# Patient Record
Sex: MALE | Race: WHITE | Employment: FULL TIME | URBAN - METROPOLITAN AREA
[De-identification: names, ages, dates, MRNs, and addresses within clinical notes are randomized per-mention and may not be internally consistent; named-entity substitution may affect disease eponyms.]

---

## 2018-07-30 ENCOUNTER — OFFICE VISIT (OUTPATIENT)
Dept: FAMILY MEDICINE CLINIC | Age: 48
End: 2018-07-30

## 2018-07-30 VITALS
WEIGHT: 241 LBS | DIASTOLIC BLOOD PRESSURE: 78 MMHG | RESPIRATION RATE: 16 BRPM | HEART RATE: 78 BPM | SYSTOLIC BLOOD PRESSURE: 130 MMHG | OXYGEN SATURATION: 97 % | BODY MASS INDEX: 33.74 KG/M2 | HEIGHT: 71 IN

## 2018-07-30 DIAGNOSIS — I10 ESSENTIAL HYPERTENSION: ICD-10-CM

## 2018-07-30 DIAGNOSIS — S31.809A WOUND OF BUTTOCK, UNSPECIFIED LATERALITY, INITIAL ENCOUNTER: ICD-10-CM

## 2018-07-30 DIAGNOSIS — I48.91 ATRIAL FIBRILLATION, UNSPECIFIED TYPE (HCC): Primary | ICD-10-CM

## 2018-07-30 DIAGNOSIS — Z94.5 HISTORY OF SKIN GRAFT: ICD-10-CM

## 2018-07-30 DIAGNOSIS — E11.9 TYPE 2 DIABETES MELLITUS WITHOUT COMPLICATION, WITHOUT LONG-TERM CURRENT USE OF INSULIN (HCC): Primary | ICD-10-CM

## 2018-07-30 PROCEDURE — 99203 OFFICE O/P NEW LOW 30 MIN: CPT | Performed by: FAMILY MEDICINE

## 2018-07-30 RX ORDER — METOPROLOL TARTRATE 50 MG/1
50 TABLET, FILM COATED ORAL 2 TIMES DAILY
COMMUNITY
End: 2018-07-30 | Stop reason: SDUPTHER

## 2018-07-30 RX ORDER — LISINOPRIL 20 MG/1
20 TABLET ORAL DAILY
COMMUNITY
End: 2018-07-30 | Stop reason: SDUPTHER

## 2018-07-30 RX ORDER — LISINOPRIL 20 MG/1
20 TABLET ORAL DAILY
Qty: 30 TABLET | Refills: 2 | Status: SHIPPED | OUTPATIENT
Start: 2018-07-30 | End: 2018-10-24 | Stop reason: SDUPTHER

## 2018-07-30 RX ORDER — METOPROLOL TARTRATE 50 MG/1
50 TABLET, FILM COATED ORAL 2 TIMES DAILY
Qty: 60 TABLET | Refills: 2 | Status: SHIPPED | OUTPATIENT
Start: 2018-07-30 | End: 2018-10-24 | Stop reason: SDUPTHER

## 2018-07-30 ASSESSMENT — PATIENT HEALTH QUESTIONNAIRE - PHQ9
2. FEELING DOWN, DEPRESSED OR HOPELESS: 0
SUM OF ALL RESPONSES TO PHQ9 QUESTIONS 1 & 2: 0
1. LITTLE INTEREST OR PLEASURE IN DOING THINGS: 0
SUM OF ALL RESPONSES TO PHQ QUESTIONS 1-9: 0

## 2018-07-30 ASSESSMENT — ENCOUNTER SYMPTOMS: DIARRHEA: 0

## 2018-07-30 NOTE — PROGRESS NOTES
7/30/2018    This is a 53 y.o. male   Chief Complaint   Patient presents with   Dante Samuels Established New Doctor     Had A1C and other labs done on Friday will send copy   . HPI  Pt presents today as a new pt to establish a PCP. Past medical history includes A. Fib, hypertension, and type 2 diabetes. States that he had his hemoglobin A1C and additional labs done 3 days ago. Sees an Endocrinologist.    States that he has had 4 surgeries including a graft for an anal hair follicle that spread to his scrotum and developed into flesh eating bacteria. Most recent skin graft was in early July. Sees a wound specialist and has an appointment in 3 days. Uses Xeroform dressing. Will go back to [facility] for colostomy reversal later this year. Past Medical History:   Diagnosis Date    Allergic rhinitis     Atrial fibrillation (HCC)     Hyperlipidemia     Hypertension     Type 2 diabetes mellitus without complication [facility])        Past Surgical History:   Procedure Laterality Date    COLONOSCOPY  06/05/2018    INFECTED SKIN DEBRIDEMENT  06/05/2018-06/08/2018    Removal of infected tissue and flesh from anus scrotum and surrounding area    KNEE ARTHROSCOPY Left     SKIN GRAFT  07/01/2018       Social History     Social History    Marital status:      Spouse name: N/A    Number of children: N/A    Years of education: N/A     Occupational History    Not on file.      Social History Main Topics    Smoking status: Never Smoker    Smokeless tobacco: Never Used    Alcohol use Yes      Comment: monthly 3 beers    Drug use: No    Sexual activity: Yes     Partners: Female     Other Topics Concern    Not on file     Social History Narrative    No narrative on file       Family History   Problem Relation Age of Onset    Diabetes Father        Current Outpatient Prescriptions   Medication Sig Dispense Refill    sitaGLIPtan-metformin (JANUMET)  MG per tablet Take 1 tablet by mouth 2 times daily (with meals)      lisinopril (PRINIVIL;ZESTRIL) 20 MG tablet Take 20 mg by mouth daily      metoprolol tartrate (LOPRESSOR) 50 MG tablet Take 50 mg by mouth 2 times daily       No current facility-administered medications for this visit. There is no immunization history on file for this patient. Allergies   Allergen Reactions    No Known Allergies        No results found for any previous visit. Review of Systems   Constitutional: Negative for chills and fever. Gastrointestinal: Negative for diarrhea. Genitourinary:        Healing anal/scrotal graft       /78 (Site: Left Arm, Position: Sitting, Cuff Size: Medium Adult)   Pulse 78   Resp 16   Ht 5' 10.5\" (1.791 m)   Wt 241 lb (109.3 kg)   SpO2 97%   BMI 34.09 kg/m²     Physical Exam   Constitutional: He is oriented to person, place, and time. He appears well-developed and well-nourished. HENT:   Head: Normocephalic and atraumatic. Eyes: EOM are normal. Pupils are equal, round, and reactive to light. Neck: Normal range of motion. Cardiovascular: Normal rate, regular rhythm and normal heart sounds. Pulmonary/Chest: Effort normal and breath sounds normal.   Abdominal: Bowel sounds are normal. There is no tenderness. Neurological: He is alert and oriented to person, place, and time. Psychiatric: He has a normal mood and affect. His behavior is normal. Judgment and thought content normal.       Plan   Diagnosis Orders   1. Type 2 diabetes mellitus without complication, without long-term current use of insulin ([facility] [address]  Patient had A1C done 3 days ago, as well as other labs and will have them sent to us. 2. Essential hypertension     3. History of skin graft    4. Wound Buttock    Return in about 2 months (around 9/30/2018) for Physical Exam.    Prior to Visit Medications    Medication Sig Taking?  Authorizing Provider   sitaGLIPtan-metformin (JANUMET)  MG per tablet Take 1 tablet by mouth 2 times daily (with meals)

## 2018-10-24 ENCOUNTER — OFFICE VISIT (OUTPATIENT)
Dept: FAMILY MEDICINE CLINIC | Age: 48
End: 2018-10-24

## 2018-10-24 VITALS
WEIGHT: 254 LBS | HEART RATE: 83 BPM | HEIGHT: 71 IN | BODY MASS INDEX: 35.56 KG/M2 | TEMPERATURE: 98.1 F | SYSTOLIC BLOOD PRESSURE: 153 MMHG | DIASTOLIC BLOOD PRESSURE: 97 MMHG | RESPIRATION RATE: 16 BRPM

## 2018-10-24 DIAGNOSIS — E11.9 TYPE 2 DIABETES MELLITUS WITHOUT COMPLICATION, WITHOUT LONG-TERM CURRENT USE OF INSULIN (HCC): ICD-10-CM

## 2018-10-24 DIAGNOSIS — I48.91 ATRIAL FIBRILLATION, UNSPECIFIED TYPE (HCC): ICD-10-CM

## 2018-10-24 DIAGNOSIS — J30.2 SEASONAL ALLERGIES: ICD-10-CM

## 2018-10-24 DIAGNOSIS — I10 ESSENTIAL HYPERTENSION: ICD-10-CM

## 2018-10-24 DIAGNOSIS — Z00.00 ANNUAL PHYSICAL EXAM: Primary | ICD-10-CM

## 2018-10-24 LAB
CREATININE URINE POCT: ABNORMAL
HBA1C MFR BLD: 7.7 %
MICROALBUMIN/CREAT 24H UR: ABNORMAL MG/G{CREAT}
MICROALBUMIN/CREAT UR-RTO: ABNORMAL

## 2018-10-24 PROCEDURE — 82044 UR ALBUMIN SEMIQUANTITATIVE: CPT | Performed by: FAMILY MEDICINE

## 2018-10-24 PROCEDURE — 99396 PREV VISIT EST AGE 40-64: CPT | Performed by: FAMILY MEDICINE

## 2018-10-24 PROCEDURE — 83036 HEMOGLOBIN GLYCOSYLATED A1C: CPT | Performed by: FAMILY MEDICINE

## 2018-10-24 RX ORDER — FLUTICASONE PROPIONATE 50 MCG
1 SPRAY, SUSPENSION (ML) NASAL DAILY
Qty: 1 BOTTLE | Refills: 0 | Status: SHIPPED | OUTPATIENT
Start: 2018-10-24 | End: 2019-12-05

## 2018-10-24 RX ORDER — LISINOPRIL 20 MG/1
20 TABLET ORAL DAILY
Qty: 30 TABLET | Refills: 2 | Status: SHIPPED | OUTPATIENT
Start: 2018-10-24 | End: 2019-02-08 | Stop reason: SDUPTHER

## 2018-10-24 RX ORDER — METOPROLOL TARTRATE 50 MG/1
50 TABLET, FILM COATED ORAL 2 TIMES DAILY
Qty: 60 TABLET | Refills: 2 | Status: SHIPPED | OUTPATIENT
Start: 2018-10-24 | End: 2019-02-08 | Stop reason: SDUPTHER

## 2018-10-24 ASSESSMENT — PATIENT HEALTH QUESTIONNAIRE - PHQ9
2. FEELING DOWN, DEPRESSED OR HOPELESS: 0
1. LITTLE INTEREST OR PLEASURE IN DOING THINGS: 0
SUM OF ALL RESPONSES TO PHQ QUESTIONS 1-9: 0
SUM OF ALL RESPONSES TO PHQ9 QUESTIONS 1 & 2: 0
SUM OF ALL RESPONSES TO PHQ QUESTIONS 1-9: 0

## 2018-10-24 ASSESSMENT — ENCOUNTER SYMPTOMS
ABDOMINAL PAIN: 0
SHORTNESS OF BREATH: 0
COLOR CHANGE: 0
COUGH: 1
CONSTIPATION: 0
EYE PAIN: 0
DIARRHEA: 0
BACK PAIN: 0
EYE ITCHING: 0

## 2019-02-08 DIAGNOSIS — I10 ESSENTIAL HYPERTENSION: ICD-10-CM

## 2019-02-08 DIAGNOSIS — I48.91 ATRIAL FIBRILLATION, UNSPECIFIED TYPE (HCC): ICD-10-CM

## 2019-02-08 RX ORDER — METOPROLOL TARTRATE 50 MG/1
TABLET, FILM COATED ORAL
Qty: 60 TABLET | Refills: 2 | Status: SHIPPED | OUTPATIENT
Start: 2019-02-08 | End: 2019-05-10 | Stop reason: SDUPTHER

## 2019-02-08 RX ORDER — LISINOPRIL 20 MG/1
TABLET ORAL
Qty: 30 TABLET | Refills: 2 | Status: SHIPPED | OUTPATIENT
Start: 2019-02-08 | End: 2019-05-10 | Stop reason: SDUPTHER

## 2019-02-11 PROBLEM — T81.89XA SURGICAL WOUND, NON HEALING: Status: ACTIVE | Noted: 2018-07-14

## 2019-02-11 PROBLEM — N49.3 FOURNIER'S GANGRENE OF SCROTUM: Status: ACTIVE | Noted: 2018-07-14

## 2019-02-11 PROBLEM — M72.6 NECROTIZING FASCIITIS (HCC): Status: ACTIVE | Noted: 2018-06-05

## 2019-02-11 PROBLEM — Z93.3 COLOSTOMY IN PLACE (HCC): Status: ACTIVE | Noted: 2018-07-14

## 2019-02-11 PROBLEM — E66.811 OBESITY, CLASS I, BMI 30-34.9: Status: ACTIVE | Noted: 2018-07-24

## 2019-02-11 PROBLEM — Z94.5 S/P SPLIT THICKNESS SKIN GRAFT: Status: ACTIVE | Noted: 2018-07-14

## 2019-02-11 PROBLEM — E66.9 OBESITY, CLASS I, BMI 30-34.9: Status: ACTIVE | Noted: 2018-07-24

## 2019-05-10 ENCOUNTER — OFFICE VISIT (OUTPATIENT)
Dept: FAMILY MEDICINE CLINIC | Age: 49
End: 2019-05-10

## 2019-05-10 VITALS
RESPIRATION RATE: 16 BRPM | WEIGHT: 264 LBS | SYSTOLIC BLOOD PRESSURE: 138 MMHG | BODY MASS INDEX: 36.96 KG/M2 | HEART RATE: 99 BPM | HEIGHT: 71 IN | OXYGEN SATURATION: 96 % | TEMPERATURE: 97.6 F | DIASTOLIC BLOOD PRESSURE: 86 MMHG

## 2019-05-10 DIAGNOSIS — E11.9 TYPE 2 DIABETES MELLITUS WITHOUT COMPLICATION, WITHOUT LONG-TERM CURRENT USE OF INSULIN (HCC): Primary | ICD-10-CM

## 2019-05-10 DIAGNOSIS — I10 ESSENTIAL HYPERTENSION: ICD-10-CM

## 2019-05-10 DIAGNOSIS — I48.91 ATRIAL FIBRILLATION, UNSPECIFIED TYPE (HCC): ICD-10-CM

## 2019-05-10 LAB — HBA1C MFR BLD: 9.8 %

## 2019-05-10 PROCEDURE — 83036 HEMOGLOBIN GLYCOSYLATED A1C: CPT | Performed by: FAMILY MEDICINE

## 2019-05-10 PROCEDURE — 99214 OFFICE O/P EST MOD 30 MIN: CPT | Performed by: FAMILY MEDICINE

## 2019-05-10 RX ORDER — METOPROLOL TARTRATE 50 MG/1
TABLET, FILM COATED ORAL
Qty: 60 TABLET | Refills: 2 | Status: SHIPPED | OUTPATIENT
Start: 2019-05-10 | End: 2019-08-16 | Stop reason: SDUPTHER

## 2019-05-10 RX ORDER — LISINOPRIL 20 MG/1
TABLET ORAL
Qty: 30 TABLET | Refills: 2 | Status: SHIPPED | OUTPATIENT
Start: 2019-05-10 | End: 2019-08-16 | Stop reason: SDUPTHER

## 2019-05-10 ASSESSMENT — PATIENT HEALTH QUESTIONNAIRE - PHQ9
SUM OF ALL RESPONSES TO PHQ9 QUESTIONS 1 & 2: 0
SUM OF ALL RESPONSES TO PHQ QUESTIONS 1-9: 0
1. LITTLE INTEREST OR PLEASURE IN DOING THINGS: 0
2. FEELING DOWN, DEPRESSED OR HOPELESS: 0
SUM OF ALL RESPONSES TO PHQ QUESTIONS 1-9: 0

## 2019-07-01 DIAGNOSIS — E11.9 DM (DIABETES MELLITUS) (HCC): ICD-10-CM

## 2019-08-16 DIAGNOSIS — I10 ESSENTIAL HYPERTENSION: ICD-10-CM

## 2019-08-16 DIAGNOSIS — I48.91 ATRIAL FIBRILLATION, UNSPECIFIED TYPE (HCC): ICD-10-CM

## 2019-08-16 RX ORDER — METOPROLOL TARTRATE 50 MG/1
TABLET, FILM COATED ORAL
Qty: 180 TABLET | Refills: 0 | Status: SHIPPED | OUTPATIENT
Start: 2019-08-16 | End: 2019-12-05 | Stop reason: SDUPTHER

## 2019-08-16 RX ORDER — LISINOPRIL 20 MG/1
TABLET ORAL
Qty: 90 TABLET | Refills: 0 | Status: SHIPPED | OUTPATIENT
Start: 2019-08-16 | End: 2019-11-19 | Stop reason: SDUPTHER

## 2019-11-19 DIAGNOSIS — I10 ESSENTIAL HYPERTENSION: ICD-10-CM

## 2019-11-19 RX ORDER — LISINOPRIL 20 MG/1
TABLET ORAL
Qty: 90 TABLET | Refills: 0 | Status: SHIPPED | OUTPATIENT
Start: 2019-11-19 | End: 2019-12-05 | Stop reason: SDUPTHER

## 2019-12-05 ENCOUNTER — OFFICE VISIT (OUTPATIENT)
Dept: FAMILY MEDICINE CLINIC | Age: 49
End: 2019-12-05

## 2019-12-05 VITALS
SYSTOLIC BLOOD PRESSURE: 143 MMHG | TEMPERATURE: 97.1 F | HEART RATE: 78 BPM | HEIGHT: 71 IN | DIASTOLIC BLOOD PRESSURE: 86 MMHG | BODY MASS INDEX: 36.12 KG/M2 | WEIGHT: 258 LBS | RESPIRATION RATE: 16 BRPM | OXYGEN SATURATION: 97 %

## 2019-12-05 DIAGNOSIS — E11.9 TYPE 2 DIABETES MELLITUS WITHOUT COMPLICATION, WITHOUT LONG-TERM CURRENT USE OF INSULIN (HCC): ICD-10-CM

## 2019-12-05 DIAGNOSIS — I10 ESSENTIAL HYPERTENSION: ICD-10-CM

## 2019-12-05 DIAGNOSIS — I10 ESSENTIAL HYPERTENSION: Primary | ICD-10-CM

## 2019-12-05 DIAGNOSIS — I48.91 ATRIAL FIBRILLATION, UNSPECIFIED TYPE (HCC): ICD-10-CM

## 2019-12-05 LAB
CREATININE URINE POCT: 300
HBA1C MFR BLD: 10 %
MICROALBUMIN/CREAT 24H UR: 150 MG/G{CREAT}
MICROALBUMIN/CREAT UR-RTO: NORMAL

## 2019-12-05 PROCEDURE — 83036 HEMOGLOBIN GLYCOSYLATED A1C: CPT | Performed by: FAMILY MEDICINE

## 2019-12-05 PROCEDURE — 99214 OFFICE O/P EST MOD 30 MIN: CPT | Performed by: FAMILY MEDICINE

## 2019-12-05 PROCEDURE — 82044 UR ALBUMIN SEMIQUANTITATIVE: CPT | Performed by: FAMILY MEDICINE

## 2019-12-05 RX ORDER — METOPROLOL TARTRATE 50 MG/1
TABLET, FILM COATED ORAL
Qty: 180 TABLET | Refills: 0 | Status: SHIPPED | OUTPATIENT
Start: 2019-12-05 | End: 2020-03-16

## 2019-12-05 RX ORDER — LISINOPRIL 20 MG/1
TABLET ORAL
Qty: 90 TABLET | Refills: 0 | Status: SHIPPED | OUTPATIENT
Start: 2019-12-05 | End: 2020-03-16

## 2019-12-05 RX ORDER — BLOOD PRESSURE TEST KIT
1 KIT MISCELLANEOUS 2 TIMES DAILY
Qty: 1 KIT | Refills: 0 | Status: CANCELLED | OUTPATIENT
Start: 2019-12-05

## 2019-12-05 ASSESSMENT — PATIENT HEALTH QUESTIONNAIRE - PHQ9
2. FEELING DOWN, DEPRESSED OR HOPELESS: 0
1. LITTLE INTEREST OR PLEASURE IN DOING THINGS: 0
SUM OF ALL RESPONSES TO PHQ9 QUESTIONS 1 & 2: 0
SUM OF ALL RESPONSES TO PHQ QUESTIONS 1-9: 0
SUM OF ALL RESPONSES TO PHQ QUESTIONS 1-9: 0

## 2020-03-16 RX ORDER — LISINOPRIL 20 MG/1
TABLET ORAL
Qty: 30 TABLET | Refills: 0 | Status: SHIPPED | OUTPATIENT
Start: 2020-03-16 | End: 2020-06-12 | Stop reason: SDUPTHER

## 2020-03-16 RX ORDER — METOPROLOL TARTRATE 50 MG/1
TABLET, FILM COATED ORAL
Qty: 60 TABLET | Refills: 0 | Status: SHIPPED | OUTPATIENT
Start: 2020-03-16 | End: 2020-06-12 | Stop reason: SDUPTHER

## 2020-03-16 NOTE — TELEPHONE ENCOUNTER
Please let pt know that I hae refilled his medication, but that he is 2 months overdue for his diabetic follow-up. His last A1C was elevated, and it is crucial that he follow-up to better control his diabetes. Thank you.

## 2020-06-12 ENCOUNTER — VIRTUAL VISIT (OUTPATIENT)
Dept: FAMILY MEDICINE CLINIC | Age: 50
End: 2020-06-12
Payer: COMMERCIAL

## 2020-06-12 PROCEDURE — 2022F DILAT RTA XM EVC RTNOPTHY: CPT | Performed by: FAMILY MEDICINE

## 2020-06-12 PROCEDURE — 99214 OFFICE O/P EST MOD 30 MIN: CPT | Performed by: FAMILY MEDICINE

## 2020-06-12 PROCEDURE — 3046F HEMOGLOBIN A1C LEVEL >9.0%: CPT | Performed by: FAMILY MEDICINE

## 2020-06-12 PROCEDURE — G8427 DOCREV CUR MEDS BY ELIG CLIN: HCPCS | Performed by: FAMILY MEDICINE

## 2020-06-12 RX ORDER — METOPROLOL TARTRATE 50 MG/1
TABLET, FILM COATED ORAL
Qty: 180 TABLET | Refills: 0 | Status: SHIPPED | OUTPATIENT
Start: 2020-06-12 | End: 2020-09-14

## 2020-06-12 RX ORDER — EMPAGLIFLOZIN 10 MG/1
10 TABLET, FILM COATED ORAL DAILY
Qty: 90 TABLET | Refills: 0 | Status: SHIPPED | OUTPATIENT
Start: 2020-06-12 | End: 2020-12-29 | Stop reason: ALTCHOICE

## 2020-06-12 RX ORDER — LISINOPRIL 20 MG/1
TABLET ORAL
Qty: 90 TABLET | Refills: 0 | Status: SHIPPED | OUTPATIENT
Start: 2020-06-12 | End: 2020-09-14

## 2020-06-12 ASSESSMENT — ENCOUNTER SYMPTOMS: BACK PAIN: 1

## 2020-06-22 ENCOUNTER — TELEPHONE (OUTPATIENT)
Dept: FAMILY MEDICINE CLINIC | Age: 50
End: 2020-06-22

## 2020-06-29 ENCOUNTER — TELEPHONE (OUTPATIENT)
Dept: FAMILY MEDICINE CLINIC | Age: 50
End: 2020-06-29

## 2020-08-12 ENCOUNTER — TELEPHONE (OUTPATIENT)
Dept: SURGERY | Age: 50
End: 2020-08-12

## 2020-08-12 ENCOUNTER — TELEPHONE (OUTPATIENT)
Dept: FAMILY MEDICINE CLINIC | Age: 50
End: 2020-08-12

## 2020-08-12 NOTE — TELEPHONE ENCOUNTER
Pt was referred by his PCP for a Colostomy Reversal. Pt had original surgery done in 2018 in [facility]. But now [place] and [place] ins. will not cover the procedure if he were to go back to original doctors. He is wanting to schedule an appt. If you would like me to schedule him with Dr. Emerson Rios for a consult please let me know.

## 2020-08-13 NOTE — TELEPHONE ENCOUNTER
Called & spoke to pt  He called [address] started it    Pt needs a letter written from Dr. Bang Fitting with the orders.     Pt will cb with fax #

## 2020-08-15 NOTE — TELEPHONE ENCOUNTER
I signed a form late this week, was it sent and is this the needed information from me that the pt is referring to? Thank you.

## 2020-09-14 RX ORDER — LISINOPRIL 20 MG/1
TABLET ORAL
Qty: 90 TABLET | Refills: 0 | Status: SHIPPED | OUTPATIENT
Start: 2020-09-14 | End: 2020-10-16 | Stop reason: SDUPTHER

## 2020-09-14 RX ORDER — METOPROLOL TARTRATE 50 MG/1
TABLET, FILM COATED ORAL
Qty: 180 TABLET | Refills: 0 | Status: SHIPPED | OUTPATIENT
Start: 2020-09-14 | End: 2020-10-16 | Stop reason: SDUPTHER

## 2020-09-16 ENCOUNTER — APPOINTMENT (OUTPATIENT)
Dept: GENERAL RADIOLOGY | Age: 50
End: 2020-09-16
Payer: MEDICAID

## 2020-09-16 ENCOUNTER — HOSPITAL ENCOUNTER (EMERGENCY)
Age: 50
Discharge: HOME | End: 2020-09-16
Payer: MEDICAID

## 2020-09-16 VITALS
HEART RATE: 88 BPM | SYSTOLIC BLOOD PRESSURE: 147 MMHG | DIASTOLIC BLOOD PRESSURE: 87 MMHG | OXYGEN SATURATION: 99 % | TEMPERATURE: 98.8 F | BODY MASS INDEX: 35.7 KG/M2 | HEIGHT: 71 IN | WEIGHT: 255 LBS | RESPIRATION RATE: 16 BRPM

## 2020-09-16 LAB
A/G RATIO: 1.4 (ref 1.1–2.2)
ALBUMIN SERPL-MCNC: 4.3 G/DL (ref 3.4–5)
ALP BLD-CCNC: 102 U/L (ref 40–129)
ALT SERPL-CCNC: 25 U/L (ref 10–40)
ANION GAP SERPL CALCULATED.3IONS-SCNC: 13 MMOL/L (ref 3–16)
AST SERPL-CCNC: 9 U/L (ref 15–37)
BASOPHILS ABSOLUTE: 0.1 K/UL (ref 0–0.2)
BASOPHILS RELATIVE PERCENT: 0.6 %
BILIRUB SERPL-MCNC: 1.5 MG/DL (ref 0–1)
BUN BLDV-MCNC: 14 MG/DL (ref 7–20)
CALCIUM SERPL-MCNC: 9.9 MG/DL (ref 8.3–10.6)
CHLORIDE BLD-SCNC: 98 MMOL/L (ref 99–110)
CO2: 23 MMOL/L (ref 21–32)
CREAT SERPL-MCNC: 1 MG/DL (ref 0.9–1.3)
EOSINOPHILS ABSOLUTE: 0.1 K/UL (ref 0–0.6)
EOSINOPHILS RELATIVE PERCENT: 0.9 %
GFR AFRICAN AMERICAN: >60
GFR NON-AFRICAN AMERICAN: >60
GLOBULIN: 3 G/DL
GLUCOSE BLD-MCNC: 296 MG/DL (ref 70–99)
HCT VFR BLD CALC: 38.6 % (ref 40.5–52.5)
HEMOGLOBIN: 13.6 G/DL (ref 13.5–17.5)
LYMPHOCYTES ABSOLUTE: 1.5 K/UL (ref 1–5.1)
LYMPHOCYTES RELATIVE PERCENT: 16.7 %
MCH RBC QN AUTO: 29.7 PG (ref 26–34)
MCHC RBC AUTO-ENTMCNC: 35.2 G/DL (ref 31–36)
MCV RBC AUTO: 84.6 FL (ref 80–100)
MONOCYTES ABSOLUTE: 0.8 K/UL (ref 0–1.3)
MONOCYTES RELATIVE PERCENT: 8.5 %
NEUTROPHILS ABSOLUTE: 6.5 K/UL (ref 1.7–7.7)
NEUTROPHILS RELATIVE PERCENT: 73.3 %
PDW BLD-RTO: 12.6 % (ref 12.4–15.4)
PLATELET # BLD: 160 K/UL (ref 135–450)
PMV BLD AUTO: 7.7 FL (ref 5–10.5)
POTASSIUM REFLEX MAGNESIUM: 4.5 MMOL/L (ref 3.5–5.1)
RBC # BLD: 4.57 M/UL (ref 4.2–5.9)
SODIUM BLD-SCNC: 134 MMOL/L (ref 136–145)
TOTAL PROTEIN: 7.3 G/DL (ref 6.4–8.2)
WBC # BLD: 8.9 K/UL (ref 4–11)

## 2020-09-16 PROCEDURE — 85025 COMPLETE CBC W/AUTO DIFF WBC: CPT

## 2020-09-16 PROCEDURE — 99283 EMERGENCY DEPT VISIT LOW MDM: CPT

## 2020-09-16 PROCEDURE — 6360000002 HC RX W HCPCS: Performed by: PHYSICIAN ASSISTANT

## 2020-09-16 PROCEDURE — 87040 BLOOD CULTURE FOR BACTERIA: CPT

## 2020-09-16 PROCEDURE — 96365 THER/PROPH/DIAG IV INF INIT: CPT

## 2020-09-16 PROCEDURE — 80053 COMPREHEN METABOLIC PANEL: CPT

## 2020-09-16 PROCEDURE — 83036 HEMOGLOBIN GLYCOSYLATED A1C: CPT

## 2020-09-16 PROCEDURE — 73630 X-RAY EXAM OF FOOT: CPT

## 2020-09-16 PROCEDURE — 2580000003 HC RX 258: Performed by: PHYSICIAN ASSISTANT

## 2020-09-16 RX ORDER — 0.9 % SODIUM CHLORIDE 0.9 %
1000 INTRAVENOUS SOLUTION INTRAVENOUS ONCE
Status: COMPLETED | OUTPATIENT
Start: 2020-09-16 | End: 2020-09-16

## 2020-09-16 RX ORDER — AMOXICILLIN AND CLAVULANATE POTASSIUM 875; 125 MG/1; MG/1
1 TABLET, FILM COATED ORAL 2 TIMES DAILY
Qty: 20 TABLET | Refills: 0 | Status: SHIPPED | OUTPATIENT
Start: 2020-09-16 | End: 2020-09-26

## 2020-09-16 RX ADMIN — SODIUM CHLORIDE 1000 ML: 9 INJECTION, SOLUTION INTRAVENOUS at 15:22

## 2020-09-16 RX ADMIN — AMPICILLIN SODIUM AND SULBACTAM SODIUM 3 G: 2; 1 INJECTION, POWDER, FOR SOLUTION INTRAMUSCULAR; INTRAVENOUS at 14:39

## 2020-09-16 NOTE — ED NOTES
Bed: 29  Expected date:   Expected time:   Means of arrival:   Comments:     Jimy Holland RN  09/16/20 2883

## 2020-09-16 NOTE — ED PROVIDER NOTES
HPI.    REVIEW OF SYSTEMS    (2-9 systems for level 4, 10 or more for level 5)     Review of Systems    Positives and Pertinent negatives as per HPI. Except as noted above in the ROS, all other systems were reviewed and negative.        PAST MEDICAL HISTORY     Past Medical History:   Diagnosis Date    Allergic rhinitis     [facility] ([facility] [address]     Hyperlipidemia     Hypertension     Type 2 diabetes mellitus without complication ([facility] [address]          SURGICAL HISTORY     Past Surgical History:   Procedure Laterality Date    COLONOSCOPY  06/05/2018    INFECTED SKIN DEBRIDEMENT  06/05/2018-06/08/2018    Removal of infected tissue and flesh from anus scrotum and surrounding area    KNEE ARTHROSCOPY Left     SKIN GRAFT  07/01/2018         CURRENTMEDICATIONS       Discharge Medication List as of 9/16/2020  3:32 PM      CONTINUE these medications which have NOT CHANGED    Details   metFORMIN (GLUCOPHAGE) 1000 MG tablet TAKE 1 TABLET BY MOUTH TWICE DAILY WITH MEALS, Disp-180 tablet,R-0Normal      metoprolol tartrate (LOPRESSOR) 50 MG tablet Take 1 tablet by mouth twice daily, Disp-180 tablet,R-0Normal      lisinopril (PRINIVIL;ZESTRIL) 20 MG tablet Take 1 tablet by mouth once daily, Disp-90 tablet,R-0Normal      empagliflozin (JARDIANCE) 10 MG tablet Take 1 tablet by mouth daily, Disp-90 tablet,R-0Normal               ALLERGIES     No known allergies    FAMILYHISTORY       Family History   Problem Relation Age of Onset    Diabetes Father           SOCIAL HISTORY       Social History     Tobacco Use    Smoking status: Never Smoker    Smokeless tobacco: Never Used   Substance Use Topics    Alcohol use: Yes     Comment: monthly 3 beers    Drug use: No       SCREENINGS             PHYSICAL EXAM    (up to 7 for level 4, 8 or more for level 5)     ED Triage Vitals [09/16/20 1341]   BP Temp Temp Source Pulse Resp SpO2 Height Weight   (!) 181/110 98.8 °F (37.1 °C) Oral 102 18 98 % 5' 11\" (1.803 m) 255 lb (115.7 kg) Physical Exam  Vitals signs and nursing note reviewed. Constitutional:       Appearance: Normal appearance. He is well-developed. He is obese. HENT:      Head: Normocephalic and atraumatic. Right Ear: External ear normal.      Left Ear: External ear normal.   Eyes:      General: No scleral icterus. Right eye: No discharge. Left eye: No discharge. Conjunctiva/sclera: Conjunctivae normal.   Neck:      Musculoskeletal: Normal range of motion and neck supple. Cardiovascular:      Rate and Rhythm: Normal rate and regular rhythm. Heart sounds: Normal heart sounds. Pulmonary:      Effort: Pulmonary effort is normal.      Breath sounds: Normal breath sounds. Musculoskeletal:         General: Signs of injury present. Comments: Patient has loss of the second right nail plate. He has epidermal tissue loss tip of the great toe right foot. He has some erythema involving the dorsum of the foot to mid metatarsal area. Area is tender. He has what appears to be a blood blister on the base and lateral aspect of the great toe. See picture. Skin:     General: Skin is warm and dry. Neurological:      General: No focal deficit present. Mental Status: He is alert and oriented to person, place, and time. Mental status is at baseline. Psychiatric:         Mood and Affect: Mood normal.         Behavior: Behavior normal.         Thought Content:  Thought content normal.         Judgment: Judgment normal.               DIAGNOSTIC RESULTS   LABS:    Labs Reviewed   CBC WITH AUTO DIFFERENTIAL - Abnormal; Notable for the following components:       Result Value    Hematocrit 38.6 (*)     All other components within normal limits    Narrative:     Performed at:  [address], [address] [place] [address]   Phone [phone]   COMPREHENSIVE METABOLIC PANEL W/ REFLEX TO MG FOR LOW K - Abnormal; Notable for the following components:    Sodium 134 (*)     Chloride 98 (*)     Glucose 296 (*)     Total Bilirubin 1.5 (*)     AST 9 (*)     All other components within normal limits    Narrative:     Performed at:  [facility]) [address],  [place], [place]   Phone [phone]   CULTURE, BLOOD 2   CULTURE, BLOOD 1   HEMOGLOBIN A1C       All other labs were within normal range or not returned as of this dictation. EKG: All EKG's are interpreted by the Emergency Department Physician in the absence of a cardiologist.  Please see their note for interpretation of EKG. RADIOLOGY:   Non-plain film images such as CT, Ultrasound and MRI are read by the radiologist. Plain radiographic images are visualized and preliminarily interpreted by the ED Provider with the below findings:    X-ray of the patient's right great toe shows comminuted minimally displaced fracture distal phalanx. No gas in tissue or tissue swelling as noted by radiology. Interpretation per the Radiologist below, if available at the time of this note:    XR FOOT RIGHT (MIN 3 VIEWS)   Final Result   Comminuted minimally displaced fracture 1st distal phalanx           No results found. PROCEDURES     The patient's right foot was soaked in a chlorhexidine and saline solution. Epidermal tissue was debrided. The area was gently cleansed. Antibiotic ointment applied along with dry sterile dressing. Postop shoe fitted on the patient.      Procedures    CRITICAL CARE TIME   N/A    CONSULTS:  None      EMERGENCY DEPARTMENT COURSE and DIFFERENTIAL DIAGNOSIS/MDM:   Vitals:    Vitals:    09/16/20 1341 09/16/20 1447 09/16/20 1521 09/16/20 1638   BP: (!) 181/110 (!) 143/96 (!) 148/92 (!) 147/87   Pulse: 102 92 87 88   Resp: 18 16 16 16   Temp: 98.8 °F (37.1 °C)      TempSrc: Oral      SpO2: 98% 98% 97% 99%   Weight: 255 lb (115.7 kg)      Height: 5' 11\" (1.803 m)          Patient was given the following medications:  Medications   ampicillin-sulbactam (UNASYN) 3 g in sodium chloride 0.9 % 100 mL IVPB (0 g Intravenous Stopped 9/16/20 1543)   0.9 % sodium chloride bolus (0 mLs Intravenous Stopped 9/16/20 1633)           Patient resenting with history of traumatic injury to his right great toe and second toe occurring yesterday. He was trying to knock dirt out of a pipe when he struck the top of his foot more specifically the first and second digit right foot. He noted some tissue injury and loss of the second nail plate. He comes in for evaluation. He is diabetic peripheral neuropathy. X-ray shows a mildly comminuted slightly displaced fracture distal phalanx right great toe. The foot was soaked in a chlorhexidine saline solution, cleansed with antibiotic ointment and dressing applied. He is fitted in a postop shoe. With the pink or early erythema on the dorsum of the foot I would consider this to be a potential early emerging cellulitis. IV was started labs were drawn and he was given Unasyn 3 g IV. Laboratory studies obtained showing normal WBC as well as CMP. Patient be discharged with close follow-up by PCP or podiatry wound care specialist Dr. Chapin Varela on Friday or in 48 hours. The patient aware to return to this facility if symptoms worsen. He will be discharged with Augmentin 875 mg twice daily. He is aware to maintain strict elevation and observation of his foot. The patient does express understanding of his diagnosis and the treatment plan. FINAL IMPRESSION      1. Closed displaced fracture of distal phalanx of right great toe, initial encounter    2. Abrasion, right great toe, initial encounter    3. Traumatic avulsion of nail plate of toe, initial encounter    4.  Cellulitis of right foot          DISPOSITION/PLAN   DISPOSITION Decision To Discharge 09/16/2020 04:33:52 PM      PATIENT REFERREDTO:  [address]  [address], [address] [place] [address] Robert Rudd     Schedule an appointment as soon as possible for a visit in 2 days  Requesting urgent appointment    DO Sid Kelley    Schedule an appointment as soon as possible for a visit in 2 days      [facility]  ED  [address] [facility] [address]  Go to   If symptoms worsen      DISCHARGE MEDICATIONS:  Discharge Medication List as of 9/16/2020  3:32 PM      START taking these medications    Details   amoxicillin-clavulanate (AUGMENTIN) 875-125 MG per tablet Take 1 tablet by mouth 2 times daily for 10 days, Disp-20 tablet,R-0Print             DISCONTINUED MEDICATIONS:  Discharge Medication List as of 9/16/2020  3:32 PM                 (Please note that portions of this note were completed with a voice recognition program.  Efforts were made to edit the dictations but occasionally words are mis-transcribed. )    Bella Adams PA-C (electronically signed)           Bella Adams PA-C  09/16/20 1921

## 2020-09-16 NOTE — ED NOTES
Pt right great toe and foot applied with PSO/Adaptic/4x4 gauze/kerlex wrap.       Carolina Ear, LPN  95/50/84 1122

## 2020-09-17 ENCOUNTER — TELEPHONE (OUTPATIENT)
Dept: FAMILY MEDICINE CLINIC | Age: 50
End: 2020-09-17

## 2020-09-17 LAB
ESTIMATED AVERAGE GLUCOSE: 220.2 MG/DL
HBA1C MFR BLD: 9.3 %

## 2020-09-20 LAB
BLOOD CULTURE, ROUTINE: NORMAL
CULTURE, BLOOD 2: NORMAL

## 2020-09-23 ENCOUNTER — VIRTUAL VISIT (OUTPATIENT)
Dept: FAMILY MEDICINE CLINIC | Age: 50
End: 2020-09-23
Payer: MEDICAID

## 2020-09-23 ENCOUNTER — TELEPHONE (OUTPATIENT)
Dept: FAMILY MEDICINE CLINIC | Age: 50
End: 2020-09-23

## 2020-09-23 PROCEDURE — 2022F DILAT RTA XM EVC RTNOPTHY: CPT | Performed by: FAMILY MEDICINE

## 2020-09-23 PROCEDURE — G8427 DOCREV CUR MEDS BY ELIG CLIN: HCPCS | Performed by: FAMILY MEDICINE

## 2020-09-23 PROCEDURE — 99214 OFFICE O/P EST MOD 30 MIN: CPT | Performed by: FAMILY MEDICINE

## 2020-09-23 PROCEDURE — 3046F HEMOGLOBIN A1C LEVEL >9.0%: CPT | Performed by: FAMILY MEDICINE

## 2020-09-23 ASSESSMENT — PATIENT HEALTH QUESTIONNAIRE - PHQ9
SUM OF ALL RESPONSES TO PHQ QUESTIONS 1-9: 0
SUM OF ALL RESPONSES TO PHQ9 QUESTIONS 1 & 2: 0
SUM OF ALL RESPONSES TO PHQ QUESTIONS 1-9: 0
1. LITTLE INTEREST OR PLEASURE IN DOING THINGS: 0
2. FEELING DOWN, DEPRESSED OR HOPELESS: 0

## 2020-09-23 NOTE — PROGRESS NOTES
discoloration noted on facial skin         [] Abnormal-            Psychiatric:       [x] Normal Affect [] No Hallucinations        [] Abnormal-     Other pertinent observable physical exam findings-     ASSESSMENT/PLAN:  1. Type 2 diabetes mellitus without complication, without long-term current use of insulin (HCC)  - will check with [facility] regarding Jardiance PA  - continue metformin 1000 mg BID    2. Colostomy in place [facility])  - instructed pt to call [facility] to have his records sent to us so that Dr. Osiel Morris can review for future colostomy reversal surgery      Return in about 3 months (around 12/23/2020) for Diabetis F/U. Mikaela Rao is a 51 y.o. male being evaluated by a Virtual Visit (video visit) encounter to address concerns as mentioned above. A caregiver was present when appropriate. Due to this being a TeleHealth encounter (During [facility] public health emergency), evaluation of the following organ systems was limited: Vitals/Constitutional/EENT/Resp/CV/GI//MS/Neuro/Skin/Heme-Lymph-Imm. Pursuant to the emergency declaration under the [address] [place] [address], [address] [place] [address] authority and the [employer] Act, this Virtual Visit was conducted with patient's (and/or legal guardian's) consent, to reduce the patient's risk of exposure to COVID-19 and provide necessary medical care. The patient (and/or legal guardian) has also been advised to contact this office for worsening conditions or problems, and seek emergency medical treatment and/or call 911 if deemed necessary. Patient identification was verified at the start of the visit: Yes    Total time spent on this encounter: Not billed by time    Services were provided through a video synchronous discussion virtually to substitute for in-person clinic visit. Patient and provider were located at their individual homes.     --Connie Cook DO on 9/23/2020 at 2:46 PM    An electronic signature was used to authenticate this note.

## 2020-09-23 NOTE — TELEPHONE ENCOUNTER
Pt was prescribed Jardiance 10 mg on 06/12/2020 but states that [facility] wanted a PA first. Please call [address] [facility] and inquire about this. If paperwork needs to be signed, please have them fax it and place it on my keyboard to sign. Thank you.

## 2020-10-06 ENCOUNTER — TELEPHONE (OUTPATIENT)
Dept: FAMILY MEDICINE CLINIC | Age: 50
End: 2020-10-06

## 2020-10-06 NOTE — TELEPHONE ENCOUNTER
Called & spoke to pt. Mara worth has been denied  [facility] has also been denied. Pt will call his insurance co & find out which medication they will pay for. He will call us back with the medication.

## 2020-10-16 ENCOUNTER — TELEPHONE (OUTPATIENT)
Dept: FAMILY MEDICINE CLINIC | Age: 50
End: 2020-10-16

## 2020-10-16 RX ORDER — LISINOPRIL 20 MG/1
TABLET ORAL
Qty: 7 TABLET | Refills: 0 | Status: SHIPPED | OUTPATIENT
Start: 2020-10-16 | End: 2020-12-21 | Stop reason: SDUPTHER

## 2020-10-16 RX ORDER — METOPROLOL TARTRATE 50 MG/1
TABLET, FILM COATED ORAL
Qty: 14 TABLET | Refills: 0 | Status: SHIPPED | OUTPATIENT
Start: 2020-10-16 | End: 2020-12-21 | Stop reason: SDUPTHER
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
## 2020-10-16 NOTE — TELEPHONE ENCOUNTER
Pharm called saying the patient is out town and needing a 7 day supply of the Lopressor and Lisinopril. Pharm on file. Please advise.

## 2020-10-16 NOTE — TELEPHONE ENCOUNTER
I have resubmitted PA for Jardiance as pt has not reached an A1C of less than 9 while on Metformin for over 90 days. This was a requirement by [facility] to have Jardiance approved.

## 2020-10-19 NOTE — TELEPHONE ENCOUNTER
Angélica Kyle was again denied. Stating pt has not tried and failed a 90 day trial of metformin. When PA was submitted, I answered YES to pt failing a metformin due to uncontrolled A1C. Pt has been on metformin since 10/24/2018. A1C has been above 9.0 since 5/10/2019. Calling [facility] @ [phone] for Appeal.       Fax Number [phone]  Expedited request - [phone]    Any medical record notes as to why medication is needed. Spoke with Wilfrid Pepper. Call Reference number -- [id number]    Sent last 3 OV's and A1C's.

## 2020-12-21 NOTE — TELEPHONE ENCOUNTER
Future Appointments   Date Time Provider Myron Call   1/6/2021  3:00 PM DO NATHAN Granda  [address]     LOV 9/23/2020

## 2020-12-21 NOTE — TELEPHONE ENCOUNTER
----- Message from [employer] sent at 12/21/2020 11:06 AM EST -----  Subject: Refill Request    QUESTIONS  Name of Medication? metoprolol tartrate (LOPRESSOR) 50 MG tablet  Patient-reported dosage and instructions? one 50 mg twice a day  How many days do you have left? 5  Preferred Pharmacy? [facility]  Pharmacy phone number (if available)? [phone]  Additional Information for Provider? Med check scheduled for 1/6  ---------------------------------------------------------------------------  --------------    Name of Medication? lisinopril (PRINIVIL;ZESTRIL) 20 MG tablet  Patient-reported dosage and instructions? 20 mg once a day  How many days do you have left? 0  Preferred Pharmacy? [facility]  Pharmacy phone number (if available)? [phone]  Additional Information for Provider? med check set for 1/6  ---------------------------------------------------------------------------  --------------    Name of Medication? metFORMIN (GLUCOPHAGE) 1000 MG tablet  Patient-reported dosage and instructions? 1000 mg twice a day  How many days do you have left? 5  Preferred Pharmacy? [facility]  Pharmacy phone number (if available)? [phone]  Additional Information for Provider? med check 1/6  ---------------------------------------------------------------------------  --------------  CALL BACK INFO  What is the best way for the office to contact you? OK to leave message on   voicemail  Preferred Call Back Phone Number?  [phone]

## 2020-12-22 RX ORDER — LISINOPRIL 20 MG/1
TABLET ORAL
Qty: 30 TABLET | Refills: 2 | Status: SHIPPED | OUTPATIENT
Start: 2020-12-22 | End: 2021-03-18 | Stop reason: SDUPTHER

## 2020-12-22 RX ORDER — METOPROLOL TARTRATE 50 MG/1
TABLET, FILM COATED ORAL
Qty: 60 TABLET | Refills: 2 | Status: SHIPPED | OUTPATIENT
Start: 2020-12-22 | Stop reason: SDUPTHER

## 2020-12-22 NOTE — TELEPHONE ENCOUNTER
Pt called again about refills states he is almost out of lisinopril (PRINIVIL;ZESTRIL) 20 MG tablet [    Needs 30 day supply sent to [facility] on file

## 2020-12-28 ENCOUNTER — OFFICE VISIT (OUTPATIENT)
Dept: PRIMARY CARE CLINIC | Age: 50
End: 2020-12-28
Payer: MEDICAID

## 2020-12-28 PROCEDURE — 99211 OFF/OP EST MAY X REQ PHY/QHP: CPT | Performed by: NURSE PRACTITIONER

## 2020-12-28 PROCEDURE — G8417 CALC BMI ABV UP PARAM F/U: HCPCS | Performed by: NURSE PRACTITIONER

## 2020-12-28 PROCEDURE — G8428 CUR MEDS NOT DOCUMENT: HCPCS | Performed by: NURSE PRACTITIONER

## 2020-12-28 NOTE — PROGRESS NOTES
removed prior to surgery. You may want to bring your eye glasses to wear immediately before and after surgery. 14. Dentures will need to be removed before your procedure. 13. Bring cases for your glasses, contacts, dentures, or hearing aids to protect them while you are in surgery. 16. If you use a CPAP, please bring it with you on the day of your procedure. 17. Do not shave or wax for 72 hours prior to procedure near your operative site  18. FOR WOMAN OF CHILDBEARING AGE ONLY- please bring a urine sample with you on day of surgery or make sure we can collect on arrival.     If you have further questions, you may contact your surgeon's office or us at [phone]     Left instructions on patient's voicemail. Ashleigh Murray. 12/28/2020 .10:48 AM

## 2020-12-28 NOTE — PROGRESS NOTES
[facility] [employer] / [facility] ([facility]) [address] [facility], [address]    Acknowledgment of Informed Consent for Surgical or Medical Procedure and Sedation  I agree to allow doctor(s) LOLY ESCOBEDO and his/her associates or assistants, including residents and/or other qualified medical practitioner to perform the following medical treatment or procedure and to administer or direct the administration of sedation as necessary:  Procedure(s): RIGHT FOOT, INCISION AND DRAINAGE BELOW FASCIA WITH OR WITHOUT TENDON SHEATH FOOT SINGLE BURSAL SPACE, AMPUTATION OF SECOND TOE  My doctor has explained the following regarding the proposed procedure:   the explanation of the procedure   the benefits of the procedure   the potential problems that might occur during recuperation   the risks and side effects of the procedure which could include but are not limited to severe blood loss, infection, stroke or death   the benefits, risks and side effect of alternative procedures including the consequences of declining this procedure or any alternative procedures   the likelihood of achieving satisfactory results. I acknowledge no guarantee or assurance has been made to me regarding the results. I understand that during the course of this treatment/procedure, unforeseen conditions can occur which require an additional or different procedure. I agree to allow my physician or assistants to perform such extension of the original procedure as they may find necessary. I understand that sedation will often result in temporary impairment of memory and fine motor skills and that sedation can occasionally progress to a state of deep sedation or general anesthesia. I understand the risks of anesthesia for surgery include, but are not limited to, sore throat, hoarseness, injury to face, mouth, or teeth; nausea; headache; injury to blood vessels or nerves; death, brain damage, or paralysis.     I understand that if I have a Limitation of Treatment order in effect during my hospitalization, the order may or may not be in effect during this procedure. I give my doctor permission to give me blood or blood products. I understand that there are risks with receiving blood such as hepatitis, AIDS, fever, or allergic reaction. I acknowledge that the risks, benefits, and alternatives of this treatment have been explained to me and that no express or implied warranty has been given by the hospital, any blood bank, or any person or entity as to the blood or blood components transfused. At the discretion of my doctor, I agree to allow observers, equipment/product representatives and allow photographing, and/or televising of the procedure, provided my name or identity is maintained confidentially. I agree the hospital may dispose of or use for scientific or educational purposes any tissue, fluid, or body parts which may be removed.     ________________________________Date________Time______ am/pm  ([place] [facility])  Patient or Signature of Closest Relative or Legal Guardian    ________________________________Date________Time______am/pm      Page 1 of  1  Witness

## 2020-12-29 ENCOUNTER — OFFICE VISIT (OUTPATIENT)
Dept: FAMILY MEDICINE CLINIC | Age: 50
End: 2020-12-29
Payer: MEDICAID

## 2020-12-29 ENCOUNTER — ANESTHESIA EVENT (OUTPATIENT)
Dept: OPERATING ROOM | Age: 50
End: 2020-12-29
Payer: MEDICAID

## 2020-12-29 VITALS
TEMPERATURE: 97.7 F | OXYGEN SATURATION: 98 % | SYSTOLIC BLOOD PRESSURE: 136 MMHG | BODY MASS INDEX: 35.42 KG/M2 | WEIGHT: 253 LBS | DIASTOLIC BLOOD PRESSURE: 102 MMHG | HEIGHT: 71 IN | HEART RATE: 91 BPM

## 2020-12-29 LAB
A/G RATIO: 1.4 (ref 1.1–2.2)
ALBUMIN SERPL-MCNC: 4.2 G/DL (ref 3.4–5)
ALP BLD-CCNC: 118 U/L (ref 40–129)
ALT SERPL-CCNC: 26 U/L (ref 10–40)
ANION GAP SERPL CALCULATED.3IONS-SCNC: 13 MMOL/L (ref 3–16)
AST SERPL-CCNC: 10 U/L (ref 15–37)
BILIRUB SERPL-MCNC: 0.7 MG/DL (ref 0–1)
BUN BLDV-MCNC: 16 MG/DL (ref 7–20)
CALCIUM SERPL-MCNC: 10.2 MG/DL (ref 8.3–10.6)
CHLORIDE BLD-SCNC: 100 MMOL/L (ref 99–110)
CHOLESTEROL, TOTAL: 176 MG/DL (ref 0–199)
CO2: 23 MMOL/L (ref 21–32)
CREAT SERPL-MCNC: 1.2 MG/DL (ref 0.9–1.3)
ESTIMATED AVERAGE GLUCOSE: 231.7 MG/DL
GFR AFRICAN AMERICAN: >60
GFR NON-AFRICAN AMERICAN: >60
GLOBULIN: 3.1 G/DL
GLUCOSE BLD-MCNC: 294 MG/DL (ref 70–99)
HBA1C MFR BLD: 10 %
HBA1C MFR BLD: 9.7 %
HCT VFR BLD CALC: 42.5 % (ref 40.5–52.5)
HDLC SERPL-MCNC: 27 MG/DL (ref 40–60)
HEMOGLOBIN: 14.3 G/DL (ref 13.5–17.5)
LDL CHOLESTEROL CALCULATED: ABNORMAL MG/DL
LDL CHOLESTEROL DIRECT: 89 MG/DL
MCH RBC QN AUTO: 28.5 PG (ref 26–34)
MCHC RBC AUTO-ENTMCNC: 33.7 G/DL (ref 31–36)
MCV RBC AUTO: 84.6 FL (ref 80–100)
PDW BLD-RTO: 13.3 % (ref 12.4–15.4)
PLATELET # BLD: 251 K/UL (ref 135–450)
PMV BLD AUTO: 8.3 FL (ref 5–10.5)
POTASSIUM SERPL-SCNC: 5.5 MMOL/L (ref 3.5–5.1)
RBC # BLD: 5.02 M/UL (ref 4.2–5.9)
SARS-COV-2: NOT DETECTED
SODIUM BLD-SCNC: 136 MMOL/L (ref 136–145)
TOTAL PROTEIN: 7.3 G/DL (ref 6.4–8.2)
TRIGL SERPL-MCNC: 523 MG/DL (ref 0–150)
VLDLC SERPL CALC-MCNC: ABNORMAL MG/DL
WBC # BLD: 10 K/UL (ref 4–11)

## 2020-12-29 PROCEDURE — G8484 FLU IMMUNIZE NO ADMIN: HCPCS | Performed by: NURSE PRACTITIONER

## 2020-12-29 PROCEDURE — G8417 CALC BMI ABV UP PARAM F/U: HCPCS | Performed by: NURSE PRACTITIONER

## 2020-12-29 PROCEDURE — 83036 HEMOGLOBIN GLYCOSYLATED A1C: CPT | Performed by: NURSE PRACTITIONER

## 2020-12-29 PROCEDURE — 2022F DILAT RTA XM EVC RTNOPTHY: CPT | Performed by: NURSE PRACTITIONER

## 2020-12-29 PROCEDURE — 3046F HEMOGLOBIN A1C LEVEL >9.0%: CPT | Performed by: NURSE PRACTITIONER

## 2020-12-29 PROCEDURE — G8427 DOCREV CUR MEDS BY ELIG CLIN: HCPCS | Performed by: NURSE PRACTITIONER

## 2020-12-29 PROCEDURE — 1036F TOBACCO NON-USER: CPT | Performed by: NURSE PRACTITIONER

## 2020-12-29 PROCEDURE — 99213 OFFICE O/P EST LOW 20 MIN: CPT | Performed by: NURSE PRACTITIONER

## 2020-12-29 RX ORDER — ROSUVASTATIN CALCIUM 5 MG/1
5 TABLET, COATED ORAL DAILY
Qty: 30 TABLET | Refills: 0 | Status: SHIPPED | OUTPATIENT
Start: 2020-12-29 | End: 2021-03-23 | Stop reason: SINTOL

## 2020-12-29 NOTE — PROGRESS NOTES
[facility]   Telephone: [phone]  Fax: [phone]  Preoperative History & Physical        DIAGNOSIS:  Toe infection with ulceration     PROCEDURE:  Second Toe amputation per Dr. Lalo Gamboa on 12/30/2020      History Obtained From:  Patient    HISTORY OF PRESENT ILLNESS:    The patient is a 53 y.o. male with significant past medical history of atrial fib, hyperlipidemia, hypertension, Type II diabetes who presents for pre operative evaluation. Atrial fib he explained he has had for multiple years. He has worn a monitor a few times with no episode of the a-fib. He did go to the emergency room at one time and was found in a-fib in the 180's. He states it is brought on by drinking something super cold, a jolt of anxiety, ect. He states it is controlled by lisinopril and metoprolol. 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 Had surgery from right side of buttock onto the front of scrotum. Has colostomy bag LLQ. He is in the middle of scheduling surgery to take down the colostomy but Covid restrictions has prolonged this surgery. Past Medical History:   Diagnosis Date    Allergic rhinitis     Atrial fibrillation (HCC)     Hyperlipidemia     Hypertension     Type 2 diabetes mellitus without complication [facility])      Past Surgical History:   Procedure Laterality Date    COLONOSCOPY  06/05/2018    INFECTED SKIN DEBRIDEMENT  06/05/2018-06/08/2018    Removal of infected tissue and flesh from anus scrotum and surrounding area    KNEE ARTHROSCOPY Left     SKIN GRAFT  07/01/2018     Current Outpatient Medications   Medication Sig Dispense Refill    metoprolol tartrate (LOPRESSOR) 50 MG tablet Take 1 tablet by mouth twice daily 60 tablet 2    lisinopril (PRINIVIL;ZESTRIL) 20 MG tablet Take 1 tablet by mouth once daily 30 tablet 2    metFORMIN (GLUCOPHAGE) 1000 MG tablet TAKE 1 TABLET BY MOUTH TWICE DAILY WITH MEALS 180 tablet 1     No current facility-administered medications for this visit. Allergies:  No known allergies  History of allergic reaction to anesthesia:  No  Planned anesthesia: General   Known anesthesia problems: None   Bleeding risk: No recent or remote history of abnormal bleeding  Personal or FH of DVT/PE: No    Patient objection to receiving blood products: No    Social History     Tobacco Use   Smoking Status Never Smoker   Smokeless Tobacco Never Used     The patient states he drinks 0-1 per week. Family History   Problem Relation Age of Onset    Diabetes Father        REVIEW OF SYSTEMS:    CONSTITUTIONAL:  negative for  chills, sweats, fatigue and malaise  EYES:  negative for  double vision, blurred vision, dry eyes and blind spots  HEENT:  negative for  tinnitus, ear drainage, earaches, nasal congestion and snoring  RESPIRATORY:  negative for  dry cough, dyspnea, wheezing and chest pain  CARDIOVASCULAR:  positive for  Intermittent a-fib   GASTROINTESTINAL:  negative for diarrhea, constipation, abdominal pain, abdominal mass and jaundice: Colostomy intact/bag in place.     GENITOURINARY:  negative for urinary incontinence and hesitancy  INTEGUMENT/BREAST:  negative for skin lesion(s), skin color change, changes in hair and changes in nails  HEMATOLOGIC/LYMPHATIC:  negative for easy bruising, bleeding and petechiae  ALLERGIC/IMMUNOLOGIC:  negative for hay fever, angioedema and anaphylaxis  ENDOCRINE:  negative for cold intolerance, tremor, weight changes and change in bowel habits  MUSCULOSKELETAL:  negative for  arthralgias, pain, joint swelling and stiff joints  NEUROLOGICAL:  negative for dizziness, seizures, memory problems, speech problems, visual disturbance and gait problems    PHYSICAL EXAM:      BP (!) 136/102 (Site: Right Upper Arm, Position: Sitting, Cuff Size: Medium Adult)   Pulse 91   Temp 97.7 °F (36.5 °C)   Ht 5' 11\" (1.803 m)   Wt 253 lb (114.8 kg)   SpO2 98%   BMI 35.29 kg/m²     CONSTITUTIONAL:  awake, alert, cooperative, no apparent distress, and appears stated age    Eyes:  Lids and lashes normal, pupils equal, round and reactive to light, extra ocular muscles intact, sclera clear, conjunctiva normal    Head/ENT:  Normocephalic, without obvious abnormality, atramatic, sinuses nontender on palpation, external ears without lesions, oral pharynx with moist mucus membranes, tonsils without erythema or exudates, gums normal and good dentition. Neck:  Supple, symmetrical, trachea midline, no adenopathy, thyroid symmetric, not enlarged and no tenderness, skin normal, No carotid bruit    Heart:  Normal apical impulse, regular rate and rhythm, normal S1 and S2, no S3 or S4, and no murmur noted    Lungs:  No increased work of breathing, good air exchange, clear to auscultation bilaterally, no crackles or wheezing    Abdomen:  No scars, normal bowel sounds, soft, non-distended, non-tender, no masses palpated, no hepatosplenomegally    Extremities:  No clubbing, cyanosis, or edema    NEUROLOGIC:  Awake, alert, oriented to name, place and time. Cranial nerves II-XII are grossly intact. Motor is 5 out of 5 bilaterally. ASSESSMENT AND PLAN:    1. Preoperative workup as follows: none  2. Change in medication regimen before surgery: None  3. Prophylaxis for cardiac events with perioperative beta-blockers: Currently taking  metoprolol  ACC/AHA indications for pre-operative beta-blocker use:    · Vascular surgery with history of postitive stress test  · Intermediate or high risk surgery with history of CAD   · Intermediate or high risk surgery with multiple clinical predictors of CAD- 2 of the following: history of compensated or prior heart failure, history of cerebrovascular disease, DM, or renal insufficiency    Routine administration of higher-dose, long-acting metoprolol in beta-blocker-naïve patients on the day of surgery, and in the absence of dose titration is associated with an overall increase in mortality.   Beta-blockers should be started days to weeks

## 2020-12-29 NOTE — PROGRESS NOTES
Spoke with Katya Balbuena at [facility] AT [address] [place] [address], please sign H&P so we can use it foe surgery tomorrow.

## 2020-12-30 ENCOUNTER — APPOINTMENT (OUTPATIENT)
Dept: GENERAL RADIOLOGY | Age: 50
End: 2020-12-30
Attending: PODIATRIST
Payer: MEDICAID

## 2020-12-30 ENCOUNTER — HOSPITAL ENCOUNTER (OUTPATIENT)
Age: 50
Setting detail: OUTPATIENT SURGERY
Discharge: HOME | End: 2020-12-30
Attending: PODIATRIST | Admitting: PODIATRIST
Payer: MEDICAID

## 2020-12-30 ENCOUNTER — ANESTHESIA (OUTPATIENT)
Dept: OPERATING ROOM | Age: 50
End: 2020-12-30
Payer: MEDICAID

## 2020-12-30 VITALS
HEART RATE: 80 BPM | TEMPERATURE: 98 F | OXYGEN SATURATION: 99 % | HEIGHT: 71 IN | WEIGHT: 255 LBS | SYSTOLIC BLOOD PRESSURE: 128 MMHG | BODY MASS INDEX: 35.7 KG/M2 | RESPIRATION RATE: 16 BRPM | DIASTOLIC BLOOD PRESSURE: 82 MMHG

## 2020-12-30 VITALS
SYSTOLIC BLOOD PRESSURE: 144 MMHG | RESPIRATION RATE: 21 BRPM | DIASTOLIC BLOOD PRESSURE: 82 MMHG | OXYGEN SATURATION: 100 %

## 2020-12-30 DIAGNOSIS — M86.9 OSTEOMYELITIS OF SECOND TOE OF RIGHT FOOT (HCC): ICD-10-CM

## 2020-12-30 DIAGNOSIS — G89.18 POST-OP PAIN: Primary | ICD-10-CM

## 2020-12-30 LAB
GLUCOSE BLD-MCNC: 235 MG/DL (ref 70–99)
GLUCOSE BLD-MCNC: 276 MG/DL (ref 70–99)
PERFORMED ON: ABNORMAL
PERFORMED ON: ABNORMAL

## 2020-12-30 PROCEDURE — 87116 MYCOBACTERIA CULTURE: CPT

## 2020-12-30 PROCEDURE — 87205 SMEAR GRAM STAIN: CPT

## 2020-12-30 PROCEDURE — 87186 SC STD MICRODIL/AGAR DIL: CPT

## 2020-12-30 PROCEDURE — 2580000003 HC RX 258: Performed by: PODIATRIST

## 2020-12-30 PROCEDURE — 2709999900 HC NON-CHARGEABLE SUPPLY: Performed by: PODIATRIST

## 2020-12-30 PROCEDURE — 87070 CULTURE OTHR SPECIMN AEROBIC: CPT

## 2020-12-30 PROCEDURE — 87015 SPECIMEN INFECT AGNT CONCNTJ: CPT

## 2020-12-30 PROCEDURE — 87075 CULTR BACTERIA EXCEPT BLOOD: CPT

## 2020-12-30 PROCEDURE — 6360000002 HC RX W HCPCS: Performed by: ANESTHESIOLOGY

## 2020-12-30 PROCEDURE — 87206 SMEAR FLUORESCENT/ACID STAI: CPT

## 2020-12-30 PROCEDURE — 6370000000 HC RX 637 (ALT 250 FOR IP): Performed by: ANESTHESIOLOGY

## 2020-12-30 PROCEDURE — 3600000004 HC SURGERY LEVEL 4 BASE: Performed by: PODIATRIST

## 2020-12-30 PROCEDURE — 87102 FUNGUS ISOLATION CULTURE: CPT

## 2020-12-30 PROCEDURE — 3700000000 HC ANESTHESIA ATTENDED CARE: Performed by: PODIATRIST

## 2020-12-30 PROCEDURE — 2580000003 HC RX 258: Performed by: ANESTHESIOLOGY

## 2020-12-30 PROCEDURE — 6360000002 HC RX W HCPCS: Performed by: PODIATRIST

## 2020-12-30 PROCEDURE — 7100000010 HC PHASE II RECOVERY - FIRST 15 MIN: Performed by: PODIATRIST

## 2020-12-30 PROCEDURE — 3600000014 HC SURGERY LEVEL 4 ADDTL 15MIN: Performed by: PODIATRIST

## 2020-12-30 PROCEDURE — 6360000002 HC RX W HCPCS: Performed by: NURSE ANESTHETIST, CERTIFIED REGISTERED

## 2020-12-30 PROCEDURE — 73630 X-RAY EXAM OF FOOT: CPT

## 2020-12-30 PROCEDURE — 7100000001 HC PACU RECOVERY - ADDTL 15 MIN: Performed by: PODIATRIST

## 2020-12-30 PROCEDURE — 87077 CULTURE AEROBIC IDENTIFY: CPT

## 2020-12-30 PROCEDURE — 3700000001 HC ADD 15 MINUTES (ANESTHESIA): Performed by: PODIATRIST

## 2020-12-30 PROCEDURE — 7100000000 HC PACU RECOVERY - FIRST 15 MIN: Performed by: PODIATRIST

## 2020-12-30 PROCEDURE — 7100000011 HC PHASE II RECOVERY - ADDTL 15 MIN: Performed by: PODIATRIST

## 2020-12-30 PROCEDURE — 88305 TISSUE EXAM BY PATHOLOGIST: CPT

## 2020-12-30 PROCEDURE — 88311 DECALCIFY TISSUE: CPT

## 2020-12-30 RX ORDER — OXYCODONE HYDROCHLORIDE AND ACETAMINOPHEN 5; 325 MG/1; MG/1
1 TABLET ORAL PRN
Status: DISCONTINUED | OUTPATIENT
Start: 2020-12-30 | End: 2020-12-30 | Stop reason: HOSPADM

## 2020-12-30 RX ORDER — MEPERIDINE HYDROCHLORIDE 25 MG/ML
12.5 INJECTION INTRAMUSCULAR; INTRAVENOUS; SUBCUTANEOUS EVERY 5 MIN PRN
Status: DISCONTINUED | OUTPATIENT
Start: 2020-12-30 | End: 2020-12-30 | Stop reason: HOSPADM

## 2020-12-30 RX ORDER — DEXTROSE MONOHYDRATE 50 MG/ML
100 INJECTION, SOLUTION INTRAVENOUS PRN
Status: DISCONTINUED | OUTPATIENT
Start: 2020-12-30 | End: 2020-12-30 | Stop reason: HOSPADM

## 2020-12-30 RX ORDER — SODIUM CHLORIDE, SODIUM LACTATE, POTASSIUM CHLORIDE, CALCIUM CHLORIDE 600; 310; 30; 20 MG/100ML; MG/100ML; MG/100ML; MG/100ML
INJECTION, SOLUTION INTRAVENOUS CONTINUOUS
Status: DISCONTINUED | OUTPATIENT
Start: 2020-12-30 | End: 2020-12-30 | Stop reason: HOSPADM

## 2020-12-30 RX ORDER — MAGNESIUM HYDROXIDE 1200 MG/15ML
LIQUID ORAL CONTINUOUS PRN
Status: COMPLETED | OUTPATIENT
Start: 2020-12-30 | End: 2020-12-30

## 2020-12-30 RX ORDER — HYDROCODONE BITARTRATE AND ACETAMINOPHEN 5; 325 MG/1; MG/1
1 TABLET ORAL EVERY 8 HOURS PRN
Qty: 9 TABLET | Refills: 0 | Status: SHIPPED | OUTPATIENT
Start: 2020-12-30 | End: 2021-01-02

## 2020-12-30 RX ORDER — LABETALOL 20 MG/4 ML (5 MG/ML) INTRAVENOUS SYRINGE
5 EVERY 10 MIN PRN
Status: DISCONTINUED | OUTPATIENT
Start: 2020-12-30 | End: 2020-12-30 | Stop reason: HOSPADM

## 2020-12-30 RX ORDER — FENTANYL CITRATE 50 UG/ML
50 INJECTION, SOLUTION INTRAMUSCULAR; INTRAVENOUS EVERY 5 MIN PRN
Status: DISCONTINUED | OUTPATIENT
Start: 2020-12-30 | End: 2020-12-30 | Stop reason: HOSPADM

## 2020-12-30 RX ORDER — LIDOCAINE HYDROCHLORIDE 20 MG/ML
INJECTION, SOLUTION INTRAVENOUS PRN
Status: DISCONTINUED | OUTPATIENT
Start: 2020-12-30 | End: 2020-12-30 | Stop reason: SDUPTHER

## 2020-12-30 RX ORDER — APREPITANT 40 MG/1
40 CAPSULE ORAL ONCE
Status: COMPLETED | OUTPATIENT
Start: 2020-12-30 | End: 2020-12-30

## 2020-12-30 RX ORDER — DIPHENHYDRAMINE HYDROCHLORIDE 50 MG/ML
12.5 INJECTION INTRAMUSCULAR; INTRAVENOUS
Status: DISCONTINUED | OUTPATIENT
Start: 2020-12-30 | End: 2020-12-30 | Stop reason: HOSPADM

## 2020-12-30 RX ORDER — ONDANSETRON 2 MG/ML
INJECTION INTRAMUSCULAR; INTRAVENOUS PRN
Status: DISCONTINUED | OUTPATIENT
Start: 2020-12-30 | End: 2020-12-30 | Stop reason: SDUPTHER

## 2020-12-30 RX ORDER — HYDRALAZINE HYDROCHLORIDE 20 MG/ML
5 INJECTION INTRAMUSCULAR; INTRAVENOUS EVERY 10 MIN PRN
Status: DISCONTINUED | OUTPATIENT
Start: 2020-12-30 | End: 2020-12-30 | Stop reason: HOSPADM

## 2020-12-30 RX ORDER — DOXYCYCLINE HYCLATE 100 MG
100 TABLET ORAL 2 TIMES DAILY
Qty: 14 TABLET | Refills: 0 | Status: SHIPPED | OUTPATIENT
Start: 2020-12-30 | End: 2021-01-06

## 2020-12-30 RX ORDER — PROPOFOL 10 MG/ML
INJECTION, EMULSION INTRAVENOUS CONTINUOUS PRN
Status: DISCONTINUED | OUTPATIENT
Start: 2020-12-30 | End: 2020-12-30 | Stop reason: SDUPTHER

## 2020-12-30 RX ORDER — FENTANYL CITRATE 50 UG/ML
INJECTION, SOLUTION INTRAMUSCULAR; INTRAVENOUS PRN
Status: DISCONTINUED | OUTPATIENT
Start: 2020-12-30 | End: 2020-12-30 | Stop reason: SDUPTHER

## 2020-12-30 RX ORDER — OXYCODONE HYDROCHLORIDE AND ACETAMINOPHEN 5; 325 MG/1; MG/1
2 TABLET ORAL PRN
Status: DISCONTINUED | OUTPATIENT
Start: 2020-12-30 | End: 2020-12-30 | Stop reason: HOSPADM

## 2020-12-30 RX ORDER — ONDANSETRON 2 MG/ML
4 INJECTION INTRAMUSCULAR; INTRAVENOUS
Status: DISCONTINUED | OUTPATIENT
Start: 2020-12-30 | End: 2020-12-30 | Stop reason: HOSPADM

## 2020-12-30 RX ORDER — PROPOFOL 10 MG/ML
INJECTION, EMULSION INTRAVENOUS PRN
Status: DISCONTINUED | OUTPATIENT
Start: 2020-12-30 | End: 2020-12-30 | Stop reason: SDUPTHER

## 2020-12-30 RX ORDER — FENTANYL CITRATE 50 UG/ML
25 INJECTION, SOLUTION INTRAMUSCULAR; INTRAVENOUS EVERY 5 MIN PRN
Status: DISCONTINUED | OUTPATIENT
Start: 2020-12-30 | End: 2020-12-30 | Stop reason: HOSPADM

## 2020-12-30 RX ORDER — PROCHLORPERAZINE EDISYLATE 5 MG/ML
5 INJECTION INTRAMUSCULAR; INTRAVENOUS
Status: DISCONTINUED | OUTPATIENT
Start: 2020-12-30 | End: 2020-12-30 | Stop reason: HOSPADM

## 2020-12-30 RX ORDER — DEXTROSE MONOHYDRATE 25 G/50ML
12.5 INJECTION, SOLUTION INTRAVENOUS PRN
Status: DISCONTINUED | OUTPATIENT
Start: 2020-12-30 | End: 2020-12-30 | Stop reason: HOSPADM

## 2020-12-30 RX ORDER — NICOTINE POLACRILEX 4 MG
15 LOZENGE BUCCAL PRN
Status: DISCONTINUED | OUTPATIENT
Start: 2020-12-30 | End: 2020-12-30 | Stop reason: HOSPADM

## 2020-12-30 RX ORDER — MIDAZOLAM HYDROCHLORIDE 1 MG/ML
INJECTION INTRAMUSCULAR; INTRAVENOUS PRN
Status: DISCONTINUED | OUTPATIENT
Start: 2020-12-30 | End: 2020-12-30 | Stop reason: SDUPTHER

## 2020-12-30 RX ADMIN — MIDAZOLAM HYDROCHLORIDE 2 MG: 2 INJECTION, SOLUTION INTRAMUSCULAR; INTRAVENOUS at 07:26

## 2020-12-30 RX ADMIN — PROPOFOL 25 MCG/KG/MIN: 10 INJECTION, EMULSION INTRAVENOUS at 07:34

## 2020-12-30 RX ADMIN — SODIUM CHLORIDE, POTASSIUM CHLORIDE, SODIUM LACTATE AND CALCIUM CHLORIDE: 600; 310; 30; 20 INJECTION, SOLUTION INTRAVENOUS at 06:36

## 2020-12-30 RX ADMIN — VANCOMYCIN HYDROCHLORIDE 1 G: 10 INJECTION, POWDER, LYOPHILIZED, FOR SOLUTION INTRAVENOUS at 07:26

## 2020-12-30 RX ADMIN — ONDANSETRON 4 MG: 2 INJECTION INTRAMUSCULAR; INTRAVENOUS at 07:53

## 2020-12-30 RX ADMIN — FENTANYL CITRATE 25 MCG: 50 INJECTION INTRAMUSCULAR; INTRAVENOUS at 07:42

## 2020-12-30 RX ADMIN — LIDOCAINE HYDROCHLORIDE 50 MG: 20 INJECTION, SOLUTION INTRAVENOUS at 07:28

## 2020-12-30 RX ADMIN — PROPOFOL 50 MG: 10 INJECTION, EMULSION INTRAVENOUS at 07:30

## 2020-12-30 RX ADMIN — INSULIN HUMAN 4 UNITS: 100 INJECTION, SOLUTION PARENTERAL at 07:15

## 2020-12-30 RX ADMIN — APREPITANT 40 MG: 40 CAPSULE ORAL at 07:21

## 2020-12-30 ASSESSMENT — PULMONARY FUNCTION TESTS
PIF_VALUE: 0
PIF_VALUE: 1
PIF_VALUE: 0

## 2020-12-30 ASSESSMENT — PAIN - FUNCTIONAL ASSESSMENT: PAIN_FUNCTIONAL_ASSESSMENT: 0-10

## 2020-12-30 ASSESSMENT — ENCOUNTER SYMPTOMS: SHORTNESS OF BREATH: 0

## 2020-12-30 ASSESSMENT — LIFESTYLE VARIABLES: SMOKING_STATUS: 0

## 2020-12-30 NOTE — PROGRESS NOTES
Ambulatory Surgery/Procedure Discharge Note  Pt alert and oriented  Foot drsg clean dry and intact  Walking shoe on place  Ice to OR  Site with instructions  Foot elevated  IV dcd , fluids and crackers taken well  Verbal and written discharge instructions given to pt and wife 'denies needing to void prior to discharge  2 Rxs given to pt upon discharge   vibratab and norco  No nausea   No pain   dcd per wheelchair to car with wife present   Stable at time of discharge    Vitals:    12/30/20 0955   BP: 128/82   Pulse: 80   Resp: 16   Temp: 98 °F (36.7 °C)   SpO2: 99%       In: 210 [P.O.:60; I.V.:150]  Out: -     Restroom use offered before discharge. Yes    Pain assessment:  level of pain (1-10, 10 severe),   Pain Level: 0        Patient discharged to home/self care.  Patient discharged via wheel chair by transporter to waiting family/S.O.       12/30/2020 9:56 AM

## 2020-12-30 NOTE — OP NOTE
Operative Note      Patient: Marita Styles  YOB: 1970  MRN: [id number]    Date of Procedure: 12/30/2020     Pre-Op Diagnosis: RIGHT FOOT OSTEOMYELITIS OF RIGHT SECOND TOE M86.171     Post-Op Diagnosis: Same       Procedure(s):  RIGHT 2ND TOE INCISION AND DRAINAGE BELOW FASCIA  PARTIAL AMPUTATION OF RIGHT 2ND TOE  BIOPSY OF MIDDLE PHALANX, RIGHT 2nd     Surgeon(s):  Nay Simmons DPM     Assistant:  Resident: Rosendo Agosto DPM     Anesthesia: Monitor Anesthesia Care     Injectables: pre-op 10 cc Polocaine plain      Hemostasis: anatomic dissection     Materials: 2-0 Nylon and 3-0 Nylon       Estimated Blood Loss: less than 10 cc     Complications: None     Specimens:   ID Type Source Tests Collected by Time Destination   1 : CULTURE SWAB RIGHT 2ND TOE Body Fluid Cul de sac CULTURE, ANAEROBIC, CULTURE, FUNGUS, CULTURE WITH SMEAR, ACID FAST Cherylle Patches, [facility] 12/30/2020 2035     A : RIGHT DISTAL 2ND PHALANX Tissue Tissue SURGICAL PATHOLOGY [place] [facility] 12/30/2020 0753     B : RIGHT 2ND PHALANX CLEARANCE FRAGMENT Tissue Tissue SURGICAL PATHOLOGY [place] CORINNE Simmons 12/30/2020 0755           Implants:  * No implants in log *      Drains: * No LDAs found *     Findings: Full thickness ulceration noted to the distal plantar aspect of the right 2nd digit, no purulent drainage      INDICATIONS FOR PROCEDURE: This patient has signs and symptoms clinically  consistent with the above mentioned preoperative diagnosis. Having failed conservative treatment, it was determined that the patient would benefit from surgical intervention. All potential risks, benefits, and complications were discussed with the patient prior to the scheduling of surgery. All the patient's questions were answered and no guarantees were given. The patient wished to proceed with surgery, and informed written consent was obtained.      DETAILS OF PROCEDURE: The patient was brought from the pre-operative area and placed on as a clearance fragment. The surgical site was then further investigated on multiple tissue planes and no purulent drainage was noted. Next, a culture swab of the surgical site was obtained for culture and sensitivities. At this time, the incision site was flushed using copious amounts of normal saline. The incision site was closed using 2-0 Nylon and 3-0 Nylon in a simple interrupted suture fashion. A soft sterile dressing was applied consisting of xeroform, gauze, webril, ace, and coban. A prompt hyperemic response was noted on all aspects of the patient's right lower extremity. END OF PROCEDURE: The patient tolerated the procedure and anesthesia well and was transported from the operating room to the PACU with vital signs stable and vascular status intact to all aspects of the patient's right lower extremity and digital capillary refill time immediate to the digits of the right foot. Following a period of post-operative monitoring, the patient will be discharged home with written and oral wound care and follow-up instructions. The patient was provided with prescriptions for Norco and Doxycycline. The patient is to follow-up with Dr. Baumann Back in his private office within 5-7 days. The patient is to keep dressing clean, dry and intact at all times. The patient is to call if any complications occur.     This operative report was dictated on behalf of Dr. Imogene Leyden, DPM.    Sheree Messer DPM  Podiatric Resident PGY2  Pager [phone] or Perfect[employer]  12/30/2020, 8:38 AM      Electronically signed by Sheree Messer DPM on 12/30/2020 at 8:15 AM

## 2020-12-30 NOTE — BRIEF OP NOTE
Brief Postoperative Note      Patient: Marlon [place]  YOB: 1970  MRN: [id number]    Date of Procedure: 12/30/2020    Pre-Op Diagnosis: RIGHT FOOT OSTEOMYELITIS OF RIGHT SECOND TOE M86.171    Post-Op Diagnosis: Same       Procedure(s):  RIGHT 2ND TOE INCISION AND DRAINAGE BELOW FASCIA  PARTIAL AMPUTATION OF RIGHT 2ND TOE   BIOPSY OF PHALANX, MIDDLE RIGHT 2nd    Surgeon(s):  Rosalva Flores DPM    Assistant:  Resident: Sonal Berman DPM    Anesthesia: Monitor Anesthesia Care    Injectables: pre-op 10 cc Polocaine plain     Hemostasis: anatomic dissection    Materials: 2-0 Nylon and 3-0 Nylon      Estimated Blood Loss: less than 10 cc    Complications: None    Specimens:   ID Type Source Tests Collected by Time Destination   1 : CULTURE SWAB RIGHT 2ND TOE Body Fluid Cul de sac CULTURE, ANAEROBIC, CULTURE, FUNGUS, CULTURE WITH SMEAR, ACID FAST Freida Pichardo DPM 12/30/2020 3392    A : RIGHT DISTAL 2ND PHALANX Tissue Tissue SURGICAL PATHOLOGY Rosalva Flores DPM 12/30/2020 0753    B : RIGHT 2ND PHALANX CLEARANCE FRAGMENT Tissue Tissue SURGICAL PATHOLOGY Rosalva Flores DPM 12/30/2020 9772        Implants:  * No implants in log *      Drains: * No LDAs found *    Findings: Full thickness ulceration noted to the distal plantar aspect of the right 2nd digit, no purulent drainage    Sonal Berman DPM  Podiatric Resident PGY2  Pager [phone] or [employer]  12/30/2020, 8:14 AM      Electronically signed by Sonal Berman DPM on 12/30/2020 at 8:13 AM

## 2020-12-30 NOTE — ANESTHESIA PRE PROCEDURE
Department of Anesthesiology  Preprocedure Note       Name:  Rubio Gunter   Age:  53 y.o.  :  1970                                          MRN:  [id number]         Date:  2020      Surgeon: Eloisa Deluca):  Andrés Temple DPM    Procedure: Procedure(s):  RIGHT FOOT INCISION AND DRAINAGE BELOW FASCIA WITH OR WITHOUT TENDON SHEATH FOOT SINGLE BURSAL SPACE/ AMPUTATION OF SECOND TOE  . Medications prior to admission:   Prior to Admission medications    Medication Sig Start Date End Date Taking? Authorizing Provider   metoprolol tartrate (LOPRESSOR) 50 MG tablet Take 1 tablet by mouth twice daily 20  Yes Priyanka Russell DO   lisinopril (PRINIVIL;ZESTRIL) 20 MG tablet Take 1 tablet by mouth once daily 20  Yes Priyanka Russell DO   metFORMIN (GLUCOPHAGE) 1000 MG tablet TAKE 1 TABLET BY MOUTH TWICE DAILY WITH MEALS 20  Yes Priyanka Russell DO   rosuvastatin (CRESTOR) 5 MG tablet Take 1 tablet by mouth daily 20   DEEP [place] - CNP       Current medications:    Current Facility-Administered Medications   Medication Dose Route Frequency Provider Last Rate Last Admin    vancomycin (VANCOCIN) 1,000 mg in dextrose 5 % 250 mL IVPB  1,000 mg Intravenous Once Andrés Temple DPM        lactated ringers infusion   Intravenous Continuous Mcgee Book,  mL/hr at 20 0636 New Bag at 20 0636    insulin regular (HUMULIN R;NOVOLIN R) injection 4 Units  4 Units Subcutaneous Once Dia Escalante MD        glucose (GLUTOSE) 40 % oral gel 15 g  15 g Oral PRN Dia Escalante MD        dextrose 50 % IV solution  12.5 g Intravenous PRN Dia Escalante MD        glucagon (rDNA) injection 1 mg  1 mg Intramuscular PRN Dia Escalante MD        dextrose 5 % solution  100 mL/hr Intravenous PRN Dia Escalante MD           Allergies:     Allergies   Allergen Reactions    No Known Allergies        Problem List:    Patient Active Problem List   Diagnosis Code  Atrial fibrillation (HCC) I48.91    Essential hypertension I10    Type 2 diabetes mellitus without complication, without long-term current use of insulin (HCC) E11.9    History of skin graft Z94.5    Wound of buttock S31.809A    Obesity, Class I, BMI 30-34.9 E66.9    S/P split thickness skin graft Z94.5    Surgical wound, non healing T81.89XA    Necrotizing fasciitis ([facility] [address] M72.6    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    Colostomy in place [facility]) Z93.3       Past Medical History:        Diagnosis Date    Allergic rhinitis     Atrial fibrillation (HCC)     Hyperlipidemia     Hypertension     Type 2 diabetes mellitus without complication ([facility] [address]        Past Surgical History:        Procedure Laterality Date    COLONOSCOPY  06/05/2018    INFECTED SKIN DEBRIDEMENT  06/05/2018-06/08/2018    Removal of infected tissue and flesh from anus scrotum and surrounding area    KNEE ARTHROSCOPY Left     SKIN GRAFT  07/01/2018       Social History:    Social History     Tobacco Use    Smoking status: Never Smoker    Smokeless tobacco: Never Used   Substance Use Topics    Alcohol use: Yes     Comment: monthly 3 beers                                Counseling given: Not Answered      Vital Signs (Current):   Vitals:    12/28/20 1443 12/30/20 0617   BP:  136/88   Pulse:  87   Resp:  16   Temp:  97.8 °F (36.6 °C)   TempSrc:  Temporal   SpO2:  100%   Weight: 255 lb (115.7 kg) 255 lb (115.7 kg)   Height: 5' 11\" (1.803 m) 5' 11\" (1.803 m)                                              BP Readings from Last 3 Encounters:   12/30/20 136/88   12/29/20 (!) 136/102   09/16/20 (!) 147/87       NPO Status: Time of last liquid consumption: 2100                        Time of last solid consumption: 2100                        Date of last liquid consumption: 12/29/20                        Date of last solid food consumption: 12/29/20    BMI:   Wt Readings from Last 3 Encounters:   12/30/20 255 lb (115.7 kg)   12/29/20 253 lb (114.8 kg)   09/16/20 255 lb (115.7 kg)     Body mass index is 35.57 kg/m².     CBC:   Lab Results   Component Value Date    WBC 10.0 12/29/2020    RBC 5.02 12/29/2020    HGB 14.3 12/29/2020    HCT 42.5 12/29/2020    MCV 84.6 12/29/2020    RDW 13.3 12/29/2020     12/29/2020       CMP:   Lab Results   Component Value Date     12/29/2020    K 5.5 12/29/2020    K 4.5 09/16/2020     12/29/2020    CO2 23 12/29/2020    BUN 16 12/29/2020    CREATININE 1.2 12/29/2020    GFRAA >60 12/29/2020    AGRATIO 1.4 12/29/2020    LABGLOM >60 12/29/2020    GLUCOSE 294 12/29/2020    PROT 7.3 12/29/2020    CALCIUM 10.2 12/29/2020    BILITOT 0.7 12/29/2020    ALKPHOS 118 12/29/2020    AST 10 12/29/2020    ALT 26 12/29/2020       POC Tests:   Recent Labs     12/30/20  0610   POCGLU 276*       Coags: No results found for: PROTIME, INR, APTT    HCG (If Applicable): No results found for: PREGTESTUR, PREGSERUM, HCG, HCGQUANT     ABGs: No results found for: PHART, PO2ART, VEA4ZOY, RTN2YQD, BEART, A3XQMDWP     Type & Screen (If Applicable):  No results found for: LABABO, LABRH    Drug/Infectious Status (If Applicable):  No results found for: HIV, HEPCAB    COVID-19 Screening (If Applicable):   Lab Results   Component Value Date    COVID19 Not Detected 12/28/2020         Anesthesia Evaluation    Airway: Mallampati: II  TM distance: >3 FB     Mouth opening: > = 3 FB Dental:          Pulmonary:       (-) shortness of breath and not a current smoker                           Cardiovascular:  Exercise tolerance: good (>4 METS),   (+) hypertension:, dysrhythmias: atrial fibrillation,     (-) past MI and  angina                Neuro/Psych:      (-) seizures           GI/Hepatic/Renal:   (+) GERD:, renal disease:, morbid obesity          Endo/Other:    (+) DiabetesType II DM, poorly controlled, , .                 Abdominal:           Vascular:                                      Anesthesia Plan      MAC     ASA 3     (-npo MN  -very poorly controlled DM. Not using insulin but agrees to a dose today to improve healing. -h/o nec fasc of bottom in [place] now with ostomy  -afib for over 10 years since admitted with the [place] fasc. Does not follow cardiologist)  Induction: intravenous. MIPS: Postoperative opioids intended. Anesthetic plan and risks discussed with patient. Plan discussed with MESSI.                 Sarah Amaral MD   12/30/2020

## 2020-12-30 NOTE — H&P
H&P on chart, completed yesterday. Pt reports no changes since visit with PCP.      Pollo Parker, SANGEETHA, APRN  12/30/2020, 6:37 AM

## 2021-01-03 LAB
GRAM STAIN RESULT: ABNORMAL
ORGANISM: ABNORMAL
WOUND/ABSCESS: ABNORMAL

## 2021-01-04 LAB — ANAEROBIC CULTURE: NORMAL

## 2021-02-01 LAB
FUNGUS (MYCOLOGY) CULTURE: NORMAL
FUNGUS STAIN: NORMAL

## 2021-02-16 LAB
AFB CULTURE (MYCOBACTERIA): NORMAL
AFB SMEAR: NORMAL

## 2021-03-18 DIAGNOSIS — I10 ESSENTIAL HYPERTENSION: ICD-10-CM

## 2021-03-18 RX ORDER — LISINOPRIL 20 MG/1
TABLET ORAL
Qty: 30 TABLET | Refills: 2 | Status: SHIPPED | OUTPATIENT
Start: 2021-03-18 | End: 2021-06-18 | Stop reason: DRUGHIGH

## 2021-03-18 NOTE — TELEPHONE ENCOUNTER
Future Appointments   Date Time Provider Myron Call   3/23/2021  8:15 AM DO NATHAN Sage  [address] [facility]     LOV 9/23/2020

## 2021-03-18 NOTE — TELEPHONE ENCOUNTER
----- Message from Cedric English sent at 3/18/2021  2:22 PM EDT -----  Subject: Refill Request    QUESTIONS  Name of Medication? lisinopril (PRINIVIL;ZESTRIL) 20 MG tablet  Patient-reported dosage and instructions? 1 20 mg tablet a day   How many days do you have left? 2  Preferred Pharmacy? [facility]  Pharmacy phone number (if available)? [phone]  ---------------------------------------------------------------------------  --------------    Name of Medication? metoprolol tartrate (LOPRESSOR) 50 MG tablet  Patient-reported dosage and instructions? 1 50 mg 2 x a day   How many days do you have left? 12  [facility]  Pharmacy phone number (if available)? [phone]  ---------------------------------------------------------------------------  --------------    Name of Medication? metFORMIN (GLUCOPHAGE) 1000 MG tablet  Patient-reported dosage and instructions? 1 1000mg 2 x a day   How many days do you have left? 16  Preferred Pharmacy? [facility]  Pharmacy phone number (if available)? [phone]  ---------------------------------------------------------------------------  --------------  Aurea HICKS  What is the best way for the office to contact you? OK to leave message on   voicemail  Preferred Call Back Phone Number?  [phone]

## 2021-03-22 DIAGNOSIS — I48.91 ATRIAL FIBRILLATION, UNSPECIFIED TYPE (HCC): ICD-10-CM

## 2021-03-22 RX ORDER — METOPROLOL TARTRATE 50 MG/1
TABLET, FILM COATED ORAL
Qty: 180 TABLET | Refills: 1 | Status: SHIPPED | OUTPATIENT
Start: 2021-03-22 | End: 2021-10-19 | Stop reason: SDUPTHER

## 2021-03-22 NOTE — TELEPHONE ENCOUNTER
Future Appointments   Date Time Provider Myron Call   3/23/2021  8:15 AM DO NATHAN Powers  [address] [facility] 9/23/2020

## 2021-03-23 ENCOUNTER — OFFICE VISIT (OUTPATIENT)
Dept: FAMILY MEDICINE CLINIC | Age: 51
End: 2021-03-23
Payer: MEDICAID

## 2021-03-23 VITALS
HEART RATE: 79 BPM | OXYGEN SATURATION: 98 % | SYSTOLIC BLOOD PRESSURE: 174 MMHG | DIASTOLIC BLOOD PRESSURE: 96 MMHG | RESPIRATION RATE: 16 BRPM | TEMPERATURE: 97.4 F | WEIGHT: 254 LBS | BODY MASS INDEX: 35.43 KG/M2

## 2021-03-23 DIAGNOSIS — E11.9 TYPE 2 DIABETES MELLITUS WITHOUT COMPLICATION, WITHOUT LONG-TERM CURRENT USE OF INSULIN (HCC): Primary | ICD-10-CM

## 2021-03-23 DIAGNOSIS — I10 ESSENTIAL HYPERTENSION: ICD-10-CM

## 2021-03-23 LAB
CREATININE URINE POCT: NORMAL
HBA1C MFR BLD: 8.8 %
MICROALBUMIN/CREAT 24H UR: NORMAL MG/G{CREAT}
MICROALBUMIN/CREAT UR-RTO: NORMAL

## 2021-03-23 PROCEDURE — G8427 DOCREV CUR MEDS BY ELIG CLIN: HCPCS | Performed by: FAMILY MEDICINE

## 2021-03-23 PROCEDURE — 83036 HEMOGLOBIN GLYCOSYLATED A1C: CPT | Performed by: FAMILY MEDICINE

## 2021-03-23 PROCEDURE — 3052F HG A1C>EQUAL 8.0%<EQUAL 9.0%: CPT | Performed by: FAMILY MEDICINE

## 2021-03-23 PROCEDURE — 99214 OFFICE O/P EST MOD 30 MIN: CPT | Performed by: FAMILY MEDICINE

## 2021-03-23 PROCEDURE — G8484 FLU IMMUNIZE NO ADMIN: HCPCS | Performed by: FAMILY MEDICINE

## 2021-03-23 PROCEDURE — 82044 UR ALBUMIN SEMIQUANTITATIVE: CPT | Performed by: FAMILY MEDICINE

## 2021-03-23 PROCEDURE — 2022F DILAT RTA XM EVC RTNOPTHY: CPT | Performed by: FAMILY MEDICINE

## 2021-03-23 PROCEDURE — 1036F TOBACCO NON-USER: CPT | Performed by: FAMILY MEDICINE

## 2021-03-23 PROCEDURE — G8417 CALC BMI ABV UP PARAM F/U: HCPCS | Performed by: FAMILY MEDICINE

## 2021-03-23 ASSESSMENT — PATIENT HEALTH QUESTIONNAIRE - PHQ9
1. LITTLE INTEREST OR PLEASURE IN DOING THINGS: 0
SUM OF ALL RESPONSES TO PHQ QUESTIONS 1-9: 0

## 2021-03-23 NOTE — PROGRESS NOTES
Never Used   Substance and Sexual Activity    Alcohol use: Yes     Comment: monthly 3 beers    Drug use: No    Sexual activity: Yes     Partners: Female   Lifestyle    Physical activity     Days per week: Not on file     Minutes per session: Not on file    Stress: Not on file   Relationships    Social connections     Talks on phone: Not on file     Gets together: Not on file     Attends Hoahaoism service: Not on file     Active member of club or organization: Not on file     Attends meetings of clubs or organizations: Not on file     Relationship status: Not on file    Intimate partner violence     Fear of current or ex partner: Not on file     Emotionally abused: Not on file     Physically abused: Not on file     Forced sexual activity: Not on file   Other Topics Concern    Not on file   Social History Narrative    Not on file       Family History   Problem Relation Age of Onset    Diabetes Father        Current Outpatient Medications   Medication Sig Dispense Refill    SITagliptin (JANUVIA) 100 MG tablet Take 1 tablet by mouth daily 90 tablet 1    metoprolol tartrate (LOPRESSOR) 50 MG tablet Take 1 tablet by mouth twice daily 180 tablet 1    lisinopril (PRINIVIL;ZESTRIL) 20 MG tablet Take 1 tablet by mouth once daily 30 tablet 2    metFORMIN (GLUCOPHAGE) 1000 MG tablet TAKE 1 TABLET BY MOUTH TWICE DAILY WITH MEALS 180 tablet 1     No current facility-administered medications for this visit.         Immunization History   Administered Date(s) Administered    DTP 06/15/2007    Influenza Vaccine, unspecified formulation 01/01/1900    Pneumococcal Polysaccharide ([id number]) 08/23/2012       Allergies   Allergen Reactions    No Known Allergies        Admission on 12/30/2020, Discharged on 12/30/2020   Component Date Value Ref Range Status    POC Glucose 12/30/2020 276* 70 - 99 mg/dl Final    Performed on 12/30/2020 ACCU-CHEK   Final    Anaerobic Culture 12/30/2020 No anaerobes isolated   Final    Effort: Pulmonary effort is normal.      Breath sounds: Normal breath sounds. No wheezing. Abdominal:      General: Bowel sounds are normal.      Tenderness: There is no abdominal tenderness. Musculoskeletal:      Comments: Amputated distal right 2nd toe   Neurological:      Mental Status: He is alert and oriented to person, place, and time. Psychiatric:         Behavior: Behavior normal.         Thought Content: Thought content normal.         Judgment: Judgment normal.     Foot Exam: Some decreased sensation bilaterally, tested with monofilament, amputated right distal 2nd toe, no cuts or punctures    Plan   Diagnosis Orders   1. Type 2 diabetes mellitus without complication, without long-term current use of insulin (HCC)  POCT glycosylated hemoglobin (Hb A1C)    POCT microalbumin    Diabetic Foot Exam    TSH without Reflex    Comprehensive Metabolic Panel    Lipid Panel    SITagliptin (JANUVIA) 100 MG tablet   2. Essential hypertension  CBC Auto Differential     Blood pressure twice daily x 2 weeks and drop off results at the office. Return in about 3 months (around 6/23/2021) for Diabetis F/U. Prior to Visit Medications    Medication Sig Taking?  Authorizing Provider   SITagliptin (JANUVIA) 100 MG tablet Take 1 tablet by mouth daily Yes Spencer Martinez DO   metoprolol tartrate (LOPRESSOR) 50 MG tablet Take 1 tablet by mouth twice daily Yes Spencer Martinez DO   lisinopril (PRINIVIL;ZESTRIL) 20 MG tablet Take 1 tablet by mouth once daily Yes Spencer Martinez DO   metFORMIN (GLUCOPHAGE) 1000 MG tablet TAKE 1 TABLET BY MOUTH TWICE DAILY WITH MEALS Yes Spencer Martinez DO   metoprolol tartrate (LOPRESSOR) 50 MG tablet Take 1 tablet by mouth twice daily  Spencer Martinez DO

## 2021-06-02 ENCOUNTER — CLINICAL DOCUMENTATION (OUTPATIENT)
Dept: OTHER | Age: 51
End: 2021-06-02

## 2021-06-18 ENCOUNTER — VIRTUAL VISIT (OUTPATIENT)
Dept: FAMILY MEDICINE CLINIC | Age: 51
End: 2021-06-18
Payer: MEDICAID

## 2021-06-18 DIAGNOSIS — I48.91 ATRIAL FIBRILLATION, UNSPECIFIED TYPE (HCC): ICD-10-CM

## 2021-06-18 DIAGNOSIS — I10 ESSENTIAL HYPERTENSION: ICD-10-CM

## 2021-06-18 DIAGNOSIS — E11.9 TYPE 2 DIABETES MELLITUS WITHOUT COMPLICATION, WITHOUT LONG-TERM CURRENT USE OF INSULIN (HCC): Primary | ICD-10-CM

## 2021-06-18 DIAGNOSIS — Z93.3 COLOSTOMY IN PLACE (HCC): ICD-10-CM

## 2021-06-18 PROCEDURE — 99214 OFFICE O/P EST MOD 30 MIN: CPT | Performed by: FAMILY MEDICINE

## 2021-06-18 PROCEDURE — 3052F HG A1C>EQUAL 8.0%<EQUAL 9.0%: CPT | Performed by: FAMILY MEDICINE

## 2021-06-18 PROCEDURE — 2022F DILAT RTA XM EVC RTNOPTHY: CPT | Performed by: FAMILY MEDICINE

## 2021-06-18 PROCEDURE — G8427 DOCREV CUR MEDS BY ELIG CLIN: HCPCS | Performed by: FAMILY MEDICINE

## 2021-06-18 RX ORDER — LISINOPRIL 30 MG/1
30 TABLET ORAL DAILY
Qty: 30 TABLET | Refills: 0 | Status: SHIPPED | OUTPATIENT
Start: 2021-06-18 | End: 2021-07-21

## 2021-06-18 NOTE — PROGRESS NOTES
2021    TELEHEALTH EVALUATION -- Audio/Visual (During IKBIW-34 public health emergency)    HPI:    Venus Krishnamurthy (:  1970) has requested an audio/video evaluation for the following concern(s):    Pt presents today via doxy. me Video visit for a diabetes and HTN follow-up. Diabetes:  Currently taking Metformin 1000 mg BID, was taking Januvia 100 mg daily but had SE of fatigue. Morning BS: avg 120's    Admits to a possible herniai his LUQ near his colostomy site. Had referral placed for general surgery in 2020, would like for     Denies fatigue, vision changes, polydipsia, or foot numbness    Eating better and walking. Losing 2-3 pounds a week. Last eye exam: referral placed today  Last foot exam: 21  Last microalbumin: 21 A1C = 8.8    Labs were ordered on 21 which have not been done    HTN: Currently taking Lopressor 50 mg BID and Lisinopril 20 mg    Home BP readings: 150/80 last night    Admits to increased stress level and mild HA's  Denies dizziness or palpitations    Review of Systems   Constitutional: Negative for fatigue. Eyes: Negative for visual disturbance. Cardiovascular: Negative for palpitations. Endocrine: Negative for polydipsia. Neurological: Positive for headaches. Negative for dizziness and numbness. Prior to Visit Medications    Medication Sig Taking?  Authorizing Provider   lisinopril (PRINIVIL;ZESTRIL) 30 MG tablet Take 1 tablet by mouth daily Yes Michael Dennison DO   SITagliptin (JANUVIA) 100 MG tablet Take 1 tablet by mouth daily Yes Michael Dennison DO   metoprolol tartrate (LOPRESSOR) 50 MG tablet Take 1 tablet by mouth twice daily Yes Michael Dennison DO   metFORMIN (GLUCOPHAGE) 1000 MG tablet TAKE 1 TABLET BY MOUTH TWICE DAILY WITH MEALS Yes Michael Dennison DO   metoprolol tartrate (LOPRESSOR) 50 MG tablet Take 1 tablet by mouth twice daily  Michael Dennison DO       Social History     Tobacco Use    Smoking status: Never Smoker    Smokeless tobacco: Never Used   Vaping Use    Vaping Use: Never used   Substance Use Topics    Alcohol use: Yes     Comment: monthly 3 beers    Drug use: No        Allergies   Allergen Reactions    No Known Allergies    ,   Past Medical History:   Diagnosis Date    Allergic rhinitis     Atrial fibrillation (HCC)     Hyperlipidemia     Hypertension     Type 2 diabetes mellitus without complication ([facility] [address]    ,   Past Surgical History:   Procedure Laterality Date    COLONOSCOPY  06/05/2018    FOOT DEBRIDEMENT Right 12/30/2020    RIGHT 2ND TOE INCISION AND DRAINAGE BELOW FASCIA performed by Zain Martinez DPM at [address] [place] [address]  06/05/2018-06/08/2018    Removal of infected tissue and flesh from anus scrotum and surrounding area    KNEE ARTHROSCOPY Left     SKIN GRAFT  07/01/2018    TOE AMPUTATION Right 12/30/2020    PARTIAL AMPUTATION OF RIGHT 2ND TOE performed by Zain Martinez DPM at [address]:  [ INSTRUCTIONS:  \"[x]\" Indicates a positive item  \"[]\" Indicates a negative item  -- DELETE ALL ITEMS NOT EXAMINED]  Vital Signs: (As obtained by patient/caregiver or practitioner observation)    Weight - 245 lb    Constitutional: [x] Appears well-developed and well-nourished [x] No apparent distress      [] Abnormal-   Mental status  [x] Alert and awake  [x] Oriented to person/place/time [x]Able to follow commands      Eyes:  EOM    [x]  Normal  [] Abnormal-  Sclera  []  Normal  [] Abnormal -         Discharge []  None visible  [] Abnormal -    HENT:   [x] Normocephalic, atraumatic.   [] Abnormal   [] Mouth/Throat: Mucous membranes are moist.     External Ears [x] Normal  [] Abnormal-     Neck: [x] No visualized mass     Pulmonary/Chest: [x] Respiratory effort normal.  [x] No visualized signs of difficulty breathing or respiratory distress        [] Abnormal-      Musculoskeletal:   [] Normal gait with no signs of ataxia         [x] Normal range of motion of neck        [] Abnormal-       Neurological:        [x] No Facial Asymmetry (Cranial nerve 7 motor function) (limited exam to video visit)          [x] No gaze palsy        [] Abnormal-         Skin:        [x] No significant exanthematous lesions or discoloration noted on facial skin         [] Abnormal-            Psychiatric:       [x] Normal Affect [] No Hallucinations        [] Abnormal-     Other pertinent observable physical exam findings-     ASSESSMENT/PLAN:  1. Type 2 diabetes mellitus without complication, without long-term current use of insulin (HCC)  - pr will do fasting labs early next week at [facility]  - no medication changes until lab results reviewed    2. Atrial fibrillation, unspecified type ([facility] [address]  - stable  -continue Lopressor 50 mg BID    3. Essential hypertension  - increased Lisinopril to 30 mg  - NAZARIO BID x 2 weeks and send reading via [employer]      Return in about 3 months (around 9/18/2021) for Physical Exam.    Jered Rios, was evaluated through a synchronous (real-time) audio-video encounter. The patient (or guardian if applicable) is aware that this is a billable service. Verbal consent to proceed has been obtained within the past 12 months. The visit was conducted pursuant to the emergency declaration under the [facility] [address] authority and the [employer] Act. Patient identification was verified, and a caregiver was present when appropriate. The patient was located in a state where the provider was credentialed to provide care. Total time spent on this encounter: Not billed by time    --Kasie Hilliard DO on 6/18/2021 at 11:00 AM    An electronic signature was used to authenticate this note.

## 2021-06-21 ENCOUNTER — TELEPHONE (OUTPATIENT)
Dept: SURGERY | Age: 51
End: 2021-06-21

## 2021-06-21 ENCOUNTER — TELEPHONE (OUTPATIENT)
Dept: FAMILY MEDICINE CLINIC | Age: 51
End: 2021-06-21

## 2021-06-21 DIAGNOSIS — Z93.3 COLOSTOMY IN PLACE (HCC): Primary | ICD-10-CM

## 2021-06-21 NOTE — TELEPHONE ENCOUNTER
Patient needs a CT ordered BEFORE he can be seen by the MD you referred him to at [facility]. Please place an order for a CT Scan and fax to [facility] at [phone].

## 2021-06-21 NOTE — TELEPHONE ENCOUNTER
Pt had Colostomy in 06/2018 by Dr. Raissa Solorzano from [facility] in [facility]. Pt previously called in 08/2020 requesting a reversal with Dr. Wanda Alcala. Pt has had a hard time requesting surgery records from first procedure. Pt was finally able to obtain those records (in chart Care everywhere) . Pt sees Dr. Kurt Tovar as PCP, called Dr. Sidney Neal office requesting they send a CT order for pt prior to his office visit with Dr. Wanda Alcala to discuss reversal options. Pt also stated that he has a hernia located around stoma area, he was curious if both surgeries could be done at the same time. Told him that is something to discuss with Dr. Wanda Alcala once appointment is scheduled. Pt understood, will call back to schedule once CT is done.

## 2021-06-25 NOTE — TELEPHONE ENCOUNTER
Pt called this morning to see if the CT had been ordered. I advised Dr Malia Ocampo was out of the office until next week, pt understood. Please advise once order is placed.

## 2021-07-15 ENCOUNTER — HOSPITAL ENCOUNTER (OUTPATIENT)
Dept: CT IMAGING | Age: 51
Discharge: HOME | End: 2021-07-15
Payer: MEDICAID

## 2021-07-15 DIAGNOSIS — Z93.3 COLOSTOMY IN PLACE (HCC): ICD-10-CM

## 2021-07-15 LAB
GFR AFRICAN AMERICAN: >60
GFR NON-AFRICAN AMERICAN: 58
PERFORMED ON: ABNORMAL
POC CREATININE: 1.3 MG/DL (ref 0.9–1.3)
POC SAMPLE TYPE: ABNORMAL

## 2021-07-15 PROCEDURE — 6360000004 HC RX CONTRAST MEDICATION: Performed by: FAMILY MEDICINE

## 2021-07-15 PROCEDURE — 74177 CT ABD & PELVIS W/CONTRAST: CPT

## 2021-07-15 PROCEDURE — 82565 ASSAY OF CREATININE: CPT

## 2021-07-15 RX ADMIN — IOHEXOL 50 ML: 240 INJECTION, SOLUTION INTRATHECAL; INTRAVASCULAR; INTRAVENOUS; ORAL at 15:08

## 2021-07-15 RX ADMIN — IOPAMIDOL 80 ML: 755 INJECTION, SOLUTION INTRAVENOUS at 15:08

## 2021-07-20 DIAGNOSIS — I10 ESSENTIAL HYPERTENSION: ICD-10-CM

## 2021-07-21 RX ORDER — LISINOPRIL 30 MG/1
TABLET ORAL
Qty: 90 TABLET | Refills: 0 | Status: SHIPPED | OUTPATIENT
Start: 2021-07-21 | End: 2021-10-19 | Stop reason: SDUPTHER

## 2021-09-02 ENCOUNTER — OFFICE VISIT (OUTPATIENT)
Dept: SURGERY | Age: 51
End: 2021-09-02
Payer: MEDICAID

## 2021-09-02 VITALS
HEIGHT: 71 IN | DIASTOLIC BLOOD PRESSURE: 86 MMHG | SYSTOLIC BLOOD PRESSURE: 140 MMHG | WEIGHT: 250 LBS | BODY MASS INDEX: 35 KG/M2

## 2021-09-02 DIAGNOSIS — K43.5 PARASTOMAL HERNIA WITHOUT OBSTRUCTION OR GANGRENE: ICD-10-CM

## 2021-09-02 DIAGNOSIS — K62.4 ANAL STENOSIS: ICD-10-CM

## 2021-09-02 DIAGNOSIS — Z93.3 COLOSTOMY IN PLACE (HCC): Primary | ICD-10-CM

## 2021-09-02 PROCEDURE — 99243 OFF/OP CNSLTJ NEW/EST LOW 30: CPT | Performed by: SURGERY

## 2021-09-02 PROCEDURE — G8427 DOCREV CUR MEDS BY ELIG CLIN: HCPCS | Performed by: SURGERY

## 2021-09-02 PROCEDURE — G8417 CALC BMI ABV UP PARAM F/U: HCPCS | Performed by: SURGERY

## 2021-09-03 ENCOUNTER — OFFICE VISIT (OUTPATIENT)
Dept: SURGERY | Age: 51
End: 2021-09-03
Payer: MEDICAID

## 2021-09-03 VITALS
OXYGEN SATURATION: 98 % | SYSTOLIC BLOOD PRESSURE: 160 MMHG | DIASTOLIC BLOOD PRESSURE: 95 MMHG | RESPIRATION RATE: 16 BRPM | HEART RATE: 82 BPM | BODY MASS INDEX: 35.14 KG/M2 | WEIGHT: 251 LBS | HEIGHT: 71 IN

## 2021-09-03 DIAGNOSIS — K62.4 ANAL STENOSIS: Primary | ICD-10-CM

## 2021-09-03 PROCEDURE — G8417 CALC BMI ABV UP PARAM F/U: HCPCS | Performed by: SURGERY

## 2021-09-03 PROCEDURE — G8427 DOCREV CUR MEDS BY ELIG CLIN: HCPCS | Performed by: SURGERY

## 2021-09-03 PROCEDURE — 99244 OFF/OP CNSLTJ NEW/EST MOD 40: CPT | Performed by: SURGERY

## 2021-09-03 NOTE — LETTER
P - [facility] [address] [place] [address] [phone]  f [phone]    Hair Michaels MD                        SURGERY ORDER   -- Time of order -- 9/3/21    11:09 AM    [facility]:   [facility] # _________________                               Scheduled by: ____________    Surgery Date & Time:                    Pt arrival:                                                                                       Patient Name:  Rubi Worthy     :  1970 PCP:  Sahra Linares DO       Home Ph:    [phone] (home)                                                     PROCEDURE:  Exam under anesthesia, colonoscopy via stoma and colonoscopy via rectum; Anal dilation, Possible excision of scar and anal advancement flap    DIAGNOSIS:      ICD-10-CM    1. Anal stenosis  K62.4        Anesthesia: _General  Time Needed:  3 hours   Pt Position:  Lithotomy for scope and dilation.  Then will go prone for prep drape and advancement flap         Outpatient __x_  Admit ___                Pre-Op to be done by: _PCP__  Cardiac Clearance Done by: ___  Labs Needed: CBC __x_ CMP _x__ EKG _x__        Patient to meet with Anesthesiology prior to surgery ___no__     Medications to be stopped 5 days before surgery: _aspirin if on aspirin__  Additional / Special Orders:     Cefoxitin 2 gm IV on call to OR  Bowel prep for colonoscopy: Golytely, Suprep, Prepopik, or Plenvue  Exparel in room                                                                                                                Hair Michaels MD

## 2021-09-03 NOTE — PROGRESS NOTES
Subjective:     Patient is a 53 y.o. man with colostomy in place, anal stenosis    The patient is referred by Dr. Jez Delong MD, Ree Orellana DO. Results of consultation will be shared via electronic medical record    HPI: Mr. Scooter Nava has a complicated history related to necrotizing fasciitis in summer of 2018. He subsequently developed some anal stenosis. He is able to pass mucus. Colostomy is working with known parastomal hernia. He has a history of diabetes and had a recent foot debridement. He does not smoke. He has never had a colonoscopy. He reports he did not have any damage to his sphincter complex at the time of his debridement and his surgeons in [facility] had planned to eventually close his colostomy.        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    Colostomy in place [facility]) 07/14/2018    Necrotizing fasciitis ([facility] [address] 06/05/2018     Past Medical History:   Diagnosis Date    Allergic rhinitis     Atrial fibrillation (HCC)     Hyperlipidemia     Hypertension     Type 2 diabetes mellitus without complication [facility])       Past Surgical History:   Procedure Laterality Date    COLONOSCOPY  06/05/2018    FOOT DEBRIDEMENT Right 12/30/2020    RIGHT 2ND TOE INCISION AND DRAINAGE BELOW FASCIA performed by Carol Ann Hudson DPM at [address]  06/05/2018-06/08/2018    Removal of infected tissue and flesh from anus scrotum and surrounding area    KNEE ARTHROSCOPY Left     SKIN GRAFT  07/01/2018    TOE AMPUTATION Right 12/30/2020    PARTIAL AMPUTATION OF RIGHT 2ND TOE performed by Barbara Sunshine DPM [address]      Not in a hospital admission. Allergies   Allergen Reactions    No Known Allergies       Social History     Tobacco Use    Smoking status: Never Smoker    Smokeless tobacco: Never Used   Substance Use Topics    Alcohol use: Yes     Comment: monthly 3 beers      Family History   Problem Relation Age of Onset    Diabetes Father       Review of Systems    GEN: reviewed and negative except as noted in HPI. GI: reviewed and negative except as noted in HPI. Objective:     GEN: appears well, no distress, appears stated age  PSYCH: normal mood, normal affect  NECK: no neck masses, trachea midline  ENT: moist oral mucosa; anicteric  SKIN: no rash or jaundice  CV: regular heart rate and rhythm  PULM: normal respiratory effort, no wheezing  GI: soft non tender abdomen. Normal bowel sounds  RECTAL: Stenotic anus. Digital not attempted due to pain on prior exam. Circumferential scarring    EXT/NEURO: normal gait, strength/sensation grossly intact in all extremities    No recent labs    Imaging :    CT report:     \"  Impression:        1. Left lower quadrant end colostomy with associated moderate-sized parastomal hernia containing multiple nonobstructed loops of small bowel.       2. Approximately 3 small fat-containing ventral hernias.       3. Borderline hepatic steatosis. \"      Assessment:     Anal stenosis  Colostomy in place  Diabetes mellitus  Ventral and parastomal hernias   Obesity  History of necrotizing fasciitis     Plan:     Discussed first step is to treat his anal stenosis. Discussed recommendation for colonoscopy given age 53 and never been scoped. Discussed ACS and USPSTF recommendations for initial colon screening at age 40. I would perform this at same time as anal dilation / advancement flap to combine in single anesthesia. Discussed RBA of anal dilation versus advancement flap.  Discussed higher complication rate in patient's with diabetes. Discussed bleeding and infection rates. Discussed leak rate with colostomy closure. Discussed I would wait a few months to let flap heal prior to performing colostomy closure. Discussed potentially fatal risks such as MI (heart attack) and stroke after major surgery. Discussed given prior necrotizing fasciitis and scar burden bowel function may not be completely normal even after colostomy closure and he may have issues with leakage or accidents. Discussed given his significant hernia burden I am likely to enlist Fletcher Sarmiento and Shobha Reddy from the hernia center to assist closing his abdominal wall and parastomal hernia after colostomy closure.      Will schedule for colonoscopy, dilation of anal stenosis and/or anal advancement flap    Francisco Carvalho M.D.  9/3/21   10:07 AM

## 2021-09-10 ENCOUNTER — TELEPHONE (OUTPATIENT)
Dept: SURGERY | Age: 51
End: 2021-09-10

## 2021-09-14 RX ORDER — SODIUM, POTASSIUM,MAG SULFATES 17.5-3.13G
SOLUTION, RECONSTITUTED, ORAL ORAL
Qty: 1 EACH | Refills: 0 | Status: ON HOLD | OUTPATIENT
Start: 2021-09-14 | End: 2022-09-16 | Stop reason: HOSPADM

## 2021-09-14 NOTE — TELEPHONE ENCOUNTER
Patient has been scheduled for:    Procedure: EUA, c-scope via stoma and rectum, anal dilation  Date: 10/12/21  Time: 11:00AM  Arrival: 9:00AM  Hospital: [facility]: Test on 10/6  ASA?: N/A  Prep? Suprep, reviewed by phone and emailed     Pre-op? PCP    Post-op Appt? Pt. Will call when he knows his work schedule. Patient advised they will need a . Orders faxed to surgery scheduling. Instructions have been emailed to:  [email or web address]    Suprep sent to pharmacy

## 2021-09-15 ENCOUNTER — TELEPHONE (OUTPATIENT)
Dept: SURGERY | Age: 51
End: 2021-09-15

## 2021-09-15 NOTE — TELEPHONE ENCOUNTER
Spoke with patient, surgery moved to 10/26/21, arrival time is 5:30AM      New order sent to [facility] scheduling

## 2021-09-15 NOTE — TELEPHONE ENCOUNTER
Left a message for patient to call me back. I need to reschedule his surgery from 10/12 to possibly 10/5 with .

## 2021-09-15 NOTE — TELEPHONE ENCOUNTER
Patient was originally prescribed suprep, per his pharmacy it would cost him $122.05. We have switched to Nulytley (generic of Golytely)  It will cost patient $38.81    Instructions are the same for these products.

## 2021-10-19 DIAGNOSIS — I48.91 ATRIAL FIBRILLATION, UNSPECIFIED TYPE (HCC): ICD-10-CM

## 2021-10-19 DIAGNOSIS — I10 ESSENTIAL HYPERTENSION: ICD-10-CM

## 2021-10-19 RX ORDER — METOPROLOL TARTRATE 50 MG/1
TABLET, FILM COATED ORAL
Qty: 180 TABLET | Refills: 1 | Status: SHIPPED | OUTPATIENT
Start: 2021-10-19 | End: 2021-10-21

## 2021-10-19 RX ORDER — LISINOPRIL 30 MG/1
TABLET ORAL
Qty: 90 TABLET | Refills: 0 | Status: SHIPPED | OUTPATIENT
Start: 2021-10-19 | End: 2021-10-22

## 2021-10-19 NOTE — TELEPHONE ENCOUNTER
----- Message from Karuna Jiménez sent at 10/19/2021  9:35 AM EDT -----  Subject: Refill Request    QUESTIONS  Name of Medication? metFORMIN (GLUCOPHAGE) 1000 MG tablet  Patient-reported dosage and instructions? 1000 Mg tablet   How many days do you have left? 0  [facility]  Pharmacy phone number (if available)? [phone]  Additional Information for Provider? pt would like a 90 day supply   ---------------------------------------------------------------------------  --------------,  Name of Medication? lisinopril (PRINIVIL;ZESTRIL) 30 MG tablet  Patient-reported dosage and instructions? 30 Mg tablet   How many days do you have left? 2  Preferred Pharmacy? [facility]  Pharmacy phone number (if available)? [phone]  Additional Information for Provider? pt would like a 90 day supply   ---------------------------------------------------------------------------  --------------,  Name of Medication? metoprolol tartrate (LOPRESSOR) 50 MG tablet  Patient-reported dosage and instructions? 50 Mg   How many days do you have left? 1  Preferred Pharmacy? [facility]  Pharmacy phone number (if available)? [phone]  Additional Information for Provider? pt would like a 90 day supply   ---------------------------------------------------------------------------  --------------  CALL BACK INFO  What is the best way for the office to contact you? OK to leave message on   voicemail  Preferred Call Back Phone Number?  [phone]

## 2021-10-20 NOTE — TELEPHONE ENCOUNTER
Please let pt know that I refilled their medication, but he needs to schedule a diabetic visit(VV ok)  in the next month and have his previously ordered fasting labs done BEFORE his visit. Thank you.

## 2021-10-21 DIAGNOSIS — I10 ESSENTIAL HYPERTENSION: ICD-10-CM

## 2021-10-21 DIAGNOSIS — I48.91 ATRIAL FIBRILLATION, UNSPECIFIED TYPE (HCC): ICD-10-CM

## 2021-10-21 RX ORDER — METOPROLOL TARTRATE 50 MG/1
TABLET, FILM COATED ORAL
Qty: 180 TABLET | Refills: 0 | Status: SHIPPED | OUTPATIENT
Start: 2021-10-21 | End: 2022-05-20

## 2021-10-21 NOTE — TELEPHONE ENCOUNTER
Refilled but pt needs to make diabetic follow-up and have fasting labs done BEFORE the appt. Thank you.

## 2021-10-22 RX ORDER — LISINOPRIL 30 MG/1
TABLET ORAL
Qty: 90 TABLET | Refills: 0 | Status: SHIPPED | OUTPATIENT
Start: 2021-10-22 | End: 2022-05-20

## 2021-10-25 ENCOUNTER — TELEPHONE (OUTPATIENT)
Dept: SURGERY | Age: 51
End: 2021-10-25

## 2021-10-25 NOTE — TELEPHONE ENCOUNTER
I have placed a reminder call to patient for upcoming procedure. Did you speak directly to patient or leave a voicemail? Left a voicemail    Prep? NPO 12AM  Prep #2    Must have a  to and from procedure.     Arrive at the main entrance [facility] at 5:30AM

## 2021-12-30 NOTE — PROGRESS NOTES
Left     SKIN GRAFT  07/01/2018    TOE AMPUTATION Right 12/30/2020    PARTIAL AMPUTATION OF RIGHT 2ND TOE [facility] Iona Taylor D[facility]     Family History   Problem Relation Age of Onset    Diabetes Father      Social History     Socioeconomic History    Marital status: Single     Spouse name: Not on file    Number of children: Not on file    Years of education: Not on file    Highest education level: Not on file   Occupational History    Not on file   Tobacco Use    Smoking status: Never Smoker    Smokeless tobacco: Never Used   Vaping Use    Vaping Use: Never used   Substance and Sexual Activity    Alcohol use: Yes     Comment: monthly 3 beers    Drug use: No    Sexual activity: Yes     Partners: Female   Other Topics Concern    Not on file   Social History Narrative    Not on file     Social Determinants of Health     Financial Resource Strain:     Difficulty of Paying Living Expenses:    Food Insecurity:     Worried About Running Out of Food in the Last Year:     Ran Out of Food in the Last Year:    Transportation Needs:     Lack of Transportation (Medical):  Lack of Transportation (Non-Medical):    Physical Activity:     Days of Exercise per Week:     Minutes of Exercise per Session:    Stress:     Feeling of Stress :    Social Connections:     Frequency of Communication with Friends and Family:     Frequency of Social Gatherings with Friends and Family:     Attends Tenriism Services:     Active Member of Clubs or Organizations:     Attends Club or Organization Meetings:     Marital Status:    Intimate Partner Violence:     Fear of Current or Ex-Partner:     Emotionally Abused:     Physically Abused:     Sexually Abused:           Vitals:    09/02/21 1003   BP: (!) 140/86   Site: Left Upper Arm   Position: Sitting   Cuff Size: Large Adult   Weight: 250 lb (113.4 kg)   Height: 5' 11\" (1.803 m)     Body mass index is 34.87 kg/m².      Wt Readings from Last 3 PROVIDER:[TOKEN:[[id number]]]

## 2022-02-23 NOTE — TELEPHONE ENCOUNTER
One month of medication filled. Pt needs to make a VV or in office appointment for his diabetes and have previously ordered labs done BEFORE his visit. He is overdue and his diabetes is not at goal. Thank you.

## 2022-02-28 NOTE — TELEPHONE ENCOUNTER
Future Appointments   Date Time Provider Myron Call   3/9/2022  1:00 PM DO VAHID Anderson [address]

## 2022-03-08 ASSESSMENT — PATIENT HEALTH QUESTIONNAIRE - PHQ9
SUM OF ALL RESPONSES TO PHQ QUESTIONS 1-9: 0
SUM OF ALL RESPONSES TO PHQ9 QUESTIONS 1 & 2: 0
1. LITTLE INTEREST OR PLEASURE IN DOING THINGS: 0
2. FEELING DOWN, DEPRESSED OR HOPELESS: 0
SUM OF ALL RESPONSES TO PHQ QUESTIONS 1-9: 0

## 2022-03-09 ENCOUNTER — TELEMEDICINE (OUTPATIENT)
Dept: FAMILY MEDICINE CLINIC | Age: 52
End: 2022-03-09
Payer: MEDICAID

## 2022-03-09 DIAGNOSIS — E11.9 TYPE 2 DIABETES MELLITUS WITHOUT COMPLICATION, WITHOUT LONG-TERM CURRENT USE OF INSULIN (HCC): ICD-10-CM

## 2022-03-09 DIAGNOSIS — I48.91 ATRIAL FIBRILLATION, UNSPECIFIED TYPE (HCC): ICD-10-CM

## 2022-03-09 DIAGNOSIS — R45.89 DEPRESSED MOOD: ICD-10-CM

## 2022-03-09 DIAGNOSIS — F41.9 ANXIETY: Primary | ICD-10-CM

## 2022-03-09 PROCEDURE — 3046F HEMOGLOBIN A1C LEVEL >9.0%: CPT | Performed by: FAMILY MEDICINE

## 2022-03-09 PROCEDURE — 3017F COLORECTAL CA SCREEN DOC REV: CPT | Performed by: FAMILY MEDICINE

## 2022-03-09 PROCEDURE — G8427 DOCREV CUR MEDS BY ELIG CLIN: HCPCS | Performed by: FAMILY MEDICINE

## 2022-03-09 PROCEDURE — 99214 OFFICE O/P EST MOD 30 MIN: CPT | Performed by: FAMILY MEDICINE

## 2022-03-09 PROCEDURE — 2022F DILAT RTA XM EVC RTNOPTHY: CPT | Performed by: FAMILY MEDICINE
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
## 2022-05-18 DIAGNOSIS — E11.9 TYPE 2 DIABETES MELLITUS WITHOUT COMPLICATION, WITHOUT LONG-TERM CURRENT USE OF INSULIN (HCC): ICD-10-CM

## 2022-05-19 DIAGNOSIS — I48.91 ATRIAL FIBRILLATION, UNSPECIFIED TYPE (HCC): ICD-10-CM

## 2022-05-19 DIAGNOSIS — I10 ESSENTIAL HYPERTENSION: ICD-10-CM

## 2022-05-19 LAB
ESTIMATED AVERAGE GLUCOSE: 194.4 MG/DL
HBA1C MFR BLD: 8.4 %

## 2022-05-20 RX ORDER — METOPROLOL TARTRATE 50 MG/1
TABLET, FILM COATED ORAL
Qty: 180 TABLET | Refills: 0 | Status: SHIPPED | OUTPATIENT
Start: 2022-05-20 | End: 2022-08-10 | Stop reason: SDUPTHER

## 2022-05-20 RX ORDER — LISINOPRIL 30 MG/1
TABLET ORAL
Qty: 90 TABLET | Refills: 0 | Status: SHIPPED | OUTPATIENT
Start: 2022-05-20 | End: 2022-08-10 | Stop reason: SDUPTHER

## 2022-05-31 RX ORDER — NYSTATIN 100000 [USP'U]/G
POWDER TOPICAL
Qty: 1 EACH | Refills: 1 | Status: ON HOLD | OUTPATIENT
Start: 2022-05-31 | End: 2022-09-16 | Stop reason: HOSPADM

## 2022-06-10 DIAGNOSIS — E11.9 TYPE 2 DIABETES MELLITUS WITHOUT COMPLICATION, WITHOUT LONG-TERM CURRENT USE OF INSULIN (HCC): ICD-10-CM

## 2022-06-10 DIAGNOSIS — Z12.5 PROSTATE CANCER SCREENING: Primary | ICD-10-CM

## 2022-06-10 DIAGNOSIS — I10 ESSENTIAL HYPERTENSION: ICD-10-CM

## 2022-08-10 DIAGNOSIS — I48.91 ATRIAL FIBRILLATION, UNSPECIFIED TYPE (HCC): ICD-10-CM

## 2022-08-10 DIAGNOSIS — E11.9 TYPE 2 DIABETES MELLITUS WITHOUT COMPLICATION, WITHOUT LONG-TERM CURRENT USE OF INSULIN (HCC): ICD-10-CM

## 2022-08-10 DIAGNOSIS — I10 ESSENTIAL HYPERTENSION: ICD-10-CM

## 2022-08-10 RX ORDER — LISINOPRIL 30 MG/1
TABLET ORAL
Qty: 90 TABLET | Refills: 1 | Status: SHIPPED | OUTPATIENT
Start: 2022-08-10

## 2022-08-10 RX ORDER — METOPROLOL TARTRATE 50 MG/1
TABLET, FILM COATED ORAL
Qty: 180 TABLET | Refills: 1 | Status: SHIPPED | OUTPATIENT
Start: 2022-08-10

## 2022-08-10 NOTE — TELEPHONE ENCOUNTER
----- Message from Chiqiutasusie Messina sent at 8/10/2022  9:10 AM EDT -----  Subject: Refill Request    QUESTIONS  Name of Medication? lisinopril (PRINIVIL;ZESTRIL) 30 MG tablet  Patient-reported dosage and instructions? 30 mg  How many days do you have left? 6  Preferred Pharmacy? [facility]  Pharmacy phone number (if available)? [phone]  ---------------------------------------------------------------------------  --------------,  Name of Medication? metFORMIN (GLUCOPHAGE) 1000 MG tablet  Patient-reported dosage and instructions? 1000 MG TABLET  How many days do you have left? 5  [facility]  Pharmacy phone number (if available)? [phone]  ---------------------------------------------------------------------------  --------------,  Name of Medication? empagliflozin (JARDIANCE) 10 MG tablet  Patient-reported dosage and instructions? 10 MG   How many days do you have left? 4  Preferred Pharmacy? [facility]  Pharmacy phone number (if available)? [phone]  ---------------------------------------------------------------------------  --------------,  Name of Medication? metoprolol tartrate (LOPRESSOR) 50 MG tablet  Patient-reported dosage and instructions? 50 MG  How many days do you have left? 5  Preferred Pharmacy? [facility]  Pharmacy phone number (if available)? [phone]  ---------------------------------------------------------------------------  --------------  Aretta Kill INFO  What is the best way for the office to contact you? OK to leave message on   voicemail  Preferred Call Back Phone Number? [phone]  ---------------------------------------------------------------------------  --------------  SCRIPT ANSWERS  Relationship to Patient?  Self

## 2022-08-23 ENCOUNTER — TELEPHONE (OUTPATIENT)
Dept: SURGERY | Age: 52
End: 2022-08-23

## 2022-08-23 NOTE — TELEPHONE ENCOUNTER
Patient called to re-connect with Dr. Osvaldo Worrell from his last visit in September of 2021. Everything was set to go for surgery and then some legal matters arose that precluded him from moving forward. The patient would like to know what his next steps would be to get back in to see Dr. Osvaldo Worrell and re-scheduled for surgery again.     Please call back at [phone]

## 2022-08-24 NOTE — TELEPHONE ENCOUNTER
Given its been close to a year I will need to see him in office to go over everything again    Thanks    Chilo Etienne M.D.  8/24/22   10:41 AM

## 2022-09-07 RX ORDER — GENTAMICIN SULFATE 1 MG/G
1 OINTMENT TOPICAL DAILY
Qty: 30 G | Refills: 3 | Status: ON HOLD | OUTPATIENT
Start: 2022-09-07 | End: 2022-09-16 | Stop reason: HOSPADM

## 2022-09-07 RX ORDER — SULFAMETHOXAZOLE AND TRIMETHOPRIM 400; 80 MG/1; MG/1
1 TABLET ORAL DAILY
Qty: 10 TABLET | Refills: 0 | Status: ON HOLD | OUTPATIENT
Start: 2022-09-07 | End: 2022-09-16 | Stop reason: HOSPADM

## 2022-09-07 RX ORDER — CEPHALEXIN 500 MG/1
500 CAPSULE ORAL 4 TIMES DAILY
Qty: 40 CAPSULE | Refills: 0 | Status: ON HOLD | OUTPATIENT
Start: 2022-09-07 | End: 2022-09-16 | Stop reason: HOSPADM

## 2022-09-07 RX ORDER — SODIUM HYPOCHLORITE 5 MG/ML
1 SOLUTION TOPICAL DAILY
Qty: 473 ML | Refills: 1 | Status: ON HOLD | OUTPATIENT
Start: 2022-09-07 | End: 2022-09-16 | Stop reason: HOSPADM

## 2022-09-07 NOTE — TELEPHONE ENCOUNTER
Rx sent for left foot infection  Changed to Bactrim and Keflex for better coverage.    Gentamicin ointment   Dakins

## 2022-09-09 ENCOUNTER — OFFICE VISIT (OUTPATIENT)
Dept: SURGERY | Age: 52
End: 2022-09-09
Payer: MEDICAID

## 2022-09-09 VITALS
TEMPERATURE: 97.7 F | HEART RATE: 85 BPM | RESPIRATION RATE: 18 BRPM | SYSTOLIC BLOOD PRESSURE: 144 MMHG | WEIGHT: 241 LBS | BODY MASS INDEX: 33.74 KG/M2 | HEIGHT: 71 IN | DIASTOLIC BLOOD PRESSURE: 92 MMHG | OXYGEN SATURATION: 98 %

## 2022-09-09 DIAGNOSIS — K62.4 ANAL STENOSIS: Primary | ICD-10-CM

## 2022-09-09 PROCEDURE — 3017F COLORECTAL CA SCREEN DOC REV: CPT | Performed by: SURGERY

## 2022-09-09 PROCEDURE — G8427 DOCREV CUR MEDS BY ELIG CLIN: HCPCS | Performed by: SURGERY

## 2022-09-09 PROCEDURE — G8417 CALC BMI ABV UP PARAM F/U: HCPCS | Performed by: SURGERY

## 2022-09-09 PROCEDURE — 99214 OFFICE O/P EST MOD 30 MIN: CPT | Performed by: SURGERY

## 2022-09-09 PROCEDURE — 1036F TOBACCO NON-USER: CPT | Performed by: SURGERY

## 2022-09-09 NOTE — PROGRESS NOTES
use: Yes     Comment: monthly 3 beers      Family History   Problem Relation Age of Onset    Diabetes Father       Review of Systems    GEN: reviewed and negative except as noted in HPI. GI: reviewed and negative except as noted in HPI. Objective:     GEN: appears well, no distress, appears stated age  PSYCH: normal mood, normal affect  NECK: no neck masses, trachea midline  ENT: moist oral mucosa; anicteric  SKIN: no rash or jaundice  CV: regular heart rate and rhythm  PULM: normal respiratory effort, no wheezing  GI: soft non tender abdomen. Normal bowel sounds  RECTAL: severe stenosis and scarring   EXT/NEURO: normal gait, strength/sensation grossly intact in all extremities      Assessment:     Anal stenosis  Colostomy in place  Diabetes mellitus  Ventral and parastomal hernias   Obesity  History of necrotizing fasciitis     Plan:     Discussed first step is to treat his anal stenosis. Discussed recommendation for colonoscopy given age 53 and never been scoped. Discussed ACS and USPSTF recommendations for initial colon screening at age 40. I would perform this at same time as anal dilation / advancement flap to combine in single anesthesia. Discussed RBA of anal dilation versus advancement flap. Discussed higher complication rate in patient's with diabetes. Discussed bleeding and infection rates. Discussed leak rate with colostomy closure. Discussed I would wait a few months to let flap heal prior to performing colostomy closure. Discussed potentially fatal risks such as MI (heart attack) and stroke after major surgery. Discussed given prior necrotizing fasciitis and scar burden bowel function may not be completely normal even after colostomy closure and he may have issues with leakage or accidents. Discussed given his significant hernia burden I am likely to enlist Fletcher Bermudez and Víctor Rocha from the hernia center to assist closing his abdominal wall and parastomal hernia after colostomy closure. Will schedule for colonoscopy, dilation of anal stenosis and/or anal advancement flap    Caitie Jhaveri M.D.  9/9/22   10:23 AM

## 2022-09-11 PROCEDURE — 99285 EMERGENCY DEPT VISIT HI MDM: CPT

## 2022-09-11 PROCEDURE — 96365 THER/PROPH/DIAG IV INF INIT: CPT

## 2022-09-11 PROCEDURE — 96375 TX/PRO/DX INJ NEW DRUG ADDON: CPT

## 2022-09-12 ENCOUNTER — APPOINTMENT (OUTPATIENT)
Dept: GENERAL RADIOLOGY | Age: 52
DRG: 710 | End: 2022-09-12
Payer: MEDICAID

## 2022-09-12 ENCOUNTER — ANESTHESIA (OUTPATIENT)
Dept: OPERATING ROOM | Age: 52
DRG: 710 | End: 2022-09-12
Payer: MEDICAID

## 2022-09-12 ENCOUNTER — HOSPITAL ENCOUNTER (INPATIENT)
Age: 52
LOS: 4 days | Discharge: HOME | DRG: 710 | End: 2022-09-16
Attending: EMERGENCY MEDICINE | Admitting: INTERNAL MEDICINE
Payer: MEDICAID

## 2022-09-12 ENCOUNTER — APPOINTMENT (OUTPATIENT)
Dept: MRI IMAGING | Age: 52
DRG: 710 | End: 2022-09-12
Payer: MEDICAID

## 2022-09-12 ENCOUNTER — APPOINTMENT (OUTPATIENT)
Dept: VASCULAR LAB | Age: 52
DRG: 710 | End: 2022-09-12
Payer: MEDICAID

## 2022-09-12 ENCOUNTER — ANESTHESIA EVENT (OUTPATIENT)
Dept: OPERATING ROOM | Age: 52
DRG: 710 | End: 2022-09-12
Payer: MEDICAID

## 2022-09-12 DIAGNOSIS — L08.9 DIABETIC FOOT INFECTION (HCC): Primary | ICD-10-CM

## 2022-09-12 DIAGNOSIS — L08.9 DIABETIC INFECTION OF LEFT FOOT (HCC): ICD-10-CM

## 2022-09-12 DIAGNOSIS — A48.0 GAS GANGRENE OF FOOT (HCC): ICD-10-CM

## 2022-09-12 DIAGNOSIS — E11.628 DIABETIC INFECTION OF LEFT FOOT (HCC): ICD-10-CM

## 2022-09-12 DIAGNOSIS — E11.628 DIABETIC FOOT INFECTION (HCC): Primary | ICD-10-CM

## 2022-09-12 PROBLEM — R79.89 ELEVATED SERUM CREATININE: Status: ACTIVE | Noted: 2022-09-12

## 2022-09-12 PROBLEM — E78.5 DYSLIPIDEMIA: Status: ACTIVE | Noted: 2022-09-12

## 2022-09-12 PROBLEM — M86.472 CHRONIC OSTEOMYELITIS OF LEFT FOOT WITH DRAINING SINUS (HCC): Status: ACTIVE | Noted: 2022-09-12

## 2022-09-12 LAB
ANION GAP SERPL CALCULATED.3IONS-SCNC: 12 MMOL/L (ref 3–16)
BASOPHILS ABSOLUTE: 0.1 K/UL (ref 0–0.2)
BASOPHILS RELATIVE PERCENT: 0.5 %
BUN BLDV-MCNC: 26 MG/DL (ref 7–20)
C-REACTIVE PROTEIN: 308.6 MG/L (ref 0–5.1)
CALCIUM SERPL-MCNC: 10 MG/DL (ref 8.3–10.6)
CHLORIDE BLD-SCNC: 100 MMOL/L (ref 99–110)
CO2: 21 MMOL/L (ref 21–32)
CREAT SERPL-MCNC: 1.5 MG/DL (ref 0.9–1.3)
EKG ATRIAL RATE: 95 BPM
EKG DIAGNOSIS: NORMAL
EKG P AXIS: 54 DEGREES
EKG P-R INTERVAL: 138 MS
EKG Q-T INTERVAL: 322 MS
EKG QRS DURATION: 86 MS
EKG QTC CALCULATION (BAZETT): 404 MS
EKG R AXIS: -6 DEGREES
EKG T AXIS: 15 DEGREES
EKG VENTRICULAR RATE: 95 BPM
EOSINOPHILS ABSOLUTE: 0 K/UL (ref 0–0.6)
EOSINOPHILS RELATIVE PERCENT: 0.1 %
GFR AFRICAN AMERICAN: 60
GFR NON-AFRICAN AMERICAN: 49
GLUCOSE BLD-MCNC: 146 MG/DL (ref 70–99)
GLUCOSE BLD-MCNC: 146 MG/DL (ref 70–99)
GLUCOSE BLD-MCNC: 176 MG/DL (ref 70–99)
GLUCOSE BLD-MCNC: 203 MG/DL (ref 70–99)
GLUCOSE BLD-MCNC: 232 MG/DL (ref 70–99)
HCT VFR BLD CALC: 35.3 % (ref 40.5–52.5)
HEMOGLOBIN: 11.9 G/DL (ref 13.5–17.5)
INR BLD: 1.19 (ref 0.87–1.14)
LYMPHOCYTES ABSOLUTE: 1.1 K/UL (ref 1–5.1)
LYMPHOCYTES RELATIVE PERCENT: 6.9 %
MCH RBC QN AUTO: 28.2 PG (ref 26–34)
MCHC RBC AUTO-ENTMCNC: 33.6 G/DL (ref 31–36)
MCV RBC AUTO: 84 FL (ref 80–100)
MONOCYTES ABSOLUTE: 1.1 K/UL (ref 0–1.3)
MONOCYTES RELATIVE PERCENT: 6.9 %
NEUTROPHILS ABSOLUTE: 13.4 K/UL (ref 1.7–7.7)
NEUTROPHILS RELATIVE PERCENT: 85.6 %
PDW BLD-RTO: 12.7 % (ref 12.4–15.4)
PERFORMED ON: ABNORMAL
PLATELET # BLD: 193 K/UL (ref 135–450)
PMV BLD AUTO: 8.1 FL (ref 5–10.5)
POTASSIUM REFLEX MAGNESIUM: 4.6 MMOL/L (ref 3.5–5.1)
PREALBUMIN: 9.7 MG/DL (ref 20–40)
PROTHROMBIN TIME: 15.1 SEC (ref 11.7–14.5)
RBC # BLD: 4.21 M/UL (ref 4.2–5.9)
SEDIMENTATION RATE, ERYTHROCYTE: 57 MM/HR (ref 0–20)
SODIUM BLD-SCNC: 133 MMOL/L (ref 136–145)
WBC # BLD: 15.7 K/UL (ref 4–11)

## 2022-09-12 PROCEDURE — 85025 COMPLETE CBC W/AUTO DIFF WBC: CPT

## 2022-09-12 PROCEDURE — 6370000000 HC RX 637 (ALT 250 FOR IP): Performed by: PODIATRIST

## 2022-09-12 PROCEDURE — 3600000014 HC SURGERY LEVEL 4 ADDTL 15MIN: Performed by: PODIATRIST

## 2022-09-12 PROCEDURE — 87076 CULTURE ANAEROBE IDENT EACH: CPT

## 2022-09-12 PROCEDURE — 6360000004 HC RX CONTRAST MEDICATION: Performed by: PODIATRIST

## 2022-09-12 PROCEDURE — 6360000002 HC RX W HCPCS: Performed by: PODIATRIST

## 2022-09-12 PROCEDURE — 2580000003 HC RX 258: Performed by: EMERGENCY MEDICINE

## 2022-09-12 PROCEDURE — 73630 X-RAY EXAM OF FOOT: CPT

## 2022-09-12 PROCEDURE — 85610 PROTHROMBIN TIME: CPT

## 2022-09-12 PROCEDURE — 88304 TISSUE EXAM BY PATHOLOGIST: CPT

## 2022-09-12 PROCEDURE — 6360000002 HC RX W HCPCS: Performed by: STUDENT IN AN ORGANIZED HEALTH CARE EDUCATION/TRAINING PROGRAM

## 2022-09-12 PROCEDURE — 73720 MRI LWR EXTREMITY W/O&W/DYE: CPT

## 2022-09-12 PROCEDURE — 87186 SC STD MICRODIL/AGAR DIL: CPT

## 2022-09-12 PROCEDURE — 2580000003 HC RX 258: Performed by: ANESTHESIOLOGY

## 2022-09-12 PROCEDURE — 0KBW0ZZ EXCISION OF LEFT FOOT MUSCLE, OPEN APPROACH: ICD-10-PCS | Performed by: PODIATRIST

## 2022-09-12 PROCEDURE — 7100000000 HC PACU RECOVERY - FIRST 15 MIN: Performed by: PODIATRIST

## 2022-09-12 PROCEDURE — 87205 SMEAR GRAM STAIN: CPT

## 2022-09-12 PROCEDURE — 88311 DECALCIFY TISSUE: CPT

## 2022-09-12 PROCEDURE — 6360000002 HC RX W HCPCS: Performed by: EMERGENCY MEDICINE

## 2022-09-12 PROCEDURE — 87040 BLOOD CULTURE FOR BACTERIA: CPT

## 2022-09-12 PROCEDURE — 2580000003 HC RX 258: Performed by: STUDENT IN AN ORGANIZED HEALTH CARE EDUCATION/TRAINING PROGRAM

## 2022-09-12 PROCEDURE — 1200000000 HC SEMI PRIVATE

## 2022-09-12 PROCEDURE — 36415 COLL VENOUS BLD VENIPUNCTURE: CPT

## 2022-09-12 PROCEDURE — A4217 STERILE WATER/SALINE, 500 ML: HCPCS | Performed by: PODIATRIST

## 2022-09-12 PROCEDURE — 87181 SC STD AGAR DILUTION PER AGT: CPT

## 2022-09-12 PROCEDURE — 2500000003 HC RX 250 WO HCPCS: Performed by: ANESTHESIOLOGY

## 2022-09-12 PROCEDURE — 93005 ELECTROCARDIOGRAM TRACING: CPT | Performed by: PODIATRIST

## 2022-09-12 PROCEDURE — 3700000001 HC ADD 15 MINUTES (ANESTHESIA): Performed by: PODIATRIST

## 2022-09-12 PROCEDURE — 87077 CULTURE AEROBIC IDENTIFY: CPT

## 2022-09-12 PROCEDURE — 7100000001 HC PACU RECOVERY - ADDTL 15 MIN: Performed by: PODIATRIST

## 2022-09-12 PROCEDURE — A9576 INJ PROHANCE MULTIPACK: HCPCS | Performed by: PODIATRIST

## 2022-09-12 PROCEDURE — 85652 RBC SED RATE AUTOMATED: CPT

## 2022-09-12 PROCEDURE — 83036 HEMOGLOBIN GLYCOSYLATED A1C: CPT

## 2022-09-12 PROCEDURE — 80048 BASIC METABOLIC PNL TOTAL CA: CPT

## 2022-09-12 PROCEDURE — 0Y6N0ZF DETACHMENT AT LEFT FOOT, PARTIAL 5TH RAY, OPEN APPROACH: ICD-10-PCS | Performed by: PODIATRIST

## 2022-09-12 PROCEDURE — 99223 1ST HOSP IP/OBS HIGH 75: CPT | Performed by: INTERNAL MEDICINE

## 2022-09-12 PROCEDURE — 87075 CULTR BACTERIA EXCEPT BLOOD: CPT

## 2022-09-12 PROCEDURE — 71046 X-RAY EXAM CHEST 2 VIEWS: CPT

## 2022-09-12 PROCEDURE — 2709999900 HC NON-CHARGEABLE SUPPLY: Performed by: PODIATRIST

## 2022-09-12 PROCEDURE — 93925 LOWER EXTREMITY STUDY: CPT

## 2022-09-12 PROCEDURE — 2580000003 HC RX 258: Performed by: PODIATRIST

## 2022-09-12 PROCEDURE — 0J9R0ZZ DRAINAGE OF LEFT FOOT SUBCUTANEOUS TISSUE AND FASCIA, OPEN APPROACH: ICD-10-PCS | Performed by: PODIATRIST

## 2022-09-12 PROCEDURE — 87070 CULTURE OTHR SPECIMN AEROBIC: CPT

## 2022-09-12 PROCEDURE — 6360000002 HC RX W HCPCS: Performed by: ANESTHESIOLOGY

## 2022-09-12 PROCEDURE — 0Y6N0ZD DETACHMENT AT LEFT FOOT, PARTIAL 4TH RAY, OPEN APPROACH: ICD-10-PCS | Performed by: PODIATRIST

## 2022-09-12 PROCEDURE — 3700000000 HC ANESTHESIA ATTENDED CARE: Performed by: PODIATRIST

## 2022-09-12 PROCEDURE — 6370000000 HC RX 637 (ALT 250 FOR IP): Performed by: STUDENT IN AN ORGANIZED HEALTH CARE EDUCATION/TRAINING PROGRAM

## 2022-09-12 PROCEDURE — 84134 ASSAY OF PREALBUMIN: CPT

## 2022-09-12 PROCEDURE — 86140 C-REACTIVE PROTEIN: CPT

## 2022-09-12 PROCEDURE — 3600000004 HC SURGERY LEVEL 4 BASE: Performed by: PODIATRIST

## 2022-09-12 RX ORDER — ONDANSETRON 2 MG/ML
4 INJECTION INTRAMUSCULAR; INTRAVENOUS
Status: DISCONTINUED | OUTPATIENT
Start: 2022-09-12 | End: 2022-09-12 | Stop reason: HOSPADM

## 2022-09-12 RX ORDER — SODIUM CHLORIDE, SODIUM LACTATE, POTASSIUM CHLORIDE, CALCIUM CHLORIDE 600; 310; 30; 20 MG/100ML; MG/100ML; MG/100ML; MG/100ML
INJECTION, SOLUTION INTRAVENOUS CONTINUOUS PRN
Status: DISCONTINUED | OUTPATIENT
Start: 2022-09-12 | End: 2022-09-12 | Stop reason: SDUPTHER

## 2022-09-12 RX ORDER — ONDANSETRON 2 MG/ML
INJECTION INTRAMUSCULAR; INTRAVENOUS PRN
Status: DISCONTINUED | OUTPATIENT
Start: 2022-09-12 | End: 2022-09-12 | Stop reason: SDUPTHER

## 2022-09-12 RX ORDER — ENOXAPARIN SODIUM 100 MG/ML
30 INJECTION SUBCUTANEOUS 2 TIMES DAILY
Status: DISCONTINUED | OUTPATIENT
Start: 2022-09-12 | End: 2022-09-12

## 2022-09-12 RX ORDER — OXYCODONE HYDROCHLORIDE 5 MG/1
5 TABLET ORAL
Status: DISCONTINUED | OUTPATIENT
Start: 2022-09-12 | End: 2022-09-12 | Stop reason: HOSPADM

## 2022-09-12 RX ORDER — POLYETHYLENE GLYCOL 3350 17 G/17G
17 POWDER, FOR SOLUTION ORAL DAILY PRN
Status: DISCONTINUED | OUTPATIENT
Start: 2022-09-12 | End: 2022-09-16 | Stop reason: HOSPADM

## 2022-09-12 RX ORDER — LINEZOLID 2 MG/ML
600 INJECTION, SOLUTION INTRAVENOUS EVERY 12 HOURS
Status: DISCONTINUED | OUTPATIENT
Start: 2022-09-12 | End: 2022-09-14

## 2022-09-12 RX ORDER — SODIUM CHLORIDE 0.9 % (FLUSH) 0.9 %
5-40 SYRINGE (ML) INJECTION EVERY 12 HOURS SCHEDULED
Status: DISCONTINUED | OUTPATIENT
Start: 2022-09-12 | End: 2022-09-16 | Stop reason: HOSPADM

## 2022-09-12 RX ORDER — SODIUM CHLORIDE, SODIUM LACTATE, POTASSIUM CHLORIDE, AND CALCIUM CHLORIDE .6; .31; .03; .02 G/100ML; G/100ML; G/100ML; G/100ML
1000 INJECTION, SOLUTION INTRAVENOUS ONCE
Status: COMPLETED | OUTPATIENT
Start: 2022-09-12 | End: 2022-09-12

## 2022-09-12 RX ORDER — ACETAMINOPHEN 325 MG/1
650 TABLET ORAL EVERY 6 HOURS PRN
Status: DISCONTINUED | OUTPATIENT
Start: 2022-09-12 | End: 2022-09-16 | Stop reason: HOSPADM

## 2022-09-12 RX ORDER — INSULIN LISPRO 100 [IU]/ML
0-4 INJECTION, SOLUTION INTRAVENOUS; SUBCUTANEOUS
Status: DISCONTINUED | OUTPATIENT
Start: 2022-09-12 | End: 2022-09-13

## 2022-09-12 RX ORDER — SODIUM CHLORIDE 9 MG/ML
INJECTION, SOLUTION INTRAVENOUS PRN
Status: DISCONTINUED | OUTPATIENT
Start: 2022-09-12 | End: 2022-09-16 | Stop reason: HOSPADM

## 2022-09-12 RX ORDER — METOCLOPRAMIDE HYDROCHLORIDE 5 MG/ML
INJECTION INTRAMUSCULAR; INTRAVENOUS PRN
Status: DISCONTINUED | OUTPATIENT
Start: 2022-09-12 | End: 2022-09-12 | Stop reason: SDUPTHER

## 2022-09-12 RX ORDER — HEPARIN SODIUM 5000 [USP'U]/ML
5000 INJECTION, SOLUTION INTRAVENOUS; SUBCUTANEOUS EVERY 8 HOURS SCHEDULED
Status: DISCONTINUED | OUTPATIENT
Start: 2022-09-12 | End: 2022-09-12

## 2022-09-12 RX ORDER — ONDANSETRON 2 MG/ML
4 INJECTION INTRAMUSCULAR; INTRAVENOUS EVERY 6 HOURS PRN
Status: DISCONTINUED | OUTPATIENT
Start: 2022-09-12 | End: 2022-09-16 | Stop reason: HOSPADM

## 2022-09-12 RX ORDER — ONDANSETRON 2 MG/ML
4 INJECTION INTRAMUSCULAR; INTRAVENOUS ONCE
Status: COMPLETED | OUTPATIENT
Start: 2022-09-12 | End: 2022-09-12

## 2022-09-12 RX ORDER — SODIUM CHLORIDE 0.9 % (FLUSH) 0.9 %
5-40 SYRINGE (ML) INJECTION EVERY 12 HOURS SCHEDULED
Status: DISCONTINUED | OUTPATIENT
Start: 2022-09-12 | End: 2022-09-12 | Stop reason: HOSPADM

## 2022-09-12 RX ORDER — MEPERIDINE HYDROCHLORIDE 25 MG/ML
12.5 INJECTION INTRAMUSCULAR; INTRAVENOUS; SUBCUTANEOUS EVERY 5 MIN PRN
Status: DISCONTINUED | OUTPATIENT
Start: 2022-09-12 | End: 2022-09-12 | Stop reason: HOSPADM

## 2022-09-12 RX ORDER — METOPROLOL TARTRATE 50 MG/1
50 TABLET, FILM COATED ORAL 2 TIMES DAILY
Status: DISCONTINUED | OUTPATIENT
Start: 2022-09-12 | End: 2022-09-16 | Stop reason: HOSPADM

## 2022-09-12 RX ORDER — FAMOTIDINE 20 MG/1
20 TABLET, FILM COATED ORAL 2 TIMES DAILY
Status: DISCONTINUED | OUTPATIENT
Start: 2022-09-12 | End: 2022-09-16 | Stop reason: HOSPADM

## 2022-09-12 RX ORDER — SODIUM CHLORIDE 9 MG/ML
INJECTION, SOLUTION INTRAVENOUS PRN
Status: DISCONTINUED | OUTPATIENT
Start: 2022-09-12 | End: 2022-09-12 | Stop reason: HOSPADM

## 2022-09-12 RX ORDER — SODIUM CHLORIDE 0.9 % (FLUSH) 0.9 %
5-40 SYRINGE (ML) INJECTION PRN
Status: DISCONTINUED | OUTPATIENT
Start: 2022-09-12 | End: 2022-09-16 | Stop reason: HOSPADM

## 2022-09-12 RX ORDER — IBUPROFEN 400 MG/1
800 TABLET ORAL ONCE
Status: DISCONTINUED | OUTPATIENT
Start: 2022-09-12 | End: 2022-09-12

## 2022-09-12 RX ORDER — HEPARIN SODIUM 5000 [USP'U]/ML
5000 INJECTION, SOLUTION INTRAVENOUS; SUBCUTANEOUS EVERY 8 HOURS SCHEDULED
Status: COMPLETED | OUTPATIENT
Start: 2022-09-13 | End: 2022-09-14

## 2022-09-12 RX ORDER — PROPOFOL 10 MG/ML
INJECTION, EMULSION INTRAVENOUS PRN
Status: DISCONTINUED | OUTPATIENT
Start: 2022-09-12 | End: 2022-09-12 | Stop reason: SDUPTHER

## 2022-09-12 RX ORDER — PROCHLORPERAZINE EDISYLATE 5 MG/ML
5 INJECTION INTRAMUSCULAR; INTRAVENOUS
Status: DISCONTINUED | OUTPATIENT
Start: 2022-09-12 | End: 2022-09-12 | Stop reason: HOSPADM

## 2022-09-12 RX ORDER — SODIUM CHLORIDE 0.9 % (FLUSH) 0.9 %
5-40 SYRINGE (ML) INJECTION PRN
Status: DISCONTINUED | OUTPATIENT
Start: 2022-09-12 | End: 2022-09-12 | Stop reason: HOSPADM

## 2022-09-12 RX ORDER — ONDANSETRON 4 MG/1
4 TABLET, ORALLY DISINTEGRATING ORAL EVERY 8 HOURS PRN
Status: DISCONTINUED | OUTPATIENT
Start: 2022-09-12 | End: 2022-09-16 | Stop reason: HOSPADM

## 2022-09-12 RX ORDER — FAMOTIDINE 10 MG/ML
INJECTION, SOLUTION INTRAVENOUS PRN
Status: DISCONTINUED | OUTPATIENT
Start: 2022-09-12 | End: 2022-09-12 | Stop reason: SDUPTHER

## 2022-09-12 RX ORDER — FENTANYL CITRATE 50 UG/ML
INJECTION, SOLUTION INTRAMUSCULAR; INTRAVENOUS PRN
Status: DISCONTINUED | OUTPATIENT
Start: 2022-09-12 | End: 2022-09-12 | Stop reason: SDUPTHER

## 2022-09-12 RX ORDER — MAGNESIUM HYDROXIDE 1200 MG/15ML
LIQUID ORAL CONTINUOUS PRN
Status: COMPLETED | OUTPATIENT
Start: 2022-09-12 | End: 2022-09-12

## 2022-09-12 RX ORDER — ACETAMINOPHEN 650 MG/1
650 SUPPOSITORY RECTAL EVERY 6 HOURS PRN
Status: DISCONTINUED | OUTPATIENT
Start: 2022-09-12 | End: 2022-09-16 | Stop reason: HOSPADM

## 2022-09-12 RX ORDER — HYDRALAZINE HYDROCHLORIDE 20 MG/ML
10 INJECTION INTRAMUSCULAR; INTRAVENOUS
Status: DISCONTINUED | OUTPATIENT
Start: 2022-09-12 | End: 2022-09-12 | Stop reason: HOSPADM

## 2022-09-12 RX ORDER — LABETALOL HYDROCHLORIDE 5 MG/ML
10 INJECTION, SOLUTION INTRAVENOUS
Status: DISCONTINUED | OUTPATIENT
Start: 2022-09-12 | End: 2022-09-12 | Stop reason: HOSPADM

## 2022-09-12 RX ORDER — LIDOCAINE HYDROCHLORIDE 20 MG/ML
INJECTION, SOLUTION INTRAVENOUS PRN
Status: DISCONTINUED | OUTPATIENT
Start: 2022-09-12 | End: 2022-09-12 | Stop reason: SDUPTHER

## 2022-09-12 RX ORDER — INSULIN LISPRO 100 [IU]/ML
0-4 INJECTION, SOLUTION INTRAVENOUS; SUBCUTANEOUS NIGHTLY
Status: DISCONTINUED | OUTPATIENT
Start: 2022-09-12 | End: 2022-09-13

## 2022-09-12 RX ORDER — DEXTROSE MONOHYDRATE 100 MG/ML
INJECTION, SOLUTION INTRAVENOUS CONTINUOUS PRN
Status: DISCONTINUED | OUTPATIENT
Start: 2022-09-12 | End: 2022-09-16 | Stop reason: HOSPADM

## 2022-09-12 RX ADMIN — LIDOCAINE HYDROCHLORIDE 100 MG: 20 INJECTION, SOLUTION INTRAVENOUS at 20:03

## 2022-09-12 RX ADMIN — SODIUM CHLORIDE, PRESERVATIVE FREE 10 ML: 5 INJECTION INTRAVENOUS at 22:01

## 2022-09-12 RX ADMIN — PIPERACILLIN AND TAZOBACTAM 4500 MG: 4; .5 INJECTION, POWDER, FOR SOLUTION INTRAVENOUS at 13:29

## 2022-09-12 RX ADMIN — FAMOTIDINE 20 MG: 20 TABLET ORAL at 13:21

## 2022-09-12 RX ADMIN — GADOTERIDOL 20 ML: 279.3 INJECTION, SOLUTION INTRAVENOUS at 09:31

## 2022-09-12 RX ADMIN — ONDANSETRON 4 MG: 2 INJECTION INTRAMUSCULAR; INTRAVENOUS at 20:45

## 2022-09-12 RX ADMIN — SODIUM CHLORIDE, POTASSIUM CHLORIDE, SODIUM LACTATE AND CALCIUM CHLORIDE 1000 ML: 600; 310; 30; 20 INJECTION, SOLUTION INTRAVENOUS at 06:58

## 2022-09-12 RX ADMIN — METOCLOPRAMIDE 10 MG: 5 INJECTION, SOLUTION INTRAMUSCULAR; INTRAVENOUS at 20:21

## 2022-09-12 RX ADMIN — SODIUM CHLORIDE, PRESERVATIVE FREE 10 ML: 5 INJECTION INTRAVENOUS at 13:22

## 2022-09-12 RX ADMIN — PIPERACILLIN AND TAZOBACTAM 4500 MG: 4; .5 INJECTION, POWDER, FOR SOLUTION INTRAVENOUS at 21:53

## 2022-09-12 RX ADMIN — METOPROLOL TARTRATE 50 MG: 50 TABLET, FILM COATED ORAL at 13:21

## 2022-09-12 RX ADMIN — SODIUM CHLORIDE, SODIUM LACTATE, POTASSIUM CHLORIDE, AND CALCIUM CHLORIDE: .6; .31; .03; .02 INJECTION, SOLUTION INTRAVENOUS at 19:58

## 2022-09-12 RX ADMIN — METOPROLOL TARTRATE 50 MG: 50 TABLET, FILM COATED ORAL at 22:01

## 2022-09-12 RX ADMIN — ONDANSETRON 4 MG: 2 INJECTION INTRAMUSCULAR; INTRAVENOUS at 03:23

## 2022-09-12 RX ADMIN — SODIUM CHLORIDE: 9 INJECTION, SOLUTION INTRAVENOUS at 11:25

## 2022-09-12 RX ADMIN — SODIUM CHLORIDE, POTASSIUM CHLORIDE, SODIUM LACTATE AND CALCIUM CHLORIDE 1000 ML: 600; 310; 30; 20 INJECTION, SOLUTION INTRAVENOUS at 05:49

## 2022-09-12 RX ADMIN — FENTANYL CITRATE 100 MCG: 50 INJECTION, SOLUTION INTRAMUSCULAR; INTRAVENOUS at 20:03

## 2022-09-12 RX ADMIN — FAMOTIDINE 20 MG: 10 INJECTION, SOLUTION INTRAVENOUS at 20:21

## 2022-09-12 RX ADMIN — PROPOFOL 200 MG: 10 INJECTION, EMULSION INTRAVENOUS at 20:03

## 2022-09-12 RX ADMIN — LINEZOLID 600 MG: 600 INJECTION, SOLUTION INTRAVENOUS at 11:25

## 2022-09-12 RX ADMIN — FAMOTIDINE 20 MG: 20 TABLET ORAL at 22:01

## 2022-09-12 RX ADMIN — VANCOMYCIN HYDROCHLORIDE 2500 MG: 10 INJECTION, POWDER, LYOPHILIZED, FOR SOLUTION INTRAVENOUS at 05:58

## 2022-09-12 RX ADMIN — LINEZOLID 600 MG: 600 INJECTION, SOLUTION INTRAVENOUS at 22:04

## 2022-09-12 RX ADMIN — PIPERACILLIN AND TAZOBACTAM 4500 MG: 4; .5 INJECTION, POWDER, FOR SOLUTION INTRAVENOUS at 05:19

## 2022-09-12 ASSESSMENT — ENCOUNTER SYMPTOMS
NAUSEA: 1
COLOR CHANGE: 0
ABDOMINAL DISTENTION: 0
EYE DISCHARGE: 0
CHEST TIGHTNESS: 0
SHORTNESS OF BREATH: 0
DIARRHEA: 0
WHEEZING: 0
VOMITING: 0
SORE THROAT: 0
CONSTIPATION: 0
BACK PAIN: 0
ABDOMINAL PAIN: 0
COUGH: 0
EYE PAIN: 0

## 2022-09-12 ASSESSMENT — PAIN SCALES - GENERAL
PAINLEVEL_OUTOF10: 0
PAINLEVEL_OUTOF10: 7

## 2022-09-12 ASSESSMENT — PAIN DESCRIPTION - ORIENTATION: ORIENTATION: LEFT

## 2022-09-12 ASSESSMENT — PAIN DESCRIPTION - PAIN TYPE: TYPE: ACUTE PAIN

## 2022-09-12 ASSESSMENT — PAIN DESCRIPTION - DESCRIPTORS: DESCRIPTORS: THROBBING

## 2022-09-12 ASSESSMENT — PAIN - FUNCTIONAL ASSESSMENT: PAIN_FUNCTIONAL_ASSESSMENT: 0-10

## 2022-09-12 ASSESSMENT — PAIN DESCRIPTION - FREQUENCY: FREQUENCY: INTERMITTENT

## 2022-09-12 ASSESSMENT — PAIN DESCRIPTION - LOCATION: LOCATION: TOE (COMMENT WHICH ONE);FOOT

## 2022-09-12 NOTE — H&P
Internal Medicine  PGY 1  History & Physical      CC   L foot infection    History Obtained From:  patient, electronic medical record    HISTORY OF PRESENT ILLNESS:    This is a 49year old male with a history of T2DM (last A1c 8.4 5/22), afib, HTN, HLD, necrotizing fasciotomy s/p colostomy, ricardo's gangrene of scrotum, who presents with 24 hours of redness, swelling and dark red/purple blistering to top of L foot. Was seen by podiatry on Wednesday and has been taking bactrim and keflex, however he as been having increasing fevers, chills, nausea over last 2 days. When he changed his dressing about 1 day before presentation he noticed new discoloration, erythema, and warmth that was not there before. Also has plantar foot wound with purulent discharge for last 3 months. On arrival pt afebrile, tachycardic to 106, 's-140's, hemodynamically stable on RA. BMP notable for Na 133, Cr 1.5, glc 232. .6, sed rate 57. WBC 15.7, Hgb 11.9, INR 1.19. CXR with mild cardiomegaly. L Foot XR with soft tissue gas on lateral aspect for forefoot. Of note, the patient works in manual labor.      Past Medical History:        Diagnosis Date    Allergic rhinitis     Atrial fibrillation ([address]     Hyperlipidemia     Hypertension     Necrotizing fasciitis ([facility] [address]     2018 resulted in colostomy    Type 2 diabetes mellitus without complication [facility])        Past Surgical History:        Procedure Laterality Date    COLONOSCOPY  06/05/2018    COLOSTOMY      result of necrotizing fasciotomy    FOOT DEBRIDEMENT Right 12/30/2020    RIGHT 2ND TOE INCISION AND DRAINAGE BELOW FASCIA performed by Lorraine Andrade DPM at [facility]  06/05/2018-06/08/2018    Removal of infected tissue and flesh from anus scrotum and surrounding area    KNEE ARTHROSCOPY Left     SKIN GRAFT  07/01/2018    TOE AMPUTATION Right 12/30/2020    PARTIAL AMPUTATION OF RIGHT 2ND TOE performed by Lorraine Andrade DPM at [address] Negative for chest pain, palpitations and leg swelling. Gastrointestinal:  Positive for nausea. Negative for abdominal distention, abdominal pain, constipation, diarrhea and vomiting. Genitourinary:  Negative for dysuria, frequency and urgency. Musculoskeletal:  Negative for back pain and neck pain. Skin:  Positive for wound. Negative for color change. Neurological:  Negative for dizziness, tremors, seizures, weakness, numbness and headaches. Psychiatric/Behavioral:  Negative for agitation, behavioral problems and confusion. The patient is not nervous/anxious. ROS: A 10 point review of systems was conducted, significant findings as noted in HPI. Physical exam:    Physical Exam  Constitutional:       General: He is not in acute distress. Appearance: Normal appearance. He is obese. He is not ill-appearing, toxic-appearing or diaphoretic. HENT:      Head: Normocephalic and atraumatic. Nose: Nose normal.      Mouth/Throat:      Mouth: Mucous membranes are moist.      Pharynx: Oropharynx is clear. Eyes:      Extraocular Movements: Extraocular movements intact. Conjunctiva/sclera: Conjunctivae normal.      Pupils: Pupils are equal, round, and reactive to light. Cardiovascular:      Rate and Rhythm: Normal rate and regular rhythm. Pulses: Normal pulses. Heart sounds: Normal heart sounds. Pulmonary:      Effort: Pulmonary effort is normal. No respiratory distress. Breath sounds: Normal breath sounds. No wheezing or rhonchi. Abdominal:      General: There is distension. Palpations: Abdomen is soft. Tenderness: There is no abdominal tenderness. There is no guarding or rebound. Comments: Ostomy bag present   Musculoskeletal:         General: No swelling or tenderness. Normal range of motion. Cervical back: Normal range of motion. Right lower leg: No edema. Left lower leg: No edema. Comments: LLE in wraps.  R toe amputation   Skin: or skin ulceration. No definite signs of    osteomyelitis. MRI FOOT LEFT W WO CONTRAST    (Results Pending)       ASSESSMENT AND PLAN:    Diabetic foot infection  Concern for gas gangrene   1-2 days of worsening L foot dorsum erythema, dark purple/red blistering. Soft tissue gas on lateral aspect of forefoot on XR. Initial .6, sed rate 57. Received vancomycin in ED. - podiatry consulted   - wound culture pending   - possible bone biopsy   - ID consulted  - blood culture pending   - BLE duplex pending   - continue zyvox and zosyn   - 9/12 MRI L Foot W WO with large ulceration and sinus tract from ball of foot to 4th intermetatarsal space with adjacent osteomyelitis and septic arthritis of the 4th MTP joint and adjacent 4th metarsal and 4th proximal phalanx. Suspected early osteomyelitis of distal 5th metatarsal and base of 5th proximal phalanx. Likely devitalized soft tissues surrounding ulceration. LEROY  Apparent baseline cr ~1 in 2020. Likely 2/2 bactrim, pt states he did drink wine while on the bactrim. - avoid nephrotoxic medications  - strict I/Os    T2DM  Peripheral neuropathy  5/22 A1c 8.4  - home empagliflozin and metformin held   - LDSSI    Atrial fibrillation  Currently rate controlled. Not on anticoagulation at home.  [id number]   - continue home lopressor     HTN  - continue home lopressor     HLD  Not on home statin       Will discuss with attending physician Dr. Lele Persaud Status: Full code  FEN: NPO  PPX: SCDs pending possible procedure, pepcid   DISPO: Phillip Lanza MD PGY-1  9/12/2022,  9:42 AM

## 2022-09-12 NOTE — PROGRESS NOTES
4 Eyes Admission Assessment     I agree as the admission nurse that 2 RN's have performed a thorough Head to Toe Skin Assessment on the patient. ALL assessment sites listed below have been assessed on admission. Areas assessed by both nurses: Domenic RANDLE [x]   Head, Face, and Ears   [x]   Shoulders, Back, and Chest  [x]   Arms, Elbows, and Hands   [x]   Coccyx, Sacrum, and Ischium  [x]   Legs, Feet, and Heels        Does the Patient have Skin Breakdown? Yes a wound was noted on the Admission Assessment and an LDA was Initiated documentation include the Brigitte-wound, Wound Assessment, Measurements, Dressing Treatment, Drainage, and Color\",         Jorge Prevention initiated:  No   Wound Care Orders initiated:  No      WOC nurse consulted for Pressure Injury (Stage 3,4, Unstageable, DTI, NWPT, and Complex wounds) or Jorge score 18 or lower:  No      Patient has wound on left foot, podiatry seeing patient for this wound.     Nurse 1 eSignature: Electronically signed by Janny Lundberg RN on 9/12/22 at 1:34 PM EDT    **SHARE this note so that the co-signing nurse is able to place an eSignature**    Nurse 2 eSignature: Electronically signed by Brooklyn Hawkins RN on 9/12/22 at 7:38 PM EDT

## 2022-09-12 NOTE — CONSULTS
Department of Podiatry Consult Note  Resident       Reason for Consult:  diabetic foot infection, possible nec fasc  Requesting Physician:  Radha Pollard MD    CHIEF COMPLAINT:  left foot infection    HISTORY OF PRESENT ILLNESS:                The patient is a 49 y.o. male with significant past medical history as listed below. Podiatry was consulted for \"diabetic foot, possible nec fasc\". Patient reports increased redness, swelling, as well as blistering to the top of his left foot over the last 24 hours. He had a history of a plantar foot wound over the last 3 months. He last saw his podiatrist on Wednesday, who prescribed him antibiotics, antibiotic ointment, and Dakin's sodium hypochlorite solution to cleanse the wound with. Patient reports increased fever, chills, nausea over the last 2 days. Since he is arrived to the emergency department his nausea and fever has improved. He denies any pain to his bilateral lower extremities, however endorses neuropathy. He admits to being diabetic, and reports his last A1c was 8%.     Past Medical History:        Diagnosis Date    Allergic rhinitis     Atrial fibrillation ([facility] [address]     Hyperlipidemia     Hypertension     Necrotizing fasciitis ([facility] [address]     2018 resulted in colostomy    Type 2 diabetes mellitus without complication [facility])      Past Surgical History:        Procedure Laterality Date    COLONOSCOPY  06/05/2018    COLOSTOMY      result of necrotizing fasciotomy    FOOT DEBRIDEMENT Right 12/30/2020    RIGHT 2ND TOE INCISION AND DRAINAGE BELOW FASCIA performed by Erica Ren DPM at [facility]  06/05/2018-06/08/2018    Removal of infected tissue and flesh from anus scrotum and surrounding area    KNEE ARTHROSCOPY Left     SKIN GRAFT  07/01/2018    TOE AMPUTATION Right 12/30/2020    PARTIAL AMPUTATION OF RIGHT 2ND TOE performed by Erica Ren DPM at [facility] OR     Current Medications:    Current Facility-Administered Medications: vancomycin (VANCOCIN) 2,500 mg in dextrose 5 % 500 mL IVPB, 25 mg/kg, IntraVENous, Once  lactated ringers bolus, 1,000 mL, IntraVENous, Once  lactated ringers bolus, 1,000 mL, IntraVENous, Once  ibuprofen (ADVIL;MOTRIN) tablet 800 mg, 800 mg, Oral, Once  Allergies:   No known allergies  Social History:    TOBACCO:   reports that he has never smoked. He has never used smokeless tobacco.  ETOH:   reports current alcohol use. DRUGS:   reports no history of drug use. Family History:       Problem Relation Age of Onset    Diabetes Father      REVIEW OF SYSTEMS:    Review of Systems: A 12 point review of systems is unremarkable with the exception of the chief complaint. Patient specifically deniesvomiting, shortness of breath, chest pain, abdominal pain, constipation, or difficulty urinating. PHYSICAL EXAM:      Vitals:    /78   Pulse (!) 106   Temp 98.5 °F (36.9 °C) (Oral)   Resp 18   Ht 6' (1.829 m)   Wt 242 lb (109.8 kg)   SpO2 99%   BMI 32.82 kg/m²     LABS:   Recent Labs     09/12/22  0315   WBC 15.7*   HGB 11.9*   HCT 35.3*        Recent Labs     09/12/22  0315   *   K 4.6      CO2 21   BUN 26*   CREATININE 1.5*     No results for input(s): PROT, INR, APTT in the last 72 hours. VASCULAR: Right DP pulse palpable 2/4, PT pulse b/l palpable 2/4. Left DP pulse nonpalpable 0/4. Upon hand-held Doppler examination, DP and PT pulses were noted to have triphasic signals b/l. CFT is brisk to the digits of the foot b/l. Skin temperature is warm to warm from proximal to distal with focal calor noted to the dorsal left forefoot. Nonpitting edema noted to left foot. No pain with calf compression b/l. NEUROLOGIC: Gross and epicritic sensation is diminished b/l. Protective sensation is diminished at all pedal sites b/l.     DERMATOLOGIC:   Left lower extremity:  Full-thickness ulceration noted to the plantar aspect of the left foot underlying the fourth metatarsal.  Wound measures approximately 2.5 cm x 1.0 cm x 0.6 cm. Wound base is fibrotic in nature. Wound probes to periosteum, does not directly probe to bone. Wound tracks to the dorsal compartment of the foot between the fourth and fifth metatarsal.  Sanguinopurulent drainage noted. Malodor noted. Ecchymosis noted to the dorsal aspect of the left forefoot overlying the fourth and fifth metatarsals. Fluctuance and crepitus noted. Images from 9/12. Right lower extremity:  Soft tissue envelope is closed and without acute signs of infection. Hallucal nail is thickened and discolored. MUSCULOSKELETAL: Muscle strength is 5/5 for all pedal groups tested. Tenderness with palpation of the left dorsal forefoot. History of partial right second digit amputation. IMAGING:  XR Left Foot 9/12  Narrative       Patient: Agata Edwards  Time Out: 04:07   Exam(s): FILM LEFT FOOT        EXAM:     XR Left Foot Complete, 3 or More Views       CLINICAL HISTORY:     diabetic ulcer lateral distal midfoot/ 3-4-5 MT area. TECHNIQUE:     Frontal, lateral and oblique views of the left foot. COMPARISON:     No relevant prior studies available. FINDINGS:     Bones/joints:  Plantar calcaneal spur measuring 7 mm. No acute    fracture. No dislocation. Soft tissues:  Dorsal foot soft tissue swelling with numerous    bubbles of air in the dorsal aspect of the foot at the level of    the distal fourth and fifth. No radiopaque foreign body. Electronically signed by Radha De Leon M.D. on 09-12-22 at [employer] [facility] of air in the soft tissues near the fourth and fifth    digits as described. The bubbles of air may be secondary to a gas    forming infection or skin ulceration. No definite signs of    osteomyelitis. XR Left Foot post-debridement 9/12.   Narrative   XR FOOT LEFT (MIN 3 VIEWS)       Indication: s/p bedside debridement 2/2 gas 4th and 5th metatarsal        COMPARISON: Earlier the same day       Findings: 3 views of the left foot were obtained. The bones are well-mineralized. Soft tissue gas is again noted of the lateral aspect of the forefoot. There is no bony destruction or abnormal periosteal reaction. No acute osseous abnormality. Impression   Impression: Soft tissue gas again noted of the lateral aspect of the forefoot. PROCEDURE: Incision and drainage; left extremity:  After obtaining verbal consent, the attention was directed toward the left foot. Using a hemostat, blunt dissection was carried via the left foot plantar wound to the dorsal compartment of the foot. Approximately 15 cc of sanguinopurulent drainage was expressed. A wound culture was obtained. Next, attention was directed to the dorsal aspect of left foot at the site of the ecchymosis and fluctuance/crepitus. Using #10 blade, a full-thickness incision was made over the site of fluctuance/crepitus. Hemostat was then used to bluntly dissect and explore various compartments. Approximately 5 cc of sanguinopurulent drainage. The foot was manually compressed until no further purulent drainage was expressed. The plantar foot wound and dorsal incision was then irrigated with copious amounts of a betadine/saline solution. The surgical site was then re-evaluated. No further purulent drainage expressed. No bleeding noted. A dressing was then applied consisting of iodoform packing, Betadine soaked gauze, Kerlix, Ace bandage. Patient tolerated the procedure well. IMPRESSION/RECOMMENDATIONS:    - Sepsis 2/2 gas gangrene; left LE  - Diabetic foot ulceration; left foot;  Jackson 3  - Type 2 diabetes mellitus with peripheral neuropathy  - History of partial 2nd digit amputation      -Patient examined and evaluated at bedside   -Tachycardic, otherwise VSS, leukocytosis noted (WBC 15.7)  -.6, GIO94  -Prealbumin, HbA1c pending  -Wound culture obtained; will f/u results  -Blood culture x2 pending  -Imaging reviewed, impression noted above  -MRI left foot ordered  -Arterial duplex b/ LE ordered  -Bedside incision and drainage performed as described above in procedure documentation  -Dressing applied to left LE consisting of iodoform packing, betadine soaked gauze, DSD, ACE  -Continue broad spectrum antibiotics  -Nonweight bearing to the left LE  -Post op shoe ordered  -NPO  -Chest XR, EKG ordered  -PT/INR ordered  -Consent to be obtained  -Plan for surgical intervention today consisting of incision and drainage, possible bone biopsies of left lower extremity  -Surgical clearance per primary team      Dispo: Sepsis 2/2 gas gangrene; left LE. Labs and imaging reviewed. MRI, arterial duplex ordered. Plan for surgical intervention today consisting of incision and drainage, possible bone biopsies of left lower extremity. NPO. Surgical clearance per primary team.    Patient examined and evaluated at bedside with NESSA Mccurdy DPM  Podiatric Resident, PGY-3  Pager #: [phone] or Perfect Serve      Patient was seen and evaluated at bedside. Agree with residents assessment and treatment plan.   Ron Chaudhary DPM

## 2022-09-12 NOTE — ANESTHESIA PRE PROCEDURE
Department of Anesthesiology  Preprocedure Note       Name:  Isiah Kidney   Age:  49 y.o.  :  1970                                          MRN:  [id number]         Date:  2022      Surgeon: Anuj Lugo):  Erica Ren DPM    Procedure: Procedure(s):  LEFT FOOT DEBRIDEMENT INCISION AND DRAINAGE    Medications prior to admission:   Prior to Admission medications    Medication Sig Start Date End Date Taking? Authorizing Provider   sulfamethoxazole-trimethoprim (BACTRIM) 400-80 MG per tablet Take 1 tablet by mouth daily for 10 days 22  Leora Sullivan DPM   cephALEXin (KEFLEX) 500 MG capsule Take 1 capsule by mouth 4 times daily for 10 days  Patient not taking: Reported on 2022  Leora Sullivan DPM   gentamicin (GARAMYCIN) 0.1 % ointment Apply 1 g topically daily for 7 days Apply topically daily with dressing changes. 22  Leora Sullivan DPM   sodium hypochlorite (DAKINS) 0.5 % SOLN external solution Irrigate with 473 mLs as directed daily Do not use whole bottle each time. Small amount to wash the wound each day with dressing changes. 22   Leora Sullivan DPM   empagliflozin (JARDIANCE) 10 MG tablet Take 1 tablet by mouth in the morning. 8/10/22   Greeley Distance, DO   lisinopril (PRINIVIL;ZESTRIL) 30 MG tablet Take 1 tablet by mouth once daily 8/10/22   Greeley Distance, DO   metFORMIN (GLUCOPHAGE) 1000 MG tablet TAKE 1 TABLET BY MOUTH TWICE DAILY WITH MEALS 8/10/22   Greeley Distance, DO   metoprolol tartrate (LOPRESSOR) 50 MG tablet Take 1 tablet by mouth twice daily 8/10/22   Rasheed Distance, DO   nystatin (MYCOSTATIN) 140579 UNIT/GM powder Apply 3 times daily.   Patient not taking: No sig reported 22   Greeley Distance, DO   sertraline (ZOLOFT) 50 MG tablet Take 1 tablet by mouth daily  Patient not taking: No sig reported 3/9/22   Rasheed Distance, DO   Na Sulfate-K Sulfate-Mg Sulf (SUPREP BOWEL PREP KIT) 17.5-3.13-1.6 GM/177ML SOLN Follow Suprep instructions given to you by our office.   Patient not taking: No sig reported 9/14/21   Ayse Bentley MD   metoprolol tartrate (LOPRESSOR) 50 MG tablet Take 1 tablet by mouth twice daily 12/22/20   Deana Riojas DO       Current medications:    Current Facility-Administered Medications   Medication Dose Route Frequency Provider Last Rate Last Admin    sodium chloride flush 0.9 % injection 5-40 mL  5-40 mL IntraVENous 2 times per day Zulma Rio Arriba, DO   10 mL at 09/12/22 1322    sodium chloride flush 0.9 % injection 5-40 mL  5-40 mL IntraVENous PRN Zulma Rio Arriba, DO        0.9 % sodium chloride infusion   IntraVENous PRN Zumla Rio Arriba, DO 25 mL/hr at 09/12/22 1125 New Bag at 09/12/22 1125    ondansetron (ZOFRAN-ODT) disintegrating tablet 4 mg  4 mg Oral Q8H PRN Zulma Rio Arriba, DO        Or    [facility] injection 4 mg  4 mg IntraVENous Q6H PRN Zulma Rio Arriba, DO        polyethylene glycol (GLYCOLAX) packet 17 g  17 g Oral Daily PRN Zulma Rio Arriba, DO        acetaminophen (TYLENOL) tablet 650 mg  650 mg Oral Q6H PRN Zulma Rio Arriba, DO        Or    acetaminophen (TYLENOL) suppository 650 mg  650 mg Rectal Q6H PRN Zulma Rio Arriba, DO        glucose chewable tablet 16 g  4 tablet Oral PRN Zulma Rio Arriba, DO        dextrose bolus 10% 125 mL  125 mL IntraVENous PRN Zulma Rio Arriba, DO        Or    dextrose bolus 10% 250 mL  250 mL IntraVENous PRN Zulma Rio Arriba, DO        glucagon (rDNA) injection 1 mg  1 mg SubCUTAneous PRN Zulma Rio Arriba, DO        dextrose 10 % infusion   IntraVENous Continuous PRN Zulma Rio Arriba, DO        famotidine (PEPCID) tablet 20 mg  20 mg Oral BID Zulma Rio Arriba, DO   20 mg at 09/12/22 1321    insulin lispro (1 Unit Dial) 0-4 Units  0-4 Units SubCUTAneous TID WC Amrit Whitley DO        insulin lispro (1 Unit Dial) 0-4 Units  0-4 Units SubCUTAneous Nightly Amrit Whitley DO        piperacillin-tazobactam (ZOSYN) 4,500 mg in dextrose 5 % 100 mL IVPB extended infusion (mini-bag)  4,500 by Erica Ren DPM at [address] History:    Social History     Tobacco Use    Smoking status: Never    Smokeless tobacco: Never   Substance Use Topics    Alcohol use: Yes     Comment: monthly 3 beers                                Counseling given: Not Answered      Vital Signs (Current):   Vitals:    09/12/22 0937 09/12/22 0950 09/12/22 1335 09/12/22 1643   BP:  122/72 (!) 146/72 124/74   Pulse:  93 97 90   Resp:  16 16 18   Temp:  98.1 °F (36.7 °C) 98.1 °F (36.7 °C) 97.3 °F (36.3 °C)   TempSrc:  Oral Oral Oral   SpO2:  96% 97% 94%   Weight: 252 lb 3.3 oz (114.4 kg)      Height: 6' (1.829 m)                                                 BP Readings from Last 3 Encounters:   09/12/22 124/74   09/09/22 (!) 144/92   09/03/21 (!) 160/95       NPO Status:                                                                                 BMI:   Wt Readings from Last 3 Encounters:   09/12/22 252 lb 3.3 oz (114.4 kg)   09/09/22 241 lb (109.3 kg)   09/03/21 251 lb (113.9 kg)     Body mass index is 34.21 kg/m².     CBC:   Lab Results   Component Value Date/Time    WBC 15.7 09/12/2022 03:15 AM    RBC 4.21 09/12/2022 03:15 AM    HGB 11.9 09/12/2022 03:15 AM    HCT 35.3 09/12/2022 03:15 AM    MCV 84.0 09/12/2022 03:15 AM    RDW 12.7 09/12/2022 03:15 AM     09/12/2022 03:15 AM       CMP:   Lab Results   Component Value Date/Time     09/12/2022 03:15 AM    K 4.6 09/12/2022 03:15 AM     09/12/2022 03:15 AM    CO2 21 09/12/2022 03:15 AM    BUN 26 09/12/2022 03:15 AM    CREATININE 1.5 09/12/2022 03:15 AM    GFRAA 60 09/12/2022 03:15 AM    AGRATIO 1.4 12/29/2020 12:47 PM    LABGLOM 49 09/12/2022 03:15 AM    GLUCOSE 232 09/12/2022 03:15 AM    PROT 7.3 12/29/2020 12:47 PM    CALCIUM 10.0 09/12/2022 03:15 AM    BILITOT 0.7 12/29/2020 12:47 PM    ALKPHOS 118 12/29/2020 12:47 PM    AST 10 12/29/2020 12:47 PM    ALT 26 12/29/2020 12:47 PM       POC Tests:   Recent Labs     09/12/22  1631   POCGLU 146* Coags:   Lab Results   Component Value Date/Time    PROTIME 15.1 09/12/2022 08:08 AM    INR 1.19 09/12/2022 08:08 AM       HCG (If Applicable): No results found for: PREGTESTUR, PREGSERUM, HCG, HCGQUANT     ABGs: No results found for: PHART, PO2ART, XSL8QPT, RKF3HHR, BEART, E7PQHLKE     Type & Screen (If Applicable):  No results found for: LABABO, LABRH    Drug/Infectious Status (If Applicable):  No results found for: HIV, HEPCAB    COVID-19 Screening (If Applicable):   Lab Results   Component Value Date/Time    COVID19 Not Detected 12/28/2020 10:05 PM           Anesthesia Evaluation  Patient summary reviewed and Nursing notes reviewed no history of anesthetic complications:   Airway: Mallampati: II  TM distance: >3 FB   Neck ROM: full  Mouth opening: > = 3 FB   Dental:          Pulmonary:Negative Pulmonary ROS and normal exam                               Cardiovascular:    (+) hypertension:,                   Neuro/Psych:   Negative Neuro/Psych ROS              GI/Hepatic/Renal: Neg GI/Hepatic/Renal ROS            Endo/Other:    (+) DiabetesType II DM, , .                 Abdominal:             Vascular: Other Findings:           Anesthesia Plan      general     ASA 3       Induction: intravenous. MIPS: Postoperative opioids intended and Prophylactic antiemetics administered. Anesthetic plan and risks discussed with patient. Plan discussed with CRNA.     Attending anesthesiologist reviewed and agrees with Preprocedure content                Barbara Ken MD   9/12/2022

## 2022-09-12 NOTE — ED PROVIDER NOTES
[facility]          ATTENDING PHYSICIAN NOTE       Date of evaluation: 9/11/2022    Chief Complaint     Wound Check (On going foot wounds left foot x 3 months, see's Podiatry- appt on Wed, ? Wound getting worse, on po antibiotics per Podiatry)      History of Present Illness     Filiberto Lynch is a 49 y.o. male who presents with wound to the left foot that they are concerned for infection. Patient reports that over the course last 24 hours the wound on his left foot on the lateral aspect appears more erythematous, developing a blister and an area of dark red to purple on the dorsal aspect, as well as expressing a lot of purulence from the wound on the plantar surface of his foot. Reports he also had development of erythema over this time over the dorsum of the foot and extending up towards the ankle. He has poor sensation in the foot, has some baseline neuropathy but does not have new pain in the area. Denies any systemic symptoms otherwise other than that he feels quite anxious as he is had necrotizing fasciitis before and is concerned that it may be developing into the same. Reports blood sugars been okay, and no other complaints other than the appearance of the left foot. He has recently been on Bactrim and Keflex in an effort to stave off this infection due to his foot, however he taking consistently. Review of Systems     Review of Systems  Pertinent positives and negatives are listed in the HPI; otherwise all systems are reviewed and were negative. Past Medical, Surgical, Family, and Social History     He has a past medical history of Allergic rhinitis, Atrial fibrillation ([address] Hyperlipidemia, Hypertension, Necrotizing fasciitis ([address] and Type 2 diabetes mellitus without complication ([address] He has a past surgical history that includes Colonoscopy (06/05/2018); Skin graft (07/01/2018); Infected skin debridement (06/05/2018-06/08/2018);  Knee arthroscopy (Left); Foot Debridement (Right, 12/30/2020); Toe amputation (Right, 12/30/2020); and colostomy. His family history includes Diabetes in his father. He reports that he has never smoked. He has never used smokeless tobacco. He reports current alcohol use. He reports that he does not use drugs. Medications     Previous Medications    CEPHALEXIN (KEFLEX) 500 MG CAPSULE    Take 1 capsule by mouth 4 times daily for 10 days    EMPAGLIFLOZIN (JARDIANCE) 10 MG TABLET    Take 1 tablet by mouth in the morning. GENTAMICIN (GARAMYCIN) 0.1 % OINTMENT    Apply 1 g topically daily for 7 days Apply topically daily with dressing changes. LISINOPRIL (PRINIVIL;ZESTRIL) 30 MG TABLET    Take 1 tablet by mouth once daily    METFORMIN (GLUCOPHAGE) 1000 MG TABLET    TAKE 1 TABLET BY MOUTH TWICE DAILY WITH MEALS    METOPROLOL TARTRATE (LOPRESSOR) 50 MG TABLET    Take 1 tablet by mouth twice daily    NA SULFATE-K SULFATE-MG SULF (SUPREP BOWEL PREP KIT) 17.5-3.13-1.6 GM/177ML SOLN    Follow Suprep instructions given to you by our office. NYSTATIN (MYCOSTATIN) 084887 UNIT/GM POWDER    Apply 3 times daily. SERTRALINE (ZOLOFT) 50 MG TABLET    Take 1 tablet by mouth daily    SODIUM HYPOCHLORITE (DAKINS) 0.5 % SOLN EXTERNAL SOLUTION    Irrigate with 473 mLs as directed daily Do not use whole bottle each time. Small amount to wash the wound each day with dressing changes. SULFAMETHOXAZOLE-TRIMETHOPRIM (BACTRIM) 400-80 MG PER TABLET    Take 1 tablet by mouth daily for 10 days       Allergies     He is allergic to no known allergies. Physical Exam     INITIAL VITALS: BP: (!) 142/66, Temp: 98.5 °F (36.9 °C), Heart Rate: (!) 106, Resp: 18, SpO2: 99 %   Physical Exam  Constitutional:       Appearance: He is not toxic-appearing. HENT:      Head: Normocephalic and atraumatic. Eyes:      Conjunctiva/sclera: Conjunctivae normal.   Cardiovascular:      Rate and Rhythm: Tachycardia present.       Comments: Mild tachycardia noted  Musculoskeletal:         General: Swelling (left foot) present. Cervical back: Normal range of motion. Comments: Left foot with some diffuse swelling, erythema extending over the dorsum of the foot from the area of the wound on the forefoot back to about the ankle. There is a ulcer with some serosanguineous discharge on the plantar surface of the foot. Fluctuance appreciated over the lateral aspect of the dorsum of the foot. Neurological:      General: No focal deficit present. Mental Status: He is alert and oriented to person, place, and time. Diagnostic Results     EKG       RADIOLOGY:  XR FOOT LEFT (MIN 3 VIEWS)   Final Result     Bubbles of air in the soft tissues near the fourth and fifth    digits as described. The bubbles of air may be secondary to a gas    forming infection or skin ulceration. No definite signs of    osteomyelitis.               LABS:   Results for orders placed or performed during the hospital encounter of 09/12/22   CBC with Auto Differential   Result Value Ref Range    WBC 15.7 (H) 4.0 - 11.0 K/uL    RBC 4.21 4.20 - 5.90 M/uL    Hemoglobin 11.9 (L) 13.5 - 17.5 g/dL    Hematocrit 35.3 (L) 40.5 - 52.5 %    MCV 84.0 80.0 - 100.0 fL    MCH 28.2 26.0 - 34.0 pg    MCHC 33.6 31.0 - 36.0 g/dL    RDW 12.7 12.4 - 15.4 %    Platelets 732 293 - 337 K/uL    MPV 8.1 5.0 - 10.5 fL    Neutrophils % 85.6 %    Lymphocytes % 6.9 %    Monocytes % 6.9 %    Eosinophils % 0.1 %    Basophils % 0.5 %    Neutrophils Absolute 13.4 (H) 1.7 - 7.7 K/uL    Lymphocytes Absolute 1.1 1.0 - 5.1 K/uL    Monocytes Absolute 1.1 0.0 - 1.3 K/uL    Eosinophils Absolute 0.0 0.0 - 0.6 K/uL    Basophils Absolute 0.1 0.0 - 0.2 K/uL   BMP w/ Reflex to MG   Result Value Ref Range    Sodium 133 (L) 136 - 145 mmol/L    Potassium reflex Magnesium 4.6 3.5 - 5.1 mmol/L    Chloride 100 99 - 110 mmol/L    CO2 21 21 - 32 mmol/L    Anion Gap 12 3 - 16    Glucose 232 (H) 70 - 99 mg/dL    BUN 26 (H) 7 - 20 mg/dL    Creatinine 1.5 (H) 0.9 - 1.3 mg/dL    GFR Non- 49 (A) >60    GFR  60 (A) >60    Calcium 10.0 8.3 - 10.6 mg/dL   Sedimentation Rate   Result Value Ref Range    Sed Rate 57 (H) 0 - 20 mm/Hr   C-Reactive Protein   Result Value Ref Range    .6 (H) 0.0 - 5.1 mg/L       ED BEDSIDE ULTRASOUND:  No results found. RECENT VITALS:  BP: (!) 110/54,Temp: 98.5 °F (36.9 °C), Heart Rate: 96, Resp: 18, SpO2: 97 %     Procedures         ED Course     Nursing Notes, Past Medical Hx, Past Surgical Hx, Social Hx,Allergies, and Family Hx were reviewed. patient was given the following medications:  Orders Placed This Encounter   Medications    ondansetron (ZOFRAN) injection 4 mg    piperacillin-tazobactam (ZOSYN) 4,500 mg in dextrose 5 % 100 mL IVPB (mini-bag)     Order Specific Question:   Antimicrobial Indications     Answer:   Sepsis of Unknown Etiology    vancomycin (VANCOCIN) 2,500 mg in dextrose 5 % 500 mL IVPB     Order Specific Question:   Antimicrobial Indications     Answer:   Sepsis of Unknown Etiology    lactated ringers bolus       CONSULTS:  IP CONSULT TO PODIATRY  IP CONSULT TO HOSPITALIST    MEDICAL DECISIONMAKING / ASSESSMENT / Jeaneth Zac is a 49 y.o. male who presents with diabetic foot infection. Labs remarkable for leukocytosis, elevation in ESR and CRP, and x-ray reveals potentially some gas bubbles in the area of the wound, though no crepitus is appreciated on my exam.  Other than some mild tachycardia which is nearly resolved at the time of my evaluation, the patient does not appear systemically ill. Blood cultures were obtained, patient was started on some IV fluid, and will give some broad-spectrum antibiotics in form of vancomycin and Zosyn. Podiatry consulted and will come and see the patient for potential operative intervention. Spoke to hospitalist for admission. Clinical Impression     1.  Diabetic foot infection ([address] Disposition     PATIENT REFERRED TO:  No follow-up provider specified.     DISCHARGE MEDICATIONS:  New Prescriptions    No medications on file       DISPOSITION Decision To Admit 09/12/2022 04:36:55 AM         Dioni Helms MD  09/12/22 0486

## 2022-09-12 NOTE — PROGRESS NOTES
Pre-Operative Note  Resident Note     Patient: Alondra Baez     Procedure: Incision and drainage with possible partial 4th and 5th ray amputation; left lower extremity    Consent: to be obtained     Labs:        Recent Labs     09/12/22  0315   WBC 15.7*   HGB 11.9*   HCT 35.3*   MCV 84.0           Recent Labs     09/12/22 0315   *   K 4.6      CO2 21   BUN 26*   CREATININE 1.5*      No results for input(s): AST, ALT, ALB, BILIDIR, BILITOT, ALKPHOS in the last 72 hours. No results for input(s): LIPASE, AMYLASE in the last 72 hours. Recent Labs     09/12/22  0808   INR 1.19*      No results for input(s): CKTOTAL, CKMB, CKMBINDEX, TROPONINI in the last 72 hours. Saline lock/IV access: yes    Clearance for surgery: yes per medicine team    Diet: NPO     CXR:  mild cardiomegaly       EKG:  Please see cardiology section of EMR      Echocardiogram: not done    PT/INR: 15.1/1.19    IVF: as needed     Abx: IV linezolid, zosyn    Type and Screen: not indicated    Pregnancy test: not applicable    Known risk factors for perioperative complications: Diabetes mellitus  Atrial fibrillation       Anesthesia to see patient    I have examined the patient and reviewed the original history and physical completed and find no relevant changes.     Adilia Cazares DPM  Podiatric Resident, PGY-3  Pager #: [phone] or Perfect Serve

## 2022-09-12 NOTE — PROGRESS NOTES
Perfect Serve secure message sent to Christiane Claude, MD, as follows:  Patient had an order for Lovenox that I noticed was discontinued that was awaiting pharmacy approval due to his new admission status. Should I chart against/not given due its discontinuation? Please advise. Thank you.

## 2022-09-12 NOTE — PROGRESS NOTES
Perfect Serve secure message sent to Haven Rock MD, as follows:  Patient had an order for Lovenox that I noticed was discontinued that was awaiting pharmacy approval due to his new admission status. Should I chart against/not given due its discontinuation? Please advise. Thank you.

## 2022-09-12 NOTE — CONSULTS
Infectious Diseases Inpatient Consult Note    RESIDENT NOTE - reviewed / edited, attending note at bottom    Reason for Consult:   Left foot diabetic ulcer with osteomyelitis, concern for necrotizing fasciitis  Requesting Physician:   Dr. Cindi Elmore  Primary Care Physician:  Charlotte Velez DO  History Obtained From:   Pt, EPIC    Admit Date: 9/12/2022  Hospital Day: 1    CHIEF COMPLAINT:       Chief Complaint   Patient presents with    Wound Check     On going foot wounds left foot x 3 months, see's Podiatry- appt on Wed, ? 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 Patient states he was walking without his work boots, with blank shoes on in his work area, and stepped on some nail filings, which punctured his shoe and caused an injury to his left lateral plantar foot beneath his fourth or fifth MTP. He states that due to his diabetic neuropathy he did not notice immediately. He states this wound has worsened. He has been seen at podiatry for this, who prescribed him Keflex and Bactrim, along with gentamicin ointment and Dakin's cleansing solution on Wednesday due to worsening wound. The patient then noticed some blistering and redness over the dorsal side of the foot opposite of the wound, which prompted him to present here to [facility]. The patient endorses some fevers and chills over the past couple days, but did not take his temperature at home. He denies any other wounds currently.     Patient denies any history of smoking, states he drinks infrequently, denies any drug use. Denies history of HIV or other immunocompromised status. As far as his Mike's gangrene, the patient is well-healed from this from 2018. His diverting colostomy colostomy is planned to be reversed after anal stenosis dilation with Dr. Bry Marx. In the ED, the patient was afebrile and tachycardic in the low 100s, but hemodynamically stable. Leukocytosis was present at 15.7 with elevated CRP of 308, and low sodium of 133. Creatinine elevated at 1.5, glucose 232    Past Medical History:    Past Medical History:   Diagnosis Date    Allergic rhinitis     Atrial fibrillation [address] [address]     Hyperlipidemia     Hypertension     Necrotizing fasciitis ([facility] [address]     2018 resulted in colostomy    Type 2 diabetes mellitus without complication [facility])        Past Surgical History:    Past Surgical History:   Procedure Laterality Date    COLONOSCOPY  06/05/2018    COLOSTOMY      result of necrotizing fasciotomy    FOOT DEBRIDEMENT Right 12/30/2020    RIGHT 2ND TOE INCISION AND DRAINAGE BELOW FASCIA performed by Lashae Marte DPM at [facility]  06/05/2018-06/08/2018    Removal of infected tissue and flesh from anus scrotum and surrounding area    KNEE ARTHROSCOPY Left     SKIN GRAFT  07/01/2018    TOE AMPUTATION Right 12/30/2020    PARTIAL AMPUTATION OF RIGHT 2ND TOE performed by Lashae Marte DPM at [facility] OR       Current Medications:     sodium chloride flush  5-40 mL IntraVENous 2 times per day    famotidine  20 mg Oral BID    insulin lispro  0-4 Units SubCUTAneous TID WC    insulin lispro  0-4 Units SubCUTAneous Nightly    piperacillin-tazobactam  4,500 mg IntraVENous Q8H    linezolid  600 mg IntraVENous Q12H    metoprolol tartrate  50 mg Oral BID    [Held by provider] heparin (porcine)  5,000 Units SubCUTAneous 3 times per day       Allergies:  Patient has no known allergies.     Social History:    TOBACCO:    Denies  ETOH:    Socially, not daily  DRUGS:   Denies  MARITAL STATUS:   In a Micro: Blood cultures (9/12) in process  Wound culture in process (9/12)    Imaging:   MRI left foot (9/12)   Large ulceration and sinus tract extending from the ball the foot along the fourth intermetatarsal space. Adjacent osteomyelitis and septic arthritis of the fourth MTP joint and adjacent fourth metatarsal and fourth proximal phalanx. Suspected early    osteomyelitis of the distal fifth metatarsal and base of the fifth proximal phalanx.        IMPRESSION:      Patient Active Problem List   Diagnosis    Atrial fibrillation ([address]    Essential hypertension    Type 2 diabetes mellitus without complication, without long-term current use of insulin ([address]    History of skin graft    Wound of buttock    Obesity, Class I, BMI 30-34.9    S/P split thickness skin graft    Surgical wound, non healing    Necrotizing fasciitis ([address]    Mike's gangrene of scrotum    Colostomy in place [facility])    Diabetic infection of left foot (HCC)    Elevated serum creatinine    Dyslipidemia    Diabetic foot infection (HCC)       #Sepsis secondary to 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  -Received 4 days of Keflex and Bactrim, started 9/7  -Leukocytosis, elevated CRP  -MRI findings of osteomyelitis with septic arthritis of the fourth MTP, fourth metatarsal, fourth proximal phalanx, and early osteo of the fifth metatarsal and base of the fifth proximal phalanx  -I&D by podiatry today with approximately 15 mL sanguinous purulent drainage from the dorsum and 5 cm similar fluid from deep blunt exploration  -Patient to go to the OR [facility] for further I&D, possible partial amputation  -Patient started on Zosyn and Zyvox      RECOMMENDATIONS:    -Continue empiric antibiotics  -Follow-up wound cultures, follow-up blood cultures  -Follow-up intraoperative findings from tonight    Discussed with Dr. Connor Clifford DO  PGY1, Internal Medicine  09/12/22  6:48 PM    Addendum to Resident Consult note:  Pt seen, examined and

## 2022-09-13 LAB
ANION GAP SERPL CALCULATED.3IONS-SCNC: 12 MMOL/L (ref 3–16)
BASOPHILS ABSOLUTE: 0 K/UL (ref 0–0.2)
BASOPHILS RELATIVE PERCENT: 0.3 %
BUN BLDV-MCNC: 20 MG/DL (ref 7–20)
CALCIUM SERPL-MCNC: 9.3 MG/DL (ref 8.3–10.6)
CHLORIDE BLD-SCNC: 100 MMOL/L (ref 99–110)
CO2: 21 MMOL/L (ref 21–32)
CREAT SERPL-MCNC: 1.5 MG/DL (ref 0.9–1.3)
EOSINOPHILS ABSOLUTE: 0 K/UL (ref 0–0.6)
EOSINOPHILS RELATIVE PERCENT: 0.3 %
ESTIMATED AVERAGE GLUCOSE: 151.3 MG/DL
ESTIMATED AVERAGE GLUCOSE: 151.3 MG/DL
GFR AFRICAN AMERICAN: 60
GFR NON-AFRICAN AMERICAN: 49
GLUCOSE BLD-MCNC: 154 MG/DL (ref 70–99)
GLUCOSE BLD-MCNC: 196 MG/DL (ref 70–99)
GLUCOSE BLD-MCNC: 215 MG/DL (ref 70–99)
GLUCOSE BLD-MCNC: 234 MG/DL (ref 70–99)
GLUCOSE BLD-MCNC: 247 MG/DL (ref 70–99)
HBA1C MFR BLD: 6.9 %
HBA1C MFR BLD: 6.9 %
HCT VFR BLD CALC: 32.4 % (ref 40.5–52.5)
HEMOGLOBIN: 11.1 G/DL (ref 13.5–17.5)
LYMPHOCYTES ABSOLUTE: 1.1 K/UL (ref 1–5.1)
LYMPHOCYTES RELATIVE PERCENT: 8.8 %
MAGNESIUM: 1.4 MG/DL (ref 1.8–2.4)
MCH RBC QN AUTO: 28 PG (ref 26–34)
MCHC RBC AUTO-ENTMCNC: 34.4 G/DL (ref 31–36)
MCV RBC AUTO: 81.6 FL (ref 80–100)
MONOCYTES ABSOLUTE: 0.9 K/UL (ref 0–1.3)
MONOCYTES RELATIVE PERCENT: 7.3 %
NEUTROPHILS ABSOLUTE: 10 K/UL (ref 1.7–7.7)
NEUTROPHILS RELATIVE PERCENT: 83.3 %
PDW BLD-RTO: 12.4 % (ref 12.4–15.4)
PERFORMED ON: ABNORMAL
PLATELET # BLD: 198 K/UL (ref 135–450)
PMV BLD AUTO: 7.4 FL (ref 5–10.5)
POTASSIUM SERPL-SCNC: 4.5 MMOL/L (ref 3.5–5.1)
RBC # BLD: 3.97 M/UL (ref 4.2–5.9)
SODIUM BLD-SCNC: 133 MMOL/L (ref 136–145)
WBC # BLD: 12 K/UL (ref 4–11)

## 2022-09-13 PROCEDURE — 99232 SBSQ HOSP IP/OBS MODERATE 35: CPT | Performed by: INTERNAL MEDICINE

## 2022-09-13 PROCEDURE — 6370000000 HC RX 637 (ALT 250 FOR IP): Performed by: PODIATRIST

## 2022-09-13 PROCEDURE — 83735 ASSAY OF MAGNESIUM: CPT

## 2022-09-13 PROCEDURE — 97165 OT EVAL LOW COMPLEX 30 MIN: CPT

## 2022-09-13 PROCEDURE — 6370000000 HC RX 637 (ALT 250 FOR IP)

## 2022-09-13 PROCEDURE — 6360000002 HC RX W HCPCS: Performed by: STUDENT IN AN ORGANIZED HEALTH CARE EDUCATION/TRAINING PROGRAM

## 2022-09-13 PROCEDURE — 6360000002 HC RX W HCPCS: Performed by: PODIATRIST

## 2022-09-13 PROCEDURE — 97116 GAIT TRAINING THERAPY: CPT

## 2022-09-13 PROCEDURE — 36415 COLL VENOUS BLD VENIPUNCTURE: CPT

## 2022-09-13 PROCEDURE — 1200000000 HC SEMI PRIVATE

## 2022-09-13 PROCEDURE — 97162 PT EVAL MOD COMPLEX 30 MIN: CPT

## 2022-09-13 PROCEDURE — 80048 BASIC METABOLIC PNL TOTAL CA: CPT

## 2022-09-13 PROCEDURE — 2580000003 HC RX 258: Performed by: PODIATRIST

## 2022-09-13 PROCEDURE — 97530 THERAPEUTIC ACTIVITIES: CPT

## 2022-09-13 PROCEDURE — 85025 COMPLETE CBC W/AUTO DIFF WBC: CPT

## 2022-09-13 PROCEDURE — 97535 SELF CARE MNGMENT TRAINING: CPT

## 2022-09-13 RX ORDER — INSULIN LISPRO 100 [IU]/ML
0-8 INJECTION, SOLUTION INTRAVENOUS; SUBCUTANEOUS
Status: DISCONTINUED | OUTPATIENT
Start: 2022-09-13 | End: 2022-09-16 | Stop reason: HOSPADM

## 2022-09-13 RX ORDER — MAGNESIUM SULFATE IN WATER 40 MG/ML
2000 INJECTION, SOLUTION INTRAVENOUS ONCE
Status: COMPLETED | OUTPATIENT
Start: 2022-09-13 | End: 2022-09-13

## 2022-09-13 RX ORDER — SODIUM HYPOCHLORITE 1.25 MG/ML
SOLUTION TOPICAL DAILY
Status: DISCONTINUED | OUTPATIENT
Start: 2022-09-13 | End: 2022-09-16 | Stop reason: HOSPADM

## 2022-09-13 RX ORDER — INSULIN LISPRO 100 [IU]/ML
0-4 INJECTION, SOLUTION INTRAVENOUS; SUBCUTANEOUS NIGHTLY
Status: DISCONTINUED | OUTPATIENT
Start: 2022-09-13 | End: 2022-09-16 | Stop reason: HOSPADM

## 2022-09-13 RX ADMIN — MAGNESIUM SULFATE HEPTAHYDRATE 2000 MG: 40 INJECTION, SOLUTION INTRAVENOUS at 08:30

## 2022-09-13 RX ADMIN — PIPERACILLIN AND TAZOBACTAM 4500 MG: 4; .5 INJECTION, POWDER, FOR SOLUTION INTRAVENOUS at 06:15

## 2022-09-13 RX ADMIN — INSULIN LISPRO 2 UNITS: 100 INJECTION, SOLUTION INTRAVENOUS; SUBCUTANEOUS at 17:18

## 2022-09-13 RX ADMIN — FAMOTIDINE 20 MG: 20 TABLET ORAL at 21:28

## 2022-09-13 RX ADMIN — PIPERACILLIN AND TAZOBACTAM 4500 MG: 4; .5 INJECTION, POWDER, FOR SOLUTION INTRAVENOUS at 14:50

## 2022-09-13 RX ADMIN — INSULIN LISPRO 1 UNITS: 100 INJECTION, SOLUTION INTRAVENOUS; SUBCUTANEOUS at 12:16

## 2022-09-13 RX ADMIN — ACETAMINOPHEN 650 MG: 325 TABLET, FILM COATED ORAL at 04:37

## 2022-09-13 RX ADMIN — HEPARIN SODIUM 5000 UNITS: 5000 INJECTION INTRAVENOUS; SUBCUTANEOUS at 21:46

## 2022-09-13 RX ADMIN — METOPROLOL TARTRATE 50 MG: 50 TABLET, FILM COATED ORAL at 21:28

## 2022-09-13 RX ADMIN — SODIUM CHLORIDE, PRESERVATIVE FREE 10 ML: 5 INJECTION INTRAVENOUS at 21:45

## 2022-09-13 RX ADMIN — FAMOTIDINE 20 MG: 20 TABLET ORAL at 08:30

## 2022-09-13 RX ADMIN — INSULIN LISPRO 1 UNITS: 100 INJECTION, SOLUTION INTRAVENOUS; SUBCUTANEOUS at 08:32

## 2022-09-13 RX ADMIN — HEPARIN SODIUM 5000 UNITS: 5000 INJECTION INTRAVENOUS; SUBCUTANEOUS at 14:54

## 2022-09-13 RX ADMIN — LINEZOLID 600 MG: 600 INJECTION, SOLUTION INTRAVENOUS at 21:45

## 2022-09-13 RX ADMIN — PIPERACILLIN AND TAZOBACTAM 4500 MG: 4; .5 INJECTION, POWDER, FOR SOLUTION INTRAVENOUS at 21:37

## 2022-09-13 RX ADMIN — METOPROLOL TARTRATE 50 MG: 50 TABLET, FILM COATED ORAL at 08:30

## 2022-09-13 RX ADMIN — DAKIN'S SOLUTION 0.125% (QUARTER STRENGTH): 0.12 SOLUTION at 09:02

## 2022-09-13 RX ADMIN — LINEZOLID 600 MG: 600 INJECTION, SOLUTION INTRAVENOUS at 11:07

## 2022-09-13 RX ADMIN — ACETAMINOPHEN 650 MG: 325 TABLET, FILM COATED ORAL at 21:28

## 2022-09-13 RX ADMIN — HEPARIN SODIUM 5000 UNITS: 5000 INJECTION INTRAVENOUS; SUBCUTANEOUS at 06:13

## 2022-09-13 NOTE — ANESTHESIA POSTPROCEDURE EVALUATION
Department of Anesthesiology  Postprocedure Note    Patient: Joel Arias  MRN: [id number]  YOB: 1970  Date of evaluation: 9/12/2022      Procedure Summary     Date: 09/12/22 Room / Location: [facility] [address]  / [facility]    Anesthesia Start: 1958 Anesthesia Stop: 2054    Procedure: LEFT FOOT DEBRIDEMENT INCISION AND DRAINAGE, AMPUTATION OF FOURTH AND FIFTH RAYS (Left: Foot) Diagnosis:       Gas gangrene of foot ([address]      (GAS GANGRENE LEFT FOOT)    Surgeons: Ash Tolliver DPM Responsible Provider: Faby Gerber MD    Anesthesia Type: general ASA Status: 3          Anesthesia Type: No value filed.     Manda Phase I:      Manda Phase II:        Anesthesia Post Evaluation    Patient location during evaluation: PACU  Patient participation: complete - patient participated  Level of consciousness: awake and alert  Airway patency: patent  Nausea & Vomiting: no nausea and no vomiting  Complications: no  Cardiovascular status: hemodynamically stable  Respiratory status: acceptable  Hydration status: euvolemic  Multimodal analgesia pain management approach

## 2022-09-13 NOTE — PROGRESS NOTES
Physician Progress Note      PATIENT:               Isidra Esparza  CSN #:                  [id number]  :                       1970  ADMIT DATE:       2022 12:55 AM  DISCH DATE:  RESPONDING  PROVIDER #:        Tez Larson DPM          QUERY TEXT:    Patient admitted with \"GAS GANGRENE LEFT FOOT\". Per Op note dated 22   documentation of \"debridement\". To accurately reflect the procedure performed   please document if debridement was excisional or nonexcisional and the deepest   depth of tissue removed as down to and including: The medical record reflects the following:  Risk Factors: Gas gangrene of left foot  Clinical Indicators: per OP note on 22 \"Incision and drainage with   extensive debridement muscle and fascia of 25 cm?;  Using a #15 blade, full-thickness incision was made over the dorsal aspect of   foot extending distally around the fourth and fifth  digit. The incision excised the necrotic and nonviable tissue to the dorsal   aspect of the foot\"  Treatment: CBC, CMP, Blood cultures, MRI of the left foot; Podiatry consult; Infectious disease consult; s/p left foot surgery;  Meds: Zyvox IV; Zosyn IV; Vancomycin IV  Options provided:  -- Excisional debridement of fascia of the left foot  -- Excisional debridement of muscle of the left foot  -- Nonexcisional debridement of fascia of the left foot  -- Nonexcisional debridement of muscle of the left foot  -- Other - I will add my own diagnosis  -- Disagree - Not applicable / Not valid  -- Disagree - Clinically unable to determine / Unknown  -- Refer to Clinical Documentation Reviewer    PROVIDER RESPONSE TEXT:    Excisional debridement of muscle of the left foot was performed during   procedure on 22.     Query created by: Mary Christian on 2022 11:40 AM      Electronically signed by:  Tez Larson DPM 2022 12:16 PM

## 2022-09-13 NOTE — PROGRESS NOTES
Progress Note    Admit Date: 9/12/2022  Diet: ADULT DIET; Regular; 4 carb choices (60 gm/meal)  ADULT ORAL NUTRITION SUPPLEMENT; Breakfast, Lunch, Dinner; Wound Healing Oral Supplement    CC: L foot infection    Interval history:   Awake, appropriately interactive, pleasant this AM. Doing well post operatively. Did have episode of HTN to 's with  last night that resolved without intervention. No new or worsening complaints. Medications:     Scheduled Meds:   sodium hypochlorite   Irrigation Daily    magnesium sulfate  2,000 mg IntraVENous Once    sodium chloride flush  5-40 mL IntraVENous 2 times per day    famotidine  20 mg Oral BID    insulin lispro  0-4 Units SubCUTAneous TID WC    insulin lispro  0-4 Units SubCUTAneous Nightly    piperacillin-tazobactam  4,500 mg IntraVENous Q8H    linezolid  600 mg IntraVENous Q12H    metoprolol tartrate  50 mg Oral BID    heparin (porcine)  5,000 Units SubCUTAneous 3 times per day     Continuous Infusions:   sodium chloride 25 mL/hr at 09/12/22 1125    dextrose       PRN Meds:sodium chloride flush, sodium chloride, ondansetron **OR** ondansetron, polyethylene glycol, acetaminophen **OR** acetaminophen, glucose, dextrose bolus **OR** dextrose bolus, glucagon (rDNA), dextrose    Objective:   Vitals:   T-max:  Patient Vitals for the past 8 hrs:   BP Temp Temp src Pulse Resp SpO2 Weight   09/13/22 0746 138/84 98.4 °F (36.9 °C) Oral 100 18 96 % --   09/13/22 0600 -- -- -- -- -- -- 246 lb 7.6 oz (111.8 kg)   09/13/22 0423 (!) 145/72 -- -- 97 -- -- --   09/13/22 0345 (!) 178/97 100 °F (37.8 °C) Oral (!) 110 16 97 % --       Intake/Output Summary (Last 24 hours) at 9/13/2022 0802  Last data filed at 9/13/2022 2868  Gross per 24 hour   Intake 557 ml   Output 2375 ml   Net -1818 ml       Review of Systems  Per HPI    Physical Exam  Constitutional:       General: He is not in acute distress. Appearance: Normal appearance. He is obese.  He is not ill-appearing, toxic-appearing or diaphoretic. HENT:      Head: Normocephalic and atraumatic. Nose: Nose normal.      Mouth/Throat:      Mouth: Mucous membranes are moist.      Pharynx: Oropharynx is clear. Eyes:      Extraocular Movements: Extraocular movements intact. Conjunctiva/sclera: Conjunctivae normal.      Pupils: Pupils are equal, round, and reactive to light. Cardiovascular:      Rate and Rhythm: Normal rate and regular rhythm. Pulses: Normal pulses. Heart sounds: Normal heart sounds. Pulmonary:      Effort: Pulmonary effort is normal. No respiratory distress. Breath sounds: Normal breath sounds. No wheezing or rhonchi. Abdominal:      General: There is distension. Palpations: Abdomen is soft. Tenderness: There is no abdominal tenderness. There is no guarding or rebound. Comments: Ostomy bag present   Musculoskeletal:         General: No swelling or tenderness. Normal range of motion. Cervical back: Normal range of motion. Right lower leg: No edema. Left lower leg: No edema. Comments: LLE in wraps. R toe amputation   Skin:     General: Skin is warm and dry. Capillary Refill: Capillary refill takes less than 2 seconds. Coloration: Skin is not jaundiced or pale. Findings: Lesion present. No bruising. Neurological:      General: No focal deficit present. Mental Status: He is alert and oriented to person, place, and time. Mental status is at baseline. Cranial Nerves: No cranial nerve deficit. Sensory: Sensory deficit present. Motor: No weakness. Psychiatric:         Mood and Affect: Mood normal.         Behavior: Behavior normal.         Thought Content:  Thought content normal.         Judgment: Judgment normal.     LABS:    CBC:   Recent Labs     09/12/22  0315 09/13/22  0607   WBC 15.7* 12.0*   HGB 11.9* 11.1*   HCT 35.3* 32.4*    198   MCV 84.0 81.6     Renal:    Recent Labs     09/12/22 0315 09/13/22  0607   * 133*   K 4.6 4.5    100   CO2 21 21   BUN 26* 20   CREATININE 1.5* 1.5*   GLUCOSE 232* 196*   CALCIUM 10.0 9.3   MG  --  1.40*   ANIONGAP 12 12     Hepatic: No results for input(s): AST, ALT, BILITOT, BILIDIR, PROT, LABALBU, ALKPHOS in the last 72 hours. Troponin: No results for input(s): TROPONINI in the last 72 hours. BNP: No results for input(s): BNP in the last 72 hours. Lipids: No results for input(s): CHOL, HDL in the last 72 hours. Invalid input(s): LDLCALCU, TRIGLYCERIDE  ABGs:  No results for input(s): PHART, WQV1GDV, PO2ART, GST9GHD, BEART, THGBART, A0TXZZZN, LLB9UFR in the last 72 hours. INR:   Recent Labs     09/12/22  0808   INR 1.19*     Lactate: No results for input(s): LACTATE in the last 72 hours. Cultures:  -----------------------------------------------------------------  RAD:   XR FOOT LEFT (MIN 3 VIEWS)   Final Result      Partial amputation of the right fourth and fifth rays      VL DUP LOWER EXTREMITY ARTERIES BILATERAL         MRI FOOT LEFT W WO CONTRAST   Final Result   1. Large ulceration and sinus tract extending from the ball the foot along the fourth intermetatarsal space. Adjacent osteomyelitis and septic arthritis of the fourth MTP joint and adjacent fourth metatarsal and fourth proximal phalanx. Suspected early    osteomyelitis of the distal fifth metatarsal and base of the fifth proximal phalanx. 2. Diminished enhancement in the soft tissues adjacent to the ulceration suggesting devitalized soft tissues. Clinical correlation recommended. XR CHEST (2 VW)   Final Result   Impression: Mild cardiomegaly. XR FOOT LEFT (MIN 3 VIEWS)   Final Result   Impression: Soft tissue gas again noted of the lateral aspect of the forefoot. XR FOOT LEFT (MIN 3 VIEWS)   Final Result     Bubbles of air in the soft tissues near the fourth and fifth    digits as described.   The bubbles of air may be secondary to a gas    forming infection or skin ulceration. No definite signs of    osteomyelitis. Assessment/Plan:   Diabetic foot infection  Concern for gas gangrene   1-2 days of worsening L foot dorsum erythema, dark purple/red blistering. Soft tissue gas on lateral aspect of forefoot on XR. Initial .6, sed rate 57. Received vancomycin in ED. 9/12 MRI L Foot W WO with large ulceration and sinus tract from ball of foot to 4th intermetatarsal space with adjacent osteomyelitis and septic arthritis of the 4th MTP joint and adjacent 4th metarsal and 4th proximal phalanx. Suspected early osteomyelitis of distal 5th metatarsal and base of 5th proximal phalanx. Likely devitalized soft tissues surrounding ulceration. BLE arterial duplex unremarkable. - Podiatry following              - wound culture pending              - S/p ray amputation of L 4th and 5th digit 9/12  - ID consulted   - continue zyvox and zosyn   - 9/12 blood culture NGTD     LEROY  Apparent baseline cr ~1 in 2020. Likely 2/2 bactrim, pt states he did drink wine while on the bactrim. - avoid nephrotoxic medications  - strict I/Os     T2DM  Peripheral neuropathy  5/22 A1c 8.4  - home empagliflozin and metformin held   - LDSSI     Atrial fibrillation  Currently rate controlled. Not on anticoagulation at home.  [id number]   - continue home lopressor      HTN  - continue home lopressor      HLD  Not on home statin         Will discuss with attending physician Dr. Jonathan Hunt      Code Status: Full code  FEN: Regular diet  PPX: SubQ heparin, pepcid   DISPO: Roberto Carpenter MD, PGY-1  09/13/22  8:02 AM    This patient has been staffed and discussed with Abraham Astorga MD.

## 2022-09-13 NOTE — PROGRESS NOTES
09/13/22 1507   Encounter Summary   Encounter Overview/Reason  Initial Encounter   Service Provided For: Patient   Referral/Consult From: Rounding   Support System Family members;Friends/neighbors   Complexity of Encounter Moderate   Begin Time 1034   End Time  1044   Total Time Calculated 10 min   Encounter    Type Initial Screen/Assessment   Assessment/Intervention/Outcome   Assessment Calm;Coping; Hopeful   Intervention Active listening;Nurtured Hope   Outcome Engaged in conversation;Encouraged;Expressed Gratitude;Receptive   Plan and Referrals   Plan/Referrals Continue to visit, (comment)

## 2022-09-13 NOTE — PROGRESS NOTES
2053 admitted to PACU from [place]. Connected to monitor. Report at bedside. Denies pain and nausea. Dr Blood Pap here to see patient. Dr Louann Snowden aware of HR. Accucheck 146.  2105 xrays complete.

## 2022-09-13 NOTE — PLAN OF CARE
Problem: Pain  Goal: Verbalizes/displays adequate comfort level or baseline comfort level  9/13/2022 0219 by Opal Jo RN  Outcome: Progressing  Flowsheets (Taken 9/13/2022 0219)  Verbalizes/displays adequate comfort level or baseline comfort level:   Encourage patient to monitor pain and request assistance   Assess pain using appropriate pain scale   Administer analgesics based on type and severity of pain and evaluate response  Note: Pt denies pain poster operatively.  Will continue to monitor  9/12/2022 1855 by Shanique Ortega RN  Outcome: Progressing     Problem: Safety - Adult  Goal: Free from fall injury  9/13/2022 0219 by Opal Jo RN  Outcome: Progressing  9/12/2022 1855 by Shanique Ortega RN  Outcome: Progressing     Problem: Chronic Conditions and Co-morbidities  Goal: Patient's chronic conditions and co-morbidity symptoms are monitored and maintained or improved  Outcome: Progressing

## 2022-09-13 NOTE — PROGRESS NOTES
assist, NWB L LE in post-op shoe, colostomy    Subjective   General  Chart Reviewed: Yes  Additional Pertinent Hx: 49 y.o. M admitted 9/12 s/p L foot I&D, 4th and 5th ray amp. PMHx includes DMII, necrotizing fasciitis leading to colostomy, previous podiatry surgeries  Family / Caregiver Present: Yes (parents arrived at end of session)  Referring Practitioner: RABAI  Subjective  Subjective: Pt in bed on entry. Pleasant and cooperative with therapy. Appreciative of suggestions/recommendations. General Comment  Comments: No c/o pain     Social/Functional History  Social/Functional History  Lives With: Alone  Type of Home: House  Home Layout: One level  Home Access: Level entry  Bathroom Shower/Tub: Walk-in shower  Bathroom Toilet: Handicap height  Bathroom Equipment: Built-in shower seat, Grab bars in shower  Home Equipment: Walker, standard, Walker, rolling, Rollator  ADL Assistance: Independent  Homemaking Assistance: Independent  Ambulation Assistance: Independent  Active : Yes  Occupation: Full time employment  Type of Occupation: - plans to take time off work  Additional Comments: Pt can have assistance at home or move into girlfriend's home temporarily as she works from home. She lives in 2 story house, bedroom and bathroom on 2nd floor, 1/2 bath on 1st floor, level entry. Objective          Observation/Palpation  Observation: Pt with L distal LE bandaged with wound vac, pt with colostomy  Safety Devices  Type of Devices: Left in bed;Call light within reach; Bed alarm in place (left in bed as found)  Gait  Overall Level of Assistance:  (Transfers and gait assessed by PT. Pt safe with use of walker, compliant with NWB.  Plans to obtain both rollabout and iwalk crutch.)        ADL  Feeding: Independent  Grooming: Modified independent ;Setup  LE Bathing Skilled Clinical Factors: Discussed plan for bathing, education provided: will likely use walk-in with built in seat and prop LLE out of the water to ensure is does not get wet. LE Dressing Skilled Clinical Factors: Educated on sitting for dressing and any other dynamic standing activities - verb understanding  Toileting Skilled Clinical Factors: Discussed/educated on methods for emptying colostomy - pt verb understanding and has a few methods to try     Activity Tolerance  Activity Tolerance: Patient tolerated treatment well        Vision  Vision: Impaired  Vision Exceptions: Wears glasses at all times  Hearing  Hearing: Within functional limits  Cognition  Overall Cognitive Status: WNL  Orientation  Overall Orientation Status: Within Normal Limits          Education Given To: Patient  Education Provided: Role of Therapy;Plan of Care;Precautions; ADL Adaptive Strategies;Transfer Training;Energy Conservation; Fall Prevention Strategies  Education Method: Verbal  Barriers to Learning: None  Education Outcome: Verbalized understanding         AM-PAC Score    AM-[facility] Inpatient Daily Activity Raw Score: 21 (09/13/22 1404)  AM-PAC Inpatient ADL T-Scale Score : 44.27 (09/13/22 1404)  ADL Inpatient CMS 0-100% Score: 32.79 (09/13/22 1404)  ADL Inpatient CMS G-Code Modifier : CJ (09/13/22 1404)      Goals  Short Term Goals  Time Frame for Short term goals: by D/C  Short Term Goal 1: Demonstrate safe/effective toileting routine (including colostomy management) - Not met  Short Term Goal 2: Perform all functional transfers with spvn - Not met       Therapy Time   Individual Concurrent Group Co-treatment   Time In 1339         Time Out 1404         Minutes 25          Timed Code Tx Min: 10  Total Tx Time: 25       Rebecca Mccarthy, OT

## 2022-09-13 NOTE — PROGRESS NOTES
Physical Therapy  Facility/Department: [address]  Physical Therapy Initial Assessment and Treatment    Name: Charlie Barton  : 1970  MRN: [id number]  Date of Service: 2022    Discharge Recommendations:   Charlie Barton scored a  18/24 on the AM-PAC short mobility form. Current research shows that an AM-PAC score of 18 or greater is typically associated with a discharge to the patient's home setting. Based on the patient's AM-PAC score and their current functional mobility deficits, it is recommended that the patient have 2-3 sessions per week of Physical Therapy at d/c to increase the patient's independence. At this time, this patient demonstrates the endurance and safety to discharge home with home services) and a follow up treatment frequency of 2-3x/wk. Please see assessment section for further patient specific details. If patient discharges prior to next session this note will serve as a discharge summary. Please see below for the latest assessment towards goals. PT Equipment Recommendations  Equipment Needed: Yes  Other: Possibly rollabout- will assess      Patient Diagnosis(es): The primary encounter diagnosis was Diabetic foot infection ([address] A diagnosis of Gas gangrene of foot ([address] was also pertinent to this visit. Past Medical History:  has a past medical history of Allergic rhinitis, Atrial fibrillation ([address] Hyperlipidemia, Hypertension, Necrotizing fasciitis ([address] and Type 2 diabetes mellitus without complication ([address] Past Surgical History:  has a past surgical history that includes Colonoscopy (2018); Skin graft (2018); Infected skin debridement (2018-2018); Knee arthroscopy (Left); Foot Debridement (Right, 2020); Toe amputation (Right, 2020); colostomy; and Foot Debridement (Left, 2022). Assessment   Assessment: Pt is 50 yo M admitted with diabetic infection of L foot and underwent surgery.   Normally, pt is very independent, living alone, driving and working full time. Presently, pt is moving with assist of 1 and wheeled walker to assist in maintaining L LE NWB. Pt maintains his WB status well. Pt plans to d/c home to his [place] house with assist or to his girlfriend's 2 story home. Pt would like to trial rollabout prior to d/c later in the week. Pt may need rollabout for home use, will assess. Rec 24 hour assist initially and home PT. Will follow. Treatment Diagnosis: Decreased functional mobility  Therapy Prognosis: Good  Decision Making: Medium Complexity  Requires PT Follow-Up: Yes  Activity Tolerance  Activity Tolerance: Patient tolerated treatment well     Plan   Plan  Plan:  (2-5)  Current Treatment Recommendations: Balance training, Functional mobility training, Transfer training, Gait training, Stair training, Safety education & training, Therapeutic activities  Safety Devices  Type of Devices: Call light within reach, Left in chair, Chair alarm in place, Nurse notified     Restrictions  Position Activity Restriction  Other position/activity restrictions: Up with assist, NWB L LE, colostomy     Subjective   Pain: Denies  General  Chart Reviewed: Yes  Additional Pertinent Hx: Pt is 48 yo M admitted with diabetic infection of L foot. History of T2DM (last A1c 8.4 5/22), afib, HTN, HLD, necrotizing fasciotomy s/p colostomy, ricardo's gangrene of scrotum. Pt underwent on 9/12 incision and drainage with extensive debridement of muscle and fascia of 25cm2 left foot and Partial 4th and 5th ray amputation left foot. Follow up surgery planned for later this week per pt. Family / Caregiver Present: No  Referring Practitioner: Dr. Danny Seth  Diagnosis: Diabetic infection of L foot  Subjective  Subjective: Pt supine in bed and agreeable to PT. Pt instructed on L LE NWB.     Social/Functional History  Social/Functional History  Lives With: Alone  Type of Home: House  Home Layout: One level  Home Access: Level entry  Bathroom Shower/Tub: Walk-in shower  Bathroom Toilet: Handicap height  Bathroom Equipment: Built-in shower seat, Grab bars in shower  Home Equipment: Walker, standard, Walker, rolling, Rollator  ADL Assistance: Independent  Homemaking Assistance: Independent  Ambulation Assistance: Independent  Active : Yes  Occupation: Full time employment  Type of Occupation: - plans to take time off work  Additional Comments: Pt can have assistance at home or move into girlfriend's home temporarily as she works from home. She lives in 2 story house, bedroom and bathroom on 2nd floor, 1/2 bath on 1st floor, level entry.     Vision/Hearing  Vision  Vision: Impaired  Vision Exceptions: Wears glasses at all times  Hearing  Hearing: Within functional limits      Cognition   Orientation  Overall Orientation Status: Within Normal Limits     Objective   Observation/Palpation  Observation: Pt with L distal LE bandaged with wound vac, pt with colostomy    Sensation: N and T B feet    AROM RLE (degrees)  RLE AROM: WFL  AROM LLE (degrees)  LLE AROM : WFL    Strength RLE  Strength RLE: WFL  Strength LLE  Strength LLE: WFL  Comment: At hip and knee    Bed mobility  Supine to Sit: Supervision  Scooting: Supervision    Transfers  Sit to Stand: Contact guard assistance  Stand to sit: Contact guard assistance  Comment: Cues for correct hand placement    Ambulation  Device: Rolling Walker  Assistance: Contact guard assistance  Quality of Gait: Good maintenance of L LE NWB  Gait Deviations: Slow Justina  Distance: 4 feet and 20 feet  Comments: Pt would like to try rollabout prior to d/c    Balance  Sitting - Static: Good  Sitting - Dynamic: Good  Standing - Static: Fair (With walker)  Standing - Dynamic: Fair;- (With walker)    Goals  Short Term Goals  Time Frame for Short term goals: By discharge  Short term goal 1: Pt will perform sit to and from transfer with SBA, NWB L LE  Short term goal 2: Pt will ambulate 60 feet with appropriate AD and ROSAURA ANDERSON  Short term goal 3: (If d/c to girlfriend's home): Pt will ambulate up and down 1 flight stairs with rail and crutch with ROSAURA ANDERSON    Education  Patient Education  Education Given To: Patient  Education Provided: Role of Therapy;Precautions  Education Outcome: Verbalized understanding    Therapy Time   Individual Concurrent Group Co-treatment   Time In 0955         Time Out 1049         Minutes 54             Timed Code Treatment Minutes:   39    Total Treatment Minutes:   [address], PT

## 2022-09-13 NOTE — PROGRESS NOTES
ID Follow-up NOTE  RESIDENT NOTE - reviewed / edited, attending note at bottom    CC:   Left foot diabetic ulcer with osteomyelitis and gas gangrene  Antibiotics: Linezolid, Zosyn    Admit Date: 9/12/2022  Hospital Day: 2    Subjective:     Patient underwent bedside I&D yesterday with podiatry, with subsequent I&D and partial fourth and fifth ray amputation of the left foot. T-max of 100.0 overnight, with intermittent tachycardia in the 90s and low 100s, but stable pressures. Patient states he feels a lot better after surgery yesterday. He denies any pain, never has to do his diabetic neuropathy. Denies any nausea or vomiting and is tolerating a diet. Objective:     Patient Vitals for the past 8 hrs:   BP Temp Temp src Pulse Resp SpO2 Weight   09/13/22 0746 138/84 98.4 °F (36.9 °C) Oral 100 18 96 % --   09/13/22 0600 -- -- -- -- -- -- 246 lb 7.6 oz (111.8 kg)   09/13/22 0423 (!) 145/72 -- -- 97 -- -- --   09/13/22 0345 (!) 178/97 100 °F (37.8 °C) Oral (!) 110 16 97 % --     I/O last 3 completed shifts: In: 557 [I.V.:457; IV Piggyback:100]  Out: 0346 [Urine:2375]  I/O this shift:  In: 240 [P.O.:240]  Out: -     EXAM:  GENERAL: No apparent distress.     HEENT: Membranes moist, no oral lesion  NECK:  Supple, no lymphadenopathy  LUNGS: Clear b/l, no rales, no dullness  CARDIAC: RRR, no murmur appreciated  ABD:  + BS, soft / NT  EXT:  Left lower extremity wrapped in a bandage, instillation wound VAC applied to the surgical wound  NEURO: No focal neurologic findings  PSYCH: Orientation, sensorium, mood normal  LINES:  Peripheral iv       Data Review:  Lab Results   Component Value Date    WBC 12.0 (H) 09/13/2022    HGB 11.1 (L) 09/13/2022    HCT 32.4 (L) 09/13/2022    MCV 81.6 09/13/2022     09/13/2022     Lab Results   Component Value Date    CREATININE 1.5 (H) 09/13/2022    BUN 20 09/13/2022     (L) 09/13/2022    K 4.5 09/13/2022     09/13/2022    CO2 21 09/13/2022       Hepatic Function Panel:   Lab Results   Component Value Date/Time    [facility] SOUTH 118 12/29/2020 12:47 PM    ALT 26 12/29/2020 12:47 PM    AST 10 12/29/2020 12:47 PM    PROT 7.3 12/29/2020 12:47 PM    BILITOT 0.7 12/29/2020 12:47 PM    LABALBU 4.2 12/29/2020 12:47 PM       MICRO:  -Blood cultures (9/12) no growth to date  -Wound gram stain with 4+ Gram positive cocci and 2+ gram-positive rods, culture still in process    IMAGING:  MRI left foot (9/12)   Large ulceration and sinus tract extending from the ball the foot along the fourth intermetatarsal space. Adjacent osteomyelitis and septic arthritis of the fourth MTP joint and adjacent fourth metatarsal and fourth proximal phalanx. Suspected early    osteomyelitis of the distal fifth metatarsal and base of the fifth proximal phalanx.        Scheduled Meds:   sodium hypochlorite   Irrigation Daily    sodium chloride flush  5-40 mL IntraVENous 2 times per day    famotidine  20 mg Oral BID    insulin lispro  0-4 Units SubCUTAneous TID WC    insulin lispro  0-4 Units SubCUTAneous Nightly    piperacillin-tazobactam  4,500 mg IntraVENous Q8H    linezolid  600 mg IntraVENous Q12H    metoprolol tartrate  50 mg Oral BID    heparin (porcine)  5,000 Units SubCUTAneous 3 times per day       Continuous Infusions:   sodium chloride 25 mL/hr at 09/12/22 1125    dextrose         PRN Meds:  sodium chloride flush, sodium chloride, ondansetron **OR** ondansetron, polyethylene glycol, acetaminophen **OR** acetaminophen, glucose, dextrose bolus **OR** dextrose bolus, glucagon (rDNA), dextrose      Assessment:     #Sepsis secondary to 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  -Initial wound from stepping on nail 2-3 mo ago  -Received 4 days of Keflex and Bactrim, started 9/7  -Leukocytosis of 15.7, CRP elevated at 308.6  -A1c 6.9  -MRI findings of osteomyelitis with septic arthritis of the fourth MTP, fourth metatarsal, fourth proximal phalanx, and early osteo of the fifth metatarsal and base of the fifth proximal phalanx  -Bedside I&D with podiatry for wound cultures, went to the OR last night for further I&D and partial ourth and fifth ray amputation of left foot   -Wound culture gram stain with gram-positive cocci and rods, culture still pending  -Patient started on Zosyn and Zyvox on 9/12   -Blood cultures no growth to date      Plan:     -Follow-up wound cultures  -Continue broad-spectrum empiric antibiotics linezolid and Zosyn  -Follow-up surgical pathology     Discussed with Dr. Fajardo Mt, DO    Addendum to Resident Consult note:  Pt seen, examined and evaluated. I have reviewed the current history, physical findings, labs and assessment and plan and agree with note as documented by resident (Dr. Anderson Waldrop). 50 yo man with hx DM, neuropathy, obesity (BMI 34), AF, HTN, HL  Hx Fornier's gangrene, had anal stenosis, has diverting colostomy     Stepped on nail 2-3 mo ago, developed L plantar foot ulcer  Wound worsening over time, assoc with drainage. Outpatient rx - Keflex and bactrim  He developed worsening L foot swellng, erythema, 'feverish'     In ED 9/11, afeb, WBC 15.7  Admit, started on Linezolid, Zosyn    L foot x-ray with gas  Arterial studies with no significant problem  Cult - light Ps aeruginosa, heavy E faecalis      9/12   MRI with 4th MT + proximal phalanx OM with 4th MTP septic arthritis, 5th MT / prox phalanx edema   OR 4/5th partial ray resection, purulence     9/13 No pain. IMP/  Gas gangrene   L plantar foot wound infection secondary to nail puncture injury     REC/  Cont Linezolid + Zosyn  Will f/u on wound and blood cult   - BC no growth to date; Await sensitivities on Pseudomonas, Enterococcus  Wound care (now instil VAC with 1/4 strength Dakin's) per Podiatry  Return to OR per Podiatry - tentatively 9/15     Medical Decision Making:   The following items were considered in medical decision making:  Discussion of patient care with other providers  Reviewed clinical

## 2022-09-13 NOTE — PROGRESS NOTES
Podiatric Surgery Daily Progress Note  Charity Bettencourt      Subjective :   Patient seen and examined this am at the bedside. Patient denies any acute overnight events. Patient denies N/V/F/C/SOB. Patient denies calf pain, thigh pain, chest pain. He denies any pain overnight. States he is feeling well this morning. Review of Systems: A 12 point review of symptoms is unremarkable with the exception of the chief complaint. Patient specifically denies nausea, fever, vomiting, chills, shortness of breath, chest pain, abdominal pain, constipation or difficulty urinating. Objective     BP (!) 145/72   Pulse 97   Temp 100 °F (37.8 °C) (Oral)   Resp 16   Ht 6' (1.829 m)   Wt 246 lb 7.6 oz (111.8 kg)   SpO2 97%   BMI 33.43 kg/m²     I/O:  Intake/Output Summary (Last 24 hours) at 9/13/2022 0628  Last data filed at 9/13/2022 5040  Gross per 24 hour   Intake 557 ml   Output 2375 ml   Net -1818 ml              Wt Readings from Last 3 Encounters:   09/13/22 246 lb 7.6 oz (111.8 kg)   09/09/22 241 lb (109.3 kg)   09/03/21 251 lb (113.9 kg)       LABS:    Recent Labs     09/12/22  0315   WBC 15.7*   HGB 11.9*   HCT 35.3*           Recent Labs     09/12/22  0315   *   K 4.6      CO2 21   BUN 26*   CREATININE 1.5*        Recent Labs     09/12/22  0808   INR 1.19*           LOWER EXTREMITY EXAMINATION    Dressing to left LE intact. No strikethrough noted to the external dressing. Sanguinous drainage noted to the internal layers of the dressing. VASCULAR: Right DP pulse palpable 2/4, PT pulse b/l palpable 2/4. Left DP pulse nonpalpable 0/4. Upon hand-held Doppler examination, DP and PT pulses were noted to have triphasic signals b/l. CFT is brisk to the remaining digits of the foot b/l. CFT is less than 2 seconds to the skin flap of left foot surgical site. Skin temperature is warm to warm from proximal to distal with no focal calor noted. Nonpitting edema noted to left foot.  No pain with calf compression b/l. NEUROLOGIC: Gross and epicritic sensation is diminished b/l. Protective sensation is diminished at all pedal sites b/l. DERMATOLOGIC:   Left Lower Extremity:  Full-thickness surgical wound noted to the dorsal lateral aspect of the foot measuring approximately 7.0 cm x 3.0 cm x 3.0 cm. Wound base is granular nature with fourth and fifth metatarsals exposed. Approximately 1 cc of purulent drainage expressed from the dorsal medial aspect of the wound. No fluctuance or crepitus noted. No malodor noted. Mild periwound erythema noted, improving since admission. Full-thickness ulceration noted plantar lateral aspect of the foot. Wound measures approximately 1.2 cm x 0.6 cm. Wound communicates with the above-mentioned surgical wound. No fluctuance or crepitus noted. No purulent drainage expressed. No malodor noted. Images from 9/13. Right lower extremity:  Soft tissue envelope is closed and without acute signs of infection. Hallucal nail is thickened and discolored. MUSCULOSKELETAL: Muscle strength is 5/5 for all pedal groups tested. No pain with palpation of the left dorsal forefoot. History of partial right second digit amputation. Partial 4th and 5th ray amputation of the left foot. IMAGING:  XR Left foot post-op 9/12  Narrative   EXAM: Left foot 3 views 9/12/2022 at 21:02       INDICATION: s/p I&D, partial 4th and 5th ray amputation, postoperative       COMPARISON: 9/12/2022 at 06:37       FINDINGS:       Postoperative changes of transmetatarsal amputations involving the fourth and fifth rays. Impression   Partial amputation of the right fourth and fifth rays     XR Left foot 9/12 (pre-op)  Narrative   EXAM:     XR Left Foot Complete, 3 or More Views       CLINICAL HISTORY:     diabetic ulcer lateral distal midfoot/ 3-4-5 MT area. TECHNIQUE:     Frontal, lateral and oblique views of the left foot.        COMPARISON:     No relevant prior studies available. FINDINGS:     Bones/joints:  Plantar calcaneal spur measuring 7 mm. No acute    fracture. No dislocation. Soft tissues:  Dorsal foot soft tissue swelling with numerous    bubbles of air in the dorsal aspect of the foot at the level of    the distal fourth and fifth. No radiopaque foreign body. Electronically signed by Ga Meredith M.D. on 09-12-22 at [address] of air in the soft tissues near the fourth and fifth    digits as described. The bubbles of air may be secondary to a gas    forming infection or skin ulceration. No definite signs of    osteomyelitis. MRI Left foot 9/12  Narrative   MRI of the left foot with and without contrast       TECHNIQUE: Multiplanar T1 and T2-weighted images were obtained of the left foot without use of contrast.       FINDINGS:       There is a ulceration along the plantar aspect of the ball the foot at the level the fourth MTP joint. There is a enhancing sinus tract extending through the intermetatarsal region and along the lateral aspect of the fourth MTP joint. There is extensive abnormal signal and enhancement in the distal fourth metatarsal and proximal phalanx of the fourth digit compatible with osteomyelitis and septic arthritis of the fourth MTP joint. Patchy areas of edema involving the fifth metatarsal    head and base of the fifth proximal phalanx suspicious for early osteomyelitis. There is a large area of diminished enhancement along the ball the foot laterally suggesting a large area of devitalized soft tissue. First MTP osteoarthritis with osteophyte formation. No suspicious osseous edema elsewhere identified. Impression   1. Large ulceration and sinus tract extending from the ball the foot along the fourth intermetatarsal space. Adjacent osteomyelitis and septic arthritis of the fourth MTP joint and adjacent fourth metatarsal and fourth proximal phalanx.  Suspected early    osteomyelitis of the distal fifth metatarsal and base of the fifth proximal phalanx. 2. Diminished enhancement in the soft tissues adjacent to the ulceration suggesting devitalized soft tissues. Clinical correlation recommended. Arterial Duplex Lower Extremity b/l 9/12  Type of Study:        Extremities Arteries:Lower Extremities Arterial Duplex, VL LOWER EXTREMITY    ARTERIES DUPLEX BILATERAL. Tech Comments   Right   The right ankle/brachial index is 1.12 ( mmHg;  mmHg). There is normal, multiphasic flow identified in the common femoral artery,   suggesting no evidence of significant aorto-iliac inflow disease. There is minimal atherosclerotic plaque involving the superficial femoral and   popliteal arteries with velocities consistent with <50% stenosis. Left   The left ankle/brachial index is 1.05 ( mmHg;  mmHg). There is normal, multiphasic flow identified in the common femoral artery,   suggesting no evidence of significant aorto-iliac inflow disease. There is minimal atherosclerotic plaque involving the superficial femoral and   popliteal arteries with velocities consistent with <50% stenosis. There is no previous exam for comparison. ASSESSMENT/PLAN  - s/p I&D, partial 4th and 5th ray amputation; left foot  - Sepsis 2/2 gas gangrene; left LE  - Diabetic foot ulceration; left foot; Jackson 3  - Type 2 diabetes mellitus with peripheral neuropathy  - History of partial 2nd digit amputation     -Patient examined and evaluated at bedside   -VSS, leukocytosis noted (WBC 12.0)  -.6, [id number]  -Prealbumin 9.7  -HbA1c 6.9%  -Wound culture 9/12- 4+ GPC, 2+ GPR  -Blood culture x2- NGTD  -Surgical path pending  -All imaging reviewed, impressions noted above  -ID following; continue antibiotics per their recommendations  -Left foot wound irrigated with copious amounts of normal sterile saline  -Instill Wound VAC applied to the left foot wound.  Instill VAC set to 20mL for 10 minutes every 2 hours using Dakin's solution.   -Will change wound VAC tomorrow  -Dressing applied to left foot consisting of gauze, kerlix, ACE  -Nonweight bearing to left foot with surgical shoe  -Once wound is converted from dirty to clean, patient will return to OR for further debridement and delayed primary closure. Tentatively planned for Thursday, 9/15. DISPO: s/p I&D with partial 4th and 5th ray amputations; left foot. Surgical path pending. Continue IV antibiotics per ID recs. Instill wound VAC applied. Once wound is converted from dirty to clean, patient will return to OR for further debridement and delayed primary closure; tentatively planned for Thursday, 9/15. Discussed assessment and plan with Dr. Jay Raymond DPM.    Placido Reinoso DPM  Podiatric Resident, PGY-3  Pager #: [phone] or Perfect Serve      Patient was seen and evaluated at bedside. Agree with residents assessment and treatment plan.   Jay Raymond DPM

## 2022-09-13 NOTE — OP NOTE
Operative Note      Patient: Thiago Rodriguez  YOB: 1970  MRN: [id number]    Date of Procedure: 9/12/2022    Pre-Op Diagnosis: GAS GANGRENE LEFT FOOT    Post-Op Diagnosis: Same       Procedure(s):  -Incision and drainage with extensive debridement of muscle and fascia of 25cm2  ; left foot  -Partial 4th and 5th ray amputation; left foot     Surgeon(s):  Swapnil Lopez DPM     Assistant:  Resident: Dandre Oliva DPM     Anesthesia: General     Hemostasis: Pneumatic calf tourniquet at 250 mmHg for 25 minutes     Injectables: 20 cc of 2% Polocaine plain preoperatively     Materials: 1/4 inch iodoform packing     Implants: none      Estimated Blood Loss (mL): less than 50     Complications: None    Specimens:   ID Type Source Tests Collected by Time Destination   A : fifth metatarsal Tissue Tissue SURGICAL PATHOLOGY Swapnil Lopez DPM 9/12/2022 2029    B : fourth metatarsal Tissue Tissue SURGICAL PATHOLOGY Swapnil Lopez DPM 9/12/2022 2030    C : fourth and fifth toes Tissue Tissue SURGICAL PATHOLOGY Swapnil Lopez DPM 9/12/2022 2035        Implants:  * No implants in log *      Drains: * No LDAs found *    Findings: Approximately 15cc of sanguinopurulent drainage encountered. 4th and 5th metatarsal head and 4th and 5th proximal phalanx noted to be discolored and soft. INDICATIONS FOR PROCEDURE: This patient has signs and symptoms clinically  consistent with the above mentioned preoperative diagnosis. Given the clinical picture, along with the labs and imaging, it was determined patient would benefit from urgent surgical intervention. All potential risks, benefits, and complications were discussed with the patient prior to the scheduling of surgery. All the patient's questions were answered and no guarantees were given. The patient wished to proceed with surgery, and informed written consent was obtained.       DETAILS OF PROCEDURE: The patient was brought from the pre-operative area and placed on the operating table in the supine position. A pneumatic calf tourniquet was placed around the patient's well-padded left lower extremity. Following IV sedation, a local anesthetic block was then injected proximal to the incision site consisting of 20 cc of 2% Polocaine plain. The left lower extremity was then scrubbed, prepped, and draped in the usual sterile fashion. A time-out was performed. The patient, procedure, and operative site were confirmed. The foot was then elevated above the level of the heart for approximately 3 minutes and the tourniquet was then inflated 250 mmHg and the following procedures were performed. PROCEDURES: Incision and drainage with extensive debridement muscle and fascia of 25 cm², with partial fourth and fifth ray amputation; of the left lower extremity:  At this time, attention was directed the patient's left foot where over the dorsal aspect of the fourth and fifth metatarsophalangeal joint, crepitance and fluctuance was noted. This correlated with the preoperative radiographs that were consistent with soft tissue emphysema. Using a #15 blade, full-thickness incision was made over the dorsal aspect of foot extending distally around the fourth and fifth digit. The incision excised the necrotic and nonviable tissue to the dorsal aspect of the foot. At this time, approximately 10 cc of sanguinopurulent drainage was expressed over the dorsal aspect of the fourth and fifth metatarsophalangeal joints. Attention was then directed toward the fourth and fifth digit of the left foot. A towel clamp was clamped on the distal aspect of the fourth and fifth digits and used to stabilize the digits. A #15 blade was used to release the medial and lateral collateral ligaments present at the fourth and fifth metatarsophalangeal joints. The fourth and fifth digits were then freed from all remaining soft tissue attachments, and were disarticulated.   The fourth and fifth digits were then passed in the operative field to the back table to be sent as specimen to pathology. Attention was then directed toward the fourth and fifth metatarsals. The fourth and fifth metatarsal heads were noted to be discolored gray, and soft in nature. A McGlamry elevator was used to release the overlying periosteum of the fourth and fifth metatarsals. At this time approximately 5 cc of sanguinopurulent drainage was encountered in the fourth and fifth intermetatarsal space. Next, using a sagittal saw, the distal 1/2 of the fifth metatarsal was resected and passed from the operative field and placed on the back table to be sent as specimen to pathology. Next, using a sagittal saw, the distal 1/2 of the fourth metatarsal was resected and passed from the operative field and placed on the back table to be sent as specimen to pathology. Next, a rongeur was used to excise any remaining necrotic and nonviable soft tissue until healthy bleeding tissue was noted. Attention was then directed toward irrigation. The site was irrigated with copious amounts of normal sterile saline via pulse lavage. Upon completion of this, hemostasis was achieved. The surgical site was then gently packed with 1/2 inch iodoform packing material.    A soft sterile dressing was applied consisting of gauze, cast padding, Coban, Ace bandage. The pneumatic calf tourniquet was rapidly deflated after a total time of 25 minutes and a prompt hyperemic response was noted on all remaining aspects of the patient's left lower extremity. END OF PROCEDURE: The patient tolerated the procedure and anesthesia well. The patient left the OR and was transferred to the PACU with vital signs stable and vascular status intact to all aspects of the left lower extremity.  Following a period of postoperative monitoring the patient will be sent back to his in-house room where he will continue to receive IV antibiotic therapy per the recommendations of  Franchesca Odor. Once the wound is converted to clean he will be brought back to the operating room for further debridement and delayed primary wound closure.      Dictated on behalf of NESSA Don DPM  Podiatric Resident, PGY-3  Pager #: [phone] or Perfect Serve     Electronically signed by Patricio Yanes DPM on 9/12/2022 at 9:12 PM

## 2022-09-13 NOTE — PROGRESS NOTES
PACU Transfer Note    Vitals:    09/12/22 2130   BP: 138/80   Pulse: (!) 101   Resp: 23   Temp: 99.2 °F (37.3 °C)   SpO2: 98%       In: 457 [I.V.:457]  Out: 0     Pain assessment:  none  Pain Level: 0    Report given to Receiving unit RN.    9/12/2022 9:40 PM

## 2022-09-13 NOTE — CARE COORDINATION
Case Management Assessment           Initial Evaluation                Date / Time of Evaluation: 9/13/2022 3:17 PM                 Assessment Completed by: Farrah Koehler RN    Patient Name: Camron Hastings     YOB: 1970  Diagnosis: Diabetic foot infection ([facility] [E11.628, L08.9]     Date / Time: 9/12/2022 12:55 AM    Patient Admission Status: Inpatient    If patient is discharged prior to next notation, then this note serves as note for discharge by case management. Current PCP: Gabi Muro DO  Clinic Patient: No    Chart Reviewed: Yes  Patient/ Family Interviewed: Yes    Initial assessment completed at bedside with: yes     Hospitalization in the last 30 days: No    Emergency Contacts:  Extended Emergency Contact Information  Primary Emergency Contact: [address] [place] [address] Phone: [phone]  Relation: Brother/Sister  Secondary Emergency Contact: [address] Phone: [phone]  Relation: Parent    Advance Directives:   Code Status: Full Code    Financial  Payor: Aj Marroquin / Plan: Aj Marroquin / Product Type: *No Product type* /     Pre-cert required for SNF: Yes    Pharmacy    [address] [facility] [address] [facility] [address] [facility]  Phone: [phone] Fax: [phone]    [address] [place] [address] [phone] [facility] [phone]  [place] [address]  Phone: [phone] Fax: [phone]      Potential assistance Purchasing Medications:    Does Patient want to participate in local refill/ meds to beds program?:      Meds To Beds General Rules:  1. Can ONLY be done Monday- Friday between 8:30am-5pm  2. Prescription(s) must be in pharmacy by 3pm to be filled same day  3. Copy of patient's insurance/ prescription drug card and patient face sheet must be sent along with the prescription(s)  4. Cost of Rx cannot be added to hospital bill. If financial assistance is needed, please contact unit  or ;  or  CANNOT provide pharmacy voucher for patients co-pays  5.  Patients can then  the prescription on their way out of the hospital at discharge, or pharmacy can deliver to the bedside if staff is available. (payment due at time of pick-up or delivery - cash, check, or card accepted)     Able to afford home medications/ co-pay costs: Yes    ADLS  Support Systems:      PT AM-PAC:   OT AM-PAC:     HOUSING  Home Environment:     Lives With: Alone  Type of Home: [address]: One level  Home Access: Level entry  Bathroom Shower/Tub: Walk-in shower  Bathroom Toilet: Handicap height  [address]: Built-in shower seat, Grab bars in [address] [place]: Dulcie Sicks, standard, Walker, rolling, Rollator  Steps: none    Plans to RETURN to current housing: Yes  Barriers to RETURNING to current housin [employer]  Currently ACTIVE with  [facility] [address]: No  Home Care Agency: Not Applicable    Currently ACTIVE with [place] on Aging: No  Passport/ Waiver: No  Passport/ Waiver Services: Not Applicable    Durable Medical Equipment  DME Provider: NA  Equipment: Walker, standard, Walker, rolling, Rollator    Home Oxygen and Respiratory Equipment  Has HOME OXYGEN prior to admission: No  [address]: Not Applicable  Other Respiratory Equipment: NA    Informed of need to bring portable home O2 tank on day of DISCHARGE for nursing to connect prior to leaving: No  Verbalized agreement/Understanding: No  Person to bring portable tank at discharge: NA    Dialysis  Active with HD/PD prior to admission: No  Nephrologist: Dany Chow 61:  Not Applicable    DISCHARGE PLAN:  Disposition: Home with  [facility] [address]: SN PT  if  needed       Transportation PLAN for discharge: family Factors facilitating achievement of predicted outcomes: Cooperative and Pleasant    Barriers to discharge: Pain, medical clearance      Additional Case Management Notes:     CM  following for  d/c planning :      Patient  from Home alone  Indep  , No current  [place] [address]  services  has  DME as  noted  above     Patient  admitted  w/  Diabetic  Foot Infection:  L Left:    Podiatry and  ID following : Per  MD  Documentation :     s/p I&D with partial 4th and 5th ray amputations; left foot. Surgical path pending. Continue IV antibiotics per ID recs. Instill wound VAC applied. Once wound is converted from dirty to clean, patient will return to OR for further debridement and delayed primary closure; tentatively planned for Thursday, 9/15. The Plan for Transition of Care is related to the following treatment goals of Di[facility] foot [facility] [F86.964, L08.9]    The Patient and/or patient representative Dinesh lopez and his family were provided with a choice of provider and agrees with the discharge plan Yes    Freedom of choice list was provided with basic dialogue that supports the patient's individualized plan of care/goals and shares the quality data associated with the providers.  Yes    Care Transition patient: No    Citlali Monroe RN  [facility] [employer]  Case Management Department  Ph: [phone]

## 2022-09-13 NOTE — BRIEF OP NOTE
Brief Postoperative Note      Patient: Brandy Jain  YOB: 1970  MRN: [id number]    Date of Procedure: 9/12/2022    Pre-Op Diagnosis: GAS GANGRENE LEFT FOOT    Post-Op Diagnosis: Same       Procedure(s):  -Incision and drainage with extensive debridement of muscle and fascia of 25cm2  ; left foot  -Partial 4th and 5th ray amputation; left foot    Surgeon(s):  Kenna Phillips DPM    Assistant:  Resident: Gwyn Henson DPM    Anesthesia: General    Hemostasis: Pneumatic calf tourniquet at 250 mmHg for 25 minutes    Injectables: 20 cc of 2% Polocaine plain preoperatively    Materials: 1/4 inch iodoform packing    Implants: none     Estimated Blood Loss (mL): less than 50     Complications: None    Specimens:   ID Type Source Tests Collected by Time Destination   A : fifth metatarsal Tissue Tissue SURGICAL PATHOLOGY Kenna Phillips DPM 9/12/2022 2029    B : fourth metatarsal Tissue Tissue SURGICAL PATHOLOGY Kenna Phillips DPM 9/12/2022 2030    C : fourth and fifth toes Tissue Tissue SURGICAL PATHOLOGY Kenna Phillips DPM 9/12/2022 2035        Implants:  * No implants in log *      Drains: * No LDAs found *    Findings: Approximately 15cc of sanguinopurulent drainage encountered. 4th and 5th metatarsal head and 4th and 5th proximal phalanx noted to be discolored and soft. DISPO: s/p I&D with partial 4th and 5th ray amputations; left foot. Surgical path pending. Continue IV antibiotics per ID recs. Plan for Instill wound VAC application tomorrow AM. Once wound is converted from dirty to clean, patient will return to OR for further debridement and delayed primary closure; tentatively planned for Thursday, 9/15.     Electronically signed by Gwyn Henson DPM on 9/12/2022 at 8:48 PM

## 2022-09-14 LAB
ANION GAP SERPL CALCULATED.3IONS-SCNC: 10 MMOL/L (ref 3–16)
BASOPHILS ABSOLUTE: 0 K/UL (ref 0–0.2)
BASOPHILS RELATIVE PERCENT: 0.7 %
BUN BLDV-MCNC: 19 MG/DL (ref 7–20)
CALCIUM SERPL-MCNC: 9.3 MG/DL (ref 8.3–10.6)
CHLORIDE BLD-SCNC: 103 MMOL/L (ref 99–110)
CO2: 27 MMOL/L (ref 21–32)
CREAT SERPL-MCNC: 1.3 MG/DL (ref 0.9–1.3)
EOSINOPHILS ABSOLUTE: 0.2 K/UL (ref 0–0.6)
EOSINOPHILS RELATIVE PERCENT: 2.9 %
GFR AFRICAN AMERICAN: >60
GFR NON-AFRICAN AMERICAN: 58
GLUCOSE BLD-MCNC: 188 MG/DL (ref 70–99)
GLUCOSE BLD-MCNC: 188 MG/DL (ref 70–99)
GLUCOSE BLD-MCNC: 196 MG/DL (ref 70–99)
GLUCOSE BLD-MCNC: 206 MG/DL (ref 70–99)
GLUCOSE BLD-MCNC: 244 MG/DL (ref 70–99)
HCT VFR BLD CALC: 34.9 % (ref 40.5–52.5)
HEMOGLOBIN: 12 G/DL (ref 13.5–17.5)
LYMPHOCYTES ABSOLUTE: 1.3 K/UL (ref 1–5.1)
LYMPHOCYTES RELATIVE PERCENT: 19.5 %
MCH RBC QN AUTO: 28.9 PG (ref 26–34)
MCHC RBC AUTO-ENTMCNC: 34.4 G/DL (ref 31–36)
MCV RBC AUTO: 84 FL (ref 80–100)
MONOCYTES ABSOLUTE: 0.7 K/UL (ref 0–1.3)
MONOCYTES RELATIVE PERCENT: 11.2 %
NEUTROPHILS ABSOLUTE: 4.3 K/UL (ref 1.7–7.7)
NEUTROPHILS RELATIVE PERCENT: 65.7 %
PDW BLD-RTO: 12.3 % (ref 12.4–15.4)
PERFORMED ON: ABNORMAL
PLATELET # BLD: 198 K/UL (ref 135–450)
PMV BLD AUTO: 7.6 FL (ref 5–10.5)
POTASSIUM SERPL-SCNC: 5.2 MMOL/L (ref 3.5–5.1)
RBC # BLD: 4.15 M/UL (ref 4.2–5.9)
SODIUM BLD-SCNC: 140 MMOL/L (ref 136–145)
WBC # BLD: 6.5 K/UL (ref 4–11)

## 2022-09-14 PROCEDURE — 6370000000 HC RX 637 (ALT 250 FOR IP): Performed by: PODIATRIST

## 2022-09-14 PROCEDURE — 2580000003 HC RX 258: Performed by: PODIATRIST

## 2022-09-14 PROCEDURE — 1200000000 HC SEMI PRIVATE

## 2022-09-14 PROCEDURE — 6360000002 HC RX W HCPCS

## 2022-09-14 PROCEDURE — 80048 BASIC METABOLIC PNL TOTAL CA: CPT

## 2022-09-14 PROCEDURE — 6360000002 HC RX W HCPCS: Performed by: PODIATRIST

## 2022-09-14 PROCEDURE — 36415 COLL VENOUS BLD VENIPUNCTURE: CPT

## 2022-09-14 PROCEDURE — 85025 COMPLETE CBC W/AUTO DIFF WBC: CPT

## 2022-09-14 PROCEDURE — 6360000002 HC RX W HCPCS: Performed by: STUDENT IN AN ORGANIZED HEALTH CARE EDUCATION/TRAINING PROGRAM

## 2022-09-14 PROCEDURE — 99232 SBSQ HOSP IP/OBS MODERATE 35: CPT | Performed by: INTERNAL MEDICINE

## 2022-09-14 RX ORDER — SODIUM HYPOCHLORITE 1.25 MG/ML
SOLUTION TOPICAL DAILY
Status: DISCONTINUED | OUTPATIENT
Start: 2022-09-14 | End: 2022-09-16 | Stop reason: HOSPADM

## 2022-09-14 RX ORDER — HYDRALAZINE HYDROCHLORIDE 20 MG/ML
5 INJECTION INTRAMUSCULAR; INTRAVENOUS ONCE
Status: COMPLETED | OUTPATIENT
Start: 2022-09-14 | End: 2022-09-14

## 2022-09-14 RX ADMIN — PIPERACILLIN AND TAZOBACTAM 4500 MG: 4; .5 INJECTION, POWDER, FOR SOLUTION INTRAVENOUS at 14:41

## 2022-09-14 RX ADMIN — METOPROLOL TARTRATE 50 MG: 50 TABLET, FILM COATED ORAL at 20:19

## 2022-09-14 RX ADMIN — HEPARIN SODIUM 5000 UNITS: 5000 INJECTION INTRAVENOUS; SUBCUTANEOUS at 06:14

## 2022-09-14 RX ADMIN — INSULIN LISPRO 2 UNITS: 100 INJECTION, SOLUTION INTRAVENOUS; SUBCUTANEOUS at 12:06

## 2022-09-14 RX ADMIN — LINEZOLID 600 MG: 600 INJECTION, SOLUTION INTRAVENOUS at 10:20

## 2022-09-14 RX ADMIN — HEPARIN SODIUM 5000 UNITS: 5000 INJECTION INTRAVENOUS; SUBCUTANEOUS at 14:42

## 2022-09-14 RX ADMIN — SODIUM CHLORIDE, PRESERVATIVE FREE 10 ML: 5 INJECTION INTRAVENOUS at 20:19

## 2022-09-14 RX ADMIN — HYDRALAZINE HYDROCHLORIDE 5 MG: 20 INJECTION INTRAMUSCULAR; INTRAVENOUS at 22:13

## 2022-09-14 RX ADMIN — DAKIN'S SOLUTION 0.125% (QUARTER STRENGTH): 0.12 SOLUTION at 09:04

## 2022-09-14 RX ADMIN — PIPERACILLIN AND TAZOBACTAM 4500 MG: 4; .5 INJECTION, POWDER, FOR SOLUTION INTRAVENOUS at 06:14

## 2022-09-14 RX ADMIN — HEPARIN SODIUM 5000 UNITS: 5000 INJECTION INTRAVENOUS; SUBCUTANEOUS at 21:33

## 2022-09-14 RX ADMIN — ACETAMINOPHEN 650 MG: 325 TABLET, FILM COATED ORAL at 22:12

## 2022-09-14 RX ADMIN — FAMOTIDINE 20 MG: 20 TABLET ORAL at 20:19

## 2022-09-14 RX ADMIN — PIPERACILLIN AND TAZOBACTAM 4500 MG: 4; .5 INJECTION, POWDER, FOR SOLUTION INTRAVENOUS at 21:33

## 2022-09-14 RX ADMIN — FAMOTIDINE 20 MG: 20 TABLET ORAL at 09:05

## 2022-09-14 RX ADMIN — SODIUM CHLORIDE, PRESERVATIVE FREE 10 ML: 5 INJECTION INTRAVENOUS at 09:05

## 2022-09-14 RX ADMIN — METOPROLOL TARTRATE 50 MG: 50 TABLET, FILM COATED ORAL at 09:05

## 2022-09-14 ASSESSMENT — PAIN DESCRIPTION - LOCATION: LOCATION: HEAD

## 2022-09-14 NOTE — CARE COORDINATION
CM cont to follow for  d/c planning:    Patient  being followed  by Podiatry and Infectious Disease:     Per  MD Documentation:                 - S/p ray amputation of L 4th and 5th digit 9/12, wound vac in place with dakins, plan for primary closure tomorrow              - NPO midnight     - ID consulted              - continue zyvox and zosyn               - 9/12 wound culture with Enterococcus faecalis and Pseudomonas, sensitivities pending   - 9/12 blood culture NGTD      Plan for surgical intervention tomorrow afternoon    CM cont to follow for  d/c planning  / Needs and  Dispo:    Patient  worked  w/  PTOT  and will follow up post op:  pt  has  some  DME and may try  [facility] today :     Potential Need for   PICC and  LT IV atbx patient  aware . SW /  CM to make  approp referral  arrange  [place] [address]  and  Infusion  company referral to run benefits. Electronically signed by Joe Guevara RN on 9/14/2022 at 10:40 AM         Joe Guevara RN Case Manager  [facility]  [address] [place] [address].   [place] [id number] [phone]  Fax [phone]

## 2022-09-14 NOTE — PROGRESS NOTES
Occupational Therapy    Chart reviewed, attempted OT. Pt politely declined, stating he is connected to IV and wound vac, and is doing well with L LE NWB. OT offered to manage these items, but he declined. He reports that at home he plans to sit on commode to manage colostomy bag. In the hospital, he is managing it in bed. Will follow up per plan of care.   Roxy Munoz, OTR/L 2786

## 2022-09-14 NOTE — PROGRESS NOTES
Progress Note    Admit Date: 9/12/2022  Diet: ADULT DIET; Regular; 4 carb choices (60 gm/meal)  ADULT ORAL NUTRITION SUPPLEMENT; Breakfast, Lunch, Dinner; Standard High Calorie/High Protein Oral Supplement  Diet NPO    CC: L foot infection    Interval history:   Awake, appropriately interactive. Pt notes episode of diaphoresis overnight however this is unchanged compared to past 3-4 nights. Otherwise no new or worsening complaints, states that he feels overall improved. Wound vac replaced with output     Medications:     Scheduled Meds:   sodium hypochlorite   Irrigation Daily    sodium hypochlorite   Irrigation Daily    insulin lispro  0-8 Units SubCUTAneous TID WC    insulin lispro  0-4 Units SubCUTAneous Nightly    sodium chloride flush  5-40 mL IntraVENous 2 times per day    famotidine  20 mg Oral BID    piperacillin-tazobactam  4,500 mg IntraVENous Q8H    linezolid  600 mg IntraVENous Q12H    metoprolol tartrate  50 mg Oral BID    heparin (porcine)  5,000 Units SubCUTAneous 3 times per day     Continuous Infusions:   sodium chloride 25 mL/hr at 09/12/22 1125    dextrose       PRN Meds:sodium chloride flush, sodium chloride, ondansetron **OR** ondansetron, polyethylene glycol, acetaminophen **OR** acetaminophen, glucose, dextrose bolus **OR** dextrose bolus, glucagon (rDNA), dextrose    Objective:   Vitals:   T-max:  Patient Vitals for the past 8 hrs:   BP Temp Temp src Pulse Resp SpO2 Weight   09/14/22 0803 (!) 149/86 97.9 °F (36.6 °C) Oral 76 16 97 % --   09/14/22 0600 -- -- -- -- -- -- 252 lb 6.8 oz (114.5 kg)   09/14/22 0358 139/77 97.7 °F (36.5 °C) Oral 78 16 99 % --         Intake/Output Summary (Last 24 hours) at 9/14/2022 0959  Last data filed at 9/14/2022 9869  Gross per 24 hour   Intake 720 ml   Output 2250 ml   Net -1530 ml         Review of Systems  Per HPI    Physical Exam  Constitutional:       General: He is not in acute distress. Appearance: Normal appearance. He is obese.  He is not ill-appearing, toxic-appearing or diaphoretic. HENT:      Head: Normocephalic and atraumatic. Nose: Nose normal.      Mouth/Throat:      Mouth: Mucous membranes are moist.      Pharynx: Oropharynx is clear. Eyes:      Extraocular Movements: Extraocular movements intact. Conjunctiva/sclera: Conjunctivae normal.      Pupils: Pupils are equal, round, and reactive to light. Cardiovascular:      Rate and Rhythm: Normal rate and regular rhythm. Pulses: Normal pulses. Heart sounds: Normal heart sounds. Pulmonary:      Effort: Pulmonary effort is normal. No respiratory distress. Breath sounds: Normal breath sounds. No wheezing or rhonchi. Abdominal:      General: There is distension. Palpations: Abdomen is soft. Tenderness: There is no abdominal tenderness. There is no guarding or rebound. Comments: Ostomy bag present   Musculoskeletal:         General: No swelling or tenderness. Normal range of motion. Cervical back: Normal range of motion. Right lower leg: No edema. Left lower leg: No edema. Comments: LLE in wraps. R toe amputation   Skin:     General: Skin is warm and dry. Capillary Refill: Capillary refill takes less than 2 seconds. Coloration: Skin is not jaundiced or pale. Findings: Lesion present. No bruising. Neurological:      General: No focal deficit present. Mental Status: He is alert and oriented to person, place, and time. Mental status is at baseline. Cranial Nerves: No cranial nerve deficit. Sensory: Sensory deficit present. Motor: No weakness. Psychiatric:         Mood and Affect: Mood normal.         Behavior: Behavior normal.         Thought Content:  Thought content normal.         Judgment: Judgment normal.     LABS:    CBC:   Recent Labs     09/12/22  0315 09/13/22  0607 09/14/22  0730   WBC 15.7* 12.0* 6.5   HGB 11.9* 11.1* 12.0*   HCT 35.3* 32.4* 34.9*    198 198   MCV 84.0 81.6 84.0 Renal:    Recent Labs     09/12/22  0315 09/13/22  0607 09/14/22  0730   * 133* 140   K 4.6 4.5 5.2*    100 103   CO2 21 21 27   BUN 26* 20 19   CREATININE 1.5* 1.5* 1.3   GLUCOSE 232* 196* 188*   CALCIUM 10.0 9.3 9.3   MG  --  1.40*  --    ANIONGAP 12 12 10       Hepatic: No results for input(s): AST, ALT, BILITOT, BILIDIR, PROT, LABALBU, ALKPHOS in the last 72 hours. Troponin: No results for input(s): TROPONINI in the last 72 hours. BNP: No results for input(s): BNP in the last 72 hours. Lipids: No results for input(s): CHOL, HDL in the last 72 hours. Invalid input(s): LDLCALCU, TRIGLYCERIDE  ABGs:  No results for input(s): PHART, FWP6NQH, PO2ART, YRS2GAW, BEART, THGBART, G8UPGMJF, PGN0OGE in the last 72 hours. INR:   Recent Labs     09/12/22  0808   INR 1.19*       Lactate: No results for input(s): LACTATE in the last 72 hours. Cultures:  -----------------------------------------------------------------  RAD:   XR FOOT LEFT (MIN 3 VIEWS)   Final Result      Partial amputation of the right fourth and fifth rays      VL DUP LOWER EXTREMITY ARTERIES BILATERAL         MRI FOOT LEFT W WO CONTRAST   Final Result   1. Large ulceration and sinus tract extending from the ball the foot along the fourth intermetatarsal space. Adjacent osteomyelitis and septic arthritis of the fourth MTP joint and adjacent fourth metatarsal and fourth proximal phalanx. Suspected early    osteomyelitis of the distal fifth metatarsal and base of the fifth proximal phalanx. 2. Diminished enhancement in the soft tissues adjacent to the ulceration suggesting devitalized soft tissues. Clinical correlation recommended. XR CHEST (2 VW)   Final Result   Impression: Mild cardiomegaly. XR FOOT LEFT (MIN 3 VIEWS)   Final Result   Impression: Soft tissue gas again noted of the lateral aspect of the forefoot.       XR FOOT LEFT (MIN 3 VIEWS)   Final Result     Bubbles of air in the soft tissues near the fourth and fifth    digits as described. The bubbles of air may be secondary to a gas    forming infection or skin ulceration. No definite signs of    osteomyelitis. Assessment/Plan:   Diabetic foot infection  Concern for gas gangrene   1-2 days of worsening L foot dorsum erythema, dark purple/red blistering. Soft tissue gas on lateral aspect of forefoot on XR. Initial .6, sed rate 57. Received vancomycin in ED. 9/12 MRI L Foot W WO with large ulceration and sinus tract from ball of foot to 4th intermetatarsal space with adjacent osteomyelitis and septic arthritis of the 4th MTP joint and adjacent 4th metarsal and 4th proximal phalanx. Suspected early osteomyelitis of distal 5th metatarsal and base of 5th proximal phalanx. Likely devitalized soft tissues surrounding ulceration. BLE arterial duplex unremarkable. - Podiatry following              - S/p ray amputation of L 4th and 5th digit 9/12, wound vac in place with dakins, plan for primary closure tomorrow   - NPO midnight    - RCRI 0, patient is medically cleared for surgery  - ID consulted   - continue zyvox and zosyn    - 9/12 wound culture with Enterococcus faecalis and Pseudomonas, sensitivities pending   - 9/12 blood culture NGTD     LEROY - improving   Apparent baseline cr ~1 in 2020. Likely 2/2 bactrim, pt states he did drink wine while on the bactrim. - avoid nephrotoxic medications  - strict I/Os     T2DM  Peripheral neuropathy  5/22 A1c 8.4  - home empagliflozin and metformin held   - MDSSI     Atrial fibrillation  Currently rate controlled. Not on anticoagulation at home.  [id number] 2   - continue home lopressor      HTN  - continue home lopressor      HLD  Not on home statin         Will discuss with attending physician Dr. Gurwinder De Anda Status: Full code  FEN: Regular diet  PPX: SubQ heparin, pepcid   DISPO: Vance Sethi MD, PGY-1  09/14/22  9:59 AM    This patient has been staffed and discussed with Rissa Calix MD.

## 2022-09-14 NOTE — PROGRESS NOTES
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 Had some diaphoresis overnight, but not really febrile or rigorous like when he had Mike's a couple years back. Vitals afebrile and HDS. Objective:     Patient Vitals for the past 8 hrs:   BP Temp Temp src Pulse Resp SpO2   09/14/22 1205 135/80 97.9 °F (36.6 °C) Oral 80 18 97 %   09/14/22 0803 (!) 149/86 97.9 °F (36.6 °C) Oral 76 16 97 %       I/O last 3 completed shifts: In: 7333 [P.O.:720; I.V.:457]  Out: 3150 [Urine:3150]  I/O this shift: In: 720 [P.O.:720]  Out: 900 [Urine:900]    EXAM:  GENERAL: No apparent distress. HEENT: Membranes moist, no oral lesion  NECK:  Supple, no lymphadenopathy  LUNGS: Clear b/l, no rales, no dullness  CARDIAC: RRR, no murmur appreciated  ABD:  + BS, soft / NT  EXT:  Left lower extremity wrapped in a bandage, instillation wound VAC applied to the surgical wound  NEURO: No focal neurologic findings  PSYCH: Orientation, sensorium, mood normal  LINES:  Peripheral iv       Data Review:  Lab Results   Component Value Date    WBC 6.5 09/14/2022    HGB 12.0 (L) 09/14/2022    HCT 34.9 (L) 09/14/2022    MCV 84.0 09/14/2022     09/14/2022     Lab Results   Component Value Date    CREATININE 1.3 09/14/2022    BUN 19 09/14/2022     09/14/2022    K 5.2 (H) 09/14/2022     09/14/2022    CO2 27 09/14/2022       Hepatic Function Panel:   Lab Results   Component Value Date/Time    ALKPHOS 118 12/29/2020 12:47 PM    ALT 26 12/29/2020 12:47 PM    AST 10 12/29/2020 12:47 PM    PROT 7.3 12/29/2020 12:47 PM    BILITOT 0.7 12/29/2020 12:47 PM    LABALBU 4.2 12/29/2020 12:47 PM       MICRO:  -Blood cultures (9/12) no growth to date  -Wound culture growing E.  Faecalis (pansensitive) and Pseudomonas (also pansensitive)    IMAGING:  MRI left foot (9/12)   Large ulceration and sinus tract extending from the ball the foot along the fourth intermetatarsal space. Adjacent osteomyelitis and septic arthritis of the fourth MTP joint and adjacent fourth metatarsal and fourth proximal phalanx. Suspected early    osteomyelitis of the distal fifth metatarsal and base of the fifth proximal phalanx.        Scheduled Meds:   sodium hypochlorite   Irrigation Daily    sodium hypochlorite   Irrigation Daily    insulin lispro  0-8 Units SubCUTAneous TID WC    insulin lispro  0-4 Units SubCUTAneous Nightly    sodium chloride flush  5-40 mL IntraVENous 2 times per day    famotidine  20 mg Oral BID    piperacillin-tazobactam  4,500 mg IntraVENous Q8H    linezolid  600 mg IntraVENous Q12H    metoprolol tartrate  50 mg Oral BID    heparin (porcine)  5,000 Units SubCUTAneous 3 times per day       Continuous Infusions:   sodium chloride 25 mL/hr at 09/12/22 1125    dextrose         PRN Meds:  sodium chloride flush, sodium chloride, ondansetron **OR** ondansetron, polyethylene glycol, acetaminophen **OR** acetaminophen, glucose, dextrose bolus **OR** dextrose bolus, glucagon (rDNA), dextrose      Assessment:     #Sepsis secondary to 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  -Initial wound from stepping on nail 2-3 mo ago  -Received 4 days of Keflex and Bactrim, started 9/7  -Leukocytosis of 15.7, CRP elevated at 308.6  -A1c 6.9  -MRI findings of osteomyelitis with septic arthritis of the fourth MTP, fourth metatarsal, fourth proximal phalanx, and early osteo of the fifth metatarsal and base of the fifth proximal phalanx  -Bedside I&D with podiatry for wound cultures, went to the OR (9/12) for further I&D and partial ourth and fifth ray amputation of left foot   -Wound culture growing E faecalis (pansensitive), and pseudomonas (pansensitive)  -Patient started on Zosyn and Zyvox on 9/12   -Blood cultures no growth to date      Plan: -Given culture results of pansensitive E faecalis and Pseudomonas, continue Zosyn, but will discontinue llinezolid.  -Follow-up surgical pathology   -Patient to return to OR tomorrow with Podiatry for further debridement, and possible closure    Discussed with Dr. Magdy Avery, DO    Addendum to Resident Progress note:  Pt seen, examined and evaluated. I have reviewed the current history, physical findings, labs and assessment and plan and agree with note as documented by resident (Dr. Graciela Jacobsen). 48 yo man with hx DM, neuropathy, obesity (BMI 34), AF, HTN, HL  Hx Fornier's gangrene, had anal stenosis, has diverting colostomy     Stepped on nail 2-3 mo ago, developed L plantar foot ulcer  Wound worsening over time, assoc with drainage. Outpatient rx - Keflex and bactrim  He developed worsening L foot swellng, erythema, 'feverish'     In ED 9/11, afeb, WBC 15.7  Admit, started on Linezolid, Zosyn    L foot x-ray with gas  Arterial studies with no significant problem  Cult - light Ps aeruginosa, heavy E faecalis      9/12   MRI with 4th MT + proximal phalanx OM with 4th MTP septic arthritis, 5th MT / prox phalanx edema   OR 4/5th partial ray resection, purulence      9/13 No pain. 9/14 no pain. Saw pictures in Podiatry note. IMP/  Gas gangrene   L plantar foot wound infection secondary to nail puncture injury     REC/  Cont Zosyn  Discontinue Linezolid  Wound care (now instil VAC with 1/4 strength Dakin's) per Podiatry  Return to OR per Podiatry - 9/15    Medical Decision Making:   The following items were considered in medical decision making:  Discussion of patient care with other providers  Reviewed clinical lab tests  Reviewed radiology tests  Reviewed other diagnostic tests/interventions  Microbiology cultures and other micro tests reviewed       Discussed with pt, visitors / family  Melida Meza MD

## 2022-09-14 NOTE — PROGRESS NOTES
Podiatric Surgery Daily Progress Note  Moises Contreras      Subjective :   Patient seen and examined this am at the bedside. Patient denies any acute overnight events. Patient denies N/V/F/C/SOB. Patient denies calf pain, thigh pain, chest pain. Review of Systems: A 12 point review of symptoms is unremarkable with the exception of the chief complaint. Patient specifically denies nausea, fever, vomiting, chills, shortness of breath, chest pain, abdominal pain, constipation or difficulty urinating. Objective     /77   Pulse 78   Temp 97.7 °F (36.5 °C) (Oral)   Resp 16   Ht 6' (1.829 m)   Wt 246 lb 7.6 oz (111.8 kg)   SpO2 99%   BMI 33.43 kg/m²     I/O:  Intake/Output Summary (Last 24 hours) at 9/14/2022 0545  Last data filed at 9/14/2022 0210  Gross per 24 hour   Intake 720 ml   Output 2075 ml   Net -1355 ml              Wt Readings from Last 3 Encounters:   09/13/22 246 lb 7.6 oz (111.8 kg)   09/09/22 241 lb (109.3 kg)   09/03/21 251 lb (113.9 kg)       LABS:    Recent Labs     09/12/22  0315 09/13/22  0607   WBC 15.7* 12.0*   HGB 11.9* 11.1*   HCT 35.3* 32.4*    198        Recent Labs     09/13/22  0607   *   K 4.5      CO2 21   BUN 20   CREATININE 1.5*        Recent Labs     09/12/22  0808   INR 1.19*           LOWER EXTREMITY EXAMINATION    Dressing to left LE intact. No strikethrough noted to the external dressing. Approximately 250mL of serosanguinous drainage in Wound VAC cannister. VASCULAR: Right DP pulse palpable 2/4, PT pulse b/l palpable 2/4. Left DP pulse nonpalpable 0/4. Upon hand-held Doppler examination, DP and PT pulses were noted to have triphasic signals b/l. CFT is brisk to the remaining digits of the foot b/l. CFT is less than 2 seconds to the skin flap of left foot surgical site. Skin temperature is warm to warm from proximal to distal with no focal calor noted. Nonpitting edema noted to left foot. No pain with calf compression b/l.      NEUROLOGIC: Gross and epicritic sensation is diminished b/l. Protective sensation is diminished at all pedal sites b/l. DERMATOLOGIC:   Left Lower Extremity:  Full-thickness surgical wound noted to the dorsal lateral aspect of the foot measuring approximately 7.0 cm x 3.0 cm x 3.0 cm. Wound base is granular nature with fourth and fifth metatarsals exposed. No purulent drainage expressed. No fluctuance or crepitus noted. No malodor noted. Mild periwound erythema noted, improving since admission. Full-thickness ulceration noted plantar lateral aspect of the foot. Wound measures approximately 1.2 cm x 0.6 cm. Wound communicates with the above-mentioned surgical wound. No fluctuance or crepitus noted. No purulent drainage expressed. No malodor noted. Images from 9/14. Right lower extremity:  Soft tissue envelope is closed and without acute signs of infection. Hallucal nail is thickened and discolored. MUSCULOSKELETAL: Muscle strength is 5/5 for all pedal groups tested. No pain with palpation of the left dorsal forefoot. History of partial right second digit amputation. Partial 4th and 5th ray amputation of the left foot. IMAGING:  XR Left foot post-op 9/12  Narrative   EXAM: Left foot 3 views 9/12/2022 at 21:02       INDICATION: s/p I&D, partial 4th and 5th ray amputation, postoperative       COMPARISON: 9/12/2022 at 06:37       FINDINGS:       Postoperative changes of transmetatarsal amputations involving the fourth and fifth rays. Impression   Partial amputation of the right fourth and fifth rays     XR Left foot 9/12 (pre-op)  Narrative   EXAM:     XR Left Foot Complete, 3 or More Views       CLINICAL HISTORY:     diabetic ulcer lateral distal midfoot/ 3-4-5 MT area. TECHNIQUE:     Frontal, lateral and oblique views of the left foot. COMPARISON:     No relevant prior studies available. FINDINGS:     Bones/joints:  Plantar calcaneal spur measuring 7 mm.   No acute fracture. No dislocation. Soft tissues:  Dorsal foot soft tissue swelling with numerous    bubbles of air in the dorsal aspect of the foot at the level of    the distal fourth and fifth. No radiopaque foreign body. Electronically signed by Mala Barboza M.D. on 09-12-22 at [address] of air in the soft tissues near the fourth and fifth    digits as described. The bubbles of air may be secondary to a gas    forming infection or skin ulceration. No definite signs of    osteomyelitis. MRI Left foot 9/12  Narrative   MRI of the left foot with and without contrast       TECHNIQUE: Multiplanar T1 and T2-weighted images were obtained of the left foot without use of contrast.       FINDINGS:       There is a ulceration along the plantar aspect of the ball the foot at the level the fourth MTP joint. There is a enhancing sinus tract extending through the intermetatarsal region and along the lateral aspect of the fourth MTP joint. There is extensive abnormal signal and enhancement in the distal fourth metatarsal and proximal phalanx of the fourth digit compatible with osteomyelitis and septic arthritis of the fourth MTP joint. Patchy areas of edema involving the fifth metatarsal    head and base of the fifth proximal phalanx suspicious for early osteomyelitis. There is a large area of diminished enhancement along the ball the foot laterally suggesting a large area of devitalized soft tissue. First MTP osteoarthritis with osteophyte formation. No suspicious osseous edema elsewhere identified. Impression   1. Large ulceration and sinus tract extending from the ball the foot along the fourth intermetatarsal space. Adjacent osteomyelitis and septic arthritis of the fourth MTP joint and adjacent fourth metatarsal and fourth proximal phalanx. Suspected early    osteomyelitis of the distal fifth metatarsal and base of the fifth proximal phalanx.    2. Diminished enhancement in the soft tissues adjacent to the ulceration suggesting devitalized soft tissues. Clinical correlation recommended. Arterial Duplex Lower Extremity b/l 9/12  Type of Study:        Extremities Arteries:Lower Extremities Arterial Duplex, VL LOWER EXTREMITY    ARTERIES DUPLEX BILATERAL. Tech Comments   Right   The right ankle/brachial index is 1.12 ( mmHg;  mmHg). There is normal, multiphasic flow identified in the common femoral artery,   suggesting no evidence of significant aorto-iliac inflow disease. There is minimal atherosclerotic plaque involving the superficial femoral and   popliteal arteries with velocities consistent with <50% stenosis. Left   The left ankle/brachial index is 1.05 ( mmHg;  mmHg). There is normal, multiphasic flow identified in the common femoral artery,   suggesting no evidence of significant aorto-iliac inflow disease. There is minimal atherosclerotic plaque involving the superficial femoral and   popliteal arteries with velocities consistent with <50% stenosis. There is no previous exam for comparison. ASSESSMENT/PLAN  - s/p I&D, partial 4th and 5th ray amputation; left foot  - Sepsis 2/2 gas gangrene; left LE  - Diabetic foot ulceration; left foot; Jackson 3  - Type 2 diabetes mellitus with peripheral neuropathy  - History of partial 2nd digit amputation     -Patient examined and evaluated at bedside   -VSS, no new AM CBC  -.6, [id number]  -Prealbumin 9.7  -HbA1c 6.9%  -Wound culture 9/12- Enterococcus faecalis, pseudomonas aeruginosa  -Blood culture x2- NGTD  -Surgical path pending  -All imaging reviewed, impressions noted above  -ID following; continue antibiotics per their recommendations  -Instill Wound VAC applied to the left foot wound.  Instill VAC set to 20mL for 10 minutes every 2 hours using Dakin's solution.   -Will change wound VAC tomorrow  -Dressing applied to left foot consisting of gauze, kerlix, ACE  -Nonweight bearing to left foot with surgical shoe  -Plan for surgical intervention tomorrow consisting of incision and drainage, delayed primary closure, application of skin graft substitute the left lower extremity  -NPO at midnight  -Clearance per primary team  -Consent to be obtained      DISPO: s/p I&D with partial 4th and 5th ray amputations; left foot. Surgical path pending. Continue IV antibiotics per ID recs. Instill wound VAC reapplied. Plan for surgical intervention tomorrow afternoon. Consent to be obtained    Patient examined and evaluated at bedside with Dr. Jaye Fishman DPM.    Antoni Mayfield DPM  Podiatric Resident, PGY-3  Pager #: [phone] or Perfect [employer]      Patient was seen and evaluated at bedside. Agree with residents assessment and treatment plan.   Jaye Fishman DPM

## 2022-09-14 NOTE — PLAN OF CARE
Podiatric Surgery Plan of Care Note  Moises Contreras      -Discussed surgical intervention with patient at bedside. All potential risks, benefits, and complications were discussed with the patient prior to the scheduling of surgery. No guarantees or promises were made to what the outcomes will be. The patient wished to proceed with surgery, and informed written consent was obtained, signed, and placed on the patient's chart.    -NPO at midnight  -PT/INR to be drawn tomorrow AM  -Heparin to be held tomorrow after morning dose    Discussed with Dr. Josee Elam DPM.    Patricio Yanes DPM  Podiatric Resident, PGY-3  Pager #: [phone] or [employer]

## 2022-09-14 NOTE — PLAN OF CARE
Problem: Discharge Planning  Goal: Discharge to home or other facility with appropriate resources  Outcome: Progressing  Flowsheets (Taken 9/14/2022 0400)  Discharge to home or other facility with appropriate resources:   Identify barriers to discharge with patient and caregiver   Arrange for needed discharge resources and transportation as appropriate   Identify discharge learning needs (meds, wound care, etc)     Problem: Pain  Goal: Verbalizes/displays adequate comfort level or baseline comfort level  Outcome: Progressing  Flowsheets (Taken 9/14/2022 0401)  Verbalizes/displays adequate comfort level or baseline comfort level:   Encourage patient to monitor pain and request assistance   Assess pain using appropriate pain scale   Administer analgesics based on type and severity of pain and evaluate response  Note: Pt denies pain during shift. Requests tylenol to help sleep     Problem: Chronic Conditions and Co-morbidities  Goal: Patient's chronic conditions and co-morbidity symptoms are monitored and maintained or improved  Outcome: Progressing  Flowsheets (Taken 9/14/2022 0401)  Care Plan - Patient's Chronic Conditions and Co-Morbidity Symptoms are Monitored and Maintained or Improved:   Monitor and assess patient's chronic conditions and comorbid symptoms for stability, deterioration, or improvement   Collaborate with multidisciplinary team to address chronic and comorbid conditions and prevent exacerbation or deterioration  Note: Pt has R and L toe amputations. Diabetic foot infection/gas gangrene on L foot. Podiatry and infectious disease following. Wound vac to left foot. ACHS blood glucose checks. No insulin coverage required during my shift. Pt on IV antibitoics. Pt has ostomy that he manages himself. Problem: Safety - Adult  Goal: Free from fall injury  Outcome: Progressing  Note: Pt calls appropriately for needs. NWB on L foot.

## 2022-09-14 NOTE — PLAN OF CARE
Problem: Discharge Planning  Goal: Discharge to home or other facility with appropriate resources  9/14/2022 1403 by Juan Gagnon RN  Outcome: Progressing  Discharge to home or other facility with appropriate resources:   Identify barriers to discharge with patient and caregiver   Arrange for needed discharge resources and transportation as appropriate   Identify discharge learning needs (meds, wound care, etc)  Note: Pt plans to discharge to parent's house when stable. Pt to have surgery tomorrow. May need wound vac and IV antibiotics at discharge. Problem: Pain  Goal: Verbalizes/displays adequate comfort level or baseline comfort level  9/14/2022 1403 by Juan Gagnon RN  Outcome: Progressing  Verbalizes/displays adequate comfort level or baseline comfort level:   Encourage patient to monitor pain and request assistance   Assess pain using appropriate pain scale   Administer analgesics based on type and severity of pain and evaluate response  Note: Pt denies c/o pain. Pt has no feeling in feet. Problem: Safety - Adult  Goal: Free from fall injury  9/14/2022 1403 by Juan Gagnon RN  Outcome: Progressing  Flowsheets (Taken 9/14/2022 1403)  Free From Fall Injury: Instruct family/caregiver on patient safety  Note: Patient at risk for falls. Patient resting quietly in bed. Side rails up x 2/4. Bed locked in lowest position. Bed alarm on. Bedside table and call light within reach. Patient instructed to call for assistance. Patient verbalized understanding. Will continue to monitor.

## 2022-09-15 ENCOUNTER — ANESTHESIA EVENT (OUTPATIENT)
Dept: OPERATING ROOM | Age: 52
DRG: 710 | End: 2022-09-15
Payer: MEDICAID

## 2022-09-15 ENCOUNTER — ANESTHESIA (OUTPATIENT)
Dept: OPERATING ROOM | Age: 52
DRG: 710 | End: 2022-09-15
Payer: MEDICAID

## 2022-09-15 LAB
ANION GAP SERPL CALCULATED.3IONS-SCNC: 10 MMOL/L (ref 3–16)
BASOPHILS ABSOLUTE: 0.1 K/UL (ref 0–0.2)
BASOPHILS RELATIVE PERCENT: 1 %
BUN BLDV-MCNC: 18 MG/DL (ref 7–20)
CALCIUM SERPL-MCNC: 9.7 MG/DL (ref 8.3–10.6)
CHLORIDE BLD-SCNC: 102 MMOL/L (ref 99–110)
CO2: 28 MMOL/L (ref 21–32)
CREAT SERPL-MCNC: 1.2 MG/DL (ref 0.9–1.3)
EOSINOPHILS ABSOLUTE: 0.3 K/UL (ref 0–0.6)
EOSINOPHILS RELATIVE PERCENT: 3.7 %
GFR AFRICAN AMERICAN: >60
GFR NON-AFRICAN AMERICAN: >60
GLUCOSE BLD-MCNC: 145 MG/DL (ref 70–99)
GLUCOSE BLD-MCNC: 147 MG/DL (ref 70–99)
GLUCOSE BLD-MCNC: 149 MG/DL (ref 70–99)
GLUCOSE BLD-MCNC: 154 MG/DL (ref 70–99)
GLUCOSE BLD-MCNC: 156 MG/DL (ref 70–99)
HCT VFR BLD CALC: 37.8 % (ref 40.5–52.5)
HEMOGLOBIN: 12.8 G/DL (ref 13.5–17.5)
INR BLD: 1.1 (ref 0.87–1.14)
LYMPHOCYTES ABSOLUTE: 1.3 K/UL (ref 1–5.1)
LYMPHOCYTES RELATIVE PERCENT: 16.3 %
MCH RBC QN AUTO: 28.2 PG (ref 26–34)
MCHC RBC AUTO-ENTMCNC: 33.9 G/DL (ref 31–36)
MCV RBC AUTO: 83.3 FL (ref 80–100)
MONOCYTES ABSOLUTE: 0.7 K/UL (ref 0–1.3)
MONOCYTES RELATIVE PERCENT: 9.5 %
NEUTROPHILS ABSOLUTE: 5.4 K/UL (ref 1.7–7.7)
NEUTROPHILS RELATIVE PERCENT: 69.5 %
PDW BLD-RTO: 12.7 % (ref 12.4–15.4)
PERFORMED ON: ABNORMAL
PLATELET # BLD: 244 K/UL (ref 135–450)
PMV BLD AUTO: 7.2 FL (ref 5–10.5)
POTASSIUM SERPL-SCNC: 5.3 MMOL/L (ref 3.5–5.1)
PROTHROMBIN TIME: 14.1 SEC (ref 11.7–14.5)
RBC # BLD: 4.54 M/UL (ref 4.2–5.9)
SODIUM BLD-SCNC: 140 MMOL/L (ref 136–145)
WBC # BLD: 7.8 K/UL (ref 4–11)

## 2022-09-15 PROCEDURE — 3700000001 HC ADD 15 MINUTES (ANESTHESIA): Performed by: PODIATRIST

## 2022-09-15 PROCEDURE — 7100000001 HC PACU RECOVERY - ADDTL 15 MIN: Performed by: PODIATRIST

## 2022-09-15 PROCEDURE — 6370000000 HC RX 637 (ALT 250 FOR IP): Performed by: PODIATRIST

## 2022-09-15 PROCEDURE — 85610 PROTHROMBIN TIME: CPT

## 2022-09-15 PROCEDURE — 97530 THERAPEUTIC ACTIVITIES: CPT

## 2022-09-15 PROCEDURE — 1200000000 HC SEMI PRIVATE

## 2022-09-15 PROCEDURE — 85025 COMPLETE CBC W/AUTO DIFF WBC: CPT

## 2022-09-15 PROCEDURE — 97535 SELF CARE MNGMENT TRAINING: CPT

## 2022-09-15 PROCEDURE — 0QBR0ZZ EXCISION OF LEFT TOE PHALANX, OPEN APPROACH: ICD-10-PCS | Performed by: PODIATRIST

## 2022-09-15 PROCEDURE — 2709999900 HC NON-CHARGEABLE SUPPLY: Performed by: PODIATRIST

## 2022-09-15 PROCEDURE — 36415 COLL VENOUS BLD VENIPUNCTURE: CPT

## 2022-09-15 PROCEDURE — 99232 SBSQ HOSP IP/OBS MODERATE 35: CPT | Performed by: INTERNAL MEDICINE

## 2022-09-15 PROCEDURE — 7100000000 HC PACU RECOVERY - FIRST 15 MIN: Performed by: PODIATRIST

## 2022-09-15 PROCEDURE — 2500000003 HC RX 250 WO HCPCS

## 2022-09-15 PROCEDURE — 2580000003 HC RX 258: Performed by: PODIATRIST

## 2022-09-15 PROCEDURE — 97116 GAIT TRAINING THERAPY: CPT

## 2022-09-15 PROCEDURE — 6360000002 HC RX W HCPCS

## 2022-09-15 PROCEDURE — 6360000002 HC RX W HCPCS: Performed by: PODIATRIST

## 2022-09-15 PROCEDURE — 3700000000 HC ANESTHESIA ATTENDED CARE: Performed by: PODIATRIST

## 2022-09-15 PROCEDURE — A4217 STERILE WATER/SALINE, 500 ML: HCPCS | Performed by: PODIATRIST

## 2022-09-15 PROCEDURE — 80048 BASIC METABOLIC PNL TOTAL CA: CPT

## 2022-09-15 PROCEDURE — 88305 TISSUE EXAM BY PATHOLOGIST: CPT

## 2022-09-15 PROCEDURE — 2500000003 HC RX 250 WO HCPCS: Performed by: PODIATRIST

## 2022-09-15 PROCEDURE — 3600000012 HC SURGERY LEVEL 2 ADDTL 15MIN: Performed by: PODIATRIST

## 2022-09-15 PROCEDURE — 3600000002 HC SURGERY LEVEL 2 BASE: Performed by: PODIATRIST

## 2022-09-15 RX ORDER — METOCLOPRAMIDE HYDROCHLORIDE 5 MG/ML
10 INJECTION INTRAMUSCULAR; INTRAVENOUS
Status: DISCONTINUED | OUTPATIENT
Start: 2022-09-15 | End: 2022-09-15 | Stop reason: HOSPADM

## 2022-09-15 RX ORDER — LABETALOL HYDROCHLORIDE 5 MG/ML
10 INJECTION, SOLUTION INTRAVENOUS
Status: DISCONTINUED | OUTPATIENT
Start: 2022-09-15 | End: 2022-09-15 | Stop reason: HOSPADM

## 2022-09-15 RX ORDER — MIDAZOLAM HYDROCHLORIDE 1 MG/ML
INJECTION INTRAMUSCULAR; INTRAVENOUS PRN
Status: DISCONTINUED | OUTPATIENT
Start: 2022-09-15 | End: 2022-09-15 | Stop reason: SDUPTHER

## 2022-09-15 RX ORDER — HEPARIN SODIUM 5000 [USP'U]/ML
5000 INJECTION, SOLUTION INTRAVENOUS; SUBCUTANEOUS EVERY 8 HOURS SCHEDULED
Status: DISCONTINUED | OUTPATIENT
Start: 2022-09-15 | End: 2022-09-16

## 2022-09-15 RX ORDER — BUPIVACAINE HYDROCHLORIDE 5 MG/ML
INJECTION, SOLUTION EPIDURAL; INTRACAUDAL PRN
Status: DISCONTINUED | OUTPATIENT
Start: 2022-09-15 | End: 2022-09-15 | Stop reason: ALTCHOICE

## 2022-09-15 RX ORDER — FAMOTIDINE 10 MG/ML
INJECTION, SOLUTION INTRAVENOUS PRN
Status: DISCONTINUED | OUTPATIENT
Start: 2022-09-15 | End: 2022-09-15 | Stop reason: SDUPTHER

## 2022-09-15 RX ORDER — LIDOCAINE HYDROCHLORIDE 20 MG/ML
INJECTION, SOLUTION INFILTRATION; PERINEURAL PRN
Status: DISCONTINUED | OUTPATIENT
Start: 2022-09-15 | End: 2022-09-15 | Stop reason: ALTCHOICE

## 2022-09-15 RX ORDER — FENTANYL CITRATE 50 UG/ML
INJECTION, SOLUTION INTRAMUSCULAR; INTRAVENOUS PRN
Status: DISCONTINUED | OUTPATIENT
Start: 2022-09-15 | End: 2022-09-15 | Stop reason: SDUPTHER

## 2022-09-15 RX ORDER — METOCLOPRAMIDE HYDROCHLORIDE 5 MG/ML
INJECTION INTRAMUSCULAR; INTRAVENOUS PRN
Status: DISCONTINUED | OUTPATIENT
Start: 2022-09-15 | End: 2022-09-15 | Stop reason: SDUPTHER

## 2022-09-15 RX ORDER — HYDRALAZINE HYDROCHLORIDE 20 MG/ML
10 INJECTION INTRAMUSCULAR; INTRAVENOUS
Status: DISCONTINUED | OUTPATIENT
Start: 2022-09-15 | End: 2022-09-15 | Stop reason: HOSPADM

## 2022-09-15 RX ORDER — OXYCODONE HYDROCHLORIDE 5 MG/1
5 TABLET ORAL PRN
Status: DISCONTINUED | OUTPATIENT
Start: 2022-09-15 | End: 2022-09-15 | Stop reason: HOSPADM

## 2022-09-15 RX ORDER — LIDOCAINE HYDROCHLORIDE 20 MG/ML
INJECTION, SOLUTION INTRAVENOUS PRN
Status: DISCONTINUED | OUTPATIENT
Start: 2022-09-15 | End: 2022-09-15 | Stop reason: SDUPTHER

## 2022-09-15 RX ORDER — DIPHENHYDRAMINE HYDROCHLORIDE 50 MG/ML
12.5 INJECTION INTRAMUSCULAR; INTRAVENOUS
Status: DISCONTINUED | OUTPATIENT
Start: 2022-09-15 | End: 2022-09-15 | Stop reason: HOSPADM

## 2022-09-15 RX ORDER — MEPERIDINE HYDROCHLORIDE 25 MG/ML
12.5 INJECTION INTRAMUSCULAR; INTRAVENOUS; SUBCUTANEOUS EVERY 5 MIN PRN
Status: DISCONTINUED | OUTPATIENT
Start: 2022-09-15 | End: 2022-09-15 | Stop reason: HOSPADM

## 2022-09-15 RX ORDER — EPHEDRINE SULFATE 50 MG/ML
INJECTION INTRAVENOUS PRN
Status: DISCONTINUED | OUTPATIENT
Start: 2022-09-15 | End: 2022-09-15 | Stop reason: SDUPTHER

## 2022-09-15 RX ORDER — PHENYLEPHRINE HYDROCHLORIDE 10 MG/ML
INJECTION INTRAVENOUS PRN
Status: DISCONTINUED | OUTPATIENT
Start: 2022-09-15 | End: 2022-09-15 | Stop reason: SDUPTHER

## 2022-09-15 RX ORDER — SODIUM CHLORIDE 9 MG/ML
25 INJECTION, SOLUTION INTRAVENOUS PRN
Status: DISCONTINUED | OUTPATIENT
Start: 2022-09-15 | End: 2022-09-15 | Stop reason: HOSPADM

## 2022-09-15 RX ORDER — SODIUM CHLORIDE 0.9 % (FLUSH) 0.9 %
5-40 SYRINGE (ML) INJECTION PRN
Status: DISCONTINUED | OUTPATIENT
Start: 2022-09-15 | End: 2022-09-15 | Stop reason: HOSPADM

## 2022-09-15 RX ORDER — SODIUM CHLORIDE 0.9 % (FLUSH) 0.9 %
5-40 SYRINGE (ML) INJECTION EVERY 12 HOURS SCHEDULED
Status: DISCONTINUED | OUTPATIENT
Start: 2022-09-15 | End: 2022-09-15 | Stop reason: HOSPADM

## 2022-09-15 RX ORDER — ONDANSETRON 2 MG/ML
INJECTION INTRAMUSCULAR; INTRAVENOUS PRN
Status: DISCONTINUED | OUTPATIENT
Start: 2022-09-15 | End: 2022-09-15 | Stop reason: SDUPTHER

## 2022-09-15 RX ORDER — OXYCODONE HYDROCHLORIDE 5 MG/1
10 TABLET ORAL PRN
Status: DISCONTINUED | OUTPATIENT
Start: 2022-09-15 | End: 2022-09-15 | Stop reason: HOSPADM

## 2022-09-15 RX ORDER — PROPOFOL 10 MG/ML
INJECTION, EMULSION INTRAVENOUS PRN
Status: DISCONTINUED | OUTPATIENT
Start: 2022-09-15 | End: 2022-09-15 | Stop reason: SDUPTHER

## 2022-09-15 RX ADMIN — EPHEDRINE SULFATE 5 MG: 50 INJECTION INTRAVENOUS at 17:47

## 2022-09-15 RX ADMIN — MIDAZOLAM HYDROCHLORIDE 2 MG: 2 INJECTION, SOLUTION INTRAMUSCULAR; INTRAVENOUS at 17:19

## 2022-09-15 RX ADMIN — PIPERACILLIN AND TAZOBACTAM 4500 MG: 4; .5 INJECTION, POWDER, FOR SOLUTION INTRAVENOUS at 22:29

## 2022-09-15 RX ADMIN — DAKIN'S SOLUTION 0.125% (QUARTER STRENGTH): 0.12 SOLUTION at 09:40

## 2022-09-15 RX ADMIN — FAMOTIDINE 20 MG: 20 TABLET ORAL at 20:42

## 2022-09-15 RX ADMIN — ONDANSETRON 4 MG: 2 INJECTION INTRAMUSCULAR; INTRAVENOUS at 17:14

## 2022-09-15 RX ADMIN — EPHEDRINE SULFATE 5 MG: 50 INJECTION INTRAVENOUS at 17:58

## 2022-09-15 RX ADMIN — PHENYLEPHRINE HYDROCHLORIDE 100 MCG: 10 INJECTION INTRAVENOUS at 17:47

## 2022-09-15 RX ADMIN — FENTANYL CITRATE 50 MCG: 50 INJECTION, SOLUTION INTRAMUSCULAR; INTRAVENOUS at 17:27

## 2022-09-15 RX ADMIN — METOPROLOL TARTRATE 50 MG: 50 TABLET, FILM COATED ORAL at 20:42

## 2022-09-15 RX ADMIN — FAMOTIDINE 20 MG: 20 TABLET ORAL at 09:25

## 2022-09-15 RX ADMIN — METOPROLOL TARTRATE 50 MG: 50 TABLET, FILM COATED ORAL at 09:25

## 2022-09-15 RX ADMIN — SODIUM CHLORIDE, PRESERVATIVE FREE 10 ML: 5 INJECTION INTRAVENOUS at 11:27

## 2022-09-15 RX ADMIN — PHENYLEPHRINE HYDROCHLORIDE 200 MCG: 10 INJECTION INTRAVENOUS at 17:43

## 2022-09-15 RX ADMIN — PHENYLEPHRINE HYDROCHLORIDE 100 MCG: 10 INJECTION INTRAVENOUS at 17:36

## 2022-09-15 RX ADMIN — EPHEDRINE SULFATE 5 MG: 50 INJECTION INTRAVENOUS at 18:18

## 2022-09-15 RX ADMIN — EPHEDRINE SULFATE 5 MG: 50 INJECTION INTRAVENOUS at 18:26

## 2022-09-15 RX ADMIN — PROPOFOL 200 MG: 10 INJECTION, EMULSION INTRAVENOUS at 17:22

## 2022-09-15 RX ADMIN — PROPOFOL 30 MG: 10 INJECTION, EMULSION INTRAVENOUS at 18:38

## 2022-09-15 RX ADMIN — LIDOCAINE HYDROCHLORIDE 100 MG: 20 INJECTION INTRAVENOUS at 17:22

## 2022-09-15 RX ADMIN — DAKIN'S SOLUTION 0.125% (QUARTER STRENGTH): 0.12 SOLUTION at 09:39

## 2022-09-15 RX ADMIN — EPHEDRINE SULFATE 5 MG: 50 INJECTION INTRAVENOUS at 18:08

## 2022-09-15 RX ADMIN — PIPERACILLIN AND TAZOBACTAM 4500 MG: 4; .5 INJECTION, POWDER, FOR SOLUTION INTRAVENOUS at 15:24

## 2022-09-15 RX ADMIN — METOCLOPRAMIDE 10 MG: 5 INJECTION, SOLUTION INTRAMUSCULAR; INTRAVENOUS at 17:17

## 2022-09-15 RX ADMIN — PROPOFOL 20 MG: 10 INJECTION, EMULSION INTRAVENOUS at 18:40

## 2022-09-15 RX ADMIN — FENTANYL CITRATE 50 MCG: 50 INJECTION, SOLUTION INTRAMUSCULAR; INTRAVENOUS at 17:19

## 2022-09-15 RX ADMIN — FAMOTIDINE 20 MG: 10 INJECTION, SOLUTION INTRAVENOUS at 17:17

## 2022-09-15 RX ADMIN — PIPERACILLIN AND TAZOBACTAM 4500 MG: 4; .5 INJECTION, POWDER, FOR SOLUTION INTRAVENOUS at 05:48

## 2022-09-15 RX ADMIN — PROPOFOL 30 MG: 10 INJECTION, EMULSION INTRAVENOUS at 17:23

## 2022-09-15 RX ADMIN — PHENYLEPHRINE HYDROCHLORIDE 50 MCG: 10 INJECTION INTRAVENOUS at 18:18

## 2022-09-15 RX ADMIN — PHENYLEPHRINE HYDROCHLORIDE 50 MCG: 10 INJECTION INTRAVENOUS at 18:26

## 2022-09-15 RX ADMIN — ONDANSETRON 4 MG: 2 INJECTION INTRAMUSCULAR; INTRAVENOUS at 18:37

## 2022-09-15 RX ADMIN — SODIUM CHLORIDE, PRESERVATIVE FREE 10 ML: 5 INJECTION INTRAVENOUS at 20:43

## 2022-09-15 NOTE — PROGRESS NOTES
Physical Therapy  Facility/Department: [address] [place] [address]  Physical Therapy Treatment/Discharge    Name: Ronnell Meek  : 1970  MRN: [id number]  Date of Service: 9/15/2022    Discharge Recommendations:Franklyn Fermin scored a 22/24 on the AM-PAC short mobility form. Current research shows that an AM-PAC score of 18 or greater is typically associated with a discharge to the patient's home setting. Based on the patient's AM-PAC score and their current functional mobility deficits, it is recommended that the patient have 2-3 sessions per week of Physical Therapy at d/c to increase the patient's independence. At this time, this patient demonstrates the endurance and safety to discharge home with  (home vs OP services) and a follow up treatment frequency of 2-3x/wk. Please see assessment section for further patient specific details. PT Equipment Recommendations  Equipment Needed: No (pt has rollabout & iwalk ordered through [facility])      Patient Diagnosis(es): The primary encounter diagnosis was Diabetic foot infection ([address] A diagnosis of Gas gangrene of foot ([address] was also pertinent to this visit. Past Medical History:  has a past medical history of Allergic rhinitis, Atrial fibrillation ([address] Hyperlipidemia, Hypertension, Necrotizing fasciitis ([address] and Type 2 diabetes mellitus without complication ([address] Past Surgical History:  has a past surgical history that includes Colonoscopy (2018); Skin graft (2018); Infected skin debridement (2018-2018); Knee arthroscopy (Left); Foot Debridement (Right, 2020); Toe amputation (Right, 2020); colostomy; and Foot Debridement (Left, 2022). Assessment   Assessment: Demo complete independence with bed mobility/transfers/rollabout use. Demo proper way to get on & off rollabout & was safe mobilizing in hallway. Maintained NWB to LLE throughout session.  Will not have to negotiate stairs anytime soon & will be able to stay either in his ranch home or girlfriend's lower level. Pt has ordered a rollabout through [facility] [employer] will arrive soon. Recommend 24 hr A initially at GA. No further IP PT needed at this time due to demo independence with mobility. Home PT may be beneficial to help pt with iwalk crutch use once it arrives but otherwise wouldn't be necessary if not planning to trial the iwalk crutch. Treatment Diagnosis: Decreased functional mobility  Requires PT Follow-Up: No  Activity Tolerance  Activity Tolerance: Patient tolerated treatment well     Plan   Plan  Plan:  (2-5)  Current Treatment Recommendations: Balance training, Functional mobility training, Transfer training, Gait training, Stair training, Safety education & training, Therapeutic activities  Safety Devices  Type of Devices: Left in bed, Call light within reach, Nurse notified (no alarm in use upon arrival)     Restrictions  Position Activity Restriction  Other position/activity restrictions: Up with assist, NWB L LE in post-op shoe, colostomy     Subjective   General  Chart Reviewed: Yes  Additional Pertinent Hx: Pt is 48 yo M admitted with diabetic infection of L foot. History of T2DM (last A1c 8.4 5/22), afib, HTN, HLD, necrotizing fasciotomy s/p colostomy, ricardo's gangrene of scrotum. Pt underwent on 9/12 incision and drainage with extensive debridement of muscle and fascia of 25cm2 left foot and Partial 4th and 5th ray amputation left foot. Follow up surgery planned for later this week per pt. Family / Caregiver Present: No  Referring Practitioner: Dr. Yael Caballero  Diagnosis: Diabetic infection of L foot  Follows Commands: Within Functional Limits  Subjective  Subjective: Pt supine in bed and agreeable to PT. Pt reports he has ordered rollabout & iwalk for home. Has never used a rollabout & agreeable to trying with PT today. States whether he goes home or to girlfriend's house will have zero steps to enter.   At girlfriends, he can used golf cart to go through grass to lower level entrance & stay on lower level which has a bathroom. Social/Functional History  Social/Functional History  Lives With: Alone  Type of Home: House  Home Layout: One level  Home Access: Level entry  Bathroom Shower/Tub: Walk-in shower  Bathroom Toilet: Handicap height  Bathroom Equipment: Built-in shower seat, Grab bars in shower  Home Equipment: Walker, standard, Walker, rolling, Rollator  ADL Assistance: Independent  Homemaking Assistance: Independent  Ambulation Assistance: Independent  Active : Yes  Occupation: Full time employment  Type of Occupation: - plans to take time off work  Additional Comments: Pt can have assistance at home or move into girlfriend's home temporarily as she works from home. She lives in 2 story house, bedroom and bathroom on 2nd floor, 1/2 bath on 1st floor, level entry.        Cognition -WFL        Objective                  Bed mobility  Supine to Sit: Independent  Sit to Supine: Independent  Scooting: Independent  Transfers  Sit to Stand: Supervision (NWB LLE onto rollabout)  Stand to sit: Supervision  Ambulation  Device:  (ROLLABOUT)  Assistance: Supervision  Quality of Gait: Good maintenance of L LE NWB; moved at ease on rollabout without LOB  Distance: 150'  Stairs/Curb  Stairs?: No (pt will have no stairs at home & states he won't even attempt until allowed to WB)     Balance  Sitting - Static: Good  Standing - Static: Good (on rollabout, NWB LLE)  Standing - Dynamic: Good (on rollabout, NWB LLE)                                                       AM-PAC Score  AM-PAC Inpatient Mobility Raw Score : 22 (09/15/22 1335)  AM-PAC Inpatient T-Scale Score : 53.28 (09/15/22 1335)  Mobility Inpatient CMS 0-100% Score: 20.91 (09/15/22 1335)  Mobility Inpatient CMS G-Code Modifier : CJ (09/15/22 1335)             Goals  Short Term Goals  Time Frame for Short term goals: By discharge  Short term goal 1: Pt will perform

## 2022-09-15 NOTE — PLAN OF CARE
Problem: Pain  Goal: Verbalizes/displays adequate comfort level or baseline comfort level  Outcome: Progressing     Problem: Chronic Conditions and Co-morbidities  Goal: Patient's chronic conditions and co-morbidity symptoms are monitored and maintained or improved  Outcome: Progressing     Problem: Safety - Adult  Goal: Free from fall injury  Outcome: Progressing

## 2022-09-15 NOTE — ANESTHESIA POSTPROCEDURE EVALUATION
Department of Anesthesiology  Postprocedure Note    Patient: Charlie Barton  MRN: [id number]  YOB: 1970  Date of evaluation: 9/15/2022      Procedure Summary     Date: 09/15/22 Room / Location: [address] [place] [address] [address] [place] [address] / [facility]    Anesthesia Start: 8583 Anesthesia Stop: 6089    Procedure: INCISION AND DRAINAGE WITH DELAYED PRIMARY CLOSURE  LEFT FOOT (Left: Foot) Diagnosis:       Diabetic infection of left foot ([address]      (Diabetic infection of left foot ([address] [L61.446, L08.9])    Surgeons: Priscilla Hernandez DPM Responsible Provider: Estefani Syed MD    Anesthesia Type: general ASA Status: 3          Anesthesia Type: No value filed.     Manda Phase I: Manda Score: 8    Manda Phase II:        Anesthesia Post Evaluation    Patient location during evaluation: PACU  Patient participation: complete - patient participated  Level of consciousness: awake and alert  Pain score: 0  Airway patency: patent  Nausea & Vomiting: no nausea and no vomiting  Complications: no  Cardiovascular status: hemodynamically stable  Respiratory status: acceptable  Hydration status: euvolemic

## 2022-09-15 NOTE — ANESTHESIA PRE PROCEDURE
Department of Anesthesiology  Preprocedure Note       Name:  Filiberto Lynch   Age:  49 y.o.  :  1970                                          MRN:  [id number]         Date:  9/15/2022      Surgeon: Abdifatah Cedeno):  Sharri Padgett DPM    Procedure: Procedure(s):  INCISION AND DRAINAGE WITH DELAYED PRIMARY CLOSURE AND APPLICATION SKIN GRAFT LEFT FOOT    Medications prior to admission:   Prior to Admission medications    Medication Sig Start Date End Date Taking? Authorizing Provider   sulfamethoxazole-trimethoprim (BACTRIM) 400-80 MG per tablet Take 1 tablet by mouth daily for 10 days 22  Charly Young DPM   cephALEXin (KEFLEX) 500 MG capsule Take 1 capsule by mouth 4 times daily for 10 days  Patient not taking: Reported on 2022  Charly NESSA Young   sodium hypochlorite (DAKINS) 0.5 % SOLN external solution Irrigate with 473 mLs as directed daily Do not use whole bottle each time. Small amount to wash the wound each day with dressing changes. 22   Charly Young DPM   empagliflozin (JARDIANCE) 10 MG tablet Take 1 tablet by mouth in the morning. 8/10/22   [facility], DO   lisinopril (PRINIVIL;ZESTRIL) 30 MG tablet Take 1 tablet by mouth once daily 8/10/22   [facility], DO   metFORMIN (GLUCOPHAGE) 1000 MG tablet TAKE 1 TABLET BY MOUTH TWICE DAILY WITH MEALS 8/10/22   [facility], DO   metoprolol tartrate (LOPRESSOR) 50 MG tablet Take 1 tablet by mouth twice daily 8/10/22   [facility], DO   nystatin (MYCOSTATIN) 583000 UNIT/GM powder Apply 3 times daily. Patient not taking: No sig reported 22   [facility], DO   sertraline (ZOLOFT) 50 MG tablet Take 1 tablet by mouth daily  Patient not taking: No sig reported 3/9/22   [facility], DO   Na Sulfate-K Sulfate-Mg Sulf (SUPREP BOWEL PREP KIT) 17.5-3.13-1.6 GM/177ML SOLN Follow Suprep instructions given to you by our office.   Patient not taking: No sig reported 21   Ellis Harding MD metoprolol tartrate (LOPRESSOR) 50 MG tablet Take 1 tablet by mouth twice daily 12/22/20   Luis Ellis, DO       Current medications:    Current Facility-Administered Medications   Medication Dose Route Frequency Provider Last Rate Last Admin    [Held by provider] heparin (porcine) injection 5,000 Units  5,000 Units SubCUTAneous 3 times per day Veronica Meza DPM        sodium hypochlorite (DAKINS) 0.125 % external solution   Irrigation Daily CORINNE CarreonM   Given by Other at 09/15/22 0939    sodium hypochlorite (DAKINS) 0.125 % external solution   Irrigation Daily CORINNE CarreonM   Given by Other at 09/15/22 0940    insulin lispro (1 Unit Dial) 0-8 Units  0-8 Units SubCUTAneous TID WC Renato Mayfield MD   2 Units at 09/14/22 1206    insulin lispro (1 Unit Dial) 0-4 Units  0-4 Units SubCUTAneous Nightly Renato Mayfield MD        sodium chloride flush 0.9 % injection 5-40 mL  5-40 mL IntraVENous 2 times per day Adilia Cazares DPM   10 mL at 09/15/22 1127    sodium chloride flush 0.9 % injection 5-40 mL  5-40 mL IntraVENous PRN Adilia Cazarse DPM        0.9 % sodium chloride infusion   IntraVENous PRN Adilia Cazares DPM 25 mL/hr at 09/12/22 1125 New Bag at 09/12/22 1125    ondansetron (ZOFRAN-ODT) disintegrating tablet 4 mg  4 mg Oral Q8H PRN Adilia Cazares DPM        Or    ondansetron (ZOFRAN) injection 4 mg  4 mg IntraVENous Q6H PRN Adilia Cazares DPM        polyethylene glycol (GLYCOLAX) packet 17 g  17 g Oral Daily PRN Adilia Cazares DPM        acetaminophen (TYLENOL) tablet 650 mg  650 mg Oral Q6H PRN Adilia Cazares DPM   650 mg at 09/14/22 2212    Or    acetaminophen (TYLENOL) suppository 650 mg  650 mg Rectal Q6H PRN Adilia Cazares DPM        glucose chewable tablet 16 g  4 tablet Oral PRN Adilia Cazares DPM        dextrose bolus 10% 125 mL  125 mL IntraVENous PRN Adilia Cazares DPM        Or    dextrose bolus 10% 250 mL  250 mL IntraVENous PRN Marlyne Cazares, DPM        glucagon (rDNA) injection 1 mg  1 mg SubCUTAneous PRN Abdifatah Alter, DPM        dextrose 10 % infusion   IntraVENous Continuous PRN Abdifatah Alter, DPM        famotidine (PEPCID) tablet 20 mg  20 mg Oral BID Abdifatah Alter, DPM   20 mg at 09/15/22 0925    piperacillin-tazobactam (ZOSYN) 4,500 mg in dextrose 5 % 100 mL IVPB extended infusion (mini-bag)  4,500 mg IntraVENous Q8H Abdifatah Alter, DPM 25 mL/hr at 09/15/22 1524 4,500 mg at 09/15/22 1524    metoprolol tartrate (LOPRESSOR) tablet 50 mg  50 mg Oral BID Abdifatah Alter, DPM   50 mg at 09/15/22 5080       Allergies:  No Known Allergies    Problem List:    Patient Active Problem List   Diagnosis Code    Atrial fibrillation ([facility]) I48.91    Essential hypertension I10    Type 2 diabetes mellitus without complication, without long-term current use of insulin ([facility]) E11.9    History of skin graft Z94.5    Wound of buttock S31.809A    Obesity, Class I, BMI 30-34.9 E66.9    S/P split thickness skin graft Z94.5    Surgical wound, non healing T81.89XA    Necrotizing fasciitis ([facility]) M72.6    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    Colostomy in place [facility]) Z93.3    Diabetic infection of left foot ([facility]) E11.628, L08.9    Elevated serum creatinine R79.89    Dyslipidemia E78.5    Diabetic foot infection ([facility]) E11.628, L08.9    Chronic osteomyelitis of left foot with draining sinus ([facility]) M86.472       Past Medical History:        Diagnosis Date    Allergic rhinitis     Atrial fibrillation ([facility])     Hyperlipidemia     Hypertension     Necrotizing fasciitis ([facility] [address]     2018 resulted in colostomy    Type 2 diabetes mellitus without complication [facility])        Past Surgical History:        Procedure Laterality Date    COLONOSCOPY  06/05/2018    COLOSTOMY      result of necrotizing fasciotomy    FOOT DEBRIDEMENT Right 12/30/2020    RIGHT 2ND TOE INCISION AND DRAINAGE BELOW FASCIA performed by Elizabeth Johansen DPM at [address] Left 9/12/2022    LEFT FOOT DEBRIDEMENT INCISION AND DRAINAGE, AMPUTATION OF FOURTH AND FIFTH RAYS performed by Santo Jones DPM at [address]  06/05/2018-06/08/2018    Removal of infected tissue and flesh from anus scrotum and surrounding area    KNEE ARTHROSCOPY Left     SKIN GRAFT  07/01/2018    TOE AMPUTATION Right 12/30/2020    PARTIAL AMPUTATION OF RIGHT 2ND TOE performed by Santo Jones DPM at [address] History:    Social History     Tobacco Use    Smoking status: Never    Smokeless tobacco: Never   Substance Use Topics    Alcohol use: Yes     Comment: monthly 3 beers                                Counseling given: Not Answered      Vital Signs (Current):   Vitals:    09/15/22 0329 09/15/22 0600 09/15/22 0920 09/15/22 1524   BP: 136/85  124/86 (!) 154/86   Pulse: 76  85 88   Resp: 16  16 16   Temp: 97.7 °F (36.5 °C)  97.6 °F (36.4 °C) 98.6 °F (37 °C)   TempSrc: Oral  Oral Oral   SpO2: 97%  100% 97%   Weight:  251 lb 5.2 oz (114 kg)     Height:                                                  BP Readings from Last 3 Encounters:   09/15/22 (!) 154/86   09/09/22 (!) 144/92   09/03/21 (!) 160/95       NPO Status:                                                                                 BMI:   Wt Readings from Last 3 Encounters:   09/15/22 251 lb 5.2 oz (114 kg)   09/09/22 241 lb (109.3 kg)   09/03/21 251 lb (113.9 kg)     Body mass index is 34.09 kg/m².     CBC:   Lab Results   Component Value Date/Time    WBC 7.8 09/15/2022 07:46 AM    RBC 4.54 09/15/2022 07:46 AM    HGB 12.8 09/15/2022 07:46 AM    HCT 37.8 09/15/2022 07:46 AM    MCV 83.3 09/15/2022 07:46 AM    RDW 12.7 09/15/2022 07:46 AM     09/15/2022 07:46 AM       CMP:   Lab Results   Component Value Date/Time     09/15/2022 07:46 AM    K 5.3 09/15/2022 07:46 AM    K 4.6 09/12/2022 03:15 AM     09/15/2022 07:46 AM    CO2 28 09/15/2022 07:46 AM    BUN 18 09/15/2022 07:46 AM    CREATININE 1.2 09/15/2022 07:46 AM    GFRAA >60 09/15/2022 07:46 AM    AGRATIO 1.4 12/29/2020 12:47 PM    LABGLOM >60 09/15/2022 07:46 AM    GLUCOSE 154 09/15/2022 07:46 AM    PROT 7.3 12/29/2020 12:47 PM    CALCIUM 9.7 09/15/2022 07:46 AM    BILITOT 0.7 12/29/2020 12:47 PM    ALKPHOS 118 12/29/2020 12:47 PM    AST 10 12/29/2020 12:47 PM    ALT 26 12/29/2020 12:47 PM       POC Tests:   Recent Labs     09/15/22  1123   POCGLU 156*       Coags:   Lab Results   Component Value Date/Time    PROTIME 14.1 09/15/2022 07:46 AM    INR 1.10 09/15/2022 07:46 AM       HCG (If Applicable): No results found for: PREGTESTUR, PREGSERUM, HCG, HCGQUANT     ABGs: No results found for: PHART, PO2ART, KVJ0AOU, XLV0VBC, BEART, G5CIPDEF     Type & Screen (If Applicable):  No results found for: LABABO, LABRH    Drug/Infectious Status (If Applicable):  No results found for: HIV, HEPCAB    COVID-19 Screening (If Applicable):   Lab Results   Component Value Date/Time    COVID19 Not Detected 12/28/2020 10:05 PM           Anesthesia Evaluation  Patient summary reviewed and Nursing notes reviewed  Airway: Mallampati: II  TM distance: >3 FB   Neck ROM: full  Mouth opening: > = 3 FB   Dental:          Pulmonary:Negative Pulmonary ROS                              Cardiovascular:    (+) hypertension:, dysrhythmias: atrial fibrillation, hyperlipidemia                  Neuro/Psych:   Negative Neuro/Psych ROS              GI/Hepatic/Renal:             Endo/Other:    (+) DiabetesType II DM, , .                 Abdominal:             Vascular: negative vascular ROS. Other Findings:           Anesthesia Plan      general     ASA 1    (59-year-old male presents for INCISION AND DRAINAGE WITH DELAYED PRIMARY CLOSURE AND APPLICATION SKIN GRAFT LEFT FOOT. Plan general anesthesia with ASA standard monitors. Questions answered. Patient agreeable with anesthetic plan.  )  Induction: intravenous. Anesthetic plan and risks discussed with patient. Plan discussed with CRNA.     Attending anesthesiologist reviewed and agrees with Rachel Arias MD   9/15/2022

## 2022-09-15 NOTE — PROGRESS NOTES
Occupational Therapy  Facility/Department: [address]  Occupational Therapy Treatment    Name: Lawrence Felix  : 1970  MRN: [id number]  Date of Service: 9/15/2022    Discharge Recommendations:   Lawrence Felix scored a 22/24 on the AM-PAC ADL Inpatient form. Current research shows that an AM-PAC score of 18 or greater is typically associated with a discharge to the patient's home setting. Please see assessment section for further patient specific details. If patient discharges prior to next session this note will serve as a discharge summary. Please see below for the latest assessment towards goals. OT Equipment Recommendations  Equipment Needed: No       Patient Diagnosis(es): The primary encounter diagnosis was Diabetic foot infection ([facility] [address] A diagnosis of Gas gangrene of foot ([facility] [address] was also pertinent to this visit. Past Medical History:  has a past medical history of Allergic rhinitis, Atrial fibrillation ([facility] [address] Hyperlipidemia, Hypertension, Necrotizing fasciitis ([facility] [address] and Type 2 diabetes mellitus without complication ([facility] [address] Past Surgical History:  has a past surgical history that includes Colonoscopy (2018); Skin graft (2018); Infected skin debridement (2018-2018); Knee arthroscopy (Left); Foot Debridement (Right, 2020); Toe amputation (Right, 2020); colostomy; and Foot Debridement (Left, 2022). Treatment Diagnosis: Decreaesd activity tolerance, impaired ADLs and mobility      Assessment   Performance deficits / Impairments: Decreased functional mobility ; Decreased ADL status; Decreased balance;Decreased high-level IADLs    Assessment: Pt has made improvement with functional transfers and mobility, able to complete sit <> stand with supervision from EOB to knee scooter and perform mobility around unit hallway with supervision demonstrating good safety awareness.  Pt close to baseline and has a plan for how to safely complete ADLs once home. He does not feel he needs OT services anymore. OT agrees that pt is close to baseline and pt will have initial 24hr assist upon d/c. Not further OT services required. D/C OT. Treatment Diagnosis: Decreaesd activity tolerance, impaired ADLs and mobility  REQUIRES OT FOLLOW-UP: No  Activity Tolerance  Activity Tolerance: Patient Tolerated treatment well        Plan   Plan  Times per Week: 2-5x  Times per Day: Daily  Current Treatment Recommendations: Balance training, Functional mobility training, Endurance training, Safety education & training, Patient/Caregiver education & training, Equipment evaluation, education, & procurement, Self-Care / ADL  Plan Comment: D/C OT     Restrictions  Position Activity Restriction  Other position/activity restrictions: Up with assist, NWB L LE in post-op shoe, colostomy    Subjective   General  Chart Reviewed: Yes  Additional Pertinent Hx: 49 y.o. M admitted 9/12 s/p L foot I&D, 4th and 5th ray amp. PMHx includes DMII, necrotizing fasciitis leading to colostomy, previous podiatry surgeries  Family / Caregiver Present: Yes (parents arrived at end of session)  Referring Practitioner: Luis E Quesada  Subjective  Subjective: Pt in bed on entry. Pleasant and cooperative with therapy. Appreciative of suggestions/recommendations.   General Comment  Comments: No c/o pain     Social/Functional History  Social/Functional History  Lives With: Alone  Type of Home: House  Home Layout: One level  Home Access: Level entry  Bathroom Shower/Tub: Walk-in shower  Bathroom Toilet: Handicap height  Bathroom Equipment: Built-in shower seat, Grab bars in shower  Home Equipment: Walker, standard, Walker, rolling, Rollator  ADL Assistance: Independent  Homemaking Assistance: Independent  Ambulation Assistance: Independent  Active : Yes  Occupation: Full time employment  Type of Occupation: - plans to take time off work  Additional Comments: Pt can have assistance at home or move into girlfriend's home temporarily as she works from home. She lives in 2 story house, bedroom and bathroom on 2nd floor, 1/2 bath on 1st floor, level entry. Objective             Safety Devices  Type of Devices: Left in bed;Call light within reach;Nurse notified (no alarm in use upon arrival)           ADL  LE Dressing: Setup  LE Dressing Skilled Clinical Factors: able to serge sock with setup  Toileting: Setup  Toileting Skilled Clinical Factors: Pt states he is managing colostomy in bed as needed. Pt using urinal to void urine at bed level. Activity Tolerance  Activity Tolerance: Patient tolerated treatment well  Bed mobility  Supine to Sit: Independent  Sit to Supine: Independent  Scooting: Independent  Transfers  Sit to stand: Supervision (from EOB to knee scooter)  Stand to sit: Supervision (from knee scooter to EOB)  Transfer Comments: Pt able to perform functional mobility around unit using knee scooter to maintain NWB status of LLE with supervision. Cognition  Overall Cognitive Status: WNL  Orientation  Overall Orientation Status: Within Normal Limits                  Education Given To: Patient  Education Provided: Role of Therapy;Plan of Care;Precautions; ADL Adaptive Strategies;Transfer Training; Fall Prevention Strategies; Equipment  Education Method: Verbal  Barriers to Learning: None  Education Outcome: Verbalized understanding                     AM-PAC Score        AM-PAC Inpatient Daily Activity Raw Score: 22 (09/15/22 1334)  AM-PAC Inpatient ADL T-Scale Score : 47.1 (09/15/22 1334)  ADL Inpatient CMS 0-100% Score: 25.8 (09/15/22 1334)  ADL Inpatient CMS G-Code Modifier : CJ (09/15/22 1334)    Goals  Short Term Goals  Time Frame for Short term goals: by D/C  Short Term Goal 1: Demonstrate safe/effective toileting routine (including colostomy management) - Met 9/15  Short Term Goal 2: Perform all functional transfers with spvn - Met 9/15       Therapy Time   Individual Concurrent Group Co-treatment   Time In 1000         Time Out 1038         Minutes 38         Timed Code Treatment Minutes: [place], [place]

## 2022-09-15 NOTE — CARE COORDINATION
CM cont to follow for  d/c planning:    Patient  being followed  by Podiatry and Infectious Disease:     Per  MD Documentation:        Plan for surgical intervention tomorrow   OR for primary closure today       CM cont to follow for  d/c planning  / Needs and  Dispo:    Patient  worked  w/  PTOT  and will follow up post op:  pt  has  DME :  (pt has rollabout & iwalk ordered through [employer])    Potential Need for   PICC and  LT IV atbx patient  aware . SW /  CM to make  approp referral  arrange  [facility]  and  Infusion  company referral to run benefits. ID and  Podiatry following   Electronically signed by Bobby Newman RN on 9/15/2022 at 3:26 PM         Bobby Newman RN Case Manager  [facility]  [address] [place] [address].   [facility] [phone]  Fax [phone]

## 2022-09-15 NOTE — PROGRESS NOTES
ID Follow-up NOTE  RESIDENT NOTE - reviewed / edited, attending note at bottom    CC:   Left foot diabetic ulcer with osteomyelitis and gas gangrene  Antibiotics: Zosyn    Admit Date: 9/12/2022  Hospital Day: 4    Subjective:     Patient has remained afebrile and HDS. He still endorses some night sweats, which he says is typical for him whenever he has any sort of infection or inflammatory process. Denies any chills. Objective:     Patient Vitals for the past 8 hrs:   BP Temp Temp src Pulse Resp SpO2 Weight   09/15/22 0600 -- -- -- -- -- -- 251 lb 5.2 oz (114 kg)   09/15/22 0329 136/85 97.7 °F (36.5 °C) Oral 76 16 97 % --       I/O last 3 completed shifts: In: 720 [P.O.:720]  Out: 7368 [Urine:3325]  No intake/output data recorded. EXAM:  GENERAL: No apparent distress. HEENT: Membranes moist, no oral lesion  NECK:  Supple, no lymphadenopathy  LUNGS: Clear b/l, no rales, no dullness  CARDIAC: RRR, no murmur appreciated  ABD:  + BS, soft / NT  EXT:  Left lower extremity wrapped in a bandage, instillation wound VAC applied to the surgical wound  NEURO: No focal neurologic findings  PSYCH: Orientation, sensorium, mood normal  LINES:  Peripheral iv       Data Review:  Lab Results   Component Value Date    WBC 7.8 09/15/2022    HGB 12.8 (L) 09/15/2022    HCT 37.8 (L) 09/15/2022    MCV 83.3 09/15/2022     09/15/2022     Lab Results   Component Value Date    CREATININE 1.2 09/15/2022    BUN 18 09/15/2022     09/15/2022    K 5.3 (H) 09/15/2022     09/15/2022    CO2 28 09/15/2022       Hepatic Function Panel:   Lab Results   Component Value Date/Time    ALKPHOS 118 12/29/2020 12:47 PM    ALT 26 12/29/2020 12:47 PM    AST 10 12/29/2020 12:47 PM    PROT 7.3 12/29/2020 12:47 PM    BILITOT 0.7 12/29/2020 12:47 PM    LABALBU 4.2 12/29/2020 12:47 PM       MICRO:  -Blood cultures (9/12) no growth to date  -Wound culture growing E.  Faecalis (pansensitive) and Pseudomonas (also pansensitive)    IMAGING:  MRI left foot (9/12)   Large ulceration and sinus tract extending from the ball the foot along the fourth intermetatarsal space. Adjacent osteomyelitis and septic arthritis of the fourth MTP joint and adjacent fourth metatarsal and fourth proximal phalanx. Suspected early    osteomyelitis of the distal fifth metatarsal and base of the fifth proximal phalanx.        Scheduled Meds:   heparin (porcine)  5,000 Units SubCUTAneous 3 times per day    sodium hypochlorite   Irrigation Daily    sodium hypochlorite   Irrigation Daily    insulin lispro  0-8 Units SubCUTAneous TID WC    insulin lispro  0-4 Units SubCUTAneous Nightly    sodium chloride flush  5-40 mL IntraVENous 2 times per day    famotidine  20 mg Oral BID    piperacillin-tazobactam  4,500 mg IntraVENous Q8H    metoprolol tartrate  50 mg Oral BID       Continuous Infusions:   sodium chloride 25 mL/hr at 09/12/22 1125    dextrose         PRN Meds:  sodium chloride flush, sodium chloride, ondansetron **OR** ondansetron, polyethylene glycol, acetaminophen **OR** acetaminophen, glucose, dextrose bolus **OR** dextrose bolus, glucagon (rDNA), dextrose      Assessment:     #Sepsis secondary to 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  -Initial wound from stepping on nail 2-3 mo ago  -Received 4 days of Keflex and Bactrim, started 9/7  -Leukocytosis of 15.7, CRP elevated at 308.6  -A1c 6.9  -MRI findings of osteomyelitis with septic arthritis of the fourth MTP, fourth metatarsal, fourth proximal phalanx, and early osteo of the fifth metatarsal and base of the fifth proximal phalanx  -Bedside I&D with podiatry for wound cultures, went to the OR (9/12) for further I&D and partial fourth and fifth ray amputation of left foot with purulence   -Wound culture growing E faecalis (pansensitive), and pseudomonas (pansensitive)  -Patient started on Zosyn and Zyvox on 9/12   -Blood cultures no growth to date      Plan:     -Given culture results of pansensitive E faecalis and Pseudomonas, continue Zosyn  -Follow-up surgical pathology for clearance fragment.  -Patient to return to OR today with podiatry for further debridement, possible graft    Discussed with Dr. Tru Strong DO    Addendum to Resident Progress note:  Pt seen, examined and evaluated. I have reviewed the current history, physical findings, labs and assessment and plan and agree with note as documented by resident (Dr. Andree Velez). 50 yo man with hx DM, neuropathy, obesity (BMI 34), AF, HTN, HL  Hx Fornier's gangrene, had anal stenosis, has diverting colostomy     Stepped on nail 2-3 mo ago, developed L plantar foot ulcer  Wound worsening over time, assoc with drainage. Outpatient rx - Keflex and bactrim  He developed worsening L foot swellng, erythema, 'feverish'     In ED 9/11, afeb, WBC 15.7  Admit, started on Linezolid, Zosyn    L foot x-ray with gas  Arterial studies with no significant problem  Cult - light Ps aeruginosa, heavy E faecalis      9/12   MRI with 4th MT + proximal phalanx OM with 4th MTP septic arthritis, 5th MT / prox phalanx edema   OR 4/5th partial ray resection, purulence      9/13 No pain. 9/14 no pain. Saw pictures in Podiatry note. IMP/  Gas gangrene   L plantar foot wound infection secondary to nail puncture injury     REC/  Cont Zosyn  Wound care  per Podiatry  Return to OR per Podiatry - 9/15    Medical Decision Making:   The following items were considered in medical decision making:  Discussion of patient care with other providers  Reviewed clinical lab tests  Reviewed radiology tests  Reviewed other diagnostic tests/interventions  Microbiology cultures and other micro tests reviewed       Discussed with pt  Reena Jose MD

## 2022-09-15 NOTE — BRIEF OP NOTE
Brief Postoperative Note      Patient: Felix Carrasquillo  YOB: 1970  MRN: [id number]    Date of Procedure: 9/15/2022    Pre-Op Diagnosis: Diabetic infection of [facility] ([facility] [Y92.234, L08.9]    Post-Op Diagnosis: Same       Procedure(s):  INCISION AND DRAINAGE WITH DELAYED PRIMARY CLOSURE  LEFT FOOT    Surgeon(s):  Tenisha Rodriguez DPM    Assistant:  Resident: Otelia Blizzard, DPM  Student: Kathy Subramanian MS-4    Anesthesia: Choice    Injectables: pre-op 20 cc of 2% lidocaine plain and post-op 30 cc of 0.5% marcaine plain     Hemostasis: anatomic dissection and electrocautery    Materials: 3-0 Vicryl, 2-0 Nylon, 3- Nylon      Estimated Blood Loss: less than 464 mL    Complications: None    Specimens:   ID Type Source Tests Collected by Time Destination   A : LEFT FOOT ULCER Tissue Tissue SURGICAL PATHOLOGY Tenisha Rodriguez DPM 9/15/2022 1734        Implants:  * No implants in log *      Drains: * No LDAs found *    Findings: All non viable soft tissue excised and removed to healthy red and bleeding tissue. Wound noted to the plantar lateral aspect of the foot was completely excised and sent for pathology. DISPO: s/p left foot I&D with delayed primary closure. Patient will transfer back to the floor for continued medical management and antibiotics. Procedure was felt to be definitve. Patient will be non weight bearing to the left lower extremity. Patient ok to discharge from podiatric standpoint pending medical clearance, PT/OT eval, and final ID recs.     Electronically signed by Otelia Blizzard, DPM on 9/15/2022 at 6:55 PM

## 2022-09-15 NOTE — PROGRESS NOTES
Conjunctiva/sclera: Conjunctivae normal.      Pupils: Pupils are equal, round, and reactive to light. Cardiovascular:      Rate and Rhythm: Normal rate and regular rhythm. Pulses: Normal pulses. Heart sounds: Normal heart sounds. Pulmonary:      Effort: Pulmonary effort is normal. No respiratory distress. Breath sounds: Normal breath sounds. No wheezing or rhonchi. Abdominal:      General: There is distension. Palpations: Abdomen is soft. Tenderness: There is no abdominal tenderness. There is no guarding or rebound. Comments: Ostomy bag present   Musculoskeletal:         General: No swelling or tenderness. Normal range of motion. Cervical back: Normal range of motion. Right lower leg: No edema. Left lower leg: No edema. Comments: LLE in wraps with wound vac. R toe amputation   Skin:     General: Skin is warm and dry. Capillary Refill: Capillary refill takes less than 2 seconds. Coloration: Skin is not jaundiced or pale. Findings: Lesion present. No bruising. Neurological:      General: No focal deficit present. Mental Status: He is alert and oriented to person, place, and time. Mental status is at baseline. Cranial Nerves: No cranial nerve deficit. Sensory: Sensory deficit present. Motor: No weakness. Psychiatric:         Mood and Affect: Mood normal.         Behavior: Behavior normal.         Thought Content:  Thought content normal.         Judgment: Judgment normal.     LABS:    CBC:   Recent Labs     09/13/22  0607 09/14/22  0730 09/15/22  0746   WBC 12.0* 6.5 7.8   HGB 11.1* 12.0* 12.8*   HCT 32.4* 34.9* 37.8*    198 244   MCV 81.6 84.0 83.3       Renal:    Recent Labs     09/13/22  0607 09/14/22  0730 09/15/22  0746   * 140 140   K 4.5 5.2* 5.3*    103 102   CO2 21 27 28   BUN 20 19 18   CREATININE 1.5* 1.3 1.2   GLUCOSE 196* 188* 154*   CALCIUM 9.3 9.3 9.7   MG 1.40*  --   --    ANIONGAP 12 10 10       Hepatic: No results for input(s): AST, ALT, BILITOT, BILIDIR, PROT, LABALBU, ALKPHOS in the last 72 hours. Troponin: No results for input(s): TROPONINI in the last 72 hours. BNP: No results for input(s): BNP in the last 72 hours. Lipids: No results for input(s): CHOL, HDL in the last 72 hours. Invalid input(s): LDLCALCU, TRIGLYCERIDE  ABGs:  No results for input(s): PHART, UCZ5PLI, PO2ART, DAR3OQV, BEART, THGBART, N2MOAGVC, YSF6UZA in the last 72 hours. INR:   Recent Labs     09/15/22  0746   INR 1.10       Lactate: No results for input(s): LACTATE in the last 72 hours. Cultures:  -----------------------------------------------------------------  RAD:   XR FOOT LEFT (MIN 3 VIEWS)   Final Result      Partial amputation of the right fourth and fifth rays      VL DUP LOWER EXTREMITY ARTERIES BILATERAL   Final Result      MRI FOOT LEFT W WO CONTRAST   Final Result   1. Large ulceration and sinus tract extending from the ball the foot along the fourth intermetatarsal space. Adjacent osteomyelitis and septic arthritis of the fourth MTP joint and adjacent fourth metatarsal and fourth proximal phalanx. Suspected early    osteomyelitis of the distal fifth metatarsal and base of the fifth proximal phalanx. 2. Diminished enhancement in the soft tissues adjacent to the ulceration suggesting devitalized soft tissues. Clinical correlation recommended. XR CHEST (2 VW)   Final Result   Impression: Mild cardiomegaly. XR FOOT LEFT (MIN 3 VIEWS)   Final Result   Impression: Soft tissue gas again noted of the lateral aspect of the forefoot. XR FOOT LEFT (MIN 3 VIEWS)   Final Result     Bubbles of air in the soft tissues near the fourth and fifth    digits as described. The bubbles of air may be secondary to a gas    forming infection or skin ulceration. No definite signs of    osteomyelitis.               Assessment/Plan:   Diabetic foot infection  Concern for gas gangrene   1-2 days of worsening L foot dorsum erythema, dark purple/red blistering. Soft tissue gas on lateral aspect of forefoot on XR. Initial .6, sed rate 57. Received vancomycin in ED. 9/12 MRI L Foot W WO with large ulceration and sinus tract from ball of foot to 4th intermetatarsal space with adjacent osteomyelitis and septic arthritis of the 4th MTP joint and adjacent 4th metarsal and 4th proximal phalanx. Suspected early osteomyelitis of distal 5th metatarsal and base of 5th proximal phalanx. Likely devitalized soft tissues surrounding ulceration. BLE arterial duplex unremarkable. - Podiatry following              - S/p ray amputation of L 4th and 5th digit 9/12, wound vac in place with dakins, plan for primary closure today   - RCRI 0, patient is medically cleared for surgery  - ID consulted   - 9/12 wound culture with pansensitive Enterococcus faecalis and pansensitive Pseudomonas   - zyvox stopped, continue zosyn   - 9/12 blood culture NGTD     LEROY - improving   Apparent baseline cr ~1 in 2020. Likely 2/2 bactrim, pt states he did drink wine while on the bactrim. - avoid nephrotoxic medications  - strict I/Os     T2DM  Peripheral neuropathy  9/22 A1c 6.9  - home empagliflozin and metformin held   - MDSSI     Atrial fibrillation  Currently rate controlled. Not on anticoagulation at home.  [id number] 2   - continue home lopressor      HTN  - continue home lopressor      HLD  Not on home statin         Will discuss with attending physician Dr. Rachel Gerber      Code Status: Full code  FEN: Regular diet  PPX: SubQ heparin, pepcid   DISPO: Reuben Tejeda MD, PGY-1  09/15/22  10:18 AM    This patient has been staffed and discussed with Rema Escalera MD.

## 2022-09-15 NOTE — PROGRESS NOTES
1856 Admitted to PACU from [address] [place] [address]. Connected to monitor. Report at bedside. Denies pain and nausea.

## 2022-09-16 VITALS
OXYGEN SATURATION: 98 % | BODY MASS INDEX: 33.08 KG/M2 | DIASTOLIC BLOOD PRESSURE: 69 MMHG | WEIGHT: 244.2 LBS | HEIGHT: 72 IN | HEART RATE: 84 BPM | TEMPERATURE: 97.5 F | RESPIRATION RATE: 17 BRPM | SYSTOLIC BLOOD PRESSURE: 126 MMHG

## 2022-09-16 LAB
ANAEROBIC CULTURE: ABNORMAL
ANAEROBIC CULTURE: ABNORMAL
BLOOD CULTURE, ROUTINE: NORMAL
CULTURE, BLOOD 2: NORMAL
CULTURE, BLOOD 2: NORMAL
GRAM STAIN RESULT: ABNORMAL
ORGANISM: ABNORMAL
WOUND/ABSCESS: ABNORMAL
WOUND/ABSCESS: ABNORMAL

## 2022-09-16 PROCEDURE — 6360000002 HC RX W HCPCS: Performed by: PODIATRIST

## 2022-09-16 PROCEDURE — 2580000003 HC RX 258: Performed by: PODIATRIST

## 2022-09-16 PROCEDURE — 97116 GAIT TRAINING THERAPY: CPT

## 2022-09-16 PROCEDURE — 6370000000 HC RX 637 (ALT 250 FOR IP)

## 2022-09-16 PROCEDURE — 97161 PT EVAL LOW COMPLEX 20 MIN: CPT

## 2022-09-16 PROCEDURE — 2500000003 HC RX 250 WO HCPCS: Performed by: INTERNAL MEDICINE

## 2022-09-16 PROCEDURE — 2580000003 HC RX 258: Performed by: INTERNAL MEDICINE

## 2022-09-16 PROCEDURE — 99232 SBSQ HOSP IP/OBS MODERATE 35: CPT | Performed by: INTERNAL MEDICINE

## 2022-09-16 PROCEDURE — 2580000003 HC RX 258

## 2022-09-16 RX ORDER — SODIUM CHLORIDE 0.9 % (FLUSH) 0.9 %
5-40 SYRINGE (ML) INJECTION PRN
Status: DISCONTINUED | OUTPATIENT
Start: 2022-09-16 | End: 2022-09-16 | Stop reason: HOSPADM

## 2022-09-16 RX ORDER — SODIUM CHLORIDE 0.9 % (FLUSH) 0.9 %
5-40 SYRINGE (ML) INJECTION EVERY 12 HOURS SCHEDULED
Status: DISCONTINUED | OUTPATIENT
Start: 2022-09-16 | End: 2022-09-16 | Stop reason: HOSPADM

## 2022-09-16 RX ORDER — SODIUM CHLORIDE 9 MG/ML
25 INJECTION, SOLUTION INTRAVENOUS PRN
Status: DISCONTINUED | OUTPATIENT
Start: 2022-09-16 | End: 2022-09-16 | Stop reason: HOSPADM

## 2022-09-16 RX ORDER — HEPARIN SODIUM 5000 [USP'U]/ML
5000 INJECTION, SOLUTION INTRAVENOUS; SUBCUTANEOUS EVERY 8 HOURS SCHEDULED
Status: DISCONTINUED | OUTPATIENT
Start: 2022-09-16 | End: 2022-09-16 | Stop reason: HOSPADM

## 2022-09-16 RX ORDER — CIPROFLOXACIN 500 MG/1
500 TABLET, FILM COATED ORAL 2 TIMES DAILY
Qty: 28 TABLET | Refills: 0 | Status: SHIPPED | OUTPATIENT
Start: 2022-09-16 | End: 2022-09-30

## 2022-09-16 RX ORDER — AMOXICILLIN AND CLAVULANATE POTASSIUM 875; 125 MG/1; MG/1
1 TABLET, FILM COATED ORAL 2 TIMES DAILY
Qty: 28 TABLET | Refills: 0 | Status: SHIPPED | OUTPATIENT
Start: 2022-09-16 | End: 2022-09-30

## 2022-09-16 RX ORDER — LIDOCAINE HYDROCHLORIDE 10 MG/ML
5 INJECTION, SOLUTION EPIDURAL; INFILTRATION; INTRACAUDAL; PERINEURAL ONCE
Status: COMPLETED | OUTPATIENT
Start: 2022-09-16 | End: 2022-09-16

## 2022-09-16 RX ORDER — GABAPENTIN 300 MG/1
300 CAPSULE ORAL NIGHTLY
Qty: 30 CAPSULE | Refills: 0 | Status: SHIPPED | OUTPATIENT
Start: 2022-09-16 | End: 2022-10-16

## 2022-09-16 RX ADMIN — SODIUM CHLORIDE, PRESERVATIVE FREE 10 ML: 5 INJECTION INTRAVENOUS at 08:08

## 2022-09-16 RX ADMIN — FAMOTIDINE 20 MG: 20 TABLET ORAL at 08:08

## 2022-09-16 RX ADMIN — SODIUM CHLORIDE, PRESERVATIVE FREE 10 ML: 5 INJECTION INTRAVENOUS at 11:30

## 2022-09-16 RX ADMIN — PIPERACILLIN AND TAZOBACTAM 4500 MG: 4; .5 INJECTION, POWDER, FOR SOLUTION INTRAVENOUS at 06:24

## 2022-09-16 RX ADMIN — LIDOCAINE HYDROCHLORIDE 5 ML: 10 INJECTION, SOLUTION EPIDURAL; INFILTRATION; INTRACAUDAL; PERINEURAL at 10:00

## 2022-09-16 RX ADMIN — METOPROLOL TARTRATE 50 MG: 50 TABLET, FILM COATED ORAL at 08:08

## 2022-09-16 NOTE — PROGRESS NOTES
Podiatric Surgery Daily Progress Note  Ronnell Meek      Subjective :   Patient seen and examined this am at the bedside. Patient denies any acute overnight events. Patient denies N/V/F/C/SOB. Patient denies calf pain, thigh pain, chest pain. Reports phantom pains overnight to left foot surgical site. Review of Systems: A 12 point review of symptoms is unremarkable with the exception of the chief complaint. Patient specifically denies nausea, fever, vomiting, chills, shortness of breath, chest pain, abdominal pain, constipation or difficulty urinating. Objective     /89   Pulse (!) 104   Temp 99.8 °F (37.7 °C) (Oral)   Resp 18   Ht 6' (1.829 m)   Wt 251 lb 5.2 oz (114 kg)   SpO2 99%   BMI 34.09 kg/m²     I/O:  Intake/Output Summary (Last 24 hours) at 9/16/2022 0528  Last data filed at 9/15/2022 1945  Gross per 24 hour   Intake 214 ml   Output 1370 ml   Net -1156 ml              Wt Readings from Last 3 Encounters:   09/15/22 251 lb 5.2 oz (114 kg)   09/09/22 241 lb (109.3 kg)   09/03/21 251 lb (113.9 kg)       LABS:    Recent Labs     09/14/22  0730 09/15/22  0746   WBC 6.5 7.8   HGB 12.0* 12.8*   HCT 34.9* 37.8*    244        Recent Labs     09/15/22  0746      K 5.3*      CO2 28   BUN 18   CREATININE 1.2        Recent Labs     09/15/22  0746   INR 1.10           LOWER EXTREMITY EXAMINATION    Dressing to left LE left clean, dry, intact. No strikethrough drainage into external layers of dressing. Patient is able to actively flex and extend remaining visible digits. CFT is brisk to remaining visible digits. Gross sensation diminished, unchanged from previous examinations. No pain with calf compression.        IMAGING:  XR Left foot post-op 9/12  Narrative   EXAM: Left foot 3 views 9/12/2022 at 21:02       INDICATION: s/p I&D, partial 4th and 5th ray amputation, postoperative       COMPARISON: 9/12/2022 at 06:37       FINDINGS:       Postoperative changes of transmetatarsal amputations involving the fourth and fifth rays. Impression   Partial amputation of the right fourth and fifth rays     XR Left foot 9/12 (pre-op)  Narrative   EXAM:     XR Left Foot Complete, 3 or More Views       CLINICAL HISTORY:     diabetic ulcer lateral distal midfoot/ 3-4-5 MT area. TECHNIQUE:     Frontal, lateral and oblique views of the left foot. COMPARISON:     No relevant prior studies available. FINDINGS:     Bones/joints:  Plantar calcaneal spur measuring 7 mm. No acute    fracture. No dislocation. Soft tissues:  Dorsal foot soft tissue swelling with numerous    bubbles of air in the dorsal aspect of the foot at the level of    the distal fourth and fifth. No radiopaque foreign body. Electronically signed by Rose Irby M.D. on 09-12-22 at [employer] [facility] of air in the soft tissues near the fourth and fifth    digits as described. The bubbles of air may be secondary to a gas    forming infection or skin ulceration. No definite signs of    osteomyelitis. MRI Left foot 9/12  Narrative   MRI of the left foot with and without contrast       TECHNIQUE: Multiplanar T1 and T2-weighted images were obtained of the left foot without use of contrast.       FINDINGS:       There is a ulceration along the plantar aspect of the ball the foot at the level the fourth MTP joint. There is a enhancing sinus tract extending through the intermetatarsal region and along the lateral aspect of the fourth MTP joint. There is extensive abnormal signal and enhancement in the distal fourth metatarsal and proximal phalanx of the fourth digit compatible with osteomyelitis and septic arthritis of the fourth MTP joint. Patchy areas of edema involving the fifth metatarsal    head and base of the fifth proximal phalanx suspicious for early osteomyelitis.  There is a large area of diminished enhancement along the ball the foot laterally suggesting a large area of devitalized soft tissue. First MTP osteoarthritis with osteophyte formation. No suspicious osseous edema elsewhere identified. Impression   1. Large ulceration and sinus tract extending from the ball the foot along the fourth intermetatarsal space. Adjacent osteomyelitis and septic arthritis of the fourth MTP joint and adjacent fourth metatarsal and fourth proximal phalanx. Suspected early    osteomyelitis of the distal fifth metatarsal and base of the fifth proximal phalanx. 2. Diminished enhancement in the soft tissues adjacent to the ulceration suggesting devitalized soft tissues. Clinical correlation recommended. Arterial Duplex Lower Extremity b/l 9/12  Type of Study:        Extremities Arteries:Lower Extremities Arterial Duplex, VL LOWER EXTREMITY    ARTERIES DUPLEX BILATERAL. Tech Comments   Right   The right ankle/brachial index is 1.12 ( mmHg;  mmHg). There is normal, multiphasic flow identified in the common femoral artery,   suggesting no evidence of significant aorto-iliac inflow disease. There is minimal atherosclerotic plaque involving the superficial femoral and   popliteal arteries with velocities consistent with <50% stenosis. Left   The left ankle/brachial index is 1.05 ( mmHg;  mmHg). There is normal, multiphasic flow identified in the common femoral artery,   suggesting no evidence of significant aorto-iliac inflow disease. There is minimal atherosclerotic plaque involving the superficial femoral and   popliteal arteries with velocities consistent with <50% stenosis. There is no previous exam for comparison. ASSESSMENT/PLAN   - s/p I&D, delayed primary closure; left foot (DOS 9/15/22)   - s/p I&D, partial 4th and 5th ray amputation; left foot (DOS: 9/12/22)  - Sepsis 2/2 gas gangrene; left LE; resolved  - Acute osteomyelitis; left foot  - Diabetic foot ulceration; left foot;  Jackson 3  - Type 2 diabetes mellitus with peripheral neuropathy  - History of partial 2nd digit amputation     -Patient examined and evaluated at bedside   -VSS, no leukocytosis noted (WBC 7.8)  -.6, IZV92  -Prealbumin 9.7  -HbA1c 6.9%  -Wound culture 9/12- Enterococcus faecalis, pseudomonas aeruginosa, prevotella, veillonella  -Blood culture x2- NGTD  -Surgical path; acute osteomyelitis of fourth and fifth digits, as well as fourth and fifth metatarsals. Bone margins of fourth and fifth metatarsals are negative for osteomyelitis  -All imaging reviewed, impressions noted above  -ID following; continue antibiotics per their recommendations  -Dressing to left lower extremity clean, dry, intact  -Nonweightbearing left lower extremity  -PT/OT pending  -Gabapentin 300mg nightly at discharge for neuropathic pain      DISPO: s/p I&D with delayed primary closure; left foot (DOS: 9/15/22). Continue IV antibiotics per ID recs. No further surgical intervention from podiatric standpoint. Patient will be okay to discharge pending medical clearance, final ID recs, and PT/OT eval.    Patient examined and evaluated at bedside with Dr. Rebeka Cline DPM.    Jonathan Rose DPM  Podiatric Resident, PGY-3  Pager #: [phone] or Perfect Serve      Patient was seen and evaluated at bedside. Agree with residents assessment and treatment plan.   Rebeka Cline DPM

## 2022-09-16 NOTE — DISCHARGE INSTR - COC
Anterior; Left (Active)   Dressing Status Clean;Dry; Intact 22   Dressing/Treatment Ace wrap 22   Closure Other (Comment) 22   Margins Other (Comment) 22   Incision Assessment Other (Comment) 22   Drainage Amount None 22   Odor None 22   Number of days: 0       Incision 20 Toe (Comment  which one) Right;Distal (Active)   Number of days: 625        Elimination:  Continence: Bowel: {YES / OY:52902}  Bladder: {YES / VS:61946}  Urinary Catheter: {Urinary Catheter:239822940}   Colostomy/Ileostomy/Ileal Conduit: {YES / HO:44946}       Date of Last BM: ***    Intake/Output Summary (Last 24 hours) at 2022 0953  Last data filed at 2022 0811  Gross per 24 hour   Intake 214 ml   Output 1045 ml   Net -831 ml     I/O last 3 completed shifts:   In: 214 [I.V.:114; IV Piggyback:100]  Out: 2270 [Urine:2250; Blood:20]    Safety Concerns:     [address] Safety Concerns:002632580}    Impairments/Disabilities:      [address] Impairments/Disabilities:047829712}    Nutrition Therapy:  Current Nutrition Therapy:   [address] Diet List:922041724}    Routes of Feeding: {CHP DME Other Feedings:356864717}  Liquids: {Slp liquid thickness:54540}  Daily Fluid Restriction: {CHP DME Yes amt example:265837587}  Last Modified Barium Swallow with Video (Video Swallowing Test): {Done Not Done ZWDV:812896374}    Treatments at the Time of Hospital Discharge:   Respiratory Treatments: ***  Oxygen Therapy:  {Therapy; copd oxygen:20561}  Ventilator:    { CC Vent UOYS:320639413}    Rehab Therapies: {THERAPEUTIC INTERVENTION:8913434387}  Weight Bearing Status/Restrictions: [facility] Weight Bearin}  Other Medical Equipment (for information only, NOT a DME order):  {EQUIPMENT:069130526}  Other Treatments: ***    Patient's personal belongings (please select all that are sent with patient):  {MICHAEL DME Belongings:711212267}    RN SIGNATURE:  {Esignature:527413994}    CASE MANAGEMENT/SOCIAL WORK SECTION    Inpatient Status Date: ***    Readmission Risk Assessment Score:  Readmission Risk              Risk of Unplanned Readmission:  11           Discharging to Facility/ Agency   Name:   Address:  Phone:  Fax:    Dialysis Facility (if applicable)   Name:  Address:  Dialysis Schedule:  Phone:  Fax:    / signature: {Esignature:155224165}    PHYSICIAN SECTION    Prognosis: {Prognosis:2726147812}    Condition at Discharge: [address] Jeri Duran Patient Condition:503293927}    Rehab Potential (if transferring to Rehab): {Prognosis:2317738913}    Recommended Labs or Other Treatments After Discharge: ***    [facility]   Telephone: [facility], [facility]    Dr. Barry Jaquez    Discharge Instructions    Fluids and Diet  -begin with clear liquids, bouillon, dry toast, soda crackers  -If not nauseated, you may resume a regular diet when you desire    Medications  -Take prescription medications only as directed  -If pain is not severe, you may take the non-prescription medication that you normally take.   -If any side effects or adverse reactions occur, discontinue the medication and contact your doctor.  -Review the patient drug information leaflet, when provided, before you begin taking the medication    Ambulation and Activity  -You are advised to go directly home from the hospital  -Use the prescribed walking aids  -Nonweight bearing to the left foot  -Can use roll about knee scooter  -Do no lift or move heavy objects  -Do not Drive    Post Anesthesia Instructions  -Do not drive or operate machinery  -Do not consume alcohol, tranquilizers, sleeping medications, or a non-prescription pain medication  -Do not make important decisions  -You should have someone home with you tonight    Care of the Operative Site  -Keep cast or bandage clean, dry, and intact  -Do not shower  -Do not remove bandage  -Elevate Operative extremity as much as possible to reduce swelling and discomfort for the first 72 hours  -Apply ice bag wrapped in thick towel over bandage 20 minutes of every hour for the first 72 hours while awake  -Do not attempt to put anything between the cast or dressing and skin, some itching is normal    Special Instructions: Call doctor immediately if you develop any of the following:  -Fever over 100 degrees by mouth - take your temperature daily  -Pain not relieved by medication ordered  -Swelling, increased redness, warmth, or hardness around operative area  -Numb, tingling or cold toes  -Toe(s) become white or bluish  -Bandage becomes wet, soiled, or blood soaked (a small amount of bleeding may be normal)  -Increasing or progressive drainage from surgical area    Follow up - Call Dr. Yadi Easley office for follow up appointment. Physician Certification: I certify the above information and transfer of Brandy Jain  is necessary for the continuing treatment of the diagnosis listed and that he requires {Admit to Appropriate Level of Care:64866} for {GREATER/LESS:571383200} 30 days.      Update Admission H&P: {CHP DME Changes in [facility]:257447862}    PHYSICIAN SIGNATURE:  {Esignature:231347144}

## 2022-09-16 NOTE — PROGRESS NOTES
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 He did well in the OR, and was treated with definitive management and delayed primary closure. He feels well this morning. He remains afebrile and HDS. He denies any abdominal pain. He notes that his ostomy output is loose and lack and odor, like it has been sterilized. He actually notes that he did not have any diaphoresis overnight, which he has been having. Objective:     Patient Vitals for the past 8 hrs:   BP Temp Temp src Pulse Resp SpO2 Weight   09/16/22 0752 134/82 98.5 °F (36.9 °C) Oral (!) 104 18 96 % --   09/16/22 0600 -- -- -- -- -- -- 244 lb 3.2 oz (110.8 kg)   09/16/22 0412 133/89 99.8 °F (37.7 °C) Oral (!) 104 18 99 % --       I/O last 3 completed shifts: In: 214 [I.V.:114; IV Piggyback:100]  Out: 2270 [Urine:2250; Blood:20]  I/O this shift:  In: -   Out: 600 [Urine:600]    EXAM:  GENERAL: No apparent distress.     HEENT: Membranes moist, no oral lesion  NECK:  Supple, no lymphadenopathy  LUNGS: Clear b/l, no rales, no dullness  CARDIAC: RRR, no murmur appreciated  ABD:  + BS, soft / NT  EXT:  Left lower extremity wrapped in a bandage  NEURO: No focal neurologic findings  PSYCH: Orientation, sensorium, mood normal  LINES:  Peripheral iv       Data Review:  Lab Results   Component Value Date    WBC 7.8 09/15/2022    HGB 12.8 (L) 09/15/2022    HCT 37.8 (L) 09/15/2022    MCV 83.3 09/15/2022     09/15/2022     Lab Results   Component Value Date    CREATININE 1.2 09/15/2022    BUN 18 09/15/2022     09/15/2022    K 5.3 (H) 09/15/2022     09/15/2022    CO2 28 09/15/2022       Hepatic Function Panel:   Lab Results   Component Value Date/Time    ALKPHOS 118 12/29/2020 12:47 PM    ALT 26 12/29/2020 12:47 PM    AST 10 12/29/2020 12:47 PM    PROT 7.3 12/29/2020 12:47 PM    BILITOT 0.7 12/29/2020 12:47 PM    LABALBU 4.2 12/29/2020 12:47 PM       MICRO:  -Blood cultures (9/12) no growth to date  -Wound culture growing E faecalis (pansensitive) and Pseudomonas (also pansensitive), Prevotella sp, Veillonella dispar    IMAGING:  MRI left foot (9/12)   Large ulceration and sinus tract extending from the ball the foot along the fourth intermetatarsal space. Adjacent osteomyelitis and septic arthritis of the fourth MTP joint and adjacent fourth metatarsal and fourth proximal phalanx. Suspected early    osteomyelitis of the distal fifth metatarsal and base of the fifth proximal phalanx. PATH:  9/12   A. Fifth metatarsal, excision:      -Acute osteomyelitis. -Bone margin is negative for definitive evidence of osteomyelitis. B. Fourth metatarsal, excision:      -Acute osteomyelitis. -Bone margin is negative for definitive evidence of osteomyelitis. C. Fourth and fifth toes, excision:      -Both bones with acute suppurative osteomyelitis.      -Skin/soft tissue margins of resection contain diffuse acute        suppurative inflammation.      Scheduled Meds:   heparin (porcine)  5,000 Units SubCUTAneous 3 times per day    lidocaine 1 % injection  5 mL IntraDERmal Once    sodium chloride flush  5-40 mL IntraVENous 2 times per day    sodium hypochlorite   Irrigation Daily    sodium hypochlorite   Irrigation Daily    insulin lispro  0-8 Units SubCUTAneous TID WC    insulin lispro  0-4 Units SubCUTAneous Nightly    sodium chloride flush  5-40 mL IntraVENous 2 times per day    famotidine  20 mg Oral BID    piperacillin-tazobactam  4,500 mg IntraVENous Q8H    metoprolol tartrate  50 mg Oral BID       Continuous Infusions:   sodium chloride      sodium chloride 700 mL/hr at 09/15/22 1856    dextrose         PRN Meds:  sodium chloride flush, sodium chloride, sodium chloride flush, sodium chloride, ondansetron **OR** ondansetron, polyethylene glycol, acetaminophen **OR** acetaminophen, glucose, dextrose bolus **OR** dextrose bolus, glucagon (rDNA), dextrose      Assessment:     #Sepsis secondary to 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  -Initial wound from stepping on nail 2-3 mo ago  -Received 4 days of Keflex and Bactrim, started 9/7  -Leukocytosis of 15.7, CRP elevated at 308.6  -A1c 6.9  -MRI findings of osteomyelitis with septic arthritis of the fourth MTP, fourth metatarsal, fourth proximal phalanx, and early osteo of the fifth metatarsal and base of the fifth proximal phalanx  -Bedside I&D with podiatry for wound cultures, went to the OR (9/12) for further I&D and partial fourth and fifth ray amputation of left foot with purulence   -Wound culture growing E faecalis (pansensitive), and pseudomonas (pansensitive)  -Patient started on Zosyn and Zyvox on 9/12   -Blood cultures no growth to date  -Surgical pathology with negative clearance fragment. Plan:     -Given culture results of pansensitive E faecalis and Pseudomonas, continue Zosyn while inpatient  -Given negative clearance fragment, patient will be appropriate for p.o. antibiotics (Cipro and Augmentin) for 10 days  -Patient to return to OR today with podiatry for further debridement, possible graft    Discussed with Dr. Venessa Braxton, DO    Addendum to Resident Progress note:  Pt seen, examined and evaluated. I have reviewed the current history, physical findings, labs and assessment and plan and agree with note as documented by resident (Dr. Neri Daily). 50 yo man with hx DM, neuropathy, obesity (BMI 34), AF, HTN, HL  Hx Fornier's gangrene, had anal stenosis, has diverting colostomy     Stepped on nail 2-3 mo ago, developed L plantar foot ulcer  Wound worsening over time, assoc with drainage.   Outpatient rx - Keflex and bactrim  He developed worsening L foot swellng, erythema, 'feverish'     In ED 9/11, afeb, WBC 15.7  Admit, started on Linezolid, Zosyn    L foot x-ray with gas  Arterial studies with no significant problem  Cult - light Ps aeruginosa, heavy E faecalis , heavy Prevotella, heavy Veillonella dispar     9/12   MRI with 4th MT + proximal phalanx OM with 4th MTP septic arthritis, 5th MT / prox phalanx edema   OR 4/5th partial ray resection, purulence      9/13 No pain. 9/14 no pain. Saw pictures in Podiatry note  9/15 OR debridement and closure, 'definitive'     IMP/  Gas gangrene   L plantar foot wound infection secondary to nail puncture injury  OR 9/12 debridement, 4/5th partial ray resection, clearance frag neg    OR 9/15 debridement (including 'cut ends of the fourth and fifth metatarsals were curetted down ') and closure      REC/  Home on PO ciprofloxacin 500 bid + Augmentin 875 bid x 2 weeks  Wound care and f/u  per Podiatry  Return to OR per Podiatry - 9/15    Medical Decision Making:   The following items were considered in medical decision making:  Discussion of patient care with other providers  Reviewed clinical lab tests  Reviewed radiology tests  Reviewed other diagnostic tests/interventions  Microbiology cultures and other micro tests reviewed       Discussed with pt, Podiatry, Attending - Dr Brandy Marvin MD

## 2022-09-16 NOTE — PROGRESS NOTES
PACU Transfer Note    Vitals:    09/15/22 1945   BP: (!) 135/91   Pulse: (!) 107   Resp: 19   Temp: 97.5 °F (36.4 °C)   SpO2: 98%   Pre-op /86     In: 104 [I.V.:104]  Out: -     Pain assessment:  none  Pain Level: 0    Report given to Receiving unit RN.    9/15/2022 8:01 PM

## 2022-09-16 NOTE — DISCHARGE INSTRUCTIONS
[facility]   Telephone: [address], [address] [facility]    Dr. Nur Most    Discharge Instructions    Fluids and Diet  -begin with clear liquids, bouillon, dry toast, soda crackers  -If not nauseated, you may resume a regular diet when you desire    Medications  -Take prescription medications only as directed  -If pain is not severe, you may take the non-prescription medication that you normally take.   -If any side effects or adverse reactions occur, discontinue the medication and contact your doctor.  -Review the patient drug information leaflet, when provided, before you begin taking the medication    Ambulation and Activity  -You are advised to go directly home from the hospital  -Use the prescribed walking aids  -Nonweight bearing to the left foot  -Can use roll about knee scooter  -Do no lift or move heavy objects  -Do not Drive    Post Anesthesia Instructions  -Do not drive or operate machinery  -Do not consume alcohol, tranquilizers, sleeping medications, or a non-prescription pain medication  -Do not make important decisions  -You should have someone home with you tonight    Care of the Operative Site  -Keep cast or bandage clean, dry, and intact  -Do not shower  -Do not remove bandage  -Elevate Operative extremity as much as possible to reduce swelling and discomfort for the first 72 hours  -Apply ice bag wrapped in thick towel over bandage 20 minutes of every hour for the first 72 hours while awake  -Do not attempt to put anything between the cast or dressing and skin, some itching is normal    Special Instructions: Call doctor immediately if you develop any of the following:  -Fever over 100 degrees by mouth - take your temperature daily  -Pain not relieved by medication ordered  -Swelling, increased redness, warmth, or hardness around operative area  -Numb, tingling or cold toes  -Toe(s) become white or bluish  -Bandage becomes wet, soiled, or blood soaked (a small amount of bleeding may be normal)  -Increasing or progressive drainage from surgical area    Follow up - Call Dr. Mireya Santos office for follow up appointment.

## 2022-09-16 NOTE — PLAN OF CARE
Problem: Discharge Planning  Goal: Discharge to home or other facility with appropriate resources  9/16/2022 1528 by Natty Chatterjee RN  Outcome: Adequate for Discharge  9/16/2022 0136 by Moises Arreaga RN  Outcome: Progressing  9/16/2022 0136 by Moises Arreaga RN  Outcome: Progressing  Flowsheets (Taken 9/15/2022 2014)  Discharge to home or other facility with appropriate resources: Identify barriers to discharge with patient and caregiver     Problem: Pain  Goal: Verbalizes/displays adequate comfort level or baseline comfort level  Outcome: Adequate for Discharge     Problem: Chronic Conditions and Co-morbidities  Goal: Patient's chronic conditions and co-morbidity symptoms are monitored and maintained or improved  9/16/2022 1528 by Natty Chatterjee RN  Outcome: Adequate for Discharge  9/16/2022 0136 by Moises Arreaga RN  Outcome: Progressing  Flowsheets (Taken 9/15/2022 2014)  Care Plan - Patient's Chronic Conditions and Co-Morbidity Symptoms are Monitored and Maintained or Improved: Monitor and assess patient's chronic conditions and comorbid symptoms for stability, deterioration, or improvement     Problem: ABCDS Injury Assessment  Goal: Absence of physical injury  Outcome: Adequate for Discharge  Flowsheets (Taken 9/16/2022 0134 by Moises Arreaga, RN)  Absence of Physical Injury: Implement safety measures based on patient assessment     Problem: Safety - Adult  Goal: Free from fall injury  Outcome: Adequate for Discharge  Flowsheets (Taken 9/16/2022 0134 by Moises Arreaga, RN)  Free From Fall Injury: Instruct family/caregiver on patient safety

## 2022-09-16 NOTE — PROGRESS NOTES
PIVs removed. Dressings applied, clean, dry and intact. Printed rxs and med given to pt. Discharge instructions given, questions answered. Pt verbalized understanding. Pt discharged home.  Pt awaiting to leave when significant other returns

## 2022-09-16 NOTE — CARE COORDINATION
Case Management Assessment            Discharge Note                    Date / Time of Note: 9/16/2022 2:16 PM                  Discharge Note Completed by: Owen Cooney RN    Patient Name: Mae Huntley   YOB: 1970  Diagnosis: Diabetic foot infection ([facility] [E11.628, L08.9]   Date / Time: 9/12/2022 12:55 AM    Current PCP: Lima Lindsey DO  Clinic patient: No    Hospitalization in the last 30 days: No    Advance Directives:  Code Status: Full Code  [place] DNR form completed and on chart: No    Financial:  Payor: Shearon Patches / Plan: Shearon Patches / Product Type: *No Product type* /      Pharmacy:    [address] [facility]  Phone: [phone] Fax: [phone]    [facility] [address] JASKARAN KAPLAN [address] [place] [address] [phone] Clark Rodriguez [phone]  [place] [address]  Phone: [phone] Fax: [phone] medications?:    Assistance provided by Case Management: None at this time    Does patient want to participate in local refill/ meds to beds program?:      Meds To Beds General Rules:  1. Can ONLY be done Monday- Friday between 8:30am-5pm  2. Prescription(s) must be in pharmacy by 3pm to be filled same day  3. Copy of patient's insurance/ prescription drug card and patient face sheet must be sent along with the prescription(s)  4. Cost of Rx cannot be added to hospital bill. If financial assistance is needed, please contact unit  or ;  or  CANNOT provide pharmacy voucher for patients co-pays  5.  Patients can then  the prescription on their way out of the hospital at discharge, or pharmacy can deliver to the bedside if staff is available. (payment due at time of pick-up or delivery - cash, check, or card accepted)     Able to afford home medications/ co-pay costs: Yes    ADLS:  Current PT AM-PAC Score: 23 /24  Current OT AM-PAC Score: 22 /24      DISCHARGE Disposition: Home- No Services Needed    LOC at discharge: Not Applicable  SHIRAZ Completed: Not Indicated    Notification completed in HENS/PAS?:  Not Applicable    IMM Completed:   Not Indicated    Transportation:  Transportation PLAN for discharge: family   Mode of Transport: Private Car  Reason for medical transport: Not Applicable  Name of Transport Company: Not Applicable  Time of Transport: when family available    Transport form completed: No    Home Care:  [address] ordered at discharge: No  [address]: Not Applicable  Orders faxed: No    Durable Medical Equipment:  DME Provider: [facility]  Equipment obtained during hospitalization:     Equipment Needed: Yes (crutches provided)  Other: pt reports he ordered rollabout knee scooter from [facility] and [address] [place] [address]:  Oxygen needed at discharge?: No  [address]: Not Applicable  Portable tank available for discharge?: Not Indicated    Dialysis:  Dialysis patient: No    Dialysis Center:  Not Applicable    Hospice Services:  Location: Not Applicable  Agency: Not Applicable    Consents signed: No    Referrals made at [facility] for outpatient continued care:  Not Applicable    Additional CM Notes:     CM  confirmed  d/c home today      Patient to follow up outpt  as instructed:  Podiatry. No HHC  or  IV atbx needed :    Patient to d/c home on PO atbx:    New  Rx:   Paper Rx  provided.          these medications from any pharmacy with your printed prescription  amoxicillin-clavulanate  ciprofloxacin  gabapentin     Schedule an appointment with Daija Menchaca DPM as soon as possible for a visit in 1 week(s)  [facility]   [phone]     Schedule an appointment with Dr. Isela Cortez, DO as soon as possible for a visit in 1 week(s)  [address] [place] [address] Myron Figueredo    The Plan for Transition of Care is related to the following treatment goals of Di[facility] foot [facility] [U17.605, L08.9]    The Patient and/or patient representative Dinesh lopez and his family were provided with a choice of provider and agrees with the discharge plan Yes    Freedom of choice list was provided with basic dialogue that supports the patient's individualized plan of care/goals and shares the quality data associated with the providers.  Yes    Care Transitions patient: No    Varsha Rodriguez RN  [facility]  Case Management Department  Ph: [phone]

## 2022-09-16 NOTE — PLAN OF CARE
Problem: Discharge Planning  Goal: Discharge to home or other facility with appropriate resources  9/16/2022 0136 by Jose Meehan RN  Outcome: Progressing  Flowsheets (Taken 9/15/2022 2014)  Discharge to home or other facility with appropriate resources: Identify barriers to discharge with patient and caregiver     Problem: ABCDS Injury Assessment  Goal: Absence of physical injury  Recent Flowsheet Documentation  Taken 9/16/2022 0134 by Jose Meehan RN  Absence of Physical Injury: Implement safety measures based on patient assessment

## 2022-09-16 NOTE — CARE COORDINATION
CM  following for  d/c planning:    Patient w/ d/c order home today  ; Patient to get  PICC line placed. Will d/c home  w/  HHC   And  IV atbx :    Referral to  [facility]  and [facility]. Sejal w/ [facility] :  notified  and  coming to see  pt  at  bedside  . [phone]. CM  d/w  pt  d/c  planning in process for  today  and  acknowledged. CM  cont to follow:        Electronically signed by Bianca Davalos RN on 9/16/2022 at 10:53 AM       Bianca Davalos RN Case Manager  [facility]  [address] [place] [address].   [facility] [address] [phone]  Fax [phone]

## 2022-09-16 NOTE — PROGRESS NOTES
Occupational Therapy    New OT order placed after delayed closure yesterday. Pt remains NWB as he was during previous therapy session this week. Pt has no OT related concerns in anticipation of discharge, but does have questions about use of crutches on stairs when he transitions to his girlfriends home. PT to f/u to address this. Will discontinue OT order.      Jono Nelson, OTR/L, [id number]

## 2022-09-16 NOTE — PROGRESS NOTES
Structures, Functions, Activity Limitations Requiring Skilled Therapeutic Intervention: Decreased functional mobility   Assessment: Pt previously seen and discharged from PT using a knee scooter for mobility. New PT orders received and checked with pt, who now is concerned about how to complete stairs at home, stating that he will have to do stairs after 2-3 days. Pt trained in using crutches on stairs, including technique and safety. Pt is safe to complete stairs with 1 crutch and 1 rail with a family member/friend standing by. d/c from PT at this time. Treatment Diagnosis: Decreased functional mobility  Therapy Prognosis: Good  Decision Making: Low Complexity  Activity Tolerance  Activity Tolerance: Patient tolerated treatment well;Patient tolerated evaluation without incident     Plan   Plan  Plan:  (d/c from PT)  Current Treatment Recommendations: Balance training, Functional mobility training, Transfer training, Gait training, Stair training, Safety education & training, Therapeutic activities  Safety Devices  Type of Devices: Left in bed, Call light within reach, Nurse notified (no alarm on upon arrival)     Restrictions  Position Activity Restriction  Other position/activity restrictions: Up with assist, NWB L LE in post-op shoe, colostomy     Subjective   General  Chart Reviewed: Yes  Additional Pertinent Hx: Pt is 50 yo M admitted with diabetic infection of L foot. History of T2DM (last A1c 8.4 5/22), afib, HTN, HLD, necrotizing fasciotomy s/p colostomy, ricardo's gangrene of scrotum. Pt underwent on 9/12 incision and drainage with extensive debridement of muscle and fascia of 25cm2 left foot and Partial 4th and 5th ray amputation left foot. Follow up surgery planned for later this week per pt.   Family / Caregiver Present: No  Referring Practitioner: Dr. Kita Davis  Diagnosis: Diabetic infection of L foot  Follows Commands: Within Functional Limits  Subjective  Subjective: pt in bed on arrival, agreeable to PT.         Social/Functional History  Social/Functional History  Lives With: Alone  Type of Home: House  Home Layout: One level  Home Access: Level entry  Bathroom Shower/Tub: Walk-in shower  Bathroom Toilet: Handicap height  Bathroom Equipment: Built-in shower seat, Grab bars in shower  Home Equipment: Walker, standard, Walker, rolling, Rollator  ADL Assistance: Independent  Homemaking Assistance: Independent  Ambulation Assistance: Independent  Active : Yes  Occupation: Full time employment  Type of Occupation: - plans to take time off work  Additional Comments: Pt can have assistance at home or move into girlfriend's home temporarily as she works from home. She lives in 2 story house, bedroom and bathroom on 2nd floor, 1/2 bath on 1st floor, level entry. Vision/Hearing  Vision  Vision: Impaired  Vision Exceptions: Wears glasses at all times  Hearing  Hearing: Within functional limits    Cognition   Orientation  Overall Orientation Status: Within Normal Limits  Cognition  Overall Cognitive Status: WNL       Gross Assessment  AROM: Within functional limits  Strength: Within functional limits     Balance  Sitting: Intact  Standing: Intact  Transfer Training  Transfer Training: Yes  Overall Level of Assistance: Modified independent  Sit to Stand: Modified independent  Stand to Sit: Modified independent  Stand Pivot Transfers: Modified independent  Gait Training  Gait Training: Yes  Left Side Weight Bearing: Non-weight bearing  Gait  Overall Level of Assistance: Supervision  Interventions: Verbal cues; Safety awareness training  Gait Abnormalities:  (good foot clearance, generally stable, variable crutch placement)  Distance (ft): 80 Feet  Assistive Device: Crutches  Rail Use: Left (L ascending, R descending)  Stairs - Level of Assistance: Contact-guard assistance (using 1 crutch)  Number of Stairs Trained: 4       Balance  Sitting - Static: Good  Sitting - Dynamic: Good  Standing - Static: Good  Standing - Dynamic: Good    AM-PAC Score  AM-PAC Inpatient Mobility Raw Score : 23 (09/16/22 1338)  AM-PAC Inpatient T-Scale Score : 56.93 (09/16/22 1338)  Mobility Inpatient CMS 0-100% Score: 11.2 (09/16/22 1338)  Mobility Inpatient CMS G-Code Modifier : CI (09/16/22 1338)     Goals  Short Term Goals  Time Frame for Short term goals: no acute goals identified  Short term goal 1: Pt will perform sit to and from transfer with SBA, NWB L LE- MET 9/15  Short term goal 2: Pt will ambulate 60 feet with appropriate AD and SBA, NWB L LE- MET 9/15  Short term goal 3: (If d/c to girlfriend's home): Pt will ambulate up and down 1 flight stairs with rail and crutch with SBA, NWB L LE- N/A- will be able to enter lower level around home via use of golf cart & will avoid all stairs.      Education  Patient Education  Education Given To: Patient  Education Provided: Role of Therapy;Precautions  Education Provided Comments: NWB status, use of crutch and railing on stairs, gait training short distances with crutches  Education Method: Verbal;Demonstration  Barriers to Learning: None  Education Outcome: Demonstrated understanding;Verbalized understanding    Therapy Time   Individual Concurrent Group Co-treatment   Time In 1306         Time Out 1329         Minutes 23         Timed Code Treatment Minutes: [address]

## 2022-09-16 NOTE — OP NOTE
[facility]               [address]                                OPERATIVE REPORT    PATIENT NAME: Kwaku Villafuerte                   :        1970  MED REC NO:   [id number]                          ROOM:       [id number]  ACCOUNT NO:   [de-identified]                           ADMIT DATE: 2022  PROVIDER:     Carlyle Gaspar DPM    DATE OF PROCEDURE:  09/15/2022    SURGEON:  Carlyle Gaspar DPM    ASSISTANT:  Aashish Kruger DPM, PGY-2    PREOPERATIVE DIAGNOSIS:  Osteomyelitis, left foot. POSTOPERATIVE DIAGNOSIS:  Osteomyelitis, left foot. PROCEDURE:  Incision and drainage down to bone cortex, left foot. PATHOLOGY:  No specimen. ANESTHESIA:  General anesthetic with a local block consisting a total of  20 mL of 2% lidocaine plain. Postoperative injection includes 30 mL of  0.5% Marcaine plain. ESTIMATED BLOOD LOSS:  Less than 50 mL. INDICATIONS FOR SURGERY:  The patient previously underwent an urgent  incision and drainage due to gas gangrene of the left foot. After  receiving IV antibiotic therapy, the decision was made to bring the  patient back to the operating room for further irrigation and attempted  wound closure. All risks versus benefits of the planned procedure were  discussed with the patient in detail, all questions were answered, no  guarantees were given. OPERATIVE PROCEDURE:  The patient was brought from the operative area,  placed on the operating room table in the supine position. Following  induction of general anesthesia, a local block consisting a total of  _____. The patient's left lower extremity was scrubbed, prepped, and  draped in the usual sterile manner. Timeout was performed. The  patient, procedure, and operative site were confirmed and the following  procedures were then performed.     INCISION AND DRAINAGE DOWN TO BONE CORTEX, LEFT FOOT:  At this time,  attention was directed towards the patient's left foot where a previous  incision and drainage had been performed. At this time utilizing, a  rongeur and curette, any remaining fibrotic, necrotic or nonviable  tissue was excisionally debrided. This included tendinous structures of  the _____ and periosteum. At this time, utilizing a curette, the cut  ends of the fourth and fifth metatarsals were curetted down to a  bleeding wound base. Upon completion of this, the wound was irrigated  with 3 liters of normal sterile saline and attention was directed  towards the wound closure. At this time, the plantar ulceration was  ellipsed out via a V-wedge of soft tissue resection. This allowed the  lateral skin to be rotated distally and dorsally allowing the wound to  be closed primarily. The subcutaneous tissues were closed utilizing 3-0  Vicryl suture. The skin was closed utilizing 2-0 nylon and 3-0 nylon  suture. Upon completion of wound closure, a postoperative injection  consisting a total of 30 mL of 0.5% Marcaine plain were administered to  all surgical incision site. The wound was then dressed with Betadine  ointment, sterile Adaptic, sterile 4x4s, 4x8s, sterile Kerlix, and  Paul. A gentle compression dressing with a posterior splint was then  applied. The patient tolerated the procedure and anesthesia well. The patient  left the OR with vital signs stable and vascular status intact to all  digits of the ranges of the left foot. Following a period of  postoperative monitoring, the patient will be discharged back to his  room where he will continue to receive IV antibiotic therapy per the  recommendations of Dr. Terese Ibrahim. Barring any unforeseen  complications, he will tentatively be discharged tomorrow.         Jessica Amos DPM    D: 09/15/2022 18:43:08       T: 09/16/2022 0:36:40     [email or web address]  Job#: [id number]     Doc#: [id number]    CC:  Cat Michaels DPM

## 2022-09-16 NOTE — DISCHARGE SUMMARY
INTERNAL MEDICINE DEPARTMENT AT [address] [place] [address]  DISCHARGE SUMMARY    Patient ID: L[place] Hollow                                             Discharge Date: 9/16/2022   Patient's PCP: Franco Stanley DO                                          Discharge Physician: Jay Zamorano MD    Admit Date: 9/12/2022   Admitting Physician: Dada Thurman MD    PROBLEMS DURING HOSPITALIZATION:  Present on Admission:   Diabetic infection of left foot ([address]   Atrial fibrillation ([facility]   Essential hypertension   Obesity, Class I, BMI 30-34.9   Type 2 diabetes mellitus without complication, without long-term current use of insulin (HCC)   Elevated serum creatinine   Dyslipidemia   Diabetic foot infection (HCC)   Chronic osteomyelitis of left foot with draining sinus (HCC)      DISCHARGE DIAGNOSES:  Diabetic foot infection  LEROY  Atrial fibrillation  T2DM    HPI:  This is a 49year old male with a history of T2DM (last A1c 8.4 5/22), afib, HTN, HLD, necrotizing fasciotomy s/p colostomy, ricardo's gangrene of scrotum, who presents with 24 hours of redness, swelling and dark red/purple blistering to top of L foot. Was seen by podiatry on Wednesday and has been taking bactrim and keflex, however he as been having increasing fevers, chills, nausea over last 2 days. When he changed his dressing about 1 day before presentation he noticed new discoloration, erythema, and warmth that was not there before. Also has plantar foot wound with purulent discharge for last 3 months. On arrival pt afebrile, tachycardic to 106, 's-140's, hemodynamically stable on RA. BMP notable for Na 133, Cr 1.5, glc 232. .6, sed rate 57. WBC 15.7, Hgb 11.9, INR 1.19. CXR with mild cardiomegaly. L Foot XR with soft tissue gas on lateral aspect for forefoot.      The following issues were addressed during hospitalization:  Diabetic foot infection  - XR L foot with gas gangrene, concern for osteomyelitis and septic arthritis on L foot MRI. Started on zyvox, zosyn. Taken to OR for ray amputation of L 4th and 5th digit 9/12 with delayed primary closure on 9/15. ID consulted, 9/12 wound culture with pansensitive Enterococcus faecalis and pansensitive Pseudomonas. Zyvox stopped, patient continued on Zosyn. LEROY  - likely 2/2 course of bactrim prior to admission, pt stated he did drink wine while on bactrim, improved to 1.2 which is close to apparent baseline. Physical Exam:  Physical Exam   Constitutional:       General: He is not in acute distress. Appearance: Normal appearance. He is obese. He is not ill-appearing, toxic-appearing or diaphoretic. HENT:      Head: Normocephalic and atraumatic. Nose: Nose normal.      Mouth/Throat:      Mouth: Mucous membranes are moist.      Pharynx: Oropharynx is clear. Eyes:      Extraocular Movements: Extraocular movements intact. Conjunctiva/sclera: Conjunctivae normal.      Pupils: Pupils are equal, round, and reactive to light. Cardiovascular:      Rate and Rhythm: Normal rate and regular rhythm. Pulses: Normal pulses. Heart sounds: Normal heart sounds. Pulmonary:      Effort: Pulmonary effort is normal. No respiratory distress. Breath sounds: Normal breath sounds. No wheezing or rhonchi. Abdominal:      General: There is distension. Palpations: Abdomen is soft. Tenderness: There is no abdominal tenderness. There is no guarding or rebound. Comments: Ostomy bag present   Musculoskeletal:         General: No swelling or tenderness. Normal range of motion. Cervical back: Normal range of motion. Right lower leg: No edema. Left lower leg: No edema. Comments: LLE wraps in place. R toe amputation   Skin:     General: Skin is warm and dry. Capillary Refill: Capillary refill takes less than 2 seconds. Coloration: Skin is not jaundiced or pale. Findings: Lesion present. No bruising.    Neurological:      General: No focal deficit present. Mental Status: He is alert and oriented to person, place, and time. Mental status is at baseline. Cranial Nerves: No cranial nerve deficit. Sensory: Sensory deficit present. Motor: No weakness. Psychiatric:         Mood and Affect: Mood normal.         Behavior: Behavior normal.         Thought Content: Thought content normal.         Judgment: Judgment normal.     Consults: Podiatry, infectious disease,   Significant Diagnostic Studies:  XR Foot Left, MRI Foot Left   Treatments: Zyvox, zosyn, s/p ray amputation of L 4th and 5th digit   Disposition: Home  Discharged Condition: Stable  Follow Up: Primary Care Physician in one week    DISCHARGE MEDICATION:       Medication List        START taking these medications      gabapentin 300 MG capsule  Commonly known as: NEURONTIN  Take 1 capsule by mouth nightly for 30 days. Intended supply: 30 days            CONTINUE taking these medications      empagliflozin 10 MG tablet  Commonly known as: Jardiance  Take 1 tablet by mouth in the morning. lisinopril 30 MG tablet  Commonly known as: PRINIVIL;ZESTRIL  Take 1 tablet by mouth once daily     metFORMIN 1000 MG tablet  Commonly known as: GLUCOPHAGE  TAKE 1 TABLET BY MOUTH TWICE DAILY WITH MEALS     metoprolol tartrate 50 MG tablet  Commonly known as: LOPRESSOR  Take 1 tablet by mouth twice daily            STOP taking these medications      cephALEXin 500 MG capsule  Commonly known as: KEFLEX     gentamicin 0.1 % ointment  Commonly known as: GARAMYCIN     nystatin 102867 UNIT/GM powder  Commonly known as: MYCOSTATIN     sertraline 50 MG tablet  Commonly known as: ZOLOFT     sodium hypochlorite 0.5 % Soln external solution  Commonly known as: DAKINS     sulfamethoxazole-trimethoprim 400-80 MG per tablet  Commonly known as:  Bactrim     Suprep Bowel Prep Kit 17.5-3.13-1.6 GM/177ML Soln solution  Generic drug: Na Sulfate-K Sulfate-Mg Sulf               Where to Get Your Medications You can get these medications from any pharmacy    Bring a paper prescription for each of these medications  gabapentin 300 MG capsule          Activity: As directed  Diet: Diabetic diet   Wound Care: as directed    Time Spent on discharge is more than 45 minutes    Signed:  Jay Zamorano MD, PGY-1  9/16/2022

## 2022-09-19 ENCOUNTER — CARE COORDINATION (OUTPATIENT)
Dept: CASE MANAGEMENT | Age: 52
End: 2022-09-19

## 2022-09-19 NOTE — CARE COORDINATION
[facility] Transitions Initial Follow Up Call    Call within 2 business days of discharge: Yes    Patient: Thiago Rodriguez Patient : 1970   MRN: [id number]  Reason for Admission: diabetic foot infection (L), LEROY, DM2, afib, HTN, HLD, hx necrotizing fasciotomy s/p colostomy-->home with PO atbx (printed) & OP podiatry/wound care f/u. F/u with PCP. Discharge Date: 22 RARS: Readmission Risk Score: 8.4      Last Discharge [facility]       Date Complaint Diagnosis Description Type Department Provider    22 Wound Check Diabetic foot infection ([address] . .. ED to Hosp-Admission (Discharged) (ADMITTED) [facility] 6 Rafiq Ceja MD; Ethan Thomas. .. Spoke with: CONSTANTINO    Facility: [facility]    Attempted to reach patient via phone for initial post hospital transition call. VM left stating purpose of call along with my contact information requesting a return call.    Deejay Trejo[address]  Care Transitions  [phone]    Care Transitions 24 Hour Call    Care Transitions Interventions         Follow Up  Future Appointments   Date Time Provider Myron Call   2022  8:00 AM DO Vasu Hubbard LPN

## 2022-09-20 ENCOUNTER — CARE COORDINATION (OUTPATIENT)
Dept: CASE MANAGEMENT | Age: 52
End: 2022-09-20

## 2022-09-20 ASSESSMENT — PATIENT HEALTH QUESTIONNAIRE - PHQ9
1. LITTLE INTEREST OR PLEASURE IN DOING THINGS: 0
SUM OF ALL RESPONSES TO PHQ QUESTIONS 1-9: 0
SUM OF ALL RESPONSES TO PHQ QUESTIONS 1-9: 0
SUM OF ALL RESPONSES TO PHQ9 QUESTIONS 1 & 2: 0
SUM OF ALL RESPONSES TO PHQ QUESTIONS 1-9: 0
2. FEELING DOWN, DEPRESSED OR HOPELESS: 0
SUM OF ALL RESPONSES TO PHQ QUESTIONS 1-9: 0

## 2022-09-20 ASSESSMENT — ANXIETY QUESTIONNAIRES
5. BEING SO RESTLESS THAT IT IS HARD TO SIT STILL: 0
6. BECOMING EASILY ANNOYED OR IRRITABLE: 0
3. WORRYING TOO MUCH ABOUT DIFFERENT THINGS: 0
4. TROUBLE RELAXING: 0
7. FEELING AFRAID AS IF SOMETHING AWFUL MIGHT HAPPEN: 0
GAD7 TOTAL SCORE: 0
IF YOU CHECKED OFF ANY PROBLEMS ON THIS QUESTIONNAIRE, HOW DIFFICULT HAVE THESE PROBLEMS MADE IT FOR YOU TO DO YOUR WORK, TAKE CARE OF THINGS AT HOME, OR GET ALONG WITH OTHER PEOPLE: NOT DIFFICULT AT ALL
1. FEELING NERVOUS, ANXIOUS, OR ON EDGE: 0
2. NOT BEING ABLE TO STOP OR CONTROL WORRYING: 0

## 2022-09-20 NOTE — CARE COORDINATION
Date/Time:  9/20/2022 9:57 AM  2ND Attempted to reach patient by telephone. Call within 2 business days of discharge: Yes,  Left HIPPA compliant message requesting a return call. CTN will close out CTN/COVID -19 Monitoring episode at this time.     Thank Alvin Mcfarland RN  Care Transition Coordinator  Contact [facility]:[phone]

## 2022-09-21 ENCOUNTER — TELEMEDICINE (OUTPATIENT)
Dept: FAMILY MEDICINE CLINIC | Age: 52
End: 2022-09-21
Payer: MEDICAID

## 2022-09-21 DIAGNOSIS — Z09 HOSPITAL DISCHARGE FOLLOW-UP: Primary | ICD-10-CM

## 2022-09-21 PROCEDURE — 99214 OFFICE O/P EST MOD 30 MIN: CPT | Performed by: FAMILY MEDICINE

## 2022-09-21 PROCEDURE — 1111F DSCHRG MED/CURRENT MED MERGE: CPT | Performed by: FAMILY MEDICINE

## 2022-09-21 NOTE — PROGRESS NOTES
Post-Discharge Transitional Care  Follow Up      Rey Davis   YOB: 1970    Date of Office Visit:  9/21/2022  Date of Hospital Admission: 9/12/22  Date of Hospital Discharge: 9/16/22  Risk of hospital readmission (high >=14%. Medium >=10%) :Readmission Risk Score: 8.4      Care management risk score Rising risk (score 2-5) and Complex Care (Scores >=6): No Risk Score On File     Non face to face  following discharge, date last encounter closed (first attempt may have been earlier): 09/20/2022    Call initiated 2 business days of discharge: Yes    ASSESSMENT/PLAN:   Hospital discharge follow-up  -     [place] DISCHARGE MEDS RECONCILED W/ CURRENT OUTPATIENT MED LIST    Medical Decision Making: moderate complexity  Return in 1 month (on 10/21/2022). Subjective:   HPI:  Follow up of Hospital problems/diagnosis(es): Diabetic infection of left foot     Inpatient course: Discharge summary reviewed- see chart. Interval history/Current status: Stable, has Podiatrist f/u in 2 days,   Last A1C in hospital was 6.9, before his hospitalization had been exercising 5 days a week and eating much healthier.     Patient Active Problem List   Diagnosis    Atrial fibrillation ([address]    Essential hypertension    Type 2 diabetes mellitus without complication, without long-term current use of insulin ([address]    History of skin graft    Wound of buttock    Obesity, Class I, BMI 30-34.9    S/P split thickness skin graft    Surgical wound, non healing    Necrotizing fasciitis ([address]    Mike's gangrene of scrotum    Colostomy in place [facility])    Diabetic infection of left foot ([address]    Elevated serum creatinine    Dyslipidemia    Diabetic foot infection ([address]    Chronic osteomyelitis of left foot with draining sinus (HCC)       Medications listed as ordered at the time of discharge from hospital     Medication List            Accurate as of September 21, 2022  7:21 AM. If you have any questions, ask your nurse or doctor. CONTINUE taking these medications      amoxicillin-clavulanate 875-125 MG per tablet  Commonly known as: AUGMENTIN  Take 1 tablet by mouth 2 times daily for 14 days     ciprofloxacin 500 MG tablet  Commonly known as: CIPRO  Take 1 tablet by mouth 2 times daily for 14 days     empagliflozin 10 MG tablet  Commonly known as: Jardiance  Take 1 tablet by mouth in the morning.     gabapentin 300 MG capsule  Commonly known as: NEURONTIN  Take 1 capsule by mouth nightly for 30 days. Intended supply: 30 days     lisinopril 30 MG tablet  Commonly known as: PRINIVIL;ZESTRIL  Take 1 tablet by mouth once daily     metFORMIN 1000 MG tablet  Commonly known as: GLUCOPHAGE  TAKE 1 TABLET BY MOUTH TWICE DAILY WITH MEALS     metoprolol tartrate 50 MG tablet  Commonly known as: LOPRESSOR  Take 1 tablet by mouth twice daily                Medications marked \"taking\" at this time  Outpatient Medications Marked as Taking for the 9/21/22 encounter (Telemedicine) with Sathya Glez, DO   Medication Sig Dispense Refill    gabapentin (NEURONTIN) 300 MG capsule Take 1 capsule by mouth nightly for 30 days. Intended supply: 30 days 30 capsule 0    ciprofloxacin (CIPRO) 500 MG tablet Take 1 tablet by mouth 2 times daily for 14 days 28 tablet 0    amoxicillin-clavulanate (AUGMENTIN) 875-125 MG per tablet Take 1 tablet by mouth 2 times daily for 14 days 28 tablet 0    empagliflozin (JARDIANCE) 10 MG tablet Take 1 tablet by mouth in the morning. 30 tablet 1    lisinopril (PRINIVIL;ZESTRIL) 30 MG tablet Take 1 tablet by mouth once daily 90 tablet 1    metFORMIN (GLUCOPHAGE) 1000 MG tablet TAKE 1 TABLET BY MOUTH TWICE DAILY WITH MEALS 180 tablet 1    metoprolol tartrate (LOPRESSOR) 50 MG tablet Take 1 tablet by mouth twice daily 180 tablet 1        Medications patient taking as of now reconciled against medications ordered at time of hospital discharge:  Yes    A comprehensive review of systems was negative except for what was noted in the HPI. Objective: There were no vitals taken for this visit. Height - 6'  Weight - 240 lb    Constitutional: [x] Appears well-developed and well-nourished [x] No apparent distress      [] Abnormal-   Mental status  [x] Alert and awake  [x] Oriented to person/place/time [x]Able to follow commands      Eyes:  EOM    [x]  Normal  [] Abnormal-  Sclera  []  Normal  [] Abnormal -         Discharge []  None visible  [] Abnormal -    HENT:   [x] Normocephalic, atraumatic. [] Abnormal   [] Mouth/Throat: Mucous membranes are moist.     External Ears [x] Normal  [] Abnormal-     Neck: [x] No visualized mass     Pulmonary/Chest: [] Respiratory effort normal.  [x] No visualized signs of difficulty breathing or respiratory distress        [] Abnormal-      Musculoskeletal:   [] Normal gait with no signs of ataxia         [x] Normal range of moNeurological:        [x] No Facial Asymmetry (Cranial nerve 7 motor function) (limited exam to video visit)          [x] No gaze palsy        [] Abnormal-         Skin:        [x] No significant exanthematous lesions or discoloration noted on facial skin         [] Abnormal-            Psychiatric:       [x] Normal Affect [] No Hallucinations        [] Abnormal-     Other pertinent observable physical exam findings:        [] Abnormal-             An electronic signature was used to authenticate this note.   --Leann Ro, DO

## 2022-10-19 ENCOUNTER — TELEPHONE (OUTPATIENT)
Dept: SURGERY | Age: 52
End: 2022-10-19

## 2022-10-19 NOTE — TELEPHONE ENCOUNTER
Patient has been scheduled for:    Procedure: EUA, c-scope stoma and rectum, anal dilation, possible excision, AAF  Date: 1/3/23  Time: 7:30AM  Arrival: 5:30AM  [facility]  Prep? Golytely, emailed    Pre-op? pcp    Post-op Appt? EAST 1/27/23 at 9:00AM     Patient advised they will need a . Orders routed to surgery scheduling. Instructions have been mailed/emailed to:  [email or web address]

## 2022-10-24 DIAGNOSIS — E11.9 TYPE 2 DIABETES MELLITUS WITHOUT COMPLICATION, WITHOUT LONG-TERM CURRENT USE OF INSULIN (HCC): ICD-10-CM

## 2022-10-24 RX ORDER — EMPAGLIFLOZIN 10 MG/1
TABLET, FILM COATED ORAL
Qty: 30 TABLET | Refills: 2 | Status: SHIPPED | OUTPATIENT
Start: 2022-10-24

## 2022-10-24 RX ORDER — POLYETHYLENE GLYCOL 3350, SODIUM CHLORIDE, SODIUM BICARBONATE, POTASSIUM CHLORIDE 420; 11.2; 5.72; 1.48 G/4L; G/4L; G/4L; G/4L
POWDER, FOR SOLUTION ORAL
Qty: 1 EACH | Refills: 0 | Status: SHIPPED | OUTPATIENT
Start: 2022-10-24

## 2022-10-24 NOTE — TELEPHONE ENCOUNTER
Future Appointments   Date Time Provider Myron Call   1/27/2023  9:00 AM Lilian Pitts MD E COLON RECT [facility]     LOV 9/21/2022

## 2022-12-12 ENCOUNTER — TELEPHONE (OUTPATIENT)
Dept: FAMILY MEDICINE CLINIC | Age: 52
End: 2022-12-12

## 2022-12-12 NOTE — TELEPHONE ENCOUNTER
Please let pt know that I am still received ostomy equipment request forms from [facility]. A while back pt stated that he did not wish to continue using L[facility] but please verity if this still the case. Thank you.

## 2022-12-29 ENCOUNTER — TELEPHONE (OUTPATIENT)
Dept: SURGERY | Age: 52
End: 2022-12-29

## 2022-12-29 ENCOUNTER — OFFICE VISIT (OUTPATIENT)
Dept: FAMILY MEDICINE CLINIC | Age: 52
End: 2022-12-29

## 2022-12-29 ENCOUNTER — TELEPHONE (OUTPATIENT)
Dept: FAMILY MEDICINE CLINIC | Age: 52
End: 2022-12-29

## 2022-12-29 VITALS
OXYGEN SATURATION: 98 % | DIASTOLIC BLOOD PRESSURE: 84 MMHG | TEMPERATURE: 97 F | HEART RATE: 85 BPM | HEIGHT: 71 IN | RESPIRATION RATE: 16 BRPM | WEIGHT: 248 LBS | SYSTOLIC BLOOD PRESSURE: 118 MMHG | BODY MASS INDEX: 34.72 KG/M2

## 2022-12-29 DIAGNOSIS — Z93.3 COLOSTOMY IN PLACE (HCC): Primary | ICD-10-CM

## 2022-12-29 DIAGNOSIS — E11.9 TYPE 2 DIABETES MELLITUS WITHOUT COMPLICATION, WITHOUT LONG-TERM CURRENT USE OF INSULIN (HCC): ICD-10-CM

## 2022-12-29 DIAGNOSIS — Z01.818 PRE-OP EXAM: ICD-10-CM

## 2022-12-29 DIAGNOSIS — Z01.818 PRE-OP EXAM: Primary | ICD-10-CM

## 2022-12-29 LAB
A/G RATIO: 1.4 (ref 1.1–2.2)
ALBUMIN SERPL-MCNC: 4.3 G/DL (ref 3.4–5)
ALP BLD-CCNC: 139 U/L (ref 40–129)
ALT SERPL-CCNC: 18 U/L (ref 10–40)
ANION GAP SERPL CALCULATED.3IONS-SCNC: 9 MMOL/L (ref 3–16)
AST SERPL-CCNC: 8 U/L (ref 15–37)
BASOPHILS ABSOLUTE: 0 K/UL (ref 0–0.2)
BASOPHILS RELATIVE PERCENT: 0.6 %
BILIRUB SERPL-MCNC: 0.7 MG/DL (ref 0–1)
BUN BLDV-MCNC: 18 MG/DL (ref 7–20)
CALCIUM SERPL-MCNC: 9.6 MG/DL (ref 8.3–10.6)
CHLORIDE BLD-SCNC: 100 MMOL/L (ref 99–110)
CO2: 26 MMOL/L (ref 21–32)
CREAT SERPL-MCNC: 1.2 MG/DL (ref 0.9–1.3)
EOSINOPHILS ABSOLUTE: 0.1 K/UL (ref 0–0.6)
EOSINOPHILS RELATIVE PERCENT: 0.9 %
GFR SERPL CREATININE-BSD FRML MDRD: >60 ML/MIN/{1.73_M2}
GLUCOSE BLD-MCNC: 219 MG/DL (ref 70–99)
HCT VFR BLD CALC: 42.5 % (ref 40.5–52.5)
HEMOGLOBIN: 14.4 G/DL (ref 13.5–17.5)
LYMPHOCYTES ABSOLUTE: 2.1 K/UL (ref 1–5.1)
LYMPHOCYTES RELATIVE PERCENT: 25.4 %
MCH RBC QN AUTO: 28.2 PG (ref 26–34)
MCHC RBC AUTO-ENTMCNC: 33.8 G/DL (ref 31–36)
MCV RBC AUTO: 83.3 FL (ref 80–100)
MONOCYTES ABSOLUTE: 0.7 K/UL (ref 0–1.3)
MONOCYTES RELATIVE PERCENT: 8.7 %
NEUTROPHILS ABSOLUTE: 5.3 K/UL (ref 1.7–7.7)
NEUTROPHILS RELATIVE PERCENT: 64.4 %
PDW BLD-RTO: 13.8 % (ref 12.4–15.4)
PLATELET # BLD: 217 K/UL (ref 135–450)
PMV BLD AUTO: 8.8 FL (ref 5–10.5)
POTASSIUM SERPL-SCNC: 4.7 MMOL/L (ref 3.5–5.1)
RBC # BLD: 5.1 M/UL (ref 4.2–5.9)
SODIUM BLD-SCNC: 135 MMOL/L (ref 136–145)
TOTAL PROTEIN: 7.3 G/DL (ref 6.4–8.2)
WBC # BLD: 8.2 K/UL (ref 4–11)

## 2022-12-29 RX ORDER — POLYETHYLENE GLYCOL 3350, SODIUM CHLORIDE, SODIUM BICARBONATE, POTASSIUM CHLORIDE 420; 11.2; 5.72; 1.48 G/4L; G/4L; G/4L; G/4L
POWDER, FOR SOLUTION ORAL
Qty: 1 EACH | Refills: 0 | Status: SHIPPED | OUTPATIENT
Start: 2022-12-29

## 2022-12-29 SDOH — ECONOMIC STABILITY: FOOD INSECURITY: WITHIN THE PAST 12 MONTHS, THE FOOD YOU BOUGHT JUST DIDN'T LAST AND YOU DIDN'T HAVE MONEY TO GET MORE.: NEVER TRUE

## 2022-12-29 SDOH — ECONOMIC STABILITY: FOOD INSECURITY: WITHIN THE PAST 12 MONTHS, YOU WORRIED THAT YOUR FOOD WOULD RUN OUT BEFORE YOU GOT MONEY TO BUY MORE.: NEVER TRUE

## 2022-12-29 ASSESSMENT — PATIENT HEALTH QUESTIONNAIRE - PHQ9
2. FEELING DOWN, DEPRESSED OR HOPELESS: 0
SUM OF ALL RESPONSES TO PHQ QUESTIONS 1-9: 0
SUM OF ALL RESPONSES TO PHQ9 QUESTIONS 1 & 2: 0
SUM OF ALL RESPONSES TO PHQ QUESTIONS 1-9: 0
1. LITTLE INTEREST OR PLEASURE IN DOING THINGS: 0

## 2022-12-29 ASSESSMENT — ENCOUNTER SYMPTOMS
RESPIRATORY NEGATIVE: 1
TROUBLE SWALLOWING: 0
SORE THROAT: 0

## 2022-12-29 ASSESSMENT — SOCIAL DETERMINANTS OF HEALTH (SDOH): HOW HARD IS IT FOR YOU TO PAY FOR THE VERY BASICS LIKE FOOD, HOUSING, MEDICAL CARE, AND HEATING?: NOT HARD AT ALL

## 2022-12-29 NOTE — TELEPHONE ENCOUNTER
Sent new RX request to [place] [id number] for approval (golytely)    Patient is seeing pcp office today for pre-op

## 2022-12-29 NOTE — PROGRESS NOTES
Subjective:      Patient ID: Sylvia Dasilva is a 47 y.o. y.o. male. Here for pre-op for colonoscope and rectal dilation, ofr possible re-anastomosis of diverting colonoscopy  Had necrotizing fasciitis. 2018  Required diverting colostomy. Has been generally well and is now preparing for further surgery. Further surgery was delayed by Covid. Meds and history updated.   HPI      Chief Complaint   Patient presents with    Pre-op Exam     Colonoscopy via rectal & anal dilation, 1/3/23, Dr. Lance Ritchie       No Known Allergies    Past Medical History:   Diagnosis Date    Allergic rhinitis     Atrial fibrillation ([address]     Hyperlipidemia     Hypertension     Necrotizing fasciitis ([address]     2018 resulted in colostomy    Type 2 diabetes mellitus without complication ([facility]     Wears glasses        Past Surgical History:   Procedure Laterality Date    COLONOSCOPY  06/05/2018    COLOSTOMY      result of necrotizing fasciotomy    FOOT DEBRIDEMENT Right 12/30/2020    RIGHT 2ND TOE INCISION AND DRAINAGE BELOW FASCIA performed by Simran Orona DPM at [facility] Left 9/12/2022    LEFT FOOT DEBRIDEMENT INCISION AND DRAINAGE, AMPUTATION OF FOURTH AND FIFTH RAYS performed by Simran Orona DPM at [facility] Left 9/15/2022    INCISION AND DRAINAGE WITH DELAYED PRIMARY CLOSURE  LEFT FOOT performed by Chavez Woo DPM at [facility]  06/05/2018-06/08/2018    Removal of infected tissue and flesh from anus scrotum and surrounding area    KNEE ARTHROSCOPY Left     SKIN GRAFT  07/01/2018    TOE AMPUTATION Right 12/30/2020    PARTIAL AMPUTATION OF RIGHT 2ND TOE performed by Simran Orona DPM at [address] History     Socioeconomic History    Marital status: Single     Spouse name: Not on file    Number of children: Not on file    Years of education: Not on file    Highest education level: Not on file   Occupational History    Not on file   Tobacco Use    Smoking status: Never    Smokeless tobacco: Never   Vaping Use    Vaping Use: Never used   Substance and Sexual Activity    Alcohol use: Yes     Comment: monthly 3 beers    Drug use: No    Sexual activity: Yes     Partners: Female   Other Topics Concern    Not on file   Social History Narrative    Not on file     Social Determinants of Health     Financial Resource Strain: Low Risk     Difficulty of Paying Living Expenses: Not hard at all   Food Insecurity: No Food Insecurity    Worried About Running Out of Food in the Last Year: Never true    Ran Out of Food in the Last Year: Never true   Transportation Needs: Not on file   Physical Activity: Not on file   Stress: Not on file   Social Connections: Not on file   Intimate Partner Violence: Not on file   Housing Stability: Not on file       Family History   Problem Relation Age of Onset    Diabetes Father        Vitals:    12/29/22 1502   BP: 118/84   Pulse: 85   Resp: 16   Temp: 97 °F (36.1 °C)   SpO2: 98%       Wt Readings from Last 3 Encounters:   12/29/22 248 lb (112.5 kg)   09/16/22 244 lb 3.2 oz (110.8 kg)   09/09/22 241 lb (109.3 kg)       Review of Systems   Constitutional:  Negative for activity change and unexpected weight change. HENT:  Negative for sore throat and trouble swallowing. Respiratory: Negative. Cardiovascular: Negative. Prior hx a-fib,  resolved,  on metoprolol and lisinopril   Gastrointestinal:         Ostomy functions well. 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 Endocrine:        Metformin and Jardiance. Has neuropathy and impaired sensation feet. Walks regularly. No hypoglycemia   Genitourinary:  Negative for dysuria, hematuria and urgency. Neurological:  Positive for numbness. Negative for seizures, facial asymmetry, speech difficulty and headaches. Hematological:  Does not bruise/bleed easily. Psychiatric/Behavioral: Negative. Negative for self-injury and suicidal ideas.       Objective:   Physical Exam  Constitutional:       Appearance: He is not ill-appearing. HENT:      Right Ear: Tympanic membrane normal.      Left Ear: Tympanic membrane normal.      Mouth/Throat:      Mouth: Mucous membranes are moist.      Pharynx: Oropharynx is clear. Eyes:      General: No scleral icterus. Extraocular Movements: Extraocular movements intact. Cardiovascular:      Rate and Rhythm: Normal rate and regular rhythm. Heart sounds: Normal heart sounds. Pulmonary:      Effort: Pulmonary effort is normal.      Breath sounds: Normal breath sounds. Abdominal:      Palpations: Abdomen is soft. Comments: LLQ ostomy  Muscular weakness and hernias evident    Musculoskeletal:         General: Normal range of motion. Right lower leg: No edema. Left lower leg: No edema. Lymphadenopathy:      Cervical: No cervical adenopathy. Skin:     General: Skin is warm and dry. Neurological:      Mental Status: He is alert and oriented to person, place, and time. Psychiatric:         Mood and Affect: Mood normal.         Behavior: Behavior normal.         Thought Content: Thought content normal.       Assessment:       Diagnosis Orders   1. Pre-op exam  EKG 12 Lead      Colostomy status  Diabetes, controlled  Prior history A. fib, converted, no recurrence          Plan:   Patient has been well. He is functioning normal level. He appears well, robust, and a good candidate for general surgery. Labs are pending. Labs are nonfasting. Medically cleared for surgery. EKG ok    Labs pending. Medically stable and OK for the surgery.       Current Outpatient Medications   Medication Sig Dispense Refill    JARDIANCE 10 MG tablet TAKE 1 TABLET BY MOUTH IN THE MORNING 30 tablet 2    lisinopril (PRINIVIL;ZESTRIL) 30 MG tablet Take 1 tablet by mouth once daily 90 tablet 1    metFORMIN (GLUCOPHAGE) 1000 MG tablet TAKE 1 TABLET BY MOUTH TWICE DAILY WITH MEALS 180 tablet 1    metoprolol tartrate (LOPRESSOR) 50 MG tablet Take 1 tablet by mouth twice daily 180 tablet 1    polyethylene glycol-electrolytes (NULYTELY) 420 g solution TAKE AS DIRECTED DAY PRIOR TO COLONOSCOPY (Patient not taking: Reported on 12/29/2022) 1 each 0    polyethylene glycol-electrolytes (NULYTELY) 420 g solution TAKE AS DIRECTED DAY PRIOR TO COLONOSCOPY (Patient not taking: Reported on 12/29/2022) 1 each 0    gabapentin (NEURONTIN) 300 MG capsule Take 1 capsule by mouth nightly for 30 days. Intended supply: 30 days (Patient not taking: Reported on 12/29/2022) 30 capsule 0     No current facility-administered medications for this visit.

## 2022-12-29 NOTE — TELEPHONE ENCOUNTER
Pt called in saying he had received Briana's vm left in regards to his upcoming surgery. He has a couple of follow up questions:    1) Is the Golytely prep still available at his pharmacy to be picked up since the order was placed a while ago? 2) does he need any other preop things besides his labs and EKG? If he cannot get into his PCP, is he able to get the EKG done at urgent care? Please advise.  The best call back number is [phone]

## 2022-12-29 NOTE — TELEPHONE ENCOUNTER
----- Message from Flash sent at 12/29/2022 11:57 AM EST -----  Subject: Appointment Request    Reason for Call: Established Patient Appointment needed: Routine Pre-Op    QUESTIONS    Reason for appointment request? Available appointments did not meet   patient need     Additional Information for Provider? Patient states he has surgery   scheduled on 1/3 for colonoscopy via rectal and anal dilation and needs a   pre-op clearance appointment today or tomorrow. He is requesting a call   back.   ---------------------------------------------------------------------------  --------------  Christiano De Leon [facility]  [phone]; OK to leave message on voicemail  ---------------------------------------------------------------------------  --------------  SCRIPT ANSWERS  MARY Screen: Jonathan Weiss

## 2022-12-29 NOTE — PATIENT INSTRUCTIONS
OK for the surgery    No Diabetes meds the morning of the procedure    After surgery continue routine meds and follow ups.

## 2022-12-29 NOTE — PROGRESS NOTES
Place patient label inside box (if no patient label, complete below)  Name:  :    MR#:   Brad Snowden / PROCEDURE  I (we), Adebayo Aelx (Patient Name) authorize Romero Osborne MD (Provider / [facility]) and/or such assistants as may be selected by him/her, to perform the following operation/procedure(s): EXAM UNDER ANESTHESIA; COLONOSCOPY VIA STOMA AND COLONOSCOPY VIA RECTUM; ANAL DILATION, POSSIBLE EXCISION OF SCAR AND ANAL ADVANCEMENT FLAP       Note: If unable to obtain consent prior to an emergent procedure, document the emergent reason in the medical record. This procedure has been explained to my (our) satisfaction and included in the explanation was: The intended benefit, nature, and extent of the procedure to be performed; The significant risks involved and the probability of success; Alternative procedures and methods of treatment; The dangers and probable consequences of such alternatives (including no procedure or treatment); The expected consequences of the procedure on my future health; Whether other qualified individuals would be performing important surgical tasks and/or whether  would be present to advise or support the procedure. I (we) understand that there are other risks of infection and other serious complications in the pre-operative/procedural and postoperative/procedural stages of my (our) care. I (we) have asked all of the questions which I (we) thought were important in deciding whether or not to undergo treatment or diagnosis. These questions have been answered to my (our) satisfaction. I (we) understand that no assurance can be given that the procedure will be a success, and no guarantee or warranty of success has been given to me (us).     It has been explained to me (us) that during the course of the operation/procedure, unforeseen conditions may be revealed that necessitate extension of the original procedure(s) or different procedure(s) than those set forth in Paragraph 1. I (we) authorize and request that the above-named physician, his/her assistants or his/her designees, perform procedures as necessary and desirable if deemed to be in my (our) best interest.     Revised 8/2/2021                                                                          Page 1 of 2       I acknowledge that health care personnel may be observing this procedure for the purpose of medical education or other specified purposes as may be necessary as requested and/or approved by my (our) physician. I (we) consent to the disposal by the hospital Pathologist of the removed tissue, parts or organs in accordance with hospital policy. I do ____ do not ____ consent to the use of a local infiltration pain blocking agent that will be used by my provider/surgical provider to help alleviate pain during my procedure. I do ____ do not ____ consent to an emergent blood transfusion in the case of a life-threatening situation that requires blood components to be administered. This consent is valid for 24 hours from the beginning of the procedure. This patient does ____ or does not ____ currently have a DNR status/order. If DNR order is in place, obtain Addendum to the Surgical Consent for ALL Patients with a DNR Order to address loly-operative status for limited intervention or DNR suspension.      I have read and fully understand the above Consent for Operation/Procedure and that all blanks were completed before I signed the consent.   _____________________________       _____________________      ____/____am/pm  Signature of Patient or legal representative      Printed Name / Relationship            Date / Time   ____________________________       _____________________      ____/____am/pm  Witness to Signature                                    Printed Name                    Date / Time    If patient is unable to sign or is a minor, complete the following)  Patient is a minor, ____ years of age, or unable to sign because:   ______________________________________________________________________________________________    If a phone consent is obtained, consent will be documented by using two health care professionals, each affirming that the consenting party has no questions and gives consent for the procedure discussed with the physician/provider.   _____________________          ____________________       _____/_____am/pm   2nd witness to phone consent        Printed name           Date / Time    Informed Consent:  I have provided the explanation described above in section 1 to the patient and/or legal representative.  I have provided the patient and/or legal representative with an opportunity to ask any questions about the proposed operation/procedure.   ___________________________          ____________________         ____/____am/pm  Provider / Proceduralist                            Printed name            Date / Time  Revised 8/2/2021                                                                      Page 2 of 2

## 2022-12-29 NOTE — TELEPHONE ENCOUNTER
Spoke with patient. Possible opening with Dr. Alice Pardo.  Will confirm this and call back [phone]

## 2022-12-29 NOTE — PROGRESS NOTES
H&P being done 12/29/2022 with Dr. Ty Shepherd 320pm along with pre op labs and EKG patient confirmed. -db     12/30/22 @ 1037 Abnormal chemistry  routed to surgeon office  Clarissa Covarrubias    12/30/22 @ [phone] with Thanh Wallace at PCP office [phone] and she is sending message to Dr Armen Santiago to sign H&P  Clarissa Covarrubias

## 2022-12-29 NOTE — PROGRESS NOTES
[facility] PRE-SURGICAL TESTING INSTRUCTIONS                      PRIOR TO PROCEDURE DATE:    1. PLEASE FOLLOW ANY INSTRUCTIONS GIVEN TO YOU PER YOUR SURGEON. 2. Arrange for someone to drive you home and be with you for the first 24 hours after discharge for your safety after your procedure for which you received sedation. Ensure it is someone we can share information with regarding your discharge. NOTE: At this time ONLY 2 ADULTS may accompany you   One person ENCOURAGED to stay at hospital entire time if outpatient surgery      3. You must contact your surgeon for instructions IF:  You are taking any blood thinners, aspirin, anti-inflammatory or vitamins. There is a change in your physical condition such as a cold, fever, rash, cuts, sores, or any other infection, especially near your surgical site. 4. Do not drink alcohol the day before or day of your procedure. Do not use any recreational marijuana at least 24 hours or street drugs (heroin, cocaine) at minimum 5 days prior to your procedure. 5. A Pre-Surgical History and Physical MUST be completed WITHIN 30 DAYS OR LESS prior to your procedure. by your Physician or an Urgent Care        THE DAY OF YOUR PROCEDURE:  1. Follow instructions for ARRIVAL TIME as DIRECTED BY YOUR SURGEON. 2. Enter the MAIN entrance from [address] and follow the signs to the free Parking [employer] or [employer] (offered free of charge 7 am-5pm). 3. Enter the Main Entrance of the hospital (do not enter from the lower level of the parking garage). Upon entrance, check in with the  at the surgical information desk on your LEFT. Bring your insurance card and photo ID to register      4. DO NOT EAT ANYTHING 8 hours prior to arrival for surgery. You may have up to 8 ounces of water 4 hours prior to your arrival for surgery.    NOTE: ALL Gastric, Bariatric & Bowel surgery patients - you MUST follow your surgeon's instructions regarding eating/ drinking as you will have very specific instructions to follow. If you did not receive these, call your surgeon's office immediately. 5. MEDICATIONS:  Take the following medications with a SMALL sip of water: metoprolol  Use your usual dose of inhalers the morning of surgery. BRING your rescue inhaler with you to hospital.   Anesthesia does NOT want you to take insulin the morning of surgery. They will control your blood sugar while you are at the hospital. Please contact your ordering physician for instructions regarding your insulin the night before your procedure. If you have an insulin pump, please keep it set on basal rate. Bariatric patient's call your surgeon if on diabetic medications as some may need to be stopped 1 week prior to surgery    6. Do not swallow additional water when brushing teeth. No gum, candy, mints, or ice chips. Refrain from smoking or at least decrease the amount on day of surgery. 7. Morning of surgery:   Take a shower with an antibacterial soap (i.e., Safeguard or Dial) OR your physician may have instructed you to use Hibiclens. Dress in loose, comfortable clothing appropriate for redressing after your procedure. Do not wear jewelry (including body piercings), make-up (especially NO eye make-up), fingernail polish (NO toenail polish if foot/leg surgery), lotion, powders, or metal hairclips. Do not shave or wax for 72 hours prior to procedure near your operative site. Shaving with a razor can irritate your skin and make it easier to develop an infection. On the day of your procedure, any hair that needs to be removed near the surgical site will be 'clipped' by a healthcare worker using a special clipper designed to avoid skin irritation. 8. Dentures, glasses, or contacts will need to be removed before your procedure. Bring cases for your glasses, contacts, dentures, or hearing aids to protect them while you are in surgery.       9. If you use a CPAP, please bring it with you on the day of your procedure. 10. We recommend that valuable personal belongings such as cash, cell phones, e-tablets, or jewelry, be left at home during your stay. The hospital will not be responsible for valuables that are not secured in the hospital safe. However, if your insurance requires a co-pay, you may want to bring a method of payment, i.e., Check or credit card, if you wish to pay your co-pay the day of surgery. 11. If you are to stay overnight, you may bring a bag with personal items. Please have any large items you may need brought in by your family after your arrival to your hospital room. 12. If you have a Living Will or Durable Power of , please bring a copy on the day of your procedure. How we keep you safe and work to prevent surgical site infections:   1. Health care workers should always check your ID bracelet to verify your name and birth date. You will be asked many times to state your name, date of birth, and allergies. 2. Health care workers should always clean their hands with soap or alcohol gel before providing care to you. It is okay to ask anyone if they cleaned their hands before they touch you. 3. You will be actively involved in verifying the type of procedure you are having and ensuring the correct surgical site. This will be confirmed multiple times prior to your procedure. Do NOT марина your surgery site UNLESS instructed to by your surgeon. 4. When you are in the operating room, your surgical site will be cleansed with a special soap, and in most cases, you will be given an antibiotic before the surgery begins. What to expect AFTER your procedure? 1. Immediately following your procedure, your will be taken to the PACU for the first phase of your recovery. Your nurse will help you recover from any potential side effects of anesthesia, such as extreme drowsiness, changes in your vital signs or breathing patterns.  Nausea, headache, muscle aches, or sore throat may also occur after anesthesia. Your nurse will help you manage these potential side effects. 2. For comfort and safety, arrange to have someone at home with you for the first 24 hours after discharge. 3. You and your family will be given written instructions about your diet, activity, dressing care, medications, and return visits. 4. Once at home, should issues with nausea, pain, or bleeding occur, or should you notice any signs of infection, you should call your surgeon. 5. Always clean your hands before and after caring for your wound. Do not let your family touch your surgery site without cleaning their hands. 6. Narcotic pain medications can cause significant constipation. You may want to add a stool softener to your postoperative medication schedule or speak to your surgeon on how best to manage this SIDE EFFECT. SPECIAL INSTRUCTIONS patient acknowledges understanding of pre op instructions. Thank you for allowing us to care for you. We strive to exceed your expectations in the delivery of care and service provided to you and your family. If you need to contact the [place] [address] staff for any reason, please call us at [phone]    Instructions reviewed with patient during preadmission testing phone interview.   Shirlene Goodrich RN.12/29/2022 .2:33 PM      ADDITIONAL EDUCATIONAL INFORMATION REVIEWED PER PHONE WITH YOU AND/OR YOUR FAMILY:    Yes Antibacterial Soap

## 2022-12-29 NOTE — TELEPHONE ENCOUNTER
Tomasa Ignacio called pt  He is aware of apt & will come early for labs. Called & spoke to Dr. Jody Palacio office  Pt will need: CBC & CMP and an EKG  Orders placed.

## 2022-12-29 NOTE — TELEPHONE ENCOUNTER
I have placed a reminder call to patient for upcoming procedure. Did you speak directly to patient or leave a voicemail? Voicemail    Prep? NPO 12AM  Colonoscopy prep    Must have a  that is over the age of 25. Must be a friend or family member that can be responsible for signing them out after surgery.     Arrive at the main entrance [facility] at 5:30AM

## 2022-12-29 NOTE — TELEPHONE ENCOUNTER
LM for pt    Apt made  Future Appointments   Date Time Provider Myron Call   12/29/2022  3:20 PM DO VAHID Yadav [place] - DYD   1/27/2023  9:00 AM Jonn Kenny MD E COLON RECT [facility]

## 2022-12-30 ENCOUNTER — TELEPHONE (OUTPATIENT)
Dept: FAMILY MEDICINE CLINIC | Age: 52
End: 2022-12-30

## 2022-12-30 ENCOUNTER — ANESTHESIA EVENT (OUTPATIENT)
Dept: OPERATING ROOM | Age: 52
End: 2022-12-30
Payer: MEDICAID

## 2022-12-30 DIAGNOSIS — E11.9 TYPE 2 DIABETES MELLITUS WITHOUT COMPLICATION, WITHOUT LONG-TERM CURRENT USE OF INSULIN (HCC): ICD-10-CM

## 2022-12-30 DIAGNOSIS — E11.9 TYPE 2 DIABETES MELLITUS WITHOUT COMPLICATION, WITHOUT LONG-TERM CURRENT USE OF INSULIN (HCC): Primary | ICD-10-CM

## 2022-12-30 LAB
ESTIMATED AVERAGE GLUCOSE: 177.2 MG/DL
HBA1C MFR BLD: 7.8 %

## 2022-12-30 NOTE — TELEPHONE ENCOUNTER
Dr. Cristina Cook completed pre-op in Epic. He has also ordered an A1c to be added to blood work from yesterday. Called main lab - they will pull the A1c & get it ran. Called Dr. Corinne Holden office to let them know.   They believe the PAT was the one that called us but states they see it in [facility] and are all set

## 2022-12-30 NOTE — TELEPHONE ENCOUNTER
Dr Frye Leisure office called because the pre op visit from 12/29 is not signed and pt is scheduled for procedure on 1/3/23.     Please sign and fax to

## 2023-01-03 ENCOUNTER — HOSPITAL ENCOUNTER (OUTPATIENT)
Age: 53
Setting detail: OBSERVATION
Discharge: HOME | End: 2023-01-04
Attending: SURGERY | Admitting: SURGERY
Payer: MEDICAID

## 2023-01-03 ENCOUNTER — ANESTHESIA (OUTPATIENT)
Dept: OPERATING ROOM | Age: 53
End: 2023-01-03
Payer: MEDICAID

## 2023-01-03 DIAGNOSIS — K62.4 ANAL STENOSIS: Primary | ICD-10-CM

## 2023-01-03 LAB
GLUCOSE BLD-MCNC: 205 MG/DL (ref 70–99)
GLUCOSE BLD-MCNC: 206 MG/DL (ref 70–99)
GLUCOSE BLD-MCNC: 216 MG/DL (ref 70–99)
GLUCOSE BLD-MCNC: 247 MG/DL (ref 70–99)
PERFORMED ON: ABNORMAL

## 2023-01-03 PROCEDURE — 6360000002 HC RX W HCPCS: Performed by: NURSE ANESTHETIST, CERTIFIED REGISTERED

## 2023-01-03 PROCEDURE — 2500000003 HC RX 250 WO HCPCS: Performed by: SURGERY

## 2023-01-03 PROCEDURE — 96372 THER/PROPH/DIAG INJ SC/IM: CPT

## 2023-01-03 PROCEDURE — 3700000001 HC ADD 15 MINUTES (ANESTHESIA): Performed by: SURGERY

## 2023-01-03 PROCEDURE — 2500000003 HC RX 250 WO HCPCS: Performed by: NURSE ANESTHETIST, CERTIFIED REGISTERED

## 2023-01-03 PROCEDURE — 6360000002 HC RX W HCPCS: Performed by: STUDENT IN AN ORGANIZED HEALTH CARE EDUCATION/TRAINING PROGRAM

## 2023-01-03 PROCEDURE — 6370000000 HC RX 637 (ALT 250 FOR IP): Performed by: STUDENT IN AN ORGANIZED HEALTH CARE EDUCATION/TRAINING PROGRAM

## 2023-01-03 PROCEDURE — 94150 VITAL CAPACITY TEST: CPT

## 2023-01-03 PROCEDURE — 6360000002 HC RX W HCPCS: Performed by: SURGERY

## 2023-01-03 PROCEDURE — 2580000003 HC RX 258: Performed by: SURGERY

## 2023-01-03 PROCEDURE — A4217 STERILE WATER/SALINE, 500 ML: HCPCS | Performed by: SURGERY

## 2023-01-03 PROCEDURE — 3600000014 HC SURGERY LEVEL 4 ADDTL 15MIN: Performed by: SURGERY

## 2023-01-03 PROCEDURE — 6360000002 HC RX W HCPCS: Performed by: FAMILY MEDICINE

## 2023-01-03 PROCEDURE — 7100000000 HC PACU RECOVERY - FIRST 15 MIN: Performed by: SURGERY

## 2023-01-03 PROCEDURE — G0378 HOSPITAL OBSERVATION PER HR: HCPCS

## 2023-01-03 PROCEDURE — 3600000004 HC SURGERY LEVEL 4 BASE: Performed by: SURGERY

## 2023-01-03 PROCEDURE — 3700000000 HC ANESTHESIA ATTENDED CARE: Performed by: SURGERY

## 2023-01-03 PROCEDURE — 7100000001 HC PACU RECOVERY - ADDTL 15 MIN: Performed by: SURGERY

## 2023-01-03 PROCEDURE — 94799 UNLISTED PULMONARY SVC/PX: CPT

## 2023-01-03 PROCEDURE — 88305 TISSUE EXAM BY PATHOLOGIST: CPT

## 2023-01-03 PROCEDURE — 2580000003 HC RX 258: Performed by: STUDENT IN AN ORGANIZED HEALTH CARE EDUCATION/TRAINING PROGRAM

## 2023-01-03 PROCEDURE — 2709999900 HC NON-CHARGEABLE SUPPLY: Performed by: SURGERY

## 2023-01-03 PROCEDURE — 2580000003 HC RX 258: Performed by: NURSE ANESTHETIST, CERTIFIED REGISTERED

## 2023-01-03 RX ORDER — FENTANYL CITRATE 50 UG/ML
25 INJECTION, SOLUTION INTRAMUSCULAR; INTRAVENOUS EVERY 5 MIN PRN
Status: DISCONTINUED | OUTPATIENT
Start: 2023-01-03 | End: 2023-01-03

## 2023-01-03 RX ORDER — ONDANSETRON 2 MG/ML
INJECTION INTRAMUSCULAR; INTRAVENOUS PRN
Status: DISCONTINUED | OUTPATIENT
Start: 2023-01-03 | End: 2023-01-03 | Stop reason: SDUPTHER

## 2023-01-03 RX ORDER — PROPOFOL 10 MG/ML
INJECTION, EMULSION INTRAVENOUS PRN
Status: DISCONTINUED | OUTPATIENT
Start: 2023-01-03 | End: 2023-01-03 | Stop reason: SDUPTHER

## 2023-01-03 RX ORDER — SODIUM CHLORIDE, SODIUM LACTATE, POTASSIUM CHLORIDE, CALCIUM CHLORIDE 600; 310; 30; 20 MG/100ML; MG/100ML; MG/100ML; MG/100ML
INJECTION, SOLUTION INTRAVENOUS CONTINUOUS
Status: DISCONTINUED | OUTPATIENT
Start: 2023-01-03 | End: 2023-01-03 | Stop reason: HOSPADM

## 2023-01-03 RX ORDER — DEXTROSE MONOHYDRATE 100 MG/ML
INJECTION, SOLUTION INTRAVENOUS CONTINUOUS PRN
Status: DISCONTINUED | OUTPATIENT
Start: 2023-01-03 | End: 2023-01-04 | Stop reason: HOSPADM

## 2023-01-03 RX ORDER — INSULIN LISPRO 100 [IU]/ML
0-4 INJECTION, SOLUTION INTRAVENOUS; SUBCUTANEOUS
Status: DISCONTINUED | OUTPATIENT
Start: 2023-01-03 | End: 2023-01-04 | Stop reason: HOSPADM

## 2023-01-03 RX ORDER — SODIUM CHLORIDE 0.9 % (FLUSH) 0.9 %
5-40 SYRINGE (ML) INJECTION PRN
Status: DISCONTINUED | OUTPATIENT
Start: 2023-01-03 | End: 2023-01-03 | Stop reason: HOSPADM

## 2023-01-03 RX ORDER — OXYCODONE HYDROCHLORIDE 5 MG/1
10 TABLET ORAL EVERY 4 HOURS PRN
Status: DISCONTINUED | OUTPATIENT
Start: 2023-01-03 | End: 2023-01-04 | Stop reason: HOSPADM

## 2023-01-03 RX ORDER — ACETAMINOPHEN 500 MG
1000 TABLET ORAL EVERY 8 HOURS SCHEDULED
Status: DISCONTINUED | OUTPATIENT
Start: 2023-01-03 | End: 2023-01-04 | Stop reason: HOSPADM

## 2023-01-03 RX ORDER — ROCURONIUM BROMIDE 10 MG/ML
INJECTION, SOLUTION INTRAVENOUS PRN
Status: DISCONTINUED | OUTPATIENT
Start: 2023-01-03 | End: 2023-01-03 | Stop reason: SDUPTHER

## 2023-01-03 RX ORDER — MAGNESIUM HYDROXIDE 1200 MG/15ML
LIQUID ORAL CONTINUOUS PRN
Status: COMPLETED | OUTPATIENT
Start: 2023-01-03 | End: 2023-01-03

## 2023-01-03 RX ORDER — IBUPROFEN 200 MG
400 TABLET ORAL EVERY 6 HOURS
Status: DISCONTINUED | OUTPATIENT
Start: 2023-01-03 | End: 2023-01-04 | Stop reason: HOSPADM

## 2023-01-03 RX ORDER — SODIUM CHLORIDE 0.9 % (FLUSH) 0.9 %
5-40 SYRINGE (ML) INJECTION PRN
Status: DISCONTINUED | OUTPATIENT
Start: 2023-01-03 | End: 2023-01-04 | Stop reason: HOSPADM

## 2023-01-03 RX ORDER — GLUCAGON 1 MG/ML
1 KIT INJECTION PRN
Status: DISCONTINUED | OUTPATIENT
Start: 2023-01-03 | End: 2023-01-04 | Stop reason: HOSPADM

## 2023-01-03 RX ORDER — SODIUM CHLORIDE 9 MG/ML
INJECTION, SOLUTION INTRAVENOUS CONTINUOUS
Status: DISCONTINUED | OUTPATIENT
Start: 2023-01-03 | End: 2023-01-03

## 2023-01-03 RX ORDER — OXYCODONE HYDROCHLORIDE 5 MG/1
5 TABLET ORAL EVERY 6 HOURS PRN
Qty: 28 TABLET | Refills: 0 | Status: SHIPPED | OUTPATIENT
Start: 2023-01-03 | End: 2023-01-08

## 2023-01-03 RX ORDER — MIDAZOLAM HYDROCHLORIDE 1 MG/ML
INJECTION INTRAMUSCULAR; INTRAVENOUS PRN
Status: DISCONTINUED | OUTPATIENT
Start: 2023-01-03 | End: 2023-01-03 | Stop reason: SDUPTHER

## 2023-01-03 RX ORDER — GINSENG 100 MG
CAPSULE ORAL 2 TIMES DAILY
Status: DISCONTINUED | OUTPATIENT
Start: 2023-01-03 | End: 2023-01-04 | Stop reason: HOSPADM

## 2023-01-03 RX ORDER — SODIUM CHLORIDE 0.9 % (FLUSH) 0.9 %
5-40 SYRINGE (ML) INJECTION EVERY 12 HOURS SCHEDULED
Status: DISCONTINUED | OUTPATIENT
Start: 2023-01-03 | End: 2023-01-03 | Stop reason: HOSPADM

## 2023-01-03 RX ORDER — MEPERIDINE HYDROCHLORIDE 25 MG/ML
12.5 INJECTION INTRAMUSCULAR; INTRAVENOUS; SUBCUTANEOUS EVERY 5 MIN PRN
Status: DISCONTINUED | OUTPATIENT
Start: 2023-01-03 | End: 2023-01-03

## 2023-01-03 RX ORDER — METOPROLOL TARTRATE 50 MG/1
50 TABLET, FILM COATED ORAL 2 TIMES DAILY
Status: DISCONTINUED | OUTPATIENT
Start: 2023-01-03 | End: 2023-01-04 | Stop reason: HOSPADM

## 2023-01-03 RX ORDER — ONDANSETRON 2 MG/ML
4 INJECTION INTRAMUSCULAR; INTRAVENOUS EVERY 6 HOURS PRN
Status: DISCONTINUED | OUTPATIENT
Start: 2023-01-03 | End: 2023-01-04 | Stop reason: HOSPADM

## 2023-01-03 RX ORDER — SODIUM CHLORIDE 9 MG/ML
25 INJECTION, SOLUTION INTRAVENOUS PRN
Status: DISCONTINUED | OUTPATIENT
Start: 2023-01-03 | End: 2023-01-03

## 2023-01-03 RX ORDER — SODIUM CHLORIDE 0.9 % (FLUSH) 0.9 %
5-40 SYRINGE (ML) INJECTION PRN
Status: DISCONTINUED | OUTPATIENT
Start: 2023-01-03 | End: 2023-01-03

## 2023-01-03 RX ORDER — SODIUM CHLORIDE 0.9 % (FLUSH) 0.9 %
5-40 SYRINGE (ML) INJECTION EVERY 12 HOURS SCHEDULED
Status: DISCONTINUED | OUTPATIENT
Start: 2023-01-03 | End: 2023-01-03

## 2023-01-03 RX ORDER — SODIUM CHLORIDE, SODIUM LACTATE, POTASSIUM CHLORIDE, CALCIUM CHLORIDE 600; 310; 30; 20 MG/100ML; MG/100ML; MG/100ML; MG/100ML
INJECTION, SOLUTION INTRAVENOUS CONTINUOUS PRN
Status: DISCONTINUED | OUTPATIENT
Start: 2023-01-03 | End: 2023-01-03 | Stop reason: SDUPTHER

## 2023-01-03 RX ORDER — DIAZEPAM 5 MG/1
5 TABLET ORAL EVERY 8 HOURS PRN
Qty: 15 TABLET | Refills: 0 | Status: SHIPPED | OUTPATIENT
Start: 2023-01-03 | End: 2023-01-08

## 2023-01-03 RX ORDER — PHENYLEPHRINE HYDROCHLORIDE 10 MG/ML
INJECTION INTRAVENOUS PRN
Status: DISCONTINUED | OUTPATIENT
Start: 2023-01-03 | End: 2023-01-03 | Stop reason: SDUPTHER

## 2023-01-03 RX ORDER — INSULIN LISPRO 100 [IU]/ML
0-4 INJECTION, SOLUTION INTRAVENOUS; SUBCUTANEOUS NIGHTLY
Status: DISCONTINUED | OUTPATIENT
Start: 2023-01-03 | End: 2023-01-04 | Stop reason: HOSPADM

## 2023-01-03 RX ORDER — LIDOCAINE HYDROCHLORIDE 10 MG/ML
1 INJECTION, SOLUTION EPIDURAL; INFILTRATION; INTRACAUDAL; PERINEURAL
Status: DISCONTINUED | OUTPATIENT
Start: 2023-01-03 | End: 2023-01-03 | Stop reason: HOSPADM

## 2023-01-03 RX ORDER — OXYCODONE HYDROCHLORIDE 5 MG/1
5 TABLET ORAL EVERY 4 HOURS PRN
Status: DISCONTINUED | OUTPATIENT
Start: 2023-01-03 | End: 2023-01-04 | Stop reason: HOSPADM

## 2023-01-03 RX ORDER — DOCUSATE SODIUM 100 MG/1
100 CAPSULE, LIQUID FILLED ORAL 2 TIMES DAILY PRN
Qty: 14 CAPSULE | Refills: 0 | Status: SHIPPED | OUTPATIENT
Start: 2023-01-03 | End: 2023-01-10

## 2023-01-03 RX ORDER — ONDANSETRON 2 MG/ML
4 INJECTION INTRAMUSCULAR; INTRAVENOUS
Status: COMPLETED | OUTPATIENT
Start: 2023-01-03 | End: 2023-01-03

## 2023-01-03 RX ORDER — DIPHENHYDRAMINE HYDROCHLORIDE 50 MG/ML
12.5 INJECTION INTRAMUSCULAR; INTRAVENOUS
Status: DISCONTINUED | OUTPATIENT
Start: 2023-01-03 | End: 2023-01-03

## 2023-01-03 RX ORDER — BUPIVACAINE HYDROCHLORIDE 5 MG/ML
INJECTION, SOLUTION EPIDURAL; INTRACAUDAL PRN
Status: DISCONTINUED | OUTPATIENT
Start: 2023-01-03 | End: 2023-01-03 | Stop reason: ALTCHOICE

## 2023-01-03 RX ORDER — SODIUM CHLORIDE 9 MG/ML
INJECTION, SOLUTION INTRAVENOUS PRN
Status: DISCONTINUED | OUTPATIENT
Start: 2023-01-03 | End: 2023-01-03 | Stop reason: HOSPADM

## 2023-01-03 RX ORDER — LORAZEPAM 2 MG/ML
0.5 INJECTION INTRAMUSCULAR
Status: DISCONTINUED | OUTPATIENT
Start: 2023-01-03 | End: 2023-01-03

## 2023-01-03 RX ORDER — SODIUM CHLORIDE 0.9 % (FLUSH) 0.9 %
5-40 SYRINGE (ML) INJECTION EVERY 12 HOURS SCHEDULED
Status: DISCONTINUED | OUTPATIENT
Start: 2023-01-03 | End: 2023-01-04 | Stop reason: HOSPADM

## 2023-01-03 RX ORDER — DIAZEPAM 5 MG/1
5 TABLET ORAL EVERY 8 HOURS PRN
Qty: 15 TABLET | Refills: 0 | Status: SHIPPED | OUTPATIENT
Start: 2023-01-03 | End: 2023-01-03 | Stop reason: SDUPTHER

## 2023-01-03 RX ORDER — LABETALOL HYDROCHLORIDE 5 MG/ML
10 INJECTION, SOLUTION INTRAVENOUS
Status: DISCONTINUED | OUTPATIENT
Start: 2023-01-03 | End: 2023-01-03

## 2023-01-03 RX ORDER — SODIUM CHLORIDE 9 MG/ML
25 INJECTION, SOLUTION INTRAVENOUS PRN
Status: DISCONTINUED | OUTPATIENT
Start: 2023-01-03 | End: 2023-01-04 | Stop reason: HOSPADM

## 2023-01-03 RX ORDER — ENOXAPARIN SODIUM 100 MG/ML
30 INJECTION SUBCUTANEOUS 2 TIMES DAILY
Status: DISCONTINUED | OUTPATIENT
Start: 2023-01-03 | End: 2023-01-04 | Stop reason: HOSPADM

## 2023-01-03 RX ORDER — PROCHLORPERAZINE EDISYLATE 5 MG/ML
5 INJECTION INTRAMUSCULAR; INTRAVENOUS
Status: COMPLETED | OUTPATIENT
Start: 2023-01-03 | End: 2023-01-03

## 2023-01-03 RX ORDER — ONDANSETRON 4 MG/1
4 TABLET, ORALLY DISINTEGRATING ORAL EVERY 8 HOURS PRN
Status: DISCONTINUED | OUTPATIENT
Start: 2023-01-03 | End: 2023-01-04 | Stop reason: HOSPADM

## 2023-01-03 RX ADMIN — PROPOFOL 200 MG: 10 INJECTION, EMULSION INTRAVENOUS at 07:35

## 2023-01-03 RX ADMIN — METOPROLOL TARTRATE 50 MG: 50 TABLET, FILM COATED ORAL at 19:59

## 2023-01-03 RX ADMIN — ROCURONIUM BROMIDE 50 MG: 10 INJECTION, SOLUTION INTRAVENOUS at 07:35

## 2023-01-03 RX ADMIN — PHENYLEPHRINE HYDROCHLORIDE 150 MCG: 10 INJECTION INTRAVENOUS at 08:43

## 2023-01-03 RX ADMIN — HYDROMORPHONE HYDROCHLORIDE 0.5 MG: 1 INJECTION, SOLUTION INTRAMUSCULAR; INTRAVENOUS; SUBCUTANEOUS at 11:08

## 2023-01-03 RX ADMIN — SUGAMMADEX 200 MG: 100 INJECTION, SOLUTION INTRAVENOUS at 08:50

## 2023-01-03 RX ADMIN — ACETAMINOPHEN 1000 MG: 500 TABLET, FILM COATED ORAL at 13:31

## 2023-01-03 RX ADMIN — BACITRACIN: 500 OINTMENT TOPICAL at 22:45

## 2023-01-03 RX ADMIN — ACETAMINOPHEN 1000 MG: 500 TABLET, FILM COATED ORAL at 22:45

## 2023-01-03 RX ADMIN — PHENYLEPHRINE HYDROCHLORIDE 150 MCG: 10 INJECTION INTRAVENOUS at 08:40

## 2023-01-03 RX ADMIN — ONDANSETRON 4 MG: 2 INJECTION INTRAMUSCULAR; INTRAVENOUS at 09:29

## 2023-01-03 RX ADMIN — ONDANSETRON 4 MG: 2 INJECTION INTRAMUSCULAR; INTRAVENOUS at 08:46

## 2023-01-03 RX ADMIN — HYDROMORPHONE HYDROCHLORIDE 0.5 MG: 1 INJECTION, SOLUTION INTRAMUSCULAR; INTRAVENOUS; SUBCUTANEOUS at 09:27

## 2023-01-03 RX ADMIN — PHENYLEPHRINE HYDROCHLORIDE 100 MCG: 10 INJECTION INTRAVENOUS at 08:34

## 2023-01-03 RX ADMIN — ENOXAPARIN SODIUM 30 MG: 100 INJECTION SUBCUTANEOUS at 16:49

## 2023-01-03 RX ADMIN — SODIUM CHLORIDE, SODIUM LACTATE, POTASSIUM CHLORIDE, AND CALCIUM CHLORIDE: .6; .31; .03; .02 INJECTION, SOLUTION INTRAVENOUS at 07:00

## 2023-01-03 RX ADMIN — Medication 100 MG: at 07:35

## 2023-01-03 RX ADMIN — SODIUM CHLORIDE, PRESERVATIVE FREE 10 ML: 5 INJECTION INTRAVENOUS at 20:00

## 2023-01-03 RX ADMIN — PHENYLEPHRINE HYDROCHLORIDE 100 MCG: 10 INJECTION INTRAVENOUS at 08:29

## 2023-01-03 RX ADMIN — INSULIN LISPRO 1 UNITS: 100 INJECTION, SOLUTION INTRAVENOUS; SUBCUTANEOUS at 18:04

## 2023-01-03 RX ADMIN — MIDAZOLAM HYDROCHLORIDE 2 MG: 2 INJECTION, SOLUTION INTRAMUSCULAR; INTRAVENOUS at 07:27

## 2023-01-03 RX ADMIN — IBUPROFEN 400 MG: 200 TABLET, FILM COATED ORAL at 19:59

## 2023-01-03 RX ADMIN — ENOXAPARIN SODIUM 30 MG: 100 INJECTION SUBCUTANEOUS at 19:58

## 2023-01-03 RX ADMIN — PHENYLEPHRINE HYDROCHLORIDE 50 MCG: 10 INJECTION INTRAVENOUS at 08:27

## 2023-01-03 RX ADMIN — IBUPROFEN 400 MG: 200 TABLET, FILM COATED ORAL at 13:31

## 2023-01-03 RX ADMIN — PROCHLORPERAZINE EDISYLATE 5 MG: 5 INJECTION, SOLUTION INTRAMUSCULAR; INTRAVENOUS at 12:57

## 2023-01-03 RX ADMIN — CEFOXITIN 2000 MG: 2 INJECTION, POWDER, FOR SOLUTION INTRAVENOUS at 07:30

## 2023-01-03 ASSESSMENT — PAIN SCALES - GENERAL
PAINLEVEL_OUTOF10: 0
PAINLEVEL_OUTOF10: 0
PAINLEVEL_OUTOF10: 4
PAINLEVEL_OUTOF10: 7
PAINLEVEL_OUTOF10: 7
PAINLEVEL_OUTOF10: 0
PAINLEVEL_OUTOF10: 0

## 2023-01-03 ASSESSMENT — PAIN DESCRIPTION - LOCATION
LOCATION: RECTUM
LOCATION: RECTUM

## 2023-01-03 ASSESSMENT — PAIN DESCRIPTION - FREQUENCY
FREQUENCY: CONTINUOUS
FREQUENCY: CONTINUOUS

## 2023-01-03 ASSESSMENT — PAIN DESCRIPTION - ONSET
ONSET: ON-GOING
ONSET: ON-GOING

## 2023-01-03 ASSESSMENT — PAIN DESCRIPTION - DESCRIPTORS
DESCRIPTORS: ACHING
DESCRIPTORS: ACHING

## 2023-01-03 ASSESSMENT — PAIN - FUNCTIONAL ASSESSMENT: PAIN_FUNCTIONAL_ASSESSMENT: 0-10

## 2023-01-03 ASSESSMENT — PAIN DESCRIPTION - PAIN TYPE
TYPE: SURGICAL PAIN
TYPE: SURGICAL PAIN

## 2023-01-03 NOTE — OP NOTE
Operative Note      Patient: Carla Acosta  YOB: 1970  MRN: [id number]    Date of Procedure: 1/3/2023    Pre-Op Diagnosis: Anal stenosis [K62.4]    Post-Op Diagnosis: Same       Procedure(s):  EXAM UNDER ANESTHESIA; COLONOSCOPY VIA STOMA  AND COLONOSCOPY ANUS; ANAL DILATION,  EXCISION OF PERIANAL SCAR AND ANOPLASTY VIA HOUSE ADVANCEMENT FLAP   . Surgeon(s):  Aleja Munguia MD    Assistant:   Resident: Sherman Nash DPM; Polina Nunez MD    Anesthesia: General    Estimated Blood Loss (mL): Minimal    Complications: None    Specimens:   ID Type Source Tests Collected by Time Destination   A : A) ANAL SCAR Tissue Tissue SURGICAL PATHOLOGY Aleja Munguia MD 1/3/2023 8781            Drains:   Colostomy LLQ (Active)   Stomal Appliance 1 piece;Clean, dry & intact; Changed 01/03/23 0910   Stoma  Assessment Red 01/03/23 0910   Treatment Bag change 01/03/23 0910   Stool Amount Medium 01/03/23 0910       Findings: see full note    Indications: history of necrotizing fasciitis and anal stenosis. Detailed Description of Procedure:     OPERATIVE NOTE    After informed consent was obtained the patient was taken to the endoscopy suite. Time out was called to confirm key components. We began by performing a digital rectal exam: No lesions were noted on digital exam.     We then performed colonoscopy via the stoma complete to the cecum. Stoma was noted to have some superficial irritation. The exam was not difficult. The prep was fair with some solid stool in the cecum and right colon. Cecum and appendiceal orifice were seen and photographed. Withdrawal time was approximately 6 minutes. No pathology was seen. Good hemostasis was seen. We then placed the scope per rectum and noted a short rectal cuff with no pathology other than anal stenosis. This allowed passage of the colonoscopy but nothing larger could get past the stenosis. We then prepped and draped in the usual sterile fashion.  We used a series of Hegar dilators to dilate the anus but noted it was still stenotic. We there for proceed with right sided unilateral advancement flap. We excised an area of dense fibrosis along the left anus and anal skin. We marked out a house advancement flap then advanced it into the defect. We then secured it in placed with chromic suture and closed the resultant linear scar behind it with chromic as well. Good hemostasis was seen. We then noted the anus was patent and we were able to place our anoscopes. Flap appeared viable and without tension.     Dr. Cruz was present and performed all portions. The patient tolerated well with no immediate complication. Follow up colonoscopy is recommended in 1 year given fair prep.     Sherman Cruz M.D.  1/3/23   2:11 PM

## 2023-01-03 NOTE — PROGRESS NOTES
Patient A&O x4, VSS. Resting in bed, pain being managed with PRN pain medications. No complaints of nausea. Ostomy draining w/o complication. Dressing to rectum is clean, dry, and intact. Tolerating water without issue. Patient currently resting in bed with no other needs at this time.     Electronically signed by Dania Florence RN on 1/3/2023 at 4:58 PM

## 2023-01-03 NOTE — ANESTHESIA POSTPROCEDURE EVALUATION
Department of Anesthesiology  Postprocedure Note    Patient: Lucrecia Carlos  MRN: [id number]  YOB: 1970  Date of evaluation: 1/3/2023      Procedure Summary     Date: 01/03/23 Room / Location: [facility] OR 01 / [facility]    Anesthesia Start: 1786 Anesthesia Stop: 5874    Procedures:       EXAM UNDER ANESTHESIA; COLONOSCOPY VIA STOMA  AND VIA ANUS COLONOSCOPY VIA RECTUM; ANAL DILATION,  EXCISION OF PERIANAL SCAR AND HOUSE ADVANCEMENT FLAP ADVANCEMENT FLAP (Abdomen)      . (Rectum) Diagnosis:       Anal stenosis      (Anal stenosis [K62.4])    Surgeons: Brandi José MD Responsible Provider: Jarvis Kessler MD    Anesthesia Type: general ASA Status: 2          Anesthesia Type: No value filed.     Manda Phase I: Manda Score: 8    Manda Phase II:        Anesthesia Post Evaluation    Patient location during evaluation: PACU  Level of consciousness: awake  Complications: no  Multimodal analgesia pain management approach

## 2023-01-03 NOTE — PROGRESS NOTES
Department of Surgery  Post Op Note    Subjective: Pt did have some post op nausea which was treated with meds. He states he feels better now and is hungry. Denies pain. Ostomy functioning. Objective:  Anesthesia type: General      I/O    Intra op    Post op     Fluids  800 ml  NS 125ml/hr     EBL 20 ml  0 ml     Urine 0 ml 500 ml       Exam: VITALS:  /85   Pulse 70   Temp 97 °F (36.1 °C) (Temporal)   Resp 18   Ht 6' (1.829 m)   Wt 245 lb (111.1 kg)   SpO2 99%   BMI 33.23 kg/m²   Post-op vital signs:  Stable     Exam:General appearance: alert, appears stated age, and cooperative  Lungs: clear to auscultation bilaterally  Heart: regular rate and rhythm, S1, S2 normal, no murmur, click, rub or gallop  Abdomen:  soft, non-tender, ostomy to LLQ with output in appliance. Anus: tefla in place with minimal bloody drainage. No active bleeding.      Assessment and Plan  Pt is a 47year old male s/p EUA, Colonoscopy via stoma and rectum, anal dilation and anal advancement flap POD #0    Pain management: oxycodone PRN, dilaudid PRN, ibuprofen, tylenol   FeNa:  Diet - Carb control , Fluids SLIV  :  Urine output is adequate  Ambulation: OOB to chair  Respiratory:  IS at bedside, encourage hourly IS and deep breathing  Prophylaxis: lovenox    Estefani Mckeon CNP  1/3/2023  12:29 PM  perfectserve

## 2023-01-03 NOTE — PROGRESS NOTES
Spoke to Dr. Ranjan Jensen and notified him that patient had breakfast on 1/2/23 at 0900.   No new orders, ok to proceed with surgery per Dr. Ranjan Jensen

## 2023-01-03 NOTE — PROGRESS NOTES
Patient arrived to PACU post [address]; COLONOSCOPY VIA STOMA AND VIA ANUS COLONOSCOPY VIA RECTUM; ANAL DILATION, EXCISION OF PERIANAL SCAR AND HOUSE ADVANCEMENT FLAP ADVANCEMENT FLAP with Dr. Tamanna Merlos. VSS on arrival. CRNA gave PACU RN report at bedside stating no complications during procedure. Dr. Miller Abarca at bedside. Dressing to surgical site clean, dry and intact. Patient shows no signs of pain at this time. Will continue to monitor.

## 2023-01-03 NOTE — ANESTHESIA PRE PROCEDURE
Department of Anesthesiology  Preprocedure Note       Name:  Joel Arias   Age:  47 y.o.  :  1970                                          MRN:  [id number]         Date:  1/3/2023      Surgeon: Aramis Rand):  Jackie Erwin MD    Procedure: Procedure(s):  EXAM UNDER ANESTHESIA; COLONOSCOPY VIA STOMA AND COLONOSCOPY VIA RECTUM; ANAL DILATION, POSSIBLE EXCISION OF SCAR AND ANAL ADVANCEMENT FLAP  . Medications prior to admission:   Prior to Admission medications    Medication Sig Start Date End Date Taking? Authorizing Provider   polyethylene glycol-electrolytes (NULYTELY) 420 g solution TAKE AS DIRECTED DAY PRIOR TO COLONOSCOPY  Patient not taking: Reported on 2022   Jackie Erwin MD   polyethylene glycol-electrolytes (NULYTELY) 420 g solution TAKE AS DIRECTED DAY PRIOR TO COLONOSCOPY  Patient not taking: Reported on 2022 10/24/22   Jackie Erwin MD   JARDIANCE 10 MG tablet TAKE 1 TABLET BY MOUTH IN THE MORNING 10/24/22   Francheska Glass DO   gabapentin (NEURONTIN) 300 MG capsule Take 1 capsule by mouth nightly for 30 days.  Intended supply: 30 days  Patient not taking: Reported on 2022 9/16/22 10/16/22  Tereasa Decree, DPM   lisinopril (PRINIVIL;ZESTRIL) 30 MG tablet Take 1 tablet by mouth once daily 8/10/22   Francheska Glass DO   metFORMIN (GLUCOPHAGE) 1000 MG tablet TAKE 1 TABLET BY MOUTH TWICE DAILY WITH MEALS 8/10/22   Francheska Glass DO   metoprolol tartrate (LOPRESSOR) 50 MG tablet Take 1 tablet by mouth twice daily 8/10/22   Francheska Glass DO   sertraline (ZOLOFT) 50 MG tablet Take 1 tablet by mouth daily  Patient not taking: No sig reported 3/9/22 9/16/22  Francheska Glass DO   metoprolol tartrate (LOPRESSOR) 50 MG tablet Take 1 tablet by mouth twice daily 20   Francheska Glass DO       Current medications:    Current Facility-Administered Medications   Medication Dose Route Frequency Provider Last Rate Last Admin    cefOXitin (MEFOXIN) 2000 mg in dextrose 5% 50 mL (mini-bag)  2,000 mg IntraVENous Once Estella Fry MD        lidocaine PF 1 % injection 1 mL  1 mL IntraDERmal Once PRN Teresa Alvares, DO        sodium chloride flush 0.9 % injection 5-40 mL  5-40 mL IntraVENous 2 times per day Teresa Chipar, DO        sodium chloride flush 0.9 % injection 5-40 mL  5-40 mL IntraVENous PRN Teresa Alvares, DO        0.9 % sodium chloride infusion   IntraVENous PRN Teresa Alvares, DO           Allergies:  No Known Allergies    Problem List:    Patient Active Problem List   Diagnosis Code    [facility] fibrillation ([facility] I48.91    Essential hypertension I10    Type 2 diabetes mellitus without complication, without long-term current use of insulin ([facility]) E11.9    History of skin graft Z94.5    Wound of buttock S31.809A    Obesity, Class I, BMI 30-34.9 E66.9    S/P split thickness skin graft Z94.5    Surgical wound, non healing T81.89XA    Necrotizing fasciitis ([facility] M72.6    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    Colostomy in place [facility]) Z93.3    Diabetic infection of left foot ([facility] E11.628, L08.9    Elevated serum creatinine R79.89    Dyslipidemia E78.5    Diabetic foot infection ([facility]) E11.628, L08.9    Chronic osteomyelitis of left foot with draining sinus ([facility]) M86.472       Past Medical History:        Diagnosis Date    Allergic rhinitis     Atrial fibrillation ([facility])     Hyperlipidemia     Hypertension     Necrotizing fasciitis ([facility]     2018 resulted in colostomy    Type 2 diabetes mellitus without complication ([address]     Wears glasses        Past Surgical History:        Procedure Laterality Date    COLONOSCOPY  06/05/2018    COLOSTOMY      result of necrotizing fasciotomy    FOOT DEBRIDEMENT Right 12/30/2020    RIGHT 2ND TOE INCISION AND DRAINAGE BELOW FASCIA performed by Jacob Camarena DPM at [address] Left 9/12/2022    LEFT FOOT DEBRIDEMENT INCISION AND DRAINAGE, AMPUTATION OF FOURTH AND FIFTH RAYS performed by Swapnil Lopez DPM at [address] Left 9/15/2022    INCISION AND DRAINAGE WITH DELAYED PRIMARY CLOSURE  LEFT FOOT performed by Ning Graham DPM at [facility]  06/05/2018-06/08/2018    Removal of infected tissue and flesh from anus scrotum and surrounding area    KNEE ARTHROSCOPY Left     SKIN GRAFT  07/01/2018    TOE AMPUTATION Right 12/30/2020    PARTIAL AMPUTATION OF RIGHT 2ND TOE performed by Swapnil Lopez DPM at [address] History:    Social History     Tobacco Use    Smoking status: Never    Smokeless tobacco: Never   Substance Use Topics    Alcohol use: Yes     Comment: monthly 3 beers                                Counseling given: Not Answered      Vital Signs (Current):   Vitals:    12/29/22 1406 01/03/23 0617   BP:  130/84   Pulse:  78   Resp:  16   Temp:  97.2 °F (36.2 °C)   TempSrc:  Temporal   SpO2:  98%   Weight: 244 lb (110.7 kg) 245 lb (111.1 kg)   Height: 6' (1.829 m) 6' (1.829 m)                                              BP Readings from Last 3 Encounters:   01/03/23 130/84   12/29/22 118/84   09/16/22 126/69       NPO Status: Time of last liquid consumption: 2100                        Time of last solid consumption: 0900                        Date of last liquid consumption: 01/02/23                        Date of last solid food consumption: 01/02/23    BMI:   Wt Readings from Last 3 Encounters:   01/03/23 245 lb (111.1 kg)   12/29/22 248 lb (112.5 kg)   09/16/22 244 lb 3.2 oz (110.8 kg)     Body mass index is 33.23 kg/m².     CBC:   Lab Results   Component Value Date/Time    WBC 8.2 12/29/2022 03:09 PM    RBC 5.10 12/29/2022 03:09 PM    HGB 14.4 12/29/2022 03:09 PM    HCT 42.5 12/29/2022 03:09 PM    MCV 83.3 12/29/2022 03:09 PM    RDW 13.8 12/29/2022 03:09 PM     12/29/2022 03:09 PM       CMP:   Lab Results   Component Value Date/Time     12/29/2022 03:09 PM    K 4.7 12/29/2022 03:09 PM    K 4.6 09/12/2022 03:15 AM     12/29/2022 03:09 PM    CO2 26 12/29/2022 03:09 PM    BUN 18 12/29/2022 03:09 PM    CREATININE 1.2 12/29/2022 03:09 PM    GFRAA >60 09/15/2022 07:46 AM    AGRATIO 1.4 12/29/2022 03:09 PM    LABGLOM >60 12/29/2022 03:09 PM    GLUCOSE 219 12/29/2022 03:09 PM    PROT 7.3 12/29/2022 03:09 PM    CALCIUM 9.6 12/29/2022 03:09 PM    BILITOT 0.7 12/29/2022 03:09 PM    ALKPHOS 139 12/29/2022 03:09 PM    AST 8 12/29/2022 03:09 PM    ALT 18 12/29/2022 03:09 PM       POC Tests: No results for input(s): POCGLU, POCNA, POCK, POCCL, POCBUN, POCHEMO, POCHCT in the last 72 hours. Coags:   Lab Results   Component Value Date/Time    PROTIME 14.1 09/15/2022 07:46 AM    INR 1.10 09/15/2022 07:46 AM       HCG (If Applicable): No results found for: PREGTESTUR, PREGSERUM, HCG, HCGQUANT     ABGs: No results found for: PHART, PO2ART, NNS0ATC, TDM2RNO, BEART, F7SPUVDU     Type & Screen (If Applicable):  No results found for: LABABO, LABRH    Drug/Infectious Status (If Applicable):  No results found for: HIV, HEPCAB    COVID-19 Screening (If Applicable):   Lab Results   Component Value Date/Time    COVID19 Not Detected 12/28/2020 10:05 PM           Anesthesia Evaluation    Airway: Mallampati: II  TM distance: >3 FB   Neck ROM: full  Mouth opening: > = 3 FB   Dental:          Pulmonary:                              Cardiovascular:    (+) hypertension:,         Rhythm: regular  Rate: normal                    Neuro/Psych:               GI/Hepatic/Renal:             Endo/Other:    (+) Diabetes, . Abdominal:             Vascular: Other Findings:           Anesthesia Plan      general     ASA 2       Induction: intravenous. Anesthetic plan and risks discussed with patient. Plan discussed with CRNA.                     Sarah Katz MD   1/3/2023

## 2023-01-03 NOTE — PROGRESS NOTES
PACU RN called for receiving RN to come for bedside report. Continue Regimen: Plaquenil once daily \\nClobetasol And other topicals Detail Level: Detailed Plan: Pt saw PCP and was notified Iron has always ran low.  He takes Iron daily which is being managed by PCP.  Lab requisition given to pt to check CMP, CBC, and Urogram.

## 2023-01-03 NOTE — PROGRESS NOTES
4 Eyes Admission Assessment     I agree as the admission nurse that 2 RN's have performed a thorough Head to Toe Skin Assessment on the patient. ALL assessment sites listed below have been assessed on admission. Areas assessed by both nurses:   [x]   Head, Face, and Ears   [x]   Shoulders, Back, and Chest  [x]   Arms, Elbows, and Hands   [x]   Coccyx, Sacrum, and Ischium  [x]   Legs, Feet, and Heels        Does the Patient have Skin Breakdown?   No         Jorge Prevention initiated:  No   Wound Care Orders initiated:  No      [facility] nurse consulted for Pressure Injury (Stage 3,4, Unstageable, DTI, NWPT, and Complex wounds) or Jorge score 18 or lower:  No      Nurse 1 eSignature: Electronically signed by Otis Vickers RN on 1/3/23 at 1:23 PM EST    **SHARE this note so that the co-signing nurse is able to place an eSignature**    Nurse 2 eSignature: Electronically signed by Andreas Perales RN on 1/3/23 at 5:32 PM EST

## 2023-01-03 NOTE — DISCHARGE INSTRUCTIONS
DR MULLIGAN'S POST-OP INSTRUCTIONS AFTER ANORECTAL SURGERY    1) Please place bacitracin to suture line twice per day. 2) Pain can be controlled with a combination of:  Tylenol - you may take up to 3000 mg per day (in the absence of liver disease)  NSAID medications such as motrin, ibuprofen, or advil (only if approved by your primary care physician)   Narcotic medications if needed, such as Percocet, Oxycodone, Lortab, Norco, etc.   Be careful, because narcotic medications can impair you and make you severely constipated, which can aggravate your wounds and incisions. No driving or operating machinery if taking narcotics, or if you cannot safely maneuver the vehicle without pain. Valium 5mg can be used to relax the rectal muscles and help with pain, take every 8 hours as needed for spasm. 3) You should expect to have pain and discomfort for 2-3 weeks depending on what operation was done. You may also have some drainage of mucus and a small amount of bleeding. It is recommended to take 1-2 weeks off from work, depending on your comfort and specifics of your job. Please call the office if you need a note from the doctor. 4)Please do not lift anything more than 10 lbs until your follow up appointment. Walking is encouraged, and you may climb stairs. 5) If not already scheduled, please call the office the day after surgery to set up a post op appointment for approximately 2 weeks after surgery. If any tissue was sent for pathology, this will be discussed at your post-op visit, or you may call the office after 1 week for obtaining results sooner. Dr Jm Leyva office number: [phone]     If you have any questions or concerns, please call the office during regular hours (M-F 8:30am-5:00pm). After hours, you may call the surgeon on call or come to the nearest ER.  You should watch for excessive bleeding, fever, chills, nausea, vomiting, severe increase in pain, inability to urinate, or foul smelling or painful drainage.

## 2023-01-03 NOTE — H&P
Maureen Suarez    [id number]    [facility] Same Day Surgery Update H & P  Department of General Surgery   Surgical Service   Pre-operative History and Physical  Last H & P within the last 30 days. DIAGNOSIS:   Anal stenosis [K62.4]    PROCEDURE:  VT COLONOSCOPY FLX DX W/COLLJ SPEC WHEN PFRMD [84829] (EXAM UNDER ANESTHESIA; COLONOSCOPY VIA STOMA AND COLONOSCOPY VIA RECTUM; ANAL DILATION, POSSIBLE EXCISION OF SCAR AND ANAL ADVANCEMENT FLAP)  VT EXC RCT LINCOLN INCL MUSCULARIS PROPRIA [98961] (.)     HISTORY OF PRESENT ILLNESS:    Pt presents today for EXAM UNDER ANESTHESIA; COLONOSCOPY VIA STOMA AND COLONOSCOPY VIA RECTUM; ANAL DILATION, POSSIBLE EXCISION OF SCAR AND ANAL ADVANCEMENT FLAP w/ Dr. Jj Lopez 19:  Patient denies fever, chills, cough or known exposure to Covid-19.   Patient reports they have been quarantined at home since Covd-19 test.      Past Medical History:        Diagnosis Date    Allergic rhinitis     Atrial fibrillation ([facility]     Hyperlipidemia     Hypertension     Necrotizing fasciitis ([address]     2018 resulted in colostomy    Type 2 diabetes mellitus without complication ([facility]     Wears glasses      Past Surgical History:        Procedure Laterality Date    COLONOSCOPY  06/05/2018    COLOSTOMY      result of necrotizing fasciotomy    FOOT DEBRIDEMENT Right 12/30/2020    RIGHT 2ND TOE INCISION AND DRAINAGE BELOW FASCIA performed by Adelfa Soulier, DPM at [facility] Left 9/12/2022    LEFT FOOT DEBRIDEMENT INCISION AND DRAINAGE, AMPUTATION OF FOURTH AND FIFTH RAYS performed by Adelfa Soulier, DPM at [facility] Left 9/15/2022    INCISION AND DRAINAGE WITH DELAYED PRIMARY CLOSURE  LEFT FOOT performed by Sai Romero DPM at [facility]  06/05/2018-06/08/2018    Removal of infected tissue and flesh from anus scrotum and surrounding area    KNEE ARTHROSCOPY Left     SKIN GRAFT  07/01/2018    TOE AMPUTATION Right 12/30/2020    PARTIAL AMPUTATION OF RIGHT 2ND TOE performed by Santo Jones DPM at [facility]     Past Social History:  Social History     Socioeconomic History    Marital status: Single     Spouse name: None    Number of children: None    Years of education: None    Highest education level: None   Tobacco Use    Smoking status: Never    Smokeless tobacco: Never   Vaping Use    Vaping Use: Never used   Substance and Sexual Activity    Alcohol use: Yes     Comment: monthly 3 beers    Drug use: No    Sexual activity: Yes     Partners: Female     Social Determinants of Health     Financial Resource Strain: Low Risk     Difficulty of Paying Living Expenses: Not hard at all   Food Insecurity: No Food Insecurity    Worried About Running Out of Food in the Last Year: Never true    Ran Out of Food in the Last Year: Never true         Medications Prior to Admission:      Prior to Admission medications    Medication Sig Start Date End Date Taking? Authorizing Provider   polyethylene glycol-electrolytes (NULYTELY) 420 g solution TAKE AS DIRECTED DAY PRIOR TO COLONOSCOPY  Patient not taking: Reported on 12/29/2022 12/29/22   Moriah Montiel MD   polyethylene glycol-electrolytes (NULYTELY) 420 g solution TAKE AS DIRECTED DAY PRIOR TO COLONOSCOPY  Patient not taking: Reported on 12/29/2022 10/24/22   Moriah Montiel MD   JARDIANCE 10 MG tablet TAKE 1 TABLET BY MOUTH IN THE MORNING 10/24/22   Mat Bustamante DO   gabapentin (NEURONTIN) 300 MG capsule Take 1 capsule by mouth nightly for 30 days.  Intended supply: 30 days  Patient not taking: Reported on 12/29/2022 9/16/22 10/16/22  Alia Ortiz DPM   lisinopril (PRINIVIL;ZESTRIL) 30 MG tablet Take 1 tablet by mouth once daily 8/10/22   Mat Bustamante,    metFORMIN (GLUCOPHAGE) 1000 MG tablet TAKE 1 TABLET BY MOUTH TWICE DAILY WITH MEALS 8/10/22   Mat Bustamante,    metoprolol tartrate (LOPRESSOR) 50 MG tablet Take 1 tablet by mouth twice daily 8/10/22   Mat Bustamante, DO   sertraline (ZOLOFT) 50 MG tablet Take 1 tablet by mouth daily  Patient not taking: No sig reported 3/9/22 9/16/22  Tenamdai Riojas, DO   metoprolol tartrate (LOPRESSOR) 50 MG tablet Take 1 tablet by mouth twice daily 12/22/20   Tena Riojas, DO         Allergies:  Patient has no known allergies. PHYSICAL EXAM:      Ht 6' (1.829 m)   Wt 244 lb (110.7 kg)   BMI 33.09 kg/m²      GEN: appears well, no distress, appears stated age  PSYCH: normal mood, normal affect  NECK: no neck masses, trachea midline  ENT: moist oral mucosa; anicteric  SKIN: no rash or jaundice  CV: regular heart rate and rhythm  PULM: normal respiratory effort, no wheezing  GI: soft non tender abdomen. Normal bowel sounds  RECTAL: deferred  EXT/NEURO: normal gait, strength/sensation grossly intact in all extremities    ASSESSMENT AND PLAN:    1. History obtained from patient and review of records. Patient seen and focused exam done today. - no new changes since last physical exam on 12/29/22 by Dr. Tess Neumann    2. Access to ancillary services are available per request of the provider. 3. Plan for surgery today as planned. Abi Antony DO   PGY1, General Surgery  01/03/23  5:32 AM  Contact via Clerk     I performed a history and physical examination of the patient and discussed management with the resident. I reviewed the resident's note and agree with the documented findings and plan of care.     Brandy Booker M.D.  1/3/23   4:55 PM

## 2023-01-03 NOTE — BRIEF OP NOTE
Brief Postoperative Note      Patient: Isiah Johnson  YOB: 1970  MRN: [id number]    Date of Procedure: 1/3/2023    Pre-Op Diagnosis: Anal stenosis [K62.4]    Post-Op Diagnosis: Same       Procedure(s):  EXAM UNDER ANESTHESIA; COLONOSCOPY VIA STOMA AND COLONOSCOPY VIA RECTUM; ANAL DILATION, POSSIBLE EXCISION OF SCAR AND ANAL ADVANCEMENT FLAP  .     Surgeon(s):  Ashley Mendieta MD    Assistant:  Resident: Kevin Valencia DPM; Janes Watson MD    Anesthesia: General    Estimated Blood Loss (mL): Minimal    Complications: None    Specimens:   ID Type Source Tests Collected by Time Destination   A : A) ANAL SCAR Tissue Tissue SURGICAL PATHOLOGY Ashley Mendieta MD 1/3/2023 4365        Implants:  * No implants in log *      Drains: * No LDAs found *    Findings:   Anal dilation and anal advancement flap, after Cscope through ostomy and in rectal stump    Electronically signed by Janes Watson MD on 1/3/2023 at 8:54 AM

## 2023-01-03 NOTE — PROGRESS NOTES
Patient took home metoprolol at bedside per Dr. Christiano Ervin. Informed Dr. Christiano Ervin that patients blood sugar was 206, no new orders, ok to proceed with surgery.

## 2023-01-03 NOTE — PROGRESS NOTES
PACU Transfer to Floor Note    Procedure(s):  EXAM UNDER ANESTHESIA; COLONOSCOPY VIA STOMA  AND VIA ANUS COLONOSCOPY VIA RECTUM; ANAL DILATION,  EXCISION OF PERIANAL SCAR AND HOUSE ADVANCEMENT FLAP ADVANCEMENT FLAP  . Current Allergies: Patient has no known allergies. Pt meets criteria as per Britney Score and ASPAN Standards to transfer to next phase of care. Recent Labs     01/03/23  0646 01/03/23  0904   POCGLU 206* 205*       Vitals:    01/03/23 1230   BP: 137/88   Pulse: 69   Resp: 17   Temp: 97.1 °F (36.2 °C)   SpO2: 98%     Vitals within 20% of pt's admission vitals as per BRITNEY SCORE    SpO2: 98 %    O2 Flow Rate (L/min): 2 L/min      Intake/Output Summary (Last 24 hours) at 1/3/2023 1235  Last data filed at 1/3/2023 7368  Gross per 24 hour   Intake 800 ml   Output 20 ml   Net 780 ml       Pain assessment:  present - adequately treated    Pain Level: 0 (sleeping comfortably)    Patient was assessed for alterations to skin integrity. There were not alterations observed. Is patient incontinent: no  Voided prior to PACU    Patient has all belongings at discharge from PACU. PACU RN called and updated patient's family. Handoff report given at bedside to receiving RN, Palak Barakat.   Family updated and directed to pt room 5316      1/3/2023 12:35 PM

## 2023-01-04 VITALS
TEMPERATURE: 97.3 F | SYSTOLIC BLOOD PRESSURE: 147 MMHG | HEART RATE: 74 BPM | OXYGEN SATURATION: 97 % | RESPIRATION RATE: 18 BRPM | DIASTOLIC BLOOD PRESSURE: 83 MMHG | BODY MASS INDEX: 33.18 KG/M2 | WEIGHT: 245 LBS | HEIGHT: 72 IN

## 2023-01-04 LAB
ANION GAP SERPL CALCULATED.3IONS-SCNC: 12 MMOL/L (ref 3–16)
BASOPHILS ABSOLUTE: 0 K/UL (ref 0–0.2)
BASOPHILS RELATIVE PERCENT: 0.5 %
BUN BLDV-MCNC: 24 MG/DL (ref 7–20)
CALCIUM SERPL-MCNC: 9.4 MG/DL (ref 8.3–10.6)
CHLORIDE BLD-SCNC: 102 MMOL/L (ref 99–110)
CO2: 24 MMOL/L (ref 21–32)
CREAT SERPL-MCNC: 1.3 MG/DL (ref 0.9–1.3)
EOSINOPHILS ABSOLUTE: 0.1 K/UL (ref 0–0.6)
EOSINOPHILS RELATIVE PERCENT: 1.1 %
GFR SERPL CREATININE-BSD FRML MDRD: >60 ML/MIN/{1.73_M2}
GLUCOSE BLD-MCNC: 185 MG/DL (ref 70–99)
GLUCOSE BLD-MCNC: 198 MG/DL (ref 70–99)
GLUCOSE BLD-MCNC: 226 MG/DL (ref 70–99)
HCT VFR BLD CALC: 37.2 % (ref 40.5–52.5)
HEMOGLOBIN: 13.2 G/DL (ref 13.5–17.5)
LYMPHOCYTES ABSOLUTE: 2.1 K/UL (ref 1–5.1)
LYMPHOCYTES RELATIVE PERCENT: 25.9 %
MAGNESIUM: 1.6 MG/DL (ref 1.8–2.4)
MCH RBC QN AUTO: 29.1 PG (ref 26–34)
MCHC RBC AUTO-ENTMCNC: 35.4 G/DL (ref 31–36)
MCV RBC AUTO: 82.3 FL (ref 80–100)
MONOCYTES ABSOLUTE: 0.6 K/UL (ref 0–1.3)
MONOCYTES RELATIVE PERCENT: 7.2 %
NEUTROPHILS ABSOLUTE: 5.2 K/UL (ref 1.7–7.7)
NEUTROPHILS RELATIVE PERCENT: 65.3 %
PDW BLD-RTO: 13.5 % (ref 12.4–15.4)
PERFORMED ON: ABNORMAL
PERFORMED ON: ABNORMAL
PHOSPHORUS: 3.1 MG/DL (ref 2.5–4.9)
PLATELET # BLD: 164 K/UL (ref 135–450)
PMV BLD AUTO: 7.8 FL (ref 5–10.5)
POTASSIUM REFLEX MAGNESIUM: 4.1 MMOL/L (ref 3.5–5.1)
RBC # BLD: 4.52 M/UL (ref 4.2–5.9)
SODIUM BLD-SCNC: 138 MMOL/L (ref 136–145)
WBC # BLD: 8 K/UL (ref 4–11)

## 2023-01-04 PROCEDURE — 85025 COMPLETE CBC W/AUTO DIFF WBC: CPT

## 2023-01-04 PROCEDURE — 6360000002 HC RX W HCPCS: Performed by: STUDENT IN AN ORGANIZED HEALTH CARE EDUCATION/TRAINING PROGRAM

## 2023-01-04 PROCEDURE — G0378 HOSPITAL OBSERVATION PER HR: HCPCS

## 2023-01-04 PROCEDURE — 83735 ASSAY OF MAGNESIUM: CPT

## 2023-01-04 PROCEDURE — 96372 THER/PROPH/DIAG INJ SC/IM: CPT

## 2023-01-04 PROCEDURE — 2580000003 HC RX 258: Performed by: STUDENT IN AN ORGANIZED HEALTH CARE EDUCATION/TRAINING PROGRAM

## 2023-01-04 PROCEDURE — 84100 ASSAY OF PHOSPHORUS: CPT

## 2023-01-04 PROCEDURE — 6370000000 HC RX 637 (ALT 250 FOR IP): Performed by: STUDENT IN AN ORGANIZED HEALTH CARE EDUCATION/TRAINING PROGRAM

## 2023-01-04 PROCEDURE — 80048 BASIC METABOLIC PNL TOTAL CA: CPT

## 2023-01-04 PROCEDURE — 36415 COLL VENOUS BLD VENIPUNCTURE: CPT

## 2023-01-04 RX ORDER — MAGNESIUM SULFATE IN WATER 40 MG/ML
4000 INJECTION, SOLUTION INTRAVENOUS ONCE
Status: COMPLETED | OUTPATIENT
Start: 2023-01-04 | End: 2023-01-04

## 2023-01-04 RX ORDER — GINSENG 100 MG
CAPSULE ORAL
Qty: 28 G | Refills: 2 | Status: SHIPPED | OUTPATIENT
Start: 2023-01-04 | End: 2023-01-14

## 2023-01-04 RX ADMIN — IBUPROFEN 400 MG: 200 TABLET, FILM COATED ORAL at 06:38

## 2023-01-04 RX ADMIN — MAGNESIUM SULFATE HEPTAHYDRATE 4000 MG: 40 INJECTION, SOLUTION INTRAVENOUS at 06:50

## 2023-01-04 RX ADMIN — ENOXAPARIN SODIUM 30 MG: 100 INJECTION SUBCUTANEOUS at 07:57

## 2023-01-04 RX ADMIN — ACETAMINOPHEN 1000 MG: 500 TABLET, FILM COATED ORAL at 05:34

## 2023-01-04 RX ADMIN — METOPROLOL TARTRATE 50 MG: 50 TABLET, FILM COATED ORAL at 07:57

## 2023-01-04 RX ADMIN — BACITRACIN: 500 OINTMENT TOPICAL at 07:58

## 2023-01-04 RX ADMIN — SODIUM CHLORIDE, PRESERVATIVE FREE 10 ML: 5 INJECTION INTRAVENOUS at 07:58

## 2023-01-04 NOTE — PLAN OF CARE
Problem: Discharge Planning  Goal: Discharge to home or other facility with appropriate resources  Outcome: Progressing     Problem: Pain  Goal: Verbalizes/displays adequate comfort level or baseline comfort level  Outcome: Adequate for Discharge

## 2023-01-04 NOTE — PROGRESS NOTES
Surgery Daily Progress Note  [facility]  CC:  anal stenosis    Subjective : Pt rested well overnight. HDS, afebrile. Pain controlled with meds. Denies N/V. Tolerating diet. Ostomy functioning. Voiding. OOB ambulating. Objective    Infusions:   sodium chloride      dextrose          I/O:I/O last 3 completed shifts: In: 800 [I.V.:800]  Out: 1145 [Urine:1000; Emesis/NG output:125; Blood:20]           Wt Readings from Last 1 Encounters:   01/03/23 245 lb (111.1 kg)                 LABS:    Recent Labs     01/04/23  0457   WBC 8.0   HGB 13.2*   HCT 37.2*   MCV 82.3           Recent Labs     01/04/23  0457      K 4.1      CO2 24   PHOS 3.1   BUN 24*   CREATININE 1.3      No results for input(s): AST, ALT, ALB, BILIDIR, BILITOT, ALKPHOS in the last 72 hours. No results for input(s): LIPASE, AMYLASE in the last 72 hours. No results for input(s): PROT, INR, APTT in the last 72 hours. No results for input(s): CKTOTAL, CKMB, CKMBINDEX, TROPONINI in the last 72 hours. Exam:BP (!) 146/82   Pulse 79   Temp 97.4 °F (36.3 °C) (Oral)   Resp 18   Ht 6' (1.829 m)   Wt 245 lb (111.1 kg)   SpO2 97%   BMI 33.23 kg/m²   Exam:General appearance: alert, appears stated age, and cooperative  Lungs: unlabored breathing, on RA  Heart: regular rate and rhythm  Abdomen:  soft, non-tender, ostomy to LLQ with output in appliance. Anus: tefla in place with minimal bloody drainage. No active bleeding.        ASSESSMENT/PLAN: Pt. is a 47 y.o. male s/p EUA, Colonoscopy via stoma and rectum, anal dilation and anal advancement flap POD #1.    - Cont diet  - cont pain management  - cont OOB ambulation  - Bacitracin to incision BID  - Dispo home today    Sundeep Murphy CNP  1/4/2023  6:25 AM  perfectserve

## 2023-01-04 NOTE — PROGRESS NOTES
Pt has existing colostomy. Pt to be discharged home. Pt has no needs or concerns regarding his appliance.

## 2023-01-04 NOTE — CARE COORDINATION
8:44 AM  Upon review of pts chart, pt is from home, IPTA, no current home services. Pt denies a need for any. Pt has transport at time of dc. CM will sign off at this time. Please consult CM/SW if any dcp needs arise.     Electronically signed by Cj Miranda RN, CM on 1/4/2023 at 8:44 AM.  Phone: [phone]  Fax: [phone]

## 2023-01-04 NOTE — PROGRESS NOTES
Pt alert and oriented. VSS. Pt reporting minimal surgical pain that is being controlled with scheduled Tylenol and Ibuprofen, see MAR. Pt changed ostomy appliance. Pt tolerating diet. Pt sleeping for intervals. Pt has call light within reach, bed in lowest position with wheels locked, and bed alarm on.

## 2023-01-04 NOTE — PROGRESS NOTES
Pt discharge instructions and medications reviewed with him, no questions or concerns. IV removed with no complications. Awaiting ride to discharge. Call light is within reach.      Electronically signed by Radha Cobb RN on 1/4/2023 at 12:15 PM

## 2023-01-05 ENCOUNTER — CARE COORDINATION (OUTPATIENT)
Dept: CASE MANAGEMENT | Age: 53
End: 2023-01-05

## 2023-01-06 ENCOUNTER — CARE COORDINATION (OUTPATIENT)
Dept: CASE MANAGEMENT | Age: 53
End: 2023-01-06

## 2023-01-06 NOTE — CARE COORDINATION
[facility] Care Transitions Initial Follow Up Call    Call within 2 business days of discharge: Yes    Care Transition Nurse contacted the patient by telephone to perform post hospital discharge assessment. Verified name and  with patient as identifiers. Provided introduction to self, and explanation of the Care Transition Nurse role. Patient: Lucrecia Carlos Patient : 1970   MRN: [id number]   Reason for Admission: Anal Stenosis, Anal Dilation and Anal Advancement Flap  Discharge Date: 23 RARS: Readmission Risk Score: 8.4      Last Discharge  [facility]       Date Complaint Diagnosis Description Type Department Provider    1/3/23  Anal stenosis Admission (Discharged) Nichelle Garcia MD            Was this an external facility discharge? No     Challenges to be reviewed by the provider   Additional needs identified to be addressed with provider: No  none               Method of communication with provider: none. CTN spoke with patient this am for initial 24 hour discharge follow up CTN call. Patient states he is doing well, no reports of any fever, chills, nausea, vomiting, chest pain, SOB or cough. Patient eating well, no reports of any difficulty with urination or constipation, stoma is pink and no issues or concerns with colostomy. Patient states he is having some pain, states it is tolerable, taking PRN's and they are effective, when taken. Patient has follow up with surgeon scheduled as well. Care Transition Nurse reviewed discharge instructions, medical action plan, and red flags with patient who verbalized understanding. The patient was given an opportunity to ask questions and does not have any further questions or concerns at this time. Were discharge instructions available to patient? Yes. Reviewed appropriate site of care based on symptoms and resources available to patient including: PCP  Specialist  Urgent care clinics  [facility]   When to call 911.  The patient agrees to contact the PCP office for questions related to their healthcare. Advance Care Planning:   Does patient have an Advance Directive: not on file. Medication reconciliation was performed with patient, who verbalizes understanding of administration of home medications. Medications reviewed, 1111F entered: N/A    Was patient discharged with a pulse oximeter? no    Non-face-to-face services provided:  Obtained and reviewed discharge summary and/or continuity of care documents  Education of patient/family/caregiver/guardian to support self-management-Patient instructed to continue to monitor for any of the above noted s/s, as well as monitoring for any worsening pain, issues or concerns with stoma, reporting to MD immediately. Assessment and support for treatment adherence and medication management-CTN did go over new and stopped medications with patient. Offered patient enrollment in the Remote Patient Monitoring (RPM) program for in-home monitoring: Patient is not eligible for RPM program.    Care Transitions 24 Hour Call    Schedule Follow Up Appointment with PCP: Declined  Do you have a copy of your discharge instructions?: Yes  Do you have all of your prescriptions and are they filled?: Yes  Have you been contacted by a [facility] Pharmacist?: No  Have you scheduled your follow up appointment?: Yes  How are you going to get to your appointment?: Car - family or friend to transport  Do you have support at home?: Alone  Do you feel like you have everything you need to keep you well at home?: Yes  Are you an active caregiver in your home?: No  Care Transitions Interventions  No Identified Needs         Follow Up  Future Appointments   Date Time Provider Myron Call   1/27/2023  9:00 AM Aleja Munguia MD [address] [place] [address] Transition Nurse provided contact information. Plan for follow-up call in 5-7 days based on severity of symptoms and risk factors.   Plan for next call: symptom management-Any worsening pain, other issues or concerns, with colostomy or stoma site.     Thank Amrita Hernández RN  Care Transition Coordinator  Contact RTAYGO:[phone]

## 2023-01-08 ENCOUNTER — TELEPHONE (OUTPATIENT)
Dept: SURGERY | Age: 53
End: 2023-01-08

## 2023-01-08 DIAGNOSIS — K62.4 ANAL STENOSIS: Primary | ICD-10-CM

## 2023-01-08 DIAGNOSIS — G89.18 POST-OP PAIN: Primary | ICD-10-CM

## 2023-01-08 RX ORDER — OXYCODONE HYDROCHLORIDE 5 MG/1
5 TABLET ORAL EVERY 6 HOURS PRN
Qty: 12 TABLET | Refills: 0 | Status: SHIPPED | OUTPATIENT
Start: 2023-01-08 | End: 2023-01-08

## 2023-01-08 RX ORDER — OXYCODONE HYDROCHLORIDE AND ACETAMINOPHEN 5; 325 MG/1; MG/1
1-2 TABLET ORAL EVERY 6 HOURS PRN
Qty: 42 TABLET | Refills: 0 | Status: SHIPPED | OUTPATIENT
Start: 2023-01-08 | End: 2023-01-15

## 2023-01-08 NOTE — PROGRESS NOTES
Received call from resident that outside pharmacy would not take resident script for pain medication    E scripted pain medication     Evan Tolentino M.D.  1/8/23   2:45 PM

## 2023-01-08 NOTE — TELEPHONE ENCOUNTER
Patient unable to fill oxycodone prescription due to [facility] of provider not on file . Patient coming to hospital to  new prescription.

## 2023-01-11 ENCOUNTER — CARE COORDINATION (OUTPATIENT)
Dept: CASE MANAGEMENT | Age: 53
End: 2023-01-11

## 2023-01-11 NOTE — CARE COORDINATION
[facility] Care Transitions Follow Up Call    Care Transition Nurse contacted the patient by telephone to follow up after admission on 2023. Verified name and  with patient as identifiers. Patient: Gordon Le  Patient : 1970   MRN: [id number]   Reason for Admission: Anal Stenosis, Anal Dilation and Anal Advancement Flap  Discharge Date: 23 RARS: Readmission Risk Score: 8.4      Needs to be reviewed by the provider   Additional needs identified to be addressed with provider: No  none             Method of communication with provider: none. CTN spoke with patient this afternoon for follow up CTN call. Patient states he is doing well, no reports of any fever, chills, nausea, vomiting, chest pain, SOB or cough. Patient states incisions are healing well, pain controlled. No other issues or concerns at this time. Addressed changes since last contact:  none  Discussed follow-up appointments. If no appointment was previously scheduled, appointment scheduling offered: Yes. Is follow up appointment scheduled within 7 days of discharge? No.    Follow Up  Future Appointments   Date Time Provider Myron Call   2023  9:00 AM Nadya Casillas MD E COLON RECT MMA       Care Transition Nurse reviewed red flags with patient and discussed any barriers to care and/or understanding of plan of care after discharge. Discussed appropriate site of care based on symptoms and resources available to patient including: PCP  Specialist  Urgent care clinics  [address]   When to call 911. The patient agrees to contact the PCP office for questions related to their healthcare. Advance Care Planning:   not on file.      Patients top risk factors for readmission: medical condition-Anal Stenosis, Anal Dilation and Anal Advancement Flap  Interventions to address risk factors: Education of patient/family/caregiver/guardian to support self-management-Patient instructed to continue to monitor for any worsening pain, as well as monitoring for any of the other above noted s/s, reporting to MD immediately. Offered patient enrollment in the Remote Patient Monitoring (RPM) program for in-home monitoring: Patient is not eligible for RPM program.     Care Transitions Subsequent and Final Call    Schedule Follow Up Appointment with PCP: Declined  Subsequent and Final Calls  Do you have any ongoing symptoms?: No  Have your medications changed?: No  Do you have any questions related to your medications?: No  Do you currently have any active services?: No  Do you have any needs or concerns that I can assist you with?: No  Identified Barriers: None  Care Transitions Interventions  No Identified Needs  Other Interventions:             Care Transition Nurse provided contact information for future needs. Plan for follow-up call in 5-7 days based on severity of symptoms and risk factors. Plan for next call: symptom management-Any worsening pain, other issues or concerns that may arise.     Thank Derek Powell RN  Care Transition Coordinator  Contact RNAB:[phone]

## 2023-01-17 ENCOUNTER — TELEPHONE (OUTPATIENT)
Dept: SURGERY | Age: 53
End: 2023-01-17

## 2023-01-18 ENCOUNTER — CARE COORDINATION (OUTPATIENT)
Dept: CASE MANAGEMENT | Age: 53
End: 2023-01-18

## 2023-01-18 NOTE — CARE COORDINATION
[facility] Care Transitions Follow Up Call    Care Transition Nurse contacted the patient by telephone to follow up after admission on 2023. Verified name and  with patient as identifiers. Patient: Rubi Worthy  Patient : 1970   MRN: [id number]   Reason for Admission: Anal Stenosis, Anal Dilation and Anal Advancement Flap  Discharge Date: 23 RARS: Readmission Risk Score: 8.4      Needs to be reviewed by the provider   Additional needs identified to be addressed with provider: No  none             Method of communication with provider: none. CTN spoke with patient this afternoon for follow up CTN call. Patient states sutures dissolved, does have some skin hanging, no bleeding, no increased pain, just soreness. Patient states he contacted surgeon, notified of symptom, instructed to continue to monitor and follow up as planned on 2023. Patient denies having any fever, chills, nausea, vomiting, chest pain, SOB or cough. Patient with no congestion, pain, difficulty emptying bladder, LE edema, feeling lightheaded, dizziness, and heart palpitations. No other issues or concerns at this time. Addressed changes since last contact:  none  Discussed follow-up appointments. If no appointment was previously scheduled, appointment scheduling offered: Yes. Is follow up appointment scheduled within 7 days of discharge? Yes. Follow Up  Future Appointments   Date Time Provider Myron Call   2023  9:00 AM Hair Michaels MD E COLON RECT [facility]       Care Transition Nurse reviewed red flags with patient and discussed any barriers to care and/or understanding of plan of care after discharge. Discussed appropriate site of care based on symptoms and resources available to patient including: PCP  Specialist  Urgent care clinics  [facility]   When to call 911. The patient agrees to contact the PCP office for questions related to their healthcare.      Advance Care Planning:   not on file. Patients top risk factors for readmission: medical condition-Anal Stenosis, Anal Dilation and Anal Advancement Flap  Interventions to address risk factors: Education of patient/family/caregiver/guardian to support self-management-Patient instructed to continue to monitor for any of the above noted s/s, reporting any that may present to MD immediately. Patient also instructed to continue to monitor skin flap, reporting any bleeding, increased pain, or other issues, reporting these to MD immediately, as well. Care Transitions Subsequent and Final Call    Schedule Follow Up Appointment with PCP: Declined  Subsequent and Final Calls  Do you have any ongoing symptoms?: Yes  Onset of Patient-reported symptoms: Other  Patient-reported symptoms: Pain  Have your medications changed?: No  Do you have any questions related to your medications?: No  Do you currently have any active services?: No  Do you have any needs or concerns that I can assist you with?: No  Identified Barriers: None  Care Transitions Interventions  No Identified Needs  Other Interventions:             Care Transition Nurse provided contact information for future needs. No further follow-up call indicated based on severity of symptoms and risk factors.     Thank Dana Morgan RN  Care Transition Coordinator  Contact [facility]:[phone]

## 2023-01-27 ENCOUNTER — OFFICE VISIT (OUTPATIENT)
Dept: SURGERY | Age: 53
End: 2023-01-27

## 2023-01-27 VITALS
OXYGEN SATURATION: 97 % | HEIGHT: 72 IN | BODY MASS INDEX: 33.18 KG/M2 | HEART RATE: 99 BPM | TEMPERATURE: 97.7 F | SYSTOLIC BLOOD PRESSURE: 138 MMHG | RESPIRATION RATE: 16 BRPM | WEIGHT: 245 LBS | DIASTOLIC BLOOD PRESSURE: 101 MMHG

## 2023-01-27 DIAGNOSIS — K62.4 ANAL STENOSIS: Primary | ICD-10-CM

## 2023-01-27 PROCEDURE — 99024 POSTOP FOLLOW-UP VISIT: CPT | Performed by: SURGERY

## 2023-01-27 NOTE — PROGRESS NOTES
Subjective:       Filiberto Lynch presents to the clinic 2 weeks following anoplasty    HPI: Doing well. No pain. Some mucus drainage. Objective:      BP (!) 138/101 Comment: patient states he has not had BP meds yet today-declined recheck  Pulse 99   Temp 97.7 °F (36.5 °C) (Infrared)   Resp 16   Ht 6' (1.829 m)   Wt 245 lb (111.1 kg)   SpO2 97%   BMI 33.23 kg/m²     General:  alert, appears stated age, and cooperative   Abdomen: soft, bowel sounds active, non-tender   Incision:   healing well, no drainage, no erythema, slight retraction of edge of flap but wound granulating and not contracted          Assessment:      Doing well     Plan:      1. Continue any current medications. 2. Wound care discussed. 3. See me 2 months  4. Will discuss colostomy closure in future after wound heals. Discussed this can be done in one step or two steps. Two step approach would likely have higher success rate.    5. Discussed he has very short anal cuff so likely will have 1 good change at closing colostomy      Spencer Simmons M.D.  1/27/23   9:36 AM

## 2023-02-10 DIAGNOSIS — I10 ESSENTIAL HYPERTENSION: ICD-10-CM

## 2023-02-10 DIAGNOSIS — I48.91 ATRIAL FIBRILLATION, UNSPECIFIED TYPE (HCC): ICD-10-CM

## 2023-02-10 DIAGNOSIS — E11.9 TYPE 2 DIABETES MELLITUS WITHOUT COMPLICATION, WITHOUT LONG-TERM CURRENT USE OF INSULIN (HCC): ICD-10-CM

## 2023-02-10 RX ORDER — EMPAGLIFLOZIN 10 MG/1
TABLET, FILM COATED ORAL
Qty: 30 TABLET | Refills: 0 | Status: SHIPPED | OUTPATIENT
Start: 2023-02-10

## 2023-02-10 RX ORDER — METOPROLOL TARTRATE 50 MG/1
TABLET, FILM COATED ORAL
Qty: 180 TABLET | Refills: 0 | Status: SHIPPED | OUTPATIENT
Start: 2023-02-10

## 2023-02-10 RX ORDER — LISINOPRIL 30 MG/1
TABLET ORAL
Qty: 90 TABLET | Refills: 0 | Status: SHIPPED | OUTPATIENT
Start: 2023-02-10

## 2023-02-10 NOTE — TELEPHONE ENCOUNTER
Future Appointments   Date Time Provider Myron Call   3/31/2023  9:30 AM Everardo Moreno MD E COLON RECT MMA     LOV 12/29/2022

## 2023-03-19 DIAGNOSIS — E11.9 TYPE 2 DIABETES MELLITUS WITHOUT COMPLICATION, WITHOUT LONG-TERM CURRENT USE OF INSULIN (HCC): ICD-10-CM

## 2023-03-20 RX ORDER — EMPAGLIFLOZIN 10 MG/1
TABLET, FILM COATED ORAL
Qty: 30 TABLET | Refills: 0 | Status: SHIPPED | OUTPATIENT
Start: 2023-03-20

## 2023-03-28 ENCOUNTER — TELEPHONE (OUTPATIENT)
Dept: FAMILY MEDICINE CLINIC | Age: 53
End: 2023-03-28

## 2023-03-28 NOTE — TELEPHONE ENCOUNTER
Received call from Dr. Alex Ren, infectious disease, at [facility] in [place] stating that patient was being admitted there with a soft tissue infection of the great great toe. Feels that there is high suspicion of MSSA and Osteo and that patient will need long-term IV antibiotics. Information for Dr. Adelfa Soulier, infectious disease, was provided for patient's transition back to the area on IV antibiotics. Alonso Moscoso

## 2023-04-07 ENCOUNTER — TELEPHONE (OUTPATIENT)
Dept: INFECTIOUS DISEASES | Age: 53
End: 2023-04-07

## 2023-04-07 ENCOUNTER — TELEMEDICINE (OUTPATIENT)
Dept: INFECTIOUS DISEASES | Age: 53
End: 2023-04-07
Payer: MEDICAID

## 2023-04-07 DIAGNOSIS — B95.61 STAPHYLOCOCCUS AUREUS BACTEREMIA: Primary | ICD-10-CM

## 2023-04-07 DIAGNOSIS — L02.612 ABSCESS OF LEFT FOOT: ICD-10-CM

## 2023-04-07 DIAGNOSIS — R78.81 STAPHYLOCOCCUS AUREUS BACTEREMIA: Primary | ICD-10-CM

## 2023-04-07 DIAGNOSIS — M86.9 OSTEOMYELITIS OF LEFT FOOT, UNSPECIFIED TYPE (HCC): Primary | ICD-10-CM

## 2023-04-07 PROCEDURE — 99204 OFFICE O/P NEW MOD 45 MIN: CPT | Performed by: INTERNAL MEDICINE

## 2023-04-07 PROCEDURE — G8427 DOCREV CUR MEDS BY ELIG CLIN: HCPCS | Performed by: INTERNAL MEDICINE

## 2023-04-07 PROCEDURE — 3017F COLORECTAL CA SCREEN DOC REV: CPT | Performed by: INTERNAL MEDICINE

## 2023-04-07 PROCEDURE — 1036F TOBACCO NON-USER: CPT | Performed by: INTERNAL MEDICINE

## 2023-04-07 PROCEDURE — G8417 CALC BMI ABV UP PARAM F/U: HCPCS | Performed by: INTERNAL MEDICINE

## 2023-04-07 NOTE — PROGRESS NOTES
Infectious Diseases Consult Note    Reason for Consult:   L foot abscess, MSSA bacteremia   Requesting Physician:   Dr Robert Kauffman [facility])  Primary Care Physician:  Josse Jack DO  History Obtained From:   Patient, EPIC    CHIEF COMPLAINT:    DFI / Bacteremia, OPAT     HISTORY OF PRESENT ILLNESS:      4/7/23 Video Visit (obtained consent, pt at home, doxy. me - see disclaimer at bottom)    46 yo man  PMH - DM, HTN, obesity (BMI 33), AF, HL, complicated ST infection (2018)  [facility]urgH - colostomy 2018, L foot 4-5th partial ray resection 9/12/22    Admit [facility] hosp - 3/25/23  Sx - fever/chills, n/v  BC MSSA, f/u BC neg, TTE neg  R foot - hallux ulcer  L foot abscess, OR 3/30, 4/5  Rx Oxacillin + Metronidazole  R PICC placed  Coming home / back to [place] today  Had dose and will start 24 hr infusion / ball once he get to [facility]      Past Medical History:    Past Medical History:   Diagnosis Date    Allergic rhinitis     Atrial fibrillation ([address]     Hyperlipidemia     Hypertension     Necrotizing fasciitis ([address]     2018 resulted in colostomy    Type 2 diabetes mellitus without complication ([address]     Wears glasses        Past Surgical History:    Past Surgical History:   Procedure Laterality Date    ANUS SURGERY N/A 1/3/2023    .  performed by Kendell Reaves MD at [address]  06/05/2018    COLONOSCOPY N/A 1/3/2023    EXAM UNDER ANESTHESIA; COLONOSCOPY VIA STOMA  AND VIA ANUS COLONOSCOPY VIA RECTUM; ANAL DILATION,  EXCISION OF PERIANAL SCAR AND HOUSE ADVANCEMENT FLAP ADVANCEMENT FLAP performed by Kendell Reaves MD at [address] [place] [address]      result of necrotizing fasciotomy    FOOT DEBRIDEMENT Right 12/30/2020    RIGHT 2ND TOE INCISION AND DRAINAGE BELOW FASCIA performed by Harini Adan DPM at [facility] Left 9/12/2022    LEFT FOOT DEBRIDEMENT INCISION AND DRAINAGE, AMPUTATION OF FOURTH AND FIFTH RAYS performed by Harini Adan DPM at [facility]

## 2023-04-07 NOTE — LETTER
April 7, 2023      DO [address]      Patient: Kait Garcia   MR Number: [id number]   YOB: 1970   Date of Visit: 4/7/2023       Dear Gomez Bailey: Thank you for referring Oralee Mortimer to me for evaluation/treatment. See Epic note. If you have questions, please do not hesitate to call me. I look forward to following Franklyn along with you.     Sincerely,        Jeuss Manrique MD

## 2023-04-18 LAB
ALBUMIN SERPL-MCNC: 3 G/DL
ALP BLD-CCNC: 97 U/L
ALT SERPL-CCNC: 14 U/L
ANION GAP SERPL CALCULATED.3IONS-SCNC: ABNORMAL MMOL/L
AST SERPL-CCNC: 10 U/L
BASOPHILS ABSOLUTE: 31 /ΜL
BASOPHILS RELATIVE PERCENT: 0.5 %
BILIRUB SERPL-MCNC: 0.3 MG/DL (ref 0.1–1.4)
BUN BLDV-MCNC: 28 MG/DL
C-REACTIVE PROTEIN: 24.1
CALCIUM SERPL-MCNC: 8.7 MG/DL
CHLORIDE BLD-SCNC: 103 MMOL/L
CO2: 25 MMOL/L
CREAT SERPL-MCNC: 1.46 MG/DL
EGFR: ABNORMAL
EOSINOPHILS ABSOLUTE: 161 /ΜL
EOSINOPHILS RELATIVE PERCENT: 2.6 %
GLUCOSE BLD-MCNC: 163 MG/DL
HCT VFR BLD CALC: 28.3 % (ref 41–53)
HEMOGLOBIN: 9.5 G/DL (ref 13.5–17.5)
LYMPHOCYTES ABSOLUTE: 1587 /ΜL
LYMPHOCYTES RELATIVE PERCENT: 25.6 %
MCH RBC QN AUTO: 27.5 PG
MCHC RBC AUTO-ENTMCNC: 33.5 G/DL
MCV RBC AUTO: 82 FL
MONOCYTES ABSOLUTE: 515 /ΜL
MONOCYTES RELATIVE PERCENT: 8.3 %
NEUTROPHILS ABSOLUTE: 39.6 /ΜL
NEUTROPHILS RELATIVE PERCENT: 63 %
PDW BLD-RTO: 12.8 %
PLATELET # BLD: 286 K/ΜL
PMV BLD AUTO: 10.2 FL
POTASSIUM SERPL-SCNC: 5.2 MMOL/L
RBC # BLD: 3.45 10^6/ΜL
SEDIMENTATION RATE, ERYTHROCYTE: 118
SODIUM BLD-SCNC: 136 MMOL/L
TOTAL PROTEIN: 6.4
WBC # BLD: 6.2 10^3/ML

## 2023-04-19 ENCOUNTER — TELEPHONE (OUTPATIENT)
Dept: INFECTIOUS DISEASES | Age: 53
End: 2023-04-19

## 2023-04-19 NOTE — TELEPHONE ENCOUNTER
Spoke with Bryon Jackson at [facility] and requested this weeks lab results be faxed to this office

## 2023-04-20 ENCOUNTER — TELEMEDICINE (OUTPATIENT)
Dept: INFECTIOUS DISEASES | Age: 53
End: 2023-04-20
Payer: MEDICAID

## 2023-04-20 DIAGNOSIS — B95.61 STAPHYLOCOCCUS AUREUS BACTEREMIA: Primary | ICD-10-CM

## 2023-04-20 DIAGNOSIS — R78.81 STAPHYLOCOCCUS AUREUS BACTEREMIA: Primary | ICD-10-CM

## 2023-04-20 DIAGNOSIS — L02.612 ABSCESS OF LEFT FOOT: ICD-10-CM

## 2023-04-20 PROCEDURE — 99214 OFFICE O/P EST MOD 30 MIN: CPT | Performed by: INTERNAL MEDICINE

## 2023-04-20 PROCEDURE — 3017F COLORECTAL CA SCREEN DOC REV: CPT | Performed by: INTERNAL MEDICINE

## 2023-04-20 PROCEDURE — G8427 DOCREV CUR MEDS BY ELIG CLIN: HCPCS | Performed by: INTERNAL MEDICINE

## 2023-04-20 PROCEDURE — 1036F TOBACCO NON-USER: CPT | Performed by: INTERNAL MEDICINE

## 2023-04-20 PROCEDURE — G8417 CALC BMI ABV UP PARAM F/U: HCPCS | Performed by: INTERNAL MEDICINE

## 2023-04-20 NOTE — PROGRESS NOTES
aware that this is a billable service, which includes applicable co-pays. This Virtual Visit was conducted with patient's (and/or legal guardian's) consent. The visit was conducted pursuant to the emergency declaration under the [address] [place] [address], [address] [place] [address] authority and the [employer] General Act. Patient identification was verified, and a caregiver was present when appropriate. The patient was located at  home   Provider was located at [address],  [address]    Total time spent for this encounter:  35 min    --Dahiana Kimball MD on 4/20/2023 at 2:37 PM  An electronic signature was used to authenticate this note.

## 2023-04-21 ENCOUNTER — TELEPHONE (OUTPATIENT)
Dept: INFECTIOUS DISEASES | Age: 53
End: 2023-04-21

## 2023-04-21 NOTE — TELEPHONE ENCOUNTER
Call returned appt scheduled with pt. Pt expressed understanding,   Spoke with Angeli Mackay at [facility] new IV ATB dose Oxacillin 6 gm iv infuse daily / CI through 5/17  As per Dr Saurav Ross office note from 4/20  Angeli Mackay was not sure of stability and stated he would call if there were any issues

## 2023-04-24 ENCOUNTER — TELEPHONE (OUTPATIENT)
Dept: INFECTIOUS DISEASES | Age: 53
End: 2023-04-24

## 2023-04-24 LAB
ALBUMIN SERPL-MCNC: 3.1 G/DL
ALP BLD-CCNC: 125 U/L
ALT SERPL-CCNC: 14 U/L
ANION GAP SERPL CALCULATED.3IONS-SCNC: ABNORMAL MMOL/L
AST SERPL-CCNC: 9 U/L
BASOPHILS ABSOLUTE: 33 /ΜL
BASOPHILS RELATIVE PERCENT: 0.5 %
BILIRUB SERPL-MCNC: 0.3 MG/DL (ref 0.1–1.4)
BUN BLDV-MCNC: 29 MG/DL
C-REACTIVE PROTEIN: 7
CALCIUM SERPL-MCNC: 8.7 MG/DL
CHLORIDE BLD-SCNC: 104 MMOL/L
CO2: 25 MMOL/L
CREAT SERPL-MCNC: 1.37 MG/DL
EGFR: 62
EOSINOPHILS ABSOLUTE: 112 /ΜL
EOSINOPHILS RELATIVE PERCENT: 1.7 %
GLUCOSE BLD-MCNC: 121 MG/DL
HCT VFR BLD CALC: 29.2 % (ref 41–53)
HEMOGLOBIN: 9.9 G/DL (ref 13.5–17.5)
LYMPHOCYTES ABSOLUTE: 1559 /ΜL
LYMPHOCYTES RELATIVE PERCENT: 23.6 %
MCH RBC QN AUTO: 27.6 PG
MCHC RBC AUTO-ENTMCNC: 33.9 G/DL
MCV RBC AUTO: 81.3 FL
MONOCYTES ABSOLUTE: 548 /ΜL
MONOCYTES RELATIVE PERCENT: 8.3 %
NEUTROPHILS ABSOLUTE: 4349 /ΜL
NEUTROPHILS RELATIVE PERCENT: 65.9 %
PDW BLD-RTO: 13.7 %
PLATELET # BLD: 262 K/ΜL
PMV BLD AUTO: 9.8 FL
POTASSIUM SERPL-SCNC: 5.3 MMOL/L
RBC # BLD: 3.59 10^6/ΜL
SEDIMENTATION RATE, ERYTHROCYTE: 118
SODIUM BLD-SCNC: 137 MMOL/L
TOTAL PROTEIN: 6.5
WBC # BLD: 6.6 10^3/ML

## 2023-04-24 NOTE — TELEPHONE ENCOUNTER
My name, Dr Mathew Rough name, my direct number  Pt name,   Oxacillin needs to be 12gm continuous infusion NOT 6gm continuous infusion throught 23  Repeated information    Spoke with Greta Pavon, pharmacist at [facility].   Message regarding change to Oxacillin to 12gm was received and is being processed now

## 2023-04-24 NOTE — TELEPHONE ENCOUNTER
----- Message from Josh Lomeli MD sent at 4/21/2023 11:53 AM EDT -----  This should be 12 g daily  I copy forward order that was subsequently modified     RG  ----- Message -----  From: Graciela Dias RN  Sent: 4/21/2023   8:30 AM EDT  To: Josh Lomeli MD    [facility] contacted, spoke with Esteban Martin  Dose change given Oxacillin 6 gm iv infuse daily / CI through 5/17  Per your office note  Esteban Martin was unsure of stability of the med at this dose and stated he would call if any issues  ----- Message -----  From: Josh Lomeli MD  Sent: 4/20/2023   2:52 PM EDT  To: Graciela Dias RN, Jose Kate, [facility]

## 2023-04-27 DIAGNOSIS — L02.612 ABSCESS OF LEFT FOOT: ICD-10-CM

## 2023-05-02 LAB
ALBUMIN SERPL-MCNC: 3.3 G/DL
ALP BLD-CCNC: 137 U/L
ALT SERPL-CCNC: 17 U/L
ANION GAP SERPL CALCULATED.3IONS-SCNC: ABNORMAL MMOL/L
AST SERPL-CCNC: 12 U/L
BASOPHILS ABSOLUTE: 51 /ΜL
BASOPHILS RELATIVE PERCENT: 0.6 %
BILIRUB SERPL-MCNC: 0.4 MG/DL (ref 0.1–1.4)
BUN BLDV-MCNC: 25 MG/DL
C-REACTIVE PROTEIN: 6.3
CALCIUM SERPL-MCNC: 9.2 MG/DL
CHLORIDE BLD-SCNC: 103 MMOL/L
CO2: 20 MMOL/L
CREAT SERPL-MCNC: 1.21 MG/DL
EGFR: 72
EOSINOPHILS ABSOLUTE: 102 /ΜL
EOSINOPHILS RELATIVE PERCENT: 1.2 %
GLUCOSE BLD-MCNC: 200 MG/DL
HCT VFR BLD CALC: 34.7 % (ref 41–53)
HEMOGLOBIN: 11.8 G/DL (ref 13.5–17.5)
LYMPHOCYTES ABSOLUTE: 2389 /ΜL
LYMPHOCYTES RELATIVE PERCENT: 28.1 %
MCH RBC QN AUTO: 28.4 PG
MCHC RBC AUTO-ENTMCNC: 34 G/DL
MCV RBC AUTO: 83.4 FL
MONOCYTES ABSOLUTE: 621 /ΜL
MONOCYTES RELATIVE PERCENT: 7.3 %
NEUTROPHILS ABSOLUTE: 5338 /ΜL
NEUTROPHILS RELATIVE PERCENT: 62.8 %
PDW BLD-RTO: 13.9 %
PLATELET # BLD: 284 K/ΜL
PMV BLD AUTO: 10.6 FL
POTASSIUM SERPL-SCNC: 4.6 MMOL/L
RBC # BLD: 4.16 10^6/ΜL
SEDIMENTATION RATE, ERYTHROCYTE: 115
SODIUM BLD-SCNC: 136 MMOL/L
TOTAL PROTEIN: 6.8
WBC # BLD: 8.5 10^3/ML

## 2023-05-04 DIAGNOSIS — L02.612 ABSCESS OF LEFT FOOT: ICD-10-CM

## 2023-05-08 LAB
ALBUMIN SERPL-MCNC: 3.3 G/DL
ALP BLD-CCNC: 133 U/L
ALT SERPL-CCNC: 16 U/L
ANION GAP SERPL CALCULATED.3IONS-SCNC: ABNORMAL MMOL/L
AST SERPL-CCNC: 12 U/L
BASOPHILS ABSOLUTE: 42 /ΜL
BASOPHILS RELATIVE PERCENT: 0.6 %
BILIRUB SERPL-MCNC: 0.4 MG/DL (ref 0.1–1.4)
BUN BLDV-MCNC: 24 MG/DL
C-REACTIVE PROTEIN: 7.5
CALCIUM SERPL-MCNC: 9 MG/DL
CHLORIDE BLD-SCNC: 105 MMOL/L
CO2: 21 MMOL/L
CREAT SERPL-MCNC: 1.1 MG/DL
EGFR: 81
EOSINOPHILS ABSOLUTE: 112 /ΜL
EOSINOPHILS RELATIVE PERCENT: 1.6 %
GLUCOSE BLD-MCNC: 141 MG/DL
HCT VFR BLD CALC: 31.7 % (ref 41–53)
HEMOGLOBIN: 10.7 G/DL (ref 13.5–17.5)
LYMPHOCYTES ABSOLUTE: 186 /ΜL
LYMPHOCYTES RELATIVE PERCENT: 25.8 %
MCH RBC QN AUTO: 20.2 PG
MCHC RBC AUTO-ENTMCNC: 33.8 G/DL
MCV RBC AUTO: 83.4 FL
MONOCYTES ABSOLUTE: 532 /ΜL
MONOCYTES RELATIVE PERCENT: 7.6 %
NEUTROPHILS ABSOLUTE: 4508 /ΜL
NEUTROPHILS RELATIVE PERCENT: 64.4 %
PDW BLD-RTO: 14.4 %
PLATELET # BLD: 259 K/ΜL
PMV BLD AUTO: 10 FL
POTASSIUM SERPL-SCNC: 5 MMOL/L
RBC # BLD: 3.8 10^6/ΜL
SEDIMENTATION RATE, ERYTHROCYTE: 84
SODIUM BLD-SCNC: 137 MMOL/L
TOTAL PROTEIN: 6.5
WBC # BLD: 7 10^3/ML

## 2023-05-11 DIAGNOSIS — L02.612 ABSCESS OF LEFT FOOT: ICD-10-CM

## 2023-05-15 ENCOUNTER — TELEPHONE (OUTPATIENT)
Dept: INFECTIOUS DISEASES | Age: 53
End: 2023-05-15

## 2023-05-15 NOTE — TELEPHONE ENCOUNTER
Spoke with pt  Pt states he \"feels great, wound is healing great, Dr Nahomy Kemp is very happy with it. I saw him today\"  Pt is due to term on 5/17  Pt will go to [facility] tomorrow for lab draw and dressing change  I did ask pt to ask the staff to please fax lab results asap so we can have the information needed to make a timely determination  Pt chuckled and agreed to do so - \"I don't want to sound excited. ...but I am ready to be done\"

## 2023-05-15 NOTE — TELEPHONE ENCOUNTER
LMOM asking pt to please return call to office  Dr Ty Casillas name and office number  Need to know how pt is doing? Any follow up with Dr Bib Abreu?   Pt set to [facility] on 5/17

## 2023-05-16 NOTE — TELEPHONE ENCOUNTER
Spoke with Anne Montero at [facility] verbal orders to end IV ATB as scheduled and pull PICC  Anne Montero verbalized understanding    Contacted pt and notified him of end of therapy and the PICC can come out after his last dose; at the infusion center  Pt verbalized understanding

## 2023-05-31 DIAGNOSIS — I10 ESSENTIAL HYPERTENSION: ICD-10-CM

## 2023-05-31 DIAGNOSIS — I48.91 ATRIAL FIBRILLATION, UNSPECIFIED TYPE (HCC): ICD-10-CM

## 2023-05-31 RX ORDER — METOPROLOL TARTRATE 50 MG/1
TABLET, FILM COATED ORAL
Qty: 180 TABLET | Refills: 0 | Status: SHIPPED | OUTPATIENT
Start: 2023-05-31

## 2023-05-31 RX ORDER — LISINOPRIL 30 MG/1
TABLET ORAL
Qty: 90 TABLET | Refills: 0 | Status: SHIPPED | OUTPATIENT
Start: 2023-05-31

## 2023-06-01 NOTE — TELEPHONE ENCOUNTER
Please let pt know that I refilled their medication, and that I'd like for them to schedule an appointment for a diabetes follow-up Thank you.

## 2023-06-09 ENCOUNTER — TELEMEDICINE (OUTPATIENT)
Dept: FAMILY MEDICINE CLINIC | Age: 53
End: 2023-06-09
Payer: MEDICAID

## 2023-06-09 DIAGNOSIS — I48.91 ATRIAL FIBRILLATION, UNSPECIFIED TYPE (HCC): ICD-10-CM

## 2023-06-09 DIAGNOSIS — E11.9 TYPE 2 DIABETES MELLITUS WITHOUT COMPLICATION, WITHOUT LONG-TERM CURRENT USE OF INSULIN (HCC): ICD-10-CM

## 2023-06-09 DIAGNOSIS — A48.0 GAS GANGRENE OF FOOT (HCC): ICD-10-CM

## 2023-06-09 DIAGNOSIS — Z12.5 PROSTATE CANCER SCREENING: ICD-10-CM

## 2023-06-09 DIAGNOSIS — I10 ESSENTIAL HYPERTENSION: ICD-10-CM

## 2023-06-09 DIAGNOSIS — E11.628 TYPE 2 DIABETES MELLITUS WITH OTHER SKIN COMPLICATIONS (HCC): Primary | ICD-10-CM

## 2023-06-09 PROCEDURE — 99214 OFFICE O/P EST MOD 30 MIN: CPT | Performed by: FAMILY MEDICINE

## 2023-06-09 PROCEDURE — 3046F HEMOGLOBIN A1C LEVEL >9.0%: CPT | Performed by: FAMILY MEDICINE

## 2023-06-09 PROCEDURE — G8427 DOCREV CUR MEDS BY ELIG CLIN: HCPCS | Performed by: FAMILY MEDICINE

## 2023-06-09 PROCEDURE — 2022F DILAT RTA XM EVC RTNOPTHY: CPT | Performed by: FAMILY MEDICINE

## 2023-06-09 PROCEDURE — 3017F COLORECTAL CA SCREEN DOC REV: CPT | Performed by: FAMILY MEDICINE

## 2023-06-09 SDOH — ECONOMIC STABILITY: FOOD INSECURITY: WITHIN THE PAST 12 MONTHS, THE FOOD YOU BOUGHT JUST DIDN'T LAST AND YOU DIDN'T HAVE MONEY TO GET MORE.: NEVER TRUE

## 2023-06-09 SDOH — ECONOMIC STABILITY: FOOD INSECURITY: WITHIN THE PAST 12 MONTHS, YOU WORRIED THAT YOUR FOOD WOULD RUN OUT BEFORE YOU GOT MONEY TO BUY MORE.: NEVER TRUE

## 2023-06-09 SDOH — ECONOMIC STABILITY: HOUSING INSECURITY
IN THE LAST 12 MONTHS, WAS THERE A TIME WHEN YOU DID NOT HAVE A STEADY PLACE TO SLEEP OR SLEPT IN A SHELTER (INCLUDING NOW)?: NO

## 2023-06-09 SDOH — ECONOMIC STABILITY: INCOME INSECURITY: HOW HARD IS IT FOR YOU TO PAY FOR THE VERY BASICS LIKE FOOD, HOUSING, MEDICAL CARE, AND HEATING?: NOT HARD AT ALL

## 2023-06-09 ASSESSMENT — PATIENT HEALTH QUESTIONNAIRE - PHQ9
SUM OF ALL RESPONSES TO PHQ QUESTIONS 1-9: 0
SUM OF ALL RESPONSES TO PHQ QUESTIONS 1-9: 0
2. FEELING DOWN, DEPRESSED OR HOPELESS: 0
1. LITTLE INTEREST OR PLEASURE IN DOING THINGS: 0
SUM OF ALL RESPONSES TO PHQ QUESTIONS 1-9: 0
SUM OF ALL RESPONSES TO PHQ9 QUESTIONS 1 & 2: 0
SUM OF ALL RESPONSES TO PHQ QUESTIONS 1-9: 0

## 2023-06-09 NOTE — PROGRESS NOTES
2023    TELEHEALTH EVALUATION -- Audio/Visual (During NRTPF-98 public health emergency)    HPI:    Angie Solano (:  1970) has requested an audio/video evaluation for the following concern(s):    Chief Complaint   Patient presents with    Diabetes     Specialist took him off of Rylee Springjose     Pt presents today for a diabetic follow-up. Currently taking Metformin 1000 mg BID     Morning glucose readings:         Had emergency surgery in [address] [place] [address] in 2023 for left foot infection and fracture. Had previous 2 amputations on same foot. Has Podiatrist f/u in 3 days. Wound has healed well. Valeria was discontinued by Endo in hospital at [facility]. Had palpitations in the hospital but have resolved. Last Eye Exam: 2 years ago - negative for DR  Last Foot Exam: 22  Last Microalbumin: Will do at next visit    22 A1C = 7.8    HTN: Taking Lisinopril 30 mg and Lopressor 50 mg BID, BP good at home    Review of Systems   Cardiovascular:  Negative for palpitations. Skin:  Positive for wound. Prior to Visit Medications    Medication Sig Taking?  Authorizing Provider   lisinopril (PRINIVIL;ZESTRIL) 30 MG tablet Take 1 tablet by mouth once daily  Daniel Gomes, DO   metoprolol tartrate (LOPRESSOR) 50 MG tablet Take 1 tablet by mouth twice daily  Daniel Gomes, DO   metFORMIN (GLUCOPHAGE) 1000 MG tablet TAKE 1 TABLET BY MOUTH TWICE DAILY WITH MEALS  Shilo Rider,    hydroCHLOROthiazide (HYDRODIURIL) 25 MG tablet Take 1 tablet by mouth daily  Historical Provider, MD   cyclobenzaprine (FLEXERIL) 10 MG tablet Take 1 tablet by mouth 3 times daily as needed for Muscle spasms  Historical Provider, MD   zinc sulfate (ZINCATE) 220 (50 Zn) MG capsule Take 1 capsule by mouth daily  Historical Provider, MD   Multiple Vitamins-Minerals (THERAPEUTIC MULTIVITAMIN-MINERALS) tablet Take 1 tablet by mouth daily  Historical Provider, MD   vitamin C (ASCORBIC ACID) 500 MG tablet Take 1 tablet by

## 2023-08-31 DIAGNOSIS — I10 ESSENTIAL HYPERTENSION: ICD-10-CM

## 2023-08-31 DIAGNOSIS — I48.91 ATRIAL FIBRILLATION, UNSPECIFIED TYPE (HCC): ICD-10-CM

## 2023-08-31 RX ORDER — LISINOPRIL 30 MG/1
TABLET ORAL
Qty: 90 TABLET | Refills: 0 | Status: SHIPPED | OUTPATIENT
Start: 2023-08-31

## 2023-08-31 RX ORDER — METOPROLOL TARTRATE 50 MG/1
TABLET, FILM COATED ORAL
Qty: 180 TABLET | Refills: 0 | Status: SHIPPED | OUTPATIENT
Start: 2023-08-31

## 2023-09-06 ENCOUNTER — TELEPHONE (OUTPATIENT)
Dept: FAMILY MEDICINE CLINIC | Age: 53
End: 2023-09-06

## 2023-09-06 NOTE — TELEPHONE ENCOUNTER
I do not see catheter bags in [facility]. Please call [facility] and ask them to send an order form for the catheter bags. This is typically how he gets his other supplies. Please place the form on my desk to sign. Thank you.

## 2023-09-06 NOTE — TELEPHONE ENCOUNTER
Patient states he needs a new prescription for catheter bag. Please send prescription to   [facility] # [id number]   Please order  4 boxes or 20 pieces . This is a brand he trid and works for him.

## 2023-10-12 ENCOUNTER — TELEPHONE (OUTPATIENT)
Dept: FAMILY MEDICINE CLINIC | Age: 53
End: 2023-10-12

## 2023-10-12 NOTE — TELEPHONE ENCOUNTER
[address] [facility] called and requested Chart notes sent to them from the following dates:11/12/2022-5/12/2023. The request is to get medical insurance coverage for ostomy supplies. Please advise.

## 2023-11-07 DIAGNOSIS — I48.91 ATRIAL FIBRILLATION, UNSPECIFIED TYPE (HCC): ICD-10-CM

## 2023-11-07 DIAGNOSIS — I10 ESSENTIAL HYPERTENSION: ICD-10-CM

## 2023-11-07 RX ORDER — METOPROLOL TARTRATE 50 MG/1
TABLET, FILM COATED ORAL
Qty: 180 TABLET | Refills: 0 | Status: SHIPPED | OUTPATIENT
Start: 2023-11-07

## 2023-11-07 RX ORDER — LISINOPRIL 30 MG/1
TABLET ORAL
Qty: 90 TABLET | Refills: 0 | Status: SHIPPED | OUTPATIENT
Start: 2023-11-07

## 2023-11-29 DIAGNOSIS — I10 ESSENTIAL HYPERTENSION: ICD-10-CM

## 2023-11-29 RX ORDER — LISINOPRIL 30 MG/1
TABLET ORAL
Qty: 90 TABLET | Refills: 0 | Status: SHIPPED | OUTPATIENT
Start: 2023-11-29

## 2023-11-29 NOTE — TELEPHONE ENCOUNTER
Please let pt know that I refilled their medication, and that I'd like for them to schedule an appointment for a Diabetes follow-up and have fasting labs done BEFORE their visit. Thank you.

## 2023-11-29 NOTE — TELEPHONE ENCOUNTER
Called & spoke to pt  Apt was already made  Future Appointments   Date Time Provider [address]   12/5/2023  2:40 PM Adry, [address]

## 2023-12-05 ENCOUNTER — TELEMEDICINE (OUTPATIENT)
Dept: FAMILY MEDICINE CLINIC | Age: 53
End: 2023-12-05
Payer: MEDICAID

## 2023-12-05 DIAGNOSIS — I48.91 ATRIAL FIBRILLATION, UNSPECIFIED TYPE (HCC): ICD-10-CM

## 2023-12-05 DIAGNOSIS — I10 ESSENTIAL HYPERTENSION: ICD-10-CM

## 2023-12-05 DIAGNOSIS — E11.628 TYPE 2 DIABETES MELLITUS WITH OTHER SKIN COMPLICATIONS (HCC): Primary | ICD-10-CM

## 2023-12-05 DIAGNOSIS — R06.02 SHORTNESS OF BREATH: ICD-10-CM

## 2023-12-05 PROCEDURE — 99214 OFFICE O/P EST MOD 30 MIN: CPT | Performed by: FAMILY MEDICINE

## 2023-12-05 PROCEDURE — 2022F DILAT RTA XM EVC RTNOPTHY: CPT | Performed by: FAMILY MEDICINE

## 2023-12-05 PROCEDURE — 3046F HEMOGLOBIN A1C LEVEL >9.0%: CPT | Performed by: FAMILY MEDICINE

## 2023-12-05 PROCEDURE — G8427 DOCREV CUR MEDS BY ELIG CLIN: HCPCS | Performed by: FAMILY MEDICINE

## 2023-12-05 PROCEDURE — 3017F COLORECTAL CA SCREEN DOC REV: CPT | Performed by: FAMILY MEDICINE

## 2023-12-05 RX ORDER — METOPROLOL TARTRATE 50 MG/1
50 TABLET, FILM COATED ORAL 2 TIMES DAILY
Qty: 180 TABLET | Refills: 0 | Status: SHIPPED | OUTPATIENT
Start: 2023-12-05

## 2023-12-05 ASSESSMENT — ENCOUNTER SYMPTOMS: SHORTNESS OF BREATH: 1

## 2023-12-05 NOTE — PROGRESS NOTES
2023    TELEHEALTH EVALUATION -- Audio/Visual (During OKAKF-90 public health emergency)    HPI:    Xochitl Adnres (:  1970) has requested an audio/video evaluation for the following concern(s):    Chief Complaint   Patient presents with    Follow-up Chronic Condition     Htn. Pt presents today for the following:    Admits to a 1 year hx of SOB during intercourse, felt like it anxiety attack, both with a partner and during masturbation, has been working in a [employer] environment, worse recently, has gained some weight (25 lb) and sx are worse. Denies or palpitations. Denies hx of Asthma but states that sx feel like it. Took OTC allergy med and feels better. T2DM:  Pt presents today for a diabetic follow-up. Currently taking Metformin 1,000 mg BID    Has not done labs ordered on 23    Morning glucose readings: b/t 120-150    Denies fatigue or vision changes,     Last Eye Exam: 23 - positive for DR - had injection and will have another in 3 days with eventual laser treatment  Last Foot Exam: 22  Last Microalbumin: 21 A1C = 7.8    HTN: Taking HCTZ 25 mg daily, Lisinopril 30 mg daily, and Lopressor 50 mg daily, hasn't checked readings, will check BID for 3 days      Has been under a lot of stress with his mother's health issues. Review of Systems   Respiratory:  Positive for shortness of breath.         No Known Allergies    PHYSICAL EXAMINATION:  [ INSTRUCTIONS:  \"[x]\" Indicates a positive item  \"[]\" Indicates a negative item  -- DELETE ALL ITEMS NOT EXAMINED]  Vital Signs: (As obtained by patient/caregiver or practitioner observation)    - none available     Constitutional: [x] Appears well-developed and well-nourished [x] No apparent distress      [] Abnormal-   Mental status  [x] Alert and awake  [x] Oriented to person/place/time [x]Able to follow commands      Eyes:  EOM    [x]  Normal  [] Abnormal-  Sclera  []  Normal  [] Abnormal -         Discharge []

## 2024-01-09 ENCOUNTER — OFFICE VISIT (OUTPATIENT)
Dept: PULMONOLOGY | Age: 54
End: 2024-01-09
Payer: MEDICAID

## 2024-01-09 VITALS
TEMPERATURE: 97.3 F | SYSTOLIC BLOOD PRESSURE: 163 MMHG | WEIGHT: 263 LBS | OXYGEN SATURATION: 97 % | BODY MASS INDEX: 36.82 KG/M2 | RESPIRATION RATE: 16 BRPM | HEART RATE: 82 BPM | HEIGHT: 71 IN | DIASTOLIC BLOOD PRESSURE: 98 MMHG

## 2024-01-09 DIAGNOSIS — I48.91 ATRIAL FIBRILLATION, UNSPECIFIED TYPE (HCC): ICD-10-CM

## 2024-01-09 DIAGNOSIS — G47.30 SLEEP APNEA, UNSPECIFIED TYPE: ICD-10-CM

## 2024-01-09 DIAGNOSIS — E66.9 OBESITY (BMI 30-39.9): ICD-10-CM

## 2024-01-09 DIAGNOSIS — R06.02 SOB (SHORTNESS OF BREATH): Primary | ICD-10-CM

## 2024-01-09 PROCEDURE — 3080F DIAST BP >= 90 MM HG: CPT | Performed by: STUDENT IN AN ORGANIZED HEALTH CARE EDUCATION/TRAINING PROGRAM

## 2024-01-09 PROCEDURE — 99204 OFFICE O/P NEW MOD 45 MIN: CPT | Performed by: STUDENT IN AN ORGANIZED HEALTH CARE EDUCATION/TRAINING PROGRAM

## 2024-01-09 PROCEDURE — 3077F SYST BP >= 140 MM HG: CPT | Performed by: STUDENT IN AN ORGANIZED HEALTH CARE EDUCATION/TRAINING PROGRAM

## 2024-01-09 PROCEDURE — G8427 DOCREV CUR MEDS BY ELIG CLIN: HCPCS | Performed by: STUDENT IN AN ORGANIZED HEALTH CARE EDUCATION/TRAINING PROGRAM

## 2024-01-09 PROCEDURE — G8484 FLU IMMUNIZE NO ADMIN: HCPCS | Performed by: STUDENT IN AN ORGANIZED HEALTH CARE EDUCATION/TRAINING PROGRAM

## 2024-01-09 PROCEDURE — 1036F TOBACCO NON-USER: CPT | Performed by: STUDENT IN AN ORGANIZED HEALTH CARE EDUCATION/TRAINING PROGRAM

## 2024-01-09 PROCEDURE — G8417 CALC BMI ABV UP PARAM F/U: HCPCS | Performed by: STUDENT IN AN ORGANIZED HEALTH CARE EDUCATION/TRAINING PROGRAM

## 2024-01-09 PROCEDURE — 3017F COLORECTAL CA SCREEN DOC REV: CPT | Performed by: STUDENT IN AN ORGANIZED HEALTH CARE EDUCATION/TRAINING PROGRAM

## 2024-01-09 RX ORDER — ALBUTEROL SULFATE 90 UG/1
2 AEROSOL, METERED RESPIRATORY (INHALATION) 4 TIMES DAILY PRN
Qty: 54 G | Refills: 1 | Status: SHIPPED | OUTPATIENT
Start: 2024-01-09

## 2024-01-09 ASSESSMENT — ENCOUNTER SYMPTOMS
STRIDOR: 0
NAUSEA: 0
SHORTNESS OF BREATH: 1
TROUBLE SWALLOWING: 0
WHEEZING: 0
ABDOMINAL PAIN: 0
CONSTIPATION: 0
DIARRHEA: 0
EYE ITCHING: 0
COLOR CHANGE: 0
VOMITING: 0
ABDOMINAL DISTENTION: 0
EYE PAIN: 0
EYE REDNESS: 0
COUGH: 0
SORE THROAT: 0
BACK PAIN: 0
EYE DISCHARGE: 0

## 2024-01-09 ASSESSMENT — SLEEP AND FATIGUE QUESTIONNAIRES: NECK CIRCUMFERENCE (INCHES): 18.5

## 2024-01-09 NOTE — PROGRESS NOTES
[facility] New Clinic Visit   [address], [place] [id number] [phone]    Chief Complaint/Referring Provider:  Patient is being seen at the request of Dr. Rider for a consultation for SOB    Presenting HPI:   Patient is a 53-year-old male with significant past medical history of A-fib, hypertension that presents to [facility] pulmonary clinic for shortness of breath.  Patient reports that he has shortness of breath that occurs suddenly with physical activity.  He also has shortness of breath with exertion.  He denies any shortness of breath at rest.  He denies any additional symptoms such as fever, chills, nausea, vomiting, abdominal pain.  Patient endorses chest tightness at times.  He had a friend who had a rescue inhaler and used this with a therapeutic benefit.    Patient is a never smoker, no illicit drug use, drinks occasionally.  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  He does not have any pets/birds.  He denies any household exposures.    Patient reports that he goes to bed at midnight and wakes up at 9 AM.  He has to wake up multiple times at night to go to the bathroom.  He feels refreshed in the morning, he denies any morning headaches.  However, he does endorse daytime sleepiness, he does not take any naps.  His partner denies any snoring or apneic episodes.  In the past, he is woken up with acute shortness of breath from his sleep.      [place] Sleepiness Scale    Sitting and reading - 3    Watching TV - 2    Sitting, inactive, in a public place  (e.g., in a meeting, theater, or  dinner event) - 3    As a passenger in a car for an  hour or more without stopping  for a break - 3    Lying down to rest when  circumstances permit - 3    Sitting and talking to someone - 0    Sitting quietly after a meal  without alcohol - 3    In a car, while stopped for a few  minutes in traffic or at a light - 1      0 - Would never nod off  1 - Slight chance

## 2024-01-09 NOTE — PATIENT INSTRUCTIONS
Remember to bring a list of pulmonary medications and any CPAP or BiPAP machines to your next appointment with the office.     Please keep all of your future appointments scheduled by [facility] office. Out of respect for other patients and providers, you may be asked to reschedule your appointment if you arrive later than your scheduled appointment time. Appointments cancelled less than 24hrs in advance will be considered a no show. Patients with three missed appointments within 1 year or four missed appointments within 2 years can be dismissed from the practice.     Please be aware that our physicians are required to work in the Intensive Care Unit at [facility].  Your appointment may need to be rescheduled if they are designated to work during your appointment time.      You may receive a survey regarding the care you received during your visit.  Your input is valuable to us.  We encourage you to complete and return your survey.  We hope you will choose us in the future for your healthcare needs.     Pt instructed of all future appointment dates & times, including radiology, labs, procedures & referrals. If procedures were scheduled preparation instructions provided. Instructions on future appointments with [facility] were given.      Sleep [facility] [phone]    ECHO [phone]

## 2024-01-09 NOTE — PROGRESS NOTES
MA Communication:  The following orders are received by verbal communication from   Lexa Robertson MD    Orders include:  ECHO       PFT        HST/ Sleep Lab to call       Adeola Turcios in office

## 2024-03-08 ENCOUNTER — HOSPITAL ENCOUNTER (OUTPATIENT)
Dept: SLEEP CENTER | Age: 54
End: 2024-03-08
Payer: MEDICAID

## 2024-03-08 DIAGNOSIS — G47.30 SLEEP APNEA, UNSPECIFIED TYPE: ICD-10-CM

## 2024-03-08 PROCEDURE — 95806 SLEEP STUDY UNATT&RESP EFFT: CPT

## 2024-03-11 PROCEDURE — 95806 SLEEP STUDY UNATT&RESP EFFT: CPT | Performed by: INTERNAL MEDICINE

## 2024-03-12 ENCOUNTER — OFFICE VISIT (OUTPATIENT)
Dept: PULMONOLOGY | Age: 54
End: 2024-03-12
Payer: MEDICAID

## 2024-03-12 ENCOUNTER — HOSPITAL ENCOUNTER (OUTPATIENT)
Dept: NON INVASIVE DIAGNOSTICS | Age: 54
Discharge: HOME | End: 2024-03-12
Payer: MEDICAID

## 2024-03-12 ENCOUNTER — HOSPITAL ENCOUNTER (OUTPATIENT)
Dept: GENERAL RADIOLOGY | Age: 54
Discharge: HOME | End: 2024-03-12
Payer: MEDICAID

## 2024-03-12 ENCOUNTER — HOSPITAL ENCOUNTER (OUTPATIENT)
Age: 54
Discharge: HOME | End: 2024-03-12
Payer: MEDICAID

## 2024-03-12 VITALS
HEIGHT: 71 IN | WEIGHT: 264 LBS | BODY MASS INDEX: 36.96 KG/M2 | TEMPERATURE: 98.1 F | HEART RATE: 100 BPM | DIASTOLIC BLOOD PRESSURE: 84 MMHG | SYSTOLIC BLOOD PRESSURE: 142 MMHG | OXYGEN SATURATION: 97 %

## 2024-03-12 DIAGNOSIS — R06.02 SOB (SHORTNESS OF BREATH): Primary | ICD-10-CM

## 2024-03-12 DIAGNOSIS — R04.2 BLOODY SPUTUM: ICD-10-CM

## 2024-03-12 DIAGNOSIS — I48.91 ATRIAL FIBRILLATION, UNSPECIFIED TYPE (HCC): ICD-10-CM

## 2024-03-12 DIAGNOSIS — E66.9 OBESITY (BMI 30-39.9): ICD-10-CM

## 2024-03-12 DIAGNOSIS — I50.22 CHRONIC SYSTOLIC HEART FAILURE (HCC): ICD-10-CM

## 2024-03-12 DIAGNOSIS — G47.33 MODERATE OBSTRUCTIVE SLEEP APNEA: ICD-10-CM

## 2024-03-12 DIAGNOSIS — G47.34 NOCTURNAL HYPOXEMIA: ICD-10-CM

## 2024-03-12 DIAGNOSIS — R06.02 SOB (SHORTNESS OF BREATH): ICD-10-CM

## 2024-03-12 PROBLEM — G47.30 SLEEP APNEA: Status: ACTIVE | Noted: 2024-03-12

## 2024-03-12 LAB — LEFT VENTRICULAR EJECTION FRACTION, EXTERNAL: 45

## 2024-03-12 PROCEDURE — G8417 CALC BMI ABV UP PARAM F/U: HCPCS | Performed by: STUDENT IN AN ORGANIZED HEALTH CARE EDUCATION/TRAINING PROGRAM

## 2024-03-12 PROCEDURE — 3077F SYST BP >= 140 MM HG: CPT | Performed by: STUDENT IN AN ORGANIZED HEALTH CARE EDUCATION/TRAINING PROGRAM

## 2024-03-12 PROCEDURE — 6360000004 HC RX CONTRAST MEDICATION: Performed by: INTERNAL MEDICINE

## 2024-03-12 PROCEDURE — G8427 DOCREV CUR MEDS BY ELIG CLIN: HCPCS | Performed by: STUDENT IN AN ORGANIZED HEALTH CARE EDUCATION/TRAINING PROGRAM

## 2024-03-12 PROCEDURE — 3017F COLORECTAL CA SCREEN DOC REV: CPT | Performed by: STUDENT IN AN ORGANIZED HEALTH CARE EDUCATION/TRAINING PROGRAM

## 2024-03-12 PROCEDURE — 3079F DIAST BP 80-89 MM HG: CPT | Performed by: STUDENT IN AN ORGANIZED HEALTH CARE EDUCATION/TRAINING PROGRAM

## 2024-03-12 PROCEDURE — 99214 OFFICE O/P EST MOD 30 MIN: CPT | Performed by: STUDENT IN AN ORGANIZED HEALTH CARE EDUCATION/TRAINING PROGRAM

## 2024-03-12 PROCEDURE — C8929 TTE W OR WO FOL WCON,DOPPLER: HCPCS

## 2024-03-12 PROCEDURE — 1036F TOBACCO NON-USER: CPT | Performed by: STUDENT IN AN ORGANIZED HEALTH CARE EDUCATION/TRAINING PROGRAM

## 2024-03-12 PROCEDURE — G8484 FLU IMMUNIZE NO ADMIN: HCPCS | Performed by: STUDENT IN AN ORGANIZED HEALTH CARE EDUCATION/TRAINING PROGRAM

## 2024-03-12 PROCEDURE — 71046 X-RAY EXAM CHEST 2 VIEWS: CPT

## 2024-03-12 RX ADMIN — PERFLUTREN 1.5 ML: 6.52 INJECTION, SUSPENSION INTRAVENOUS at 11:01

## 2024-03-12 ASSESSMENT — ENCOUNTER SYMPTOMS
WHEEZING: 0
EYE ITCHING: 0
ABDOMINAL DISTENTION: 0
SHORTNESS OF BREATH: 1
TROUBLE SWALLOWING: 0
EYE DISCHARGE: 0
SORE THROAT: 0
EYE PAIN: 0
BACK PAIN: 0
NAUSEA: 0
COUGH: 1
DIARRHEA: 0
VOMITING: 0
COLOR CHANGE: 0
STRIDOR: 0
CONSTIPATION: 0
EYE REDNESS: 0
ABDOMINAL PAIN: 0

## 2024-03-12 NOTE — PROGRESS NOTES
[facility] Pulmonary Follow-up  [address] [place] [address]  [phone]        Franklyn Fermin (: 1970 ) is a 53 y.o. male here for an evaluation of   Chief Complaint   Patient presents with    Results     PFT/Echo/HST         SUBJECTIVE/OBJECTIVE:  Patient is a 53-year-old male with significant past medical history of A-fib, hypertension that presents to [facility] pulmonary clinic for f/u visit.  Patient had an episode of bloody sputum.  He is still having shortness of breath with exertion and at night.  Patient is taking care of parents and has multiple stressors.  He has been using his albuterol as needed for shortness of breath, and he says that it is helping.  He does not use his rescue inhaler often.  He is here for follow-up of home sleep test and echo.     Patient is a never smoker, no illicit drug use, drinks occasionally.  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  He does not have any pets/birds.  He denies any household exposures.    Review of Systems   Constitutional:  Negative for activity change, appetite change, chills, diaphoresis and fatigue.   HENT:  Negative for congestion, sore throat and trouble swallowing.    Eyes:  Negative for pain, discharge, redness and itching.   Respiratory:  Positive for cough and shortness of breath. Negative for wheezing and stridor.    Cardiovascular:  Negative for chest pain, palpitations and leg swelling.   Gastrointestinal:  Negative for abdominal distention, abdominal pain, constipation, diarrhea, nausea and vomiting.   Endocrine: Negative for polydipsia, polyphagia and polyuria.   Genitourinary:  Negative for difficulty urinating.   Musculoskeletal:  Negative for back pain, myalgias and neck pain.   Skin:  Negative for color change.   Neurological:  Negative for dizziness, weakness and light-headedness.   Psychiatric/Behavioral:  Negative for agitation and behavioral problems.          Vitals:    24

## 2024-03-12 NOTE — PATIENT INSTRUCTIONS
Remember to bring a list of pulmonary medications and any CPAP or BiPAP machines to your next appointment with the office.     Please keep all of your future appointments scheduled by [facility] [place] [facility] office. Out of respect for other patients and providers, you may be asked to reschedule your appointment if you arrive later than your scheduled appointment time. Appointments cancelled less than 24hrs in advance will be considered a no show. Patients with three missed appointments within 1 year or four missed appointments within 2 years can be dismissed from the practice.     Please be aware that our physicians are required to work in the Intensive Care Unit at [facility].  Your appointment may need to be rescheduled if they are designated to work during your appointment time.      You may receive a survey regarding the care you received during your visit.  Your input is valuable to us.  We encourage you to complete and return your survey.  We hope you will choose us in the future for your healthcare needs.     Pt instructed of all future appointment dates & times, including radiology, labs, procedures & referrals. If procedures were scheduled preparation instructions provided. Instructions on future appointments with [facility] were given.      In the next few weeks, you will be receiving a survey from [facility] regarding your visit today.  We would greatly appreciate it if you would take just a few minutes to fill that out.  It is very important to us that our patients receive top notch care and our surveys help keep us accountable. However, if your experience was not a good one, we want to hear about that as well. This is a key way we can keep track of problems and strive to correct any for future visits.    Again, we appreciate your time and thank you for choosing [facility]!    ASAEL Fernandez

## 2024-03-12 NOTE — PROGRESS NOTES
MA Communication:  The following orders are received by verbal communication from Lexa Robertson MD    Orders include:  CPAP 5-20 w/1 LPM bleed in                             CXR today                                        31-90 day fu        DME List given to pt (pt to call back with name)

## 2024-03-26 ENCOUNTER — TELEPHONE (OUTPATIENT)
Dept: PULMONOLOGY | Age: 54
End: 2024-03-26

## 2024-04-02 ENCOUNTER — OFFICE VISIT (OUTPATIENT)
Dept: CARDIOLOGY CLINIC | Age: 54
End: 2024-04-02
Payer: MEDICAID

## 2024-04-02 ENCOUNTER — TELEPHONE (OUTPATIENT)
Dept: CARDIOLOGY CLINIC | Age: 54
End: 2024-04-02

## 2024-04-02 VITALS
SYSTOLIC BLOOD PRESSURE: 128 MMHG | HEIGHT: 70 IN | DIASTOLIC BLOOD PRESSURE: 64 MMHG | OXYGEN SATURATION: 96 % | HEART RATE: 82 BPM | BODY MASS INDEX: 37.72 KG/M2 | WEIGHT: 263.5 LBS

## 2024-04-02 DIAGNOSIS — I51.9 LV DYSFUNCTION: ICD-10-CM

## 2024-04-02 DIAGNOSIS — E78.2 MIXED HYPERLIPIDEMIA: ICD-10-CM

## 2024-04-02 DIAGNOSIS — I48.92 ATRIAL FLUTTER, UNSPECIFIED TYPE (HCC): ICD-10-CM

## 2024-04-02 DIAGNOSIS — I10 ESSENTIAL HYPERTENSION: ICD-10-CM

## 2024-04-02 DIAGNOSIS — R06.09 DOE (DYSPNEA ON EXERTION): ICD-10-CM

## 2024-04-02 DIAGNOSIS — I48.91 ATRIAL FIBRILLATION, UNSPECIFIED TYPE (HCC): Primary | ICD-10-CM

## 2024-04-02 PROCEDURE — 99244 OFF/OP CNSLTJ NEW/EST MOD 40: CPT | Performed by: INTERNAL MEDICINE

## 2024-04-02 PROCEDURE — G8427 DOCREV CUR MEDS BY ELIG CLIN: HCPCS | Performed by: INTERNAL MEDICINE

## 2024-04-02 PROCEDURE — 93000 ELECTROCARDIOGRAM COMPLETE: CPT | Performed by: INTERNAL MEDICINE

## 2024-04-02 PROCEDURE — 3078F DIAST BP <80 MM HG: CPT | Performed by: INTERNAL MEDICINE

## 2024-04-02 PROCEDURE — G8417 CALC BMI ABV UP PARAM F/U: HCPCS | Performed by: INTERNAL MEDICINE

## 2024-04-02 PROCEDURE — 93228 REMOTE 30 DAY ECG REV/REPORT: CPT | Performed by: INTERNAL MEDICINE

## 2024-04-02 PROCEDURE — 3074F SYST BP LT 130 MM HG: CPT | Performed by: INTERNAL MEDICINE

## 2024-04-02 RX ORDER — ASPIRIN 81 MG/1
81 TABLET ORAL DAILY
Qty: 90 TABLET | Refills: 0
Start: 2024-04-02

## 2024-04-02 NOTE — PATIENT INSTRUCTIONS
Plan:  ~Cardiac event monitor for 4 weeks   ~Recommend GXT Myoview stress test to evaluate shortness of breath and educed heart function    ~Discussed a possible angiogram if stress test is abnormal   ~Recommend referral to the EP team, (also called electrophysiology), to discuss an implanted loop recorder if your cardiac monitor does not shown atrial fibrillation/flutter, or for treatment of atrial fibrillation if seen on monitor.   ~Recommend starting enteric coated Aspirin 81 mg daily.   ~Labs today- BNP, TSH, CBC, fasting lipids, mag level   Cardiac medications reviewed including indications and pertinent side effects. Medication list updated at this visit.   Patient verbalizes understanding of the need for treatment and education has been provided at today's visit. Additional education material will be provided in after visit summary.    ~Check blood pressure and heart rate at home a few times per week- keep a log with dates and times and bring to office visit   Regular exercise and following a healthy diet encouraged    ~Ok to walk for exercise. Do not start a new exercise routine at the gym until after testing   Follow up with me in 6 weeks

## 2024-04-02 NOTE — TELEPHONE ENCOUNTER
Monitor placed by [place]  Monitor company [employer]  Length of monitor 28 DAY  Monitor ordered by [facility]  Serial number [id number]  Kit ID 0000  Activation successful prior to pt leaving office? Yes

## 2024-04-02 NOTE — PROGRESS NOTES
atrium is dilated.   The right ventricle is normal in size and function. TAPSE: 2.08 cm. RVS   velocity: 12.6 cm/s.    EKG 12/29/2022        Latest Reference Range & Units 12/29/20 12:47   Cholesterol, Total 0 - 199 mg/dL 176   HDL Cholesterol 40 - 60 mg/dL 27 (L)   LDL Calculated <100 mg/dL see below   LDL DIRECT,LDL <100 mg/dL 89   Triglycerides 0 - 150 mg/dL 523 (H)   VLDL Cholesterol Calculated Not Established mg/dL see below       Assessment:   Shortness of breath - etioology uncertain. Needs further cardiac evaluation   Atrial flutter- first noted on 2012. Had DCCV. No documented recurrence   LV dysfunction- EF 45% on echo 3/12/2024. Etiology unknown   Hypertension - stable  Diabetes mellitus   Sleep apnea   History of a colostomy   History of necrotizing fasciitis resulting in partial foot amputation   Non smoker   Family history of heart diease     Plan:  ~Cardiac event monitor for 4 weeks   ~Recommend GXT Myoview stress test to evaluate shortness of breath and educed heart function    ~Discussed a possible angiogram if stress test is abnormal   ~Pending results of stress test, may further optoimize medical therapy if EF confirmed reduced.   ~Consider MRI if no ischemic heart disease identified   ~Recommend referral to the EP team, (also called electrophysiology), to discuss an implanted loop recorder if your cardiac monitor does not shown atrial fibrillation/flutter, or for treatment of atrial fibrillation if seen on monitor.   ~Recommend starting enteric coated Aspirin 81 mg daily.   ~Labs today- BNP, TSH, CBC, fasting lipids, mag level   Cardiac medications reviewed including indications and pertinent side effects. Medication list updated at this visit.   Patient verbalizes understanding of the need for treatment and education has been provided at today's visit. Additional education material will be provided in after visit summary.    ~Check blood pressure and heart rate at home a few times per week- keep

## 2024-04-08 DIAGNOSIS — I48.91 ATRIAL FIBRILLATION, UNSPECIFIED TYPE (HCC): ICD-10-CM

## 2024-04-08 NOTE — TELEPHONE ENCOUNTER
Call  Monitor show recurrent atrial flutter  Increase Toprol to 75 mg daily (has 50 mg tab. Take 1 and 1/2 tab daily)  Stop ASA  Start Eliquis 5 mg BID   Has OV w/ EP scheduled

## 2024-04-08 NOTE — TELEPHONE ENCOUNTER
Dr. Estrada - we received an urgent report from [employer]. I'm not sure why we received it? But I had to forward to you because of it being urgent.    Scanning into this encounter.

## 2024-04-09 RX ORDER — METOPROLOL TARTRATE 75 MG/1
50 TABLET, FILM COATED ORAL 2 TIMES DAILY
Qty: 180 TABLET | Refills: 1 | Status: SHIPPED | OUTPATIENT
Start: 2024-04-09

## 2024-04-09 NOTE — TELEPHONE ENCOUNTER
Spoke with pt relayed message per [facility]. Pt v/u and would like an updated Rx sent to [facility].

## 2024-05-14 ENCOUNTER — TELEPHONE (OUTPATIENT)
Dept: CARDIOLOGY CLINIC | Age: 54
End: 2024-05-14

## 2024-05-14 DIAGNOSIS — I48.92 ATRIAL FLUTTER, UNSPECIFIED TYPE (HCC): ICD-10-CM

## 2024-05-14 DIAGNOSIS — R06.09 DOE (DYSPNEA ON EXERTION): ICD-10-CM

## 2024-05-14 DIAGNOSIS — Z12.5 PROSTATE CANCER SCREENING: ICD-10-CM

## 2024-05-14 DIAGNOSIS — I10 ESSENTIAL HYPERTENSION: ICD-10-CM

## 2024-05-14 DIAGNOSIS — E11.9 TYPE 2 DIABETES MELLITUS WITHOUT COMPLICATION, WITHOUT LONG-TERM CURRENT USE OF INSULIN (HCC): ICD-10-CM

## 2024-05-14 DIAGNOSIS — I48.91 ATRIAL FIBRILLATION, UNSPECIFIED TYPE (HCC): ICD-10-CM

## 2024-05-14 NOTE — TELEPHONE ENCOUNTER
Spoke with patient and reviewed monitor results. He is scheduled to see [facility] tomorrow.

## 2024-05-14 NOTE — TELEPHONE ENCOUNTER
----- Message from Jadon Estrada MD sent at 5/14/2024 11:13 AM EDT -----  Please notify patient that their final report shows AF is infrequent  Has OV to discuss w/ EP in 2 weeks  Remain on current meds until then

## 2024-05-15 ENCOUNTER — APPOINTMENT (OUTPATIENT)
Dept: GENERAL RADIOLOGY | Age: 54
DRG: 344 | End: 2024-05-15
Payer: MEDICAID

## 2024-05-15 ENCOUNTER — HOSPITAL ENCOUNTER (INPATIENT)
Age: 54
LOS: 4 days | Discharge: ANOTHER HOSPITAL | DRG: 344 | End: 2024-05-19
Attending: EMERGENCY MEDICINE | Admitting: FAMILY MEDICINE
Payer: MEDICAID

## 2024-05-15 DIAGNOSIS — I50.9 HEART FAILURE, UNSPECIFIED HF CHRONICITY, UNSPECIFIED HEART FAILURE TYPE (HCC): ICD-10-CM

## 2024-05-15 DIAGNOSIS — L97.519 DIABETIC ULCER OF TOE OF RIGHT FOOT ASSOCIATED WITH TYPE 2 DIABETES MELLITUS, UNSPECIFIED ULCER STAGE (HCC): Primary | ICD-10-CM

## 2024-05-15 DIAGNOSIS — I42.9 CARDIOMYOPATHY, UNSPECIFIED TYPE (HCC): ICD-10-CM

## 2024-05-15 DIAGNOSIS — I50.9 CONGESTIVE HEART FAILURE, UNSPECIFIED HF CHRONICITY, UNSPECIFIED HEART FAILURE TYPE (HCC): ICD-10-CM

## 2024-05-15 DIAGNOSIS — E11.621 DIABETIC ULCER OF TOE OF RIGHT FOOT ASSOCIATED WITH TYPE 2 DIABETES MELLITUS, UNSPECIFIED ULCER STAGE (HCC): Primary | ICD-10-CM

## 2024-05-15 LAB
ALBUMIN SERPL-MCNC: 3.7 G/DL (ref 3.4–5)
ALBUMIN/GLOB SERPL: 1.2 {RATIO} (ref 1.1–2.2)
ALP SERPL-CCNC: 110 U/L (ref 40–129)
ALT SERPL-CCNC: 17 U/L (ref 10–40)
ANION GAP SERPL CALCULATED.3IONS-SCNC: 11 MMOL/L (ref 3–16)
ANION GAP SERPL CALCULATED.3IONS-SCNC: 12 MMOL/L (ref 3–16)
AST SERPL-CCNC: 9 U/L (ref 15–37)
BASOPHILS # BLD: 0 K/UL (ref 0–0.2)
BASOPHILS # BLD: 0.1 K/UL (ref 0–0.2)
BASOPHILS NFR BLD: 0.5 %
BASOPHILS NFR BLD: 0.9 %
BILIRUB SERPL-MCNC: 0.6 MG/DL (ref 0–1)
BUN SERPL-MCNC: 25 MG/DL (ref 7–20)
BUN SERPL-MCNC: 25 MG/DL (ref 7–20)
CALCIUM SERPL-MCNC: 9.6 MG/DL (ref 8.3–10.6)
CALCIUM SERPL-MCNC: 9.7 MG/DL (ref 8.3–10.6)
CHLORIDE SERPL-SCNC: 102 MMOL/L (ref 99–110)
CHLORIDE SERPL-SCNC: 103 MMOL/L (ref 99–110)
CHOLEST SERPL-MCNC: 240 MG/DL (ref 0–199)
CO2 SERPL-SCNC: 24 MMOL/L (ref 21–32)
CO2 SERPL-SCNC: 25 MMOL/L (ref 21–32)
CREAT SERPL-MCNC: 1.5 MG/DL (ref 0.9–1.3)
CREAT SERPL-MCNC: 1.6 MG/DL (ref 0.9–1.3)
CRP SERPL-MCNC: 47.1 MG/L (ref 0–5.1)
DEPRECATED RDW RBC AUTO: 13.2 % (ref 12.4–15.4)
DEPRECATED RDW RBC AUTO: 13.4 % (ref 12.4–15.4)
EOSINOPHIL # BLD: 0.1 K/UL (ref 0–0.6)
EOSINOPHIL # BLD: 0.1 K/UL (ref 0–0.6)
EOSINOPHIL NFR BLD: 0.9 %
EOSINOPHIL NFR BLD: 1.5 %
ERYTHROCYTE [SEDIMENTATION RATE] IN BLOOD BY WESTERGREN METHOD: 33 MM/HR (ref 0–20)
EST. AVERAGE GLUCOSE BLD GHB EST-MCNC: 162.8 MG/DL
GFR SERPLBLD CREATININE-BSD FMLA CKD-EPI: 51 ML/MIN/{1.73_M2}
GFR SERPLBLD CREATININE-BSD FMLA CKD-EPI: 55 ML/MIN/{1.73_M2}
GLUCOSE SERPL-MCNC: 134 MG/DL (ref 70–99)
GLUCOSE SERPL-MCNC: 178 MG/DL (ref 70–99)
HBA1C MFR BLD: 7.3 %
HCT VFR BLD AUTO: 28.7 % (ref 40.5–52.5)
HCT VFR BLD AUTO: 31.6 % (ref 40.5–52.5)
HDLC SERPL-MCNC: 31 MG/DL (ref 40–60)
HGB BLD-MCNC: 11 G/DL (ref 13.5–17.5)
HGB BLD-MCNC: 9.9 G/DL (ref 13.5–17.5)
LDLC SERPL CALC-MCNC: 182 MG/DL
LYMPHOCYTES # BLD: 1.5 K/UL (ref 1–5.1)
LYMPHOCYTES # BLD: 1.5 K/UL (ref 1–5.1)
LYMPHOCYTES NFR BLD: 18.6 %
LYMPHOCYTES NFR BLD: 23.4 %
MAGNESIUM SERPL-MCNC: 1.5 MG/DL (ref 1.8–2.4)
MCH RBC QN AUTO: 28.6 PG (ref 26–34)
MCH RBC QN AUTO: 28.7 PG (ref 26–34)
MCHC RBC AUTO-ENTMCNC: 34.5 G/DL (ref 31–36)
MCHC RBC AUTO-ENTMCNC: 34.8 G/DL (ref 31–36)
MCV RBC AUTO: 82.2 FL (ref 80–100)
MCV RBC AUTO: 83.2 FL (ref 80–100)
MONOCYTES # BLD: 0.6 K/UL (ref 0–1.3)
MONOCYTES # BLD: 0.6 K/UL (ref 0–1.3)
MONOCYTES NFR BLD: 7.7 %
MONOCYTES NFR BLD: 9.1 %
NEUTROPHILS # BLD: 4.2 K/UL (ref 1.7–7.7)
NEUTROPHILS # BLD: 5.8 K/UL (ref 1.7–7.7)
NEUTROPHILS NFR BLD: 65.1 %
NEUTROPHILS NFR BLD: 72.3 %
NT-PROBNP SERPL-MCNC: 2256 PG/ML (ref 0–124)
NT-PROBNP SERPL-MCNC: 3550 PG/ML (ref 0–124)
PLATELET # BLD AUTO: 217 K/UL (ref 135–450)
PLATELET # BLD AUTO: 242 K/UL (ref 135–450)
PMV BLD AUTO: 7.6 FL (ref 5–10.5)
PMV BLD AUTO: 8.4 FL (ref 5–10.5)
POTASSIUM SERPL-SCNC: 4.4 MMOL/L (ref 3.5–5.1)
POTASSIUM SERPL-SCNC: 4.9 MMOL/L (ref 3.5–5.1)
PROT SERPL-MCNC: 6.9 G/DL (ref 6.4–8.2)
PSA SERPL DL<=0.01 NG/ML-MCNC: 0.44 NG/ML (ref 0–4)
RBC # BLD AUTO: 3.45 M/UL (ref 4.2–5.9)
RBC # BLD AUTO: 3.84 M/UL (ref 4.2–5.9)
SODIUM SERPL-SCNC: 138 MMOL/L (ref 136–145)
SODIUM SERPL-SCNC: 139 MMOL/L (ref 136–145)
TRIGL SERPL-MCNC: 137 MG/DL (ref 0–150)
TSH SERPL DL<=0.005 MIU/L-ACNC: 3.65 UIU/ML (ref 0.27–4.2)
VLDLC SERPL CALC-MCNC: 27 MG/DL
WBC # BLD AUTO: 6.5 K/UL (ref 4–11)
WBC # BLD AUTO: 8 K/UL (ref 4–11)

## 2024-05-15 PROCEDURE — 73630 X-RAY EXAM OF FOOT: CPT

## 2024-05-15 PROCEDURE — 36415 COLL VENOUS BLD VENIPUNCTURE: CPT

## 2024-05-15 PROCEDURE — 86140 C-REACTIVE PROTEIN: CPT

## 2024-05-15 PROCEDURE — 85025 COMPLETE CBC W/AUTO DIFF WBC: CPT

## 2024-05-15 PROCEDURE — 1200000000 HC SEMI PRIVATE

## 2024-05-15 PROCEDURE — 83880 ASSAY OF NATRIURETIC PEPTIDE: CPT

## 2024-05-15 PROCEDURE — 6370000000 HC RX 637 (ALT 250 FOR IP): Performed by: STUDENT IN AN ORGANIZED HEALTH CARE EDUCATION/TRAINING PROGRAM

## 2024-05-15 PROCEDURE — 99285 EMERGENCY DEPT VISIT HI MDM: CPT

## 2024-05-15 PROCEDURE — 85652 RBC SED RATE AUTOMATED: CPT

## 2024-05-15 PROCEDURE — 93005 ELECTROCARDIOGRAM TRACING: CPT

## 2024-05-15 PROCEDURE — 80048 BASIC METABOLIC PNL TOTAL CA: CPT

## 2024-05-15 PROCEDURE — 6360000002 HC RX W HCPCS: Performed by: STUDENT IN AN ORGANIZED HEALTH CARE EDUCATION/TRAINING PROGRAM

## 2024-05-15 PROCEDURE — 71046 X-RAY EXAM CHEST 2 VIEWS: CPT

## 2024-05-15 RX ORDER — ONDANSETRON 4 MG/1
4 TABLET, ORALLY DISINTEGRATING ORAL EVERY 8 HOURS PRN
Status: DISCONTINUED | OUTPATIENT
Start: 2024-05-15 | End: 2024-05-19 | Stop reason: HOSPADM

## 2024-05-15 RX ORDER — INSULIN LISPRO 100 [IU]/ML
0-4 INJECTION, SOLUTION INTRAVENOUS; SUBCUTANEOUS
Status: DISCONTINUED | OUTPATIENT
Start: 2024-05-16 | End: 2024-05-16

## 2024-05-15 RX ORDER — ACETAMINOPHEN 325 MG/1
650 TABLET ORAL EVERY 6 HOURS PRN
Status: DISCONTINUED | OUTPATIENT
Start: 2024-05-15 | End: 2024-05-17 | Stop reason: SDUPTHER

## 2024-05-15 RX ORDER — ALBUTEROL SULFATE 90 UG/1
2 AEROSOL, METERED RESPIRATORY (INHALATION) 4 TIMES DAILY PRN
Status: DISCONTINUED | OUTPATIENT
Start: 2024-05-15 | End: 2024-05-15 | Stop reason: CLARIF

## 2024-05-15 RX ORDER — SODIUM CHLORIDE 0.9 % (FLUSH) 0.9 %
5-40 SYRINGE (ML) INJECTION EVERY 12 HOURS SCHEDULED
Status: DISCONTINUED | OUTPATIENT
Start: 2024-05-15 | End: 2024-05-19 | Stop reason: HOSPADM

## 2024-05-15 RX ORDER — SODIUM CHLORIDE 0.9 % (FLUSH) 0.9 %
5-40 SYRINGE (ML) INJECTION PRN
Status: DISCONTINUED | OUTPATIENT
Start: 2024-05-15 | End: 2024-05-19 | Stop reason: HOSPADM

## 2024-05-15 RX ORDER — METOPROLOL TARTRATE 50 MG/1
50 TABLET, FILM COATED ORAL 2 TIMES DAILY
Status: DISCONTINUED | OUTPATIENT
Start: 2024-05-15 | End: 2024-05-16

## 2024-05-15 RX ORDER — LABETALOL HYDROCHLORIDE 5 MG/ML
10 INJECTION, SOLUTION INTRAVENOUS EVERY 4 HOURS PRN
Status: DISCONTINUED | OUTPATIENT
Start: 2024-05-15 | End: 2024-05-16

## 2024-05-15 RX ORDER — SODIUM CHLORIDE 9 MG/ML
INJECTION, SOLUTION INTRAVENOUS PRN
Status: DISCONTINUED | OUTPATIENT
Start: 2024-05-15 | End: 2024-05-19 | Stop reason: HOSPADM

## 2024-05-15 RX ORDER — ACETAMINOPHEN 650 MG/1
650 SUPPOSITORY RECTAL EVERY 6 HOURS PRN
Status: DISCONTINUED | OUTPATIENT
Start: 2024-05-15 | End: 2024-05-17 | Stop reason: SDUPTHER

## 2024-05-15 RX ORDER — ALBUTEROL SULFATE 2.5 MG/3ML
2.5 SOLUTION RESPIRATORY (INHALATION) EVERY 6 HOURS PRN
Status: DISCONTINUED | OUTPATIENT
Start: 2024-05-15 | End: 2024-05-19 | Stop reason: HOSPADM

## 2024-05-15 RX ORDER — POLYETHYLENE GLYCOL 3350 17 G/17G
17 POWDER, FOR SOLUTION ORAL DAILY PRN
Status: DISCONTINUED | OUTPATIENT
Start: 2024-05-15 | End: 2024-05-19 | Stop reason: HOSPADM

## 2024-05-15 RX ORDER — ONDANSETRON 2 MG/ML
4 INJECTION INTRAMUSCULAR; INTRAVENOUS EVERY 6 HOURS PRN
Status: DISCONTINUED | OUTPATIENT
Start: 2024-05-15 | End: 2024-05-19 | Stop reason: HOSPADM

## 2024-05-15 RX ORDER — INSULIN LISPRO 100 [IU]/ML
0-4 INJECTION, SOLUTION INTRAVENOUS; SUBCUTANEOUS NIGHTLY
Status: DISCONTINUED | OUTPATIENT
Start: 2024-05-16 | End: 2024-05-16

## 2024-05-15 RX ORDER — FUROSEMIDE 10 MG/ML
40 INJECTION INTRAMUSCULAR; INTRAVENOUS ONCE
Status: COMPLETED | OUTPATIENT
Start: 2024-05-15 | End: 2024-05-15

## 2024-05-15 RX ADMIN — FUROSEMIDE 40 MG: 10 INJECTION, SOLUTION INTRAMUSCULAR; INTRAVENOUS at 23:38

## 2024-05-15 RX ADMIN — APIXABAN 5 MG: 5 TABLET, FILM COATED ORAL at 23:38

## 2024-05-15 RX ADMIN — METOPROLOL TARTRATE 50 MG: 50 TABLET, FILM COATED ORAL at 23:38

## 2024-05-15 ASSESSMENT — PAIN - FUNCTIONAL ASSESSMENT: PAIN_FUNCTIONAL_ASSESSMENT: NONE - DENIES PAIN

## 2024-05-15 NOTE — ED PROVIDER NOTES
THE [facility]  EMERGENCY DEPARTMENT ENCOUNTER          PHYSICIAN ASSISTANT NOTE       Date of evaluation: 5/15/2024    Chief Complaint     Wound Infection (Patient with chief complaint of toe infection. Patient stated he had a blister of his toe and now the bottom of his toe is to the bone. ) and Abnormal labs (Patient states he saw a cardiologist and MD said he had abnormal labs and needed to come to the hospital)      History of Present Illness     Franklyn Fermin is a 53 y.o. male with a history of allergic rhinitis, atrial fibrillation, hyperlipidemia, hypertension, necrotizing fasciitis, type 2 diabetes who presents with 2 concerns.  The patient states that he has had a wound on his right great toe for quite some time now.  The follow-up was with Dr. Lindo with podiatry.  He actually states that he was supposed to come to the hospital on Friday to have the toe amputated.  The patient states that he would have already had it done but the patient's surgeon is out of town.  He has been on Augmentin and Cipro for the past couple of weeks.  He has not noticed any acute worsening over the past couple of days.  Also denies any systemic symptoms including fever, chills, myalgias.    The patient states that he is primarily coming to the hospital today because his cardiologist told him to come in for evaluation.  He recently saw Dr. Estrada with cardiology for evaluation of heart failure and atrial fibrillation.  He has an EF of 45%.  Unclear etiology of heart failure at this time.  Also recently had a cardiac event monitor which showed infrequent atrial fibrillation.  He got some outpatient labs done had an elevated BNP and was referred to the ER for further management.  On chart review, it looks like they are planning on doing a stress test, possible angio depending on the stress test results as well as possibly a cardiac MRI.  The patient states that he was having a lot of shortness of breath but states

## 2024-05-16 LAB
APTT BLD: 35.9 SEC (ref 22.1–36.4)
APTT BLD: 43.1 SEC (ref 22.1–36.4)
APTT BLD: 48.9 SEC (ref 22.1–36.4)
BILIRUB UR QL STRIP.AUTO: NEGATIVE
CK SERPL-CCNC: 45 U/L (ref 39–308)
CLARITY UR: CLEAR
COLOR UR: YELLOW
EKG ATRIAL RATE: 94 BPM
EKG DIAGNOSIS: NORMAL
EKG P AXIS: 40 DEGREES
EKG P-R INTERVAL: 146 MS
EKG Q-T INTERVAL: 350 MS
EKG QRS DURATION: 86 MS
EKG QTC CALCULATION (BAZETT): 437 MS
EKG R AXIS: -25 DEGREES
EKG T AXIS: 79 DEGREES
EKG VENTRICULAR RATE: 94 BPM
GLUCOSE BLD-MCNC: 140 MG/DL (ref 70–99)
GLUCOSE BLD-MCNC: 178 MG/DL (ref 70–99)
GLUCOSE BLD-MCNC: 189 MG/DL (ref 70–99)
GLUCOSE BLD-MCNC: 229 MG/DL (ref 70–99)
GLUCOSE BLD-MCNC: 242 MG/DL (ref 70–99)
GLUCOSE UR STRIP.AUTO-MCNC: NEGATIVE MG/DL
HGB UR QL STRIP.AUTO: ABNORMAL
INR PPP: 1.24 (ref 0.85–1.15)
KETONES UR STRIP.AUTO-MCNC: NEGATIVE MG/DL
LEUKOCYTE ESTERASE UR QL STRIP.AUTO: NEGATIVE
MRSA DNA SPEC QL NAA+PROBE: NORMAL
NITRITE UR QL STRIP.AUTO: NEGATIVE
PERFORMED ON: ABNORMAL
PH UR STRIP.AUTO: 6 [PH] (ref 5–8)
PREALB SERPL-MCNC: 13.6 MG/DL (ref 20–40)
PROT UR STRIP.AUTO-MCNC: 100 MG/DL
PROTHROMBIN TIME: 15.8 SEC (ref 11.9–14.9)
RBC #/AREA URNS HPF: ABNORMAL /HPF (ref 0–4)
SP GR UR STRIP.AUTO: 1.01 (ref 1–1.03)
UA DIPSTICK W REFLEX MICRO PNL UR: YES
URN SPEC COLLECT METH UR: ABNORMAL
UROBILINOGEN UR STRIP-ACNC: 0.2 E.U./DL
WBC #/AREA URNS HPF: ABNORMAL /HPF (ref 0–5)

## 2024-05-16 PROCEDURE — 2580000003 HC RX 258: Performed by: INTERNAL MEDICINE

## 2024-05-16 PROCEDURE — 87075 CULTR BACTERIA EXCEPT BLOOD: CPT

## 2024-05-16 PROCEDURE — 87641 MR-STAPH DNA AMP PROBE: CPT

## 2024-05-16 PROCEDURE — 87186 SC STD MICRODIL/AGAR DIL: CPT

## 2024-05-16 PROCEDURE — 2580000003 HC RX 258

## 2024-05-16 PROCEDURE — 2580000003 HC RX 258: Performed by: STUDENT IN AN ORGANIZED HEALTH CARE EDUCATION/TRAINING PROGRAM

## 2024-05-16 PROCEDURE — 6370000000 HC RX 637 (ALT 250 FOR IP)

## 2024-05-16 PROCEDURE — 1200000000 HC SEMI PRIVATE

## 2024-05-16 PROCEDURE — 6360000002 HC RX W HCPCS

## 2024-05-16 PROCEDURE — 6370000000 HC RX 637 (ALT 250 FOR IP): Performed by: INTERNAL MEDICINE

## 2024-05-16 PROCEDURE — 87205 SMEAR GRAM STAIN: CPT

## 2024-05-16 PROCEDURE — 87077 CULTURE AEROBIC IDENTIFY: CPT

## 2024-05-16 PROCEDURE — 87070 CULTURE OTHR SPECIMN AEROBIC: CPT

## 2024-05-16 PROCEDURE — 87040 BLOOD CULTURE FOR BACTERIA: CPT

## 2024-05-16 PROCEDURE — 81001 URINALYSIS AUTO W/SCOPE: CPT

## 2024-05-16 PROCEDURE — 99223 1ST HOSP IP/OBS HIGH 75: CPT | Performed by: INTERNAL MEDICINE

## 2024-05-16 PROCEDURE — 85610 PROTHROMBIN TIME: CPT

## 2024-05-16 PROCEDURE — 84134 ASSAY OF PREALBUMIN: CPT

## 2024-05-16 PROCEDURE — 82550 ASSAY OF CK (CPK): CPT

## 2024-05-16 PROCEDURE — 6370000000 HC RX 637 (ALT 250 FOR IP): Performed by: STUDENT IN AN ORGANIZED HEALTH CARE EDUCATION/TRAINING PROGRAM

## 2024-05-16 PROCEDURE — 87081 CULTURE SCREEN ONLY: CPT

## 2024-05-16 PROCEDURE — 36415 COLL VENOUS BLD VENIPUNCTURE: CPT

## 2024-05-16 PROCEDURE — 85730 THROMBOPLASTIN TIME PARTIAL: CPT

## 2024-05-16 RX ORDER — SODIUM CHLORIDE 9 MG/ML
INJECTION, SOLUTION INTRAVENOUS CONTINUOUS
Status: ACTIVE | OUTPATIENT
Start: 2024-05-16 | End: 2024-05-16

## 2024-05-16 RX ORDER — INSULIN LISPRO 100 [IU]/ML
0-8 INJECTION, SOLUTION INTRAVENOUS; SUBCUTANEOUS
Status: DISCONTINUED | OUTPATIENT
Start: 2024-05-16 | End: 2024-05-16

## 2024-05-16 RX ORDER — HEPARIN SODIUM 1000 [USP'U]/ML
4000 INJECTION, SOLUTION INTRAVENOUS; SUBCUTANEOUS ONCE
Status: COMPLETED | OUTPATIENT
Start: 2024-05-16 | End: 2024-05-16

## 2024-05-16 RX ORDER — FUROSEMIDE 10 MG/ML
40 INJECTION INTRAMUSCULAR; INTRAVENOUS 2 TIMES DAILY
Status: DISCONTINUED | OUTPATIENT
Start: 2024-05-16 | End: 2024-05-16

## 2024-05-16 RX ORDER — INSULIN LISPRO 100 [IU]/ML
0-4 INJECTION, SOLUTION INTRAVENOUS; SUBCUTANEOUS NIGHTLY
Status: DISCONTINUED | OUTPATIENT
Start: 2024-05-16 | End: 2024-05-19 | Stop reason: HOSPADM

## 2024-05-16 RX ORDER — HYDRALAZINE HYDROCHLORIDE 20 MG/ML
5 INJECTION INTRAMUSCULAR; INTRAVENOUS EVERY 6 HOURS PRN
Status: DISCONTINUED | OUTPATIENT
Start: 2024-05-16 | End: 2024-05-19 | Stop reason: HOSPADM

## 2024-05-16 RX ORDER — LINEZOLID 2 MG/ML
600 INJECTION, SOLUTION INTRAVENOUS EVERY 12 HOURS
Status: DISCONTINUED | OUTPATIENT
Start: 2024-05-16 | End: 2024-05-17

## 2024-05-16 RX ORDER — CIPROFLOXACIN 500 MG/1
500 TABLET, FILM COATED ORAL 2 TIMES DAILY
Status: ON HOLD | COMMUNITY
Start: 2024-05-14 | End: 2024-05-18 | Stop reason: HOSPADM

## 2024-05-16 RX ORDER — INSULIN LISPRO 100 [IU]/ML
0-4 INJECTION, SOLUTION INTRAVENOUS; SUBCUTANEOUS NIGHTLY
Status: DISCONTINUED | OUTPATIENT
Start: 2024-05-16 | End: 2024-05-16

## 2024-05-16 RX ORDER — GLUCAGON 1 MG/ML
1 KIT INJECTION PRN
Status: DISCONTINUED | OUTPATIENT
Start: 2024-05-16 | End: 2024-05-19 | Stop reason: HOSPADM

## 2024-05-16 RX ORDER — INSULIN LISPRO 100 [IU]/ML
0-8 INJECTION, SOLUTION INTRAVENOUS; SUBCUTANEOUS
Status: DISCONTINUED | OUTPATIENT
Start: 2024-05-16 | End: 2024-05-19 | Stop reason: HOSPADM

## 2024-05-16 RX ORDER — METHOCARBAMOL 500 MG/1
500 TABLET, FILM COATED ORAL 4 TIMES DAILY PRN
Status: DISCONTINUED | OUTPATIENT
Start: 2024-05-16 | End: 2024-05-19 | Stop reason: HOSPADM

## 2024-05-16 RX ORDER — HEPARIN SODIUM 1000 [USP'U]/ML
4000 INJECTION, SOLUTION INTRAVENOUS; SUBCUTANEOUS PRN
Status: DISCONTINUED | OUTPATIENT
Start: 2024-05-16 | End: 2024-05-19 | Stop reason: HOSPADM

## 2024-05-16 RX ORDER — HEPARIN SODIUM 1000 [USP'U]/ML
2000 INJECTION, SOLUTION INTRAVENOUS; SUBCUTANEOUS PRN
Status: DISCONTINUED | OUTPATIENT
Start: 2024-05-16 | End: 2024-05-19 | Stop reason: HOSPADM

## 2024-05-16 RX ORDER — CARVEDILOL 3.12 MG/1
3.12 TABLET ORAL ONCE
Status: COMPLETED | OUTPATIENT
Start: 2024-05-16 | End: 2024-05-16

## 2024-05-16 RX ORDER — SODIUM CHLORIDE 9 MG/ML
INJECTION, SOLUTION INTRAVENOUS CONTINUOUS
Status: DISCONTINUED | OUTPATIENT
Start: 2024-05-16 | End: 2024-05-16

## 2024-05-16 RX ORDER — AMOXICILLIN AND CLAVULANATE POTASSIUM 875; 125 MG/1; MG/1
1 TABLET, FILM COATED ORAL EVERY 12 HOURS
Status: ON HOLD | COMMUNITY
Start: 2024-05-10 | End: 2024-05-18 | Stop reason: HOSPADM

## 2024-05-16 RX ORDER — CARVEDILOL 6.25 MG/1
6.25 TABLET ORAL 2 TIMES DAILY
Status: DISCONTINUED | OUTPATIENT
Start: 2024-05-16 | End: 2024-05-19 | Stop reason: HOSPADM

## 2024-05-16 RX ORDER — DEXTROSE MONOHYDRATE 100 MG/ML
INJECTION, SOLUTION INTRAVENOUS CONTINUOUS PRN
Status: DISCONTINUED | OUTPATIENT
Start: 2024-05-16 | End: 2024-05-19 | Stop reason: HOSPADM

## 2024-05-16 RX ORDER — HEPARIN SODIUM 10000 [USP'U]/100ML
5-30 INJECTION, SOLUTION INTRAVENOUS CONTINUOUS
Status: DISCONTINUED | OUTPATIENT
Start: 2024-05-16 | End: 2024-05-19 | Stop reason: HOSPADM

## 2024-05-16 RX ADMIN — HEPARIN SODIUM 9 UNITS/KG/HR: 10000 INJECTION, SOLUTION INTRAVENOUS at 08:35

## 2024-05-16 RX ADMIN — CARVEDILOL 3.12 MG: 3.12 TABLET, FILM COATED ORAL at 10:32

## 2024-05-16 RX ADMIN — SODIUM CHLORIDE, PRESERVATIVE FREE 10 ML: 5 INJECTION INTRAVENOUS at 08:30

## 2024-05-16 RX ADMIN — FUROSEMIDE 40 MG: 10 INJECTION, SOLUTION INTRAMUSCULAR; INTRAVENOUS at 08:44

## 2024-05-16 RX ADMIN — INSULIN LISPRO 2 UNITS: 100 INJECTION, SOLUTION INTRAVENOUS; SUBCUTANEOUS at 12:25

## 2024-05-16 RX ADMIN — METOPROLOL TARTRATE 50 MG: 50 TABLET, FILM COATED ORAL at 08:30

## 2024-05-16 RX ADMIN — INSULIN LISPRO 2 UNITS: 100 INJECTION, SOLUTION INTRAVENOUS; SUBCUTANEOUS at 18:01

## 2024-05-16 RX ADMIN — PIPERACILLIN AND TAZOBACTAM 4500 MG: 4; .5 INJECTION, POWDER, LYOPHILIZED, FOR SOLUTION INTRAVENOUS at 13:27

## 2024-05-16 RX ADMIN — PIPERACILLIN AND TAZOBACTAM 3375 MG: 3; .375 INJECTION, POWDER, LYOPHILIZED, FOR SOLUTION INTRAVENOUS at 18:02

## 2024-05-16 RX ADMIN — LINEZOLID 600 MG: 600 INJECTION, SOLUTION INTRAVENOUS at 12:21

## 2024-05-16 RX ADMIN — HEPARIN SODIUM 4000 UNITS: 1000 INJECTION INTRAVENOUS; SUBCUTANEOUS at 08:32

## 2024-05-16 RX ADMIN — SODIUM CHLORIDE: 9 INJECTION, SOLUTION INTRAVENOUS at 12:21

## 2024-05-16 RX ADMIN — CARVEDILOL 6.25 MG: 6.25 TABLET, FILM COATED ORAL at 20:41

## 2024-05-16 RX ADMIN — HEPARIN SODIUM 4000 UNITS: 1000 INJECTION INTRAVENOUS; SUBCUTANEOUS at 17:17

## 2024-05-16 ASSESSMENT — PAIN SCALES - GENERAL
PAINLEVEL_OUTOF10: 0
PAINLEVEL_OUTOF10: 0

## 2024-05-16 NOTE — ED NOTES
ED TO INPATIENT SBAR HANDOFF    Patient Name: Franklyn Fermin   :  1970  53 y.o.   MRN:  [id number]  Preferred Name  Franklyn  ED Room #:  A07/A07-07  Family/Caregiver Present no   Restraints no   Sitter no   Sepsis Risk Score Sepsis Risk Score: 2.6    Situation  Code Status: Prior No additional code details.    Allergies: Patient has no known allergies.  Weight: Patient Vitals for the past 96 hrs (Last 3 readings):   Weight   05/15/24 1908 109.8 kg (242 lb)     Arrived from: home  Chief Complaint:   Chief Complaint   Patient presents with    Wound Infection     Patient with chief complaint of toe infection. Patient stated he had a blister of his toe and now the bottom of his toe is to the bone.     Abnormal labs     Patient states he saw a cardiologist and MD said he had abnormal labs and needed to come to the hospital     Hospital Problem/Diagnosis:  Principal Problem:    Heart failure (HCC)  Resolved Problems:    * No resolved hospital problems. *    Imaging:   XR CHEST (2 VW)   Final Result      No acute cardiopulmonary findings.      Electronically signed by Jose Schofield MD      XR FOOT RIGHT (MIN 3 VIEWS)   Final Result   Impression:    1.  Extension at the interphalangeal joint of the great toe with osseous irregularity, likely sequela of remote trauma noted on prior x-ray. Superimposed osteomyelitis is not excluded on plain film.   2.  Prior amputation of the second toe at the middle phalanx.    3.  No acute fracture or dislocation.      Electronically signed by Jose Schofield MD        Abnormal labs:   Abnormal Labs Reviewed   CBC WITH AUTO DIFFERENTIAL - Abnormal; Notable for the following components:       Result Value    RBC 3.84 (*)     Hemoglobin 11.0 (*)     Hematocrit 31.6 (*)     All other components within normal limits   BASIC METABOLIC PANEL W/ REFLEX TO MG FOR LOW K - Abnormal; Notable for the following components:    Glucose 134 (*)     BUN 25 (*)     Creatinine 1.6 (*)     Est, Glom Filt Rate

## 2024-05-16 NOTE — DISCHARGE INSTRUCTIONS
Dr Singh's Post Operative Instructions    Office Number:  [phone]  Emergency Pager Number:  [phone]    Fluids and Diet    Begin with clear fluids, dry toast and/or crackers.  If not nauseated, resume regular diet.  Do not consume alcohol, tranquilizers, sleeping aids or non-prescription pain medication, unless instructed to do so.    Medications - see medication list    You may resume your daily medication schedule unless otherwise instructed.  If a rash develops or other adverse reaction occurs, discontinue medication and contact Dr. Singh immediately.  Review the drug leaflet information, when provided, before taking the prescribed medication.    Ambulation and Activity    You are advised to go directly home after surgery.  Your weight bearing status is Weight bearing on operative heel  Your surgical shoe MUST BE WORN AT ALL TIMES, even while sleeping.    Care of the Operative Are - Important!!    Keep dressing or cast clean, dry and intact.  Do not remove bandage.  Do not place anything inside the bandage.  Do not get the bandage wet.  Elevate limb above heart to control swelling and reduce pain.  Apply ice over the ankle or behind the knee for 20 minutes / hour while awake.    Special Instructions - Call Dr. Singh if you develop any of the following:    Fever over 100 degrees F - take your temperature daily for 4 days after surgery.  Pain not relieved by medications.  Excessive swelling or increased redness around the surgical site.  Cold toes or white/blue discoloration of toes.  Bruising is normal.  Bandage becomes wet, soiled or soaked in blood.  A small amount of blood is normal.           Extra Heart Failure Education/ Tools/ Resources:     https://[employer]/publication/?x=395431   --- this is American Heart Association interactive Healthier Living with Heart Failure guidebook.  Please click hyperlink or copy / paste link into search bar. The QR Code is also available below. Use

## 2024-05-16 NOTE — PLAN OF CARE
Problem: Pain  Goal: Verbalizes/displays adequate comfort level or baseline comfort level  Outcome: Progressing  Flowsheets (Taken 5/16/2024 7365)  Verbalizes/displays adequate comfort level or baseline comfort level:   Encourage patient to monitor pain and request assistance   Assess pain using appropriate pain scale   Administer analgesics based on type and severity of pain and evaluate response  Note: Pain level assessed and PRN medication made available when needed for breakthrough pain. Patient stated that he was not experiencing any pain at this time. Will continue to monitor per protocol.     Problem: Skin/Tissue Integrity  Goal: Absence of new skin breakdown  Description: 1.  Monitor for areas of redness and/or skin breakdown  2.  Assess vascular access sites hourly  3.  Every 4-6 hours minimum:  Change oxygen saturation probe site  4.  Every 4-6 hours:  If on nasal continuous positive airway pressure, respiratory therapy assess nares and determine need for appliance change or resting period.  Outcome: Progressing  Note: Patient has a colostomy that he cares for. Patient also has multiple healed toe amputation sites. Patient has no other open areas at this time. Will continue to monitor and patient to turn and reposition as needed to alleviate pressure and prevent any breakdown.

## 2024-05-16 NOTE — DISCHARGE INSTR - COC
Continuity of Care Form    Patient Name: Franklyn Fermin   :  1970  MRN:  [id number]    Admit date:  5/15/2024  Discharge date:  ***    Code Status Order: Full Code   Advance Directives:     Admitting Physician:  Steven Mckinley MD  PCP: Maximus Rider DO    Discharging Nurse: ***  Discharging Hospital Unit/Room#: 6317/6317-01  Discharging Unit Phone Number: ***    Emergency Contact:   Extended Emergency Contact Information  Primary Emergency Contact: Modesta Adrian   [facility]  Home Phone: [phone]  Relation: Domestic Partner  Secondary Emergency Contact: Ted Fermin   [facility]  Home Phone: [phone]  Relation: Brother/Sister    Past Surgical History:  Past Surgical History:   Procedure Laterality Date    ANUS SURGERY N/A 1/3/2023    . performed by Sherman Cruz MD at [facility] OR    COLONOSCOPY  2018    COLONOSCOPY N/A 1/3/2023    EXAM UNDER ANESTHESIA; COLONOSCOPY VIA STOMA  AND VIA ANUS COLONOSCOPY VIA RECTUM; ANAL DILATION,  EXCISION OF PERIANAL SCAR AND HOUSE ADVANCEMENT FLAP ADVANCEMENT FLAP performed by Sherman Cruz MD at [facility] OR    COLOSTOMY      result of necrotizing fasciotomy    FOOT DEBRIDEMENT Right 2020    RIGHT 2ND TOE INCISION AND DRAINAGE BELOW FASCIA performed by Wes Huber DPM at [facility] OR    FOOT DEBRIDEMENT Left 2022    LEFT FOOT DEBRIDEMENT INCISION AND DRAINAGE, AMPUTATION OF FOURTH AND FIFTH RAYS performed by Wes Huber DPM at [facility] OR    FOOT DEBRIDEMENT Left 9/15/2022    INCISION AND DRAINAGE WITH DELAYED PRIMARY CLOSURE  LEFT FOOT performed by Nafisa De La Cruz DPM at [facility] OR    INFECTED SKIN DEBRIDEMENT  2018-2018    Removal of infected tissue and flesh from anus scrotum and surrounding area    KNEE ARTHROSCOPY Left     SKIN GRAFT  2018    TOE AMPUTATION Right 2020    PARTIAL AMPUTATION OF RIGHT 2ND TOE performed by Wes Huber DPM at [facility] OR       Immunization History:   Immunization

## 2024-05-16 NOTE — PLAN OF CARE
Podiatric Surgery Plan of Care Note  Franklyn Fermin     If anticoag's must be continued during surgery for cardiac reasons, this would not prevent podiatric surgical intervention.  Bleeding can be managed even on anticoagulant for hallux amp.    Discussed with Dr. Ricardo Burnette, KEI.P.M.  Tomer Jeter, NESSA  Podiatric Resident PGY-2  Pager [phone] or Perfect Serve

## 2024-05-16 NOTE — CONSULTS
Cardiology Consultation                                                                    Pt Name: Franklyn Fermin  Age: 53 y.o.  Sex: male  : 1970  Location: 6317/6317-01    Referring Physician: Gisela Hilliard MD  Primary cardiologist: Dr Estrada      Reason for Consult:     Reason for Consultation/Chief Complaint: chest pains    HPI:      Franklyn Fermin is a 53 y.o. 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    Echo 2024: Normal LV, LVEF 45%, indeterminate diastolic function, normal RV, moderate LAE, normal valves, increased filling pressures.    5-day monitor 2024: Less than 1% AF, occasional RVR.    No recent ischemic evaluation.    Patient was instructed to come to the emergency room on  prior to his follow-up visit in cardiology clinic due to abnormal labs (creatinine 1.6 from 1.2, proBNP 3500).  Patient reports right first toe ulceration, otherwise denies any ischemic or congestive symptoms at this time.  However during the last few months he had 3 episodes of chest discomfort with shortness of breath after sexual intercourse.  Last episode was about 6 weeks ago. He denies similar symptoms with ADLs or regular exertion (going to [employer] or climbing stairs).  In the emergency room he was afebrile, mildly hypertensive, with normal oxygen saturation on room air.  Hemoglobin 11, normal WBC, ESR 33, chest x-ray and ECG unremarkable.      Histories     Past Medical History:   has a past medical history of Allergic rhinitis, Atrial fibrillation (HCC), Heart failure (HCC), Hyperlipidemia, Hypertension, Necrotizing fasciitis (HCC), Obesity, Class I, BMI 30-34.9, Sleep apnea, Type 2 diabetes mellitus without complication (HCC), and Wears glasses.    Surgical History:   has a past surgical history that includes Colonoscopy (2018); Skin graft (2018); Infected

## 2024-05-16 NOTE — CONSULTS
Clinical Pharmacy Consult Note  Medication History     Admit Date: 5/15/2024    Pharmacy consulted to verify home medication list by Dr. Sanchez.    RN completed medication history on admission - appears correct based on Rx dispense history, with exception of antibiotics listed below    List of of current medications patient is taking is complete. Home Medication list in EPIC updated to reflect changes noted below.    Source of information: Rx dispense history    Changes made to medication list:   Medications added:   Augmentin 875-125mg po BID x 7 days - prescribed 5/10/24  Ciprofloxacin 500mg po BID x 7 days - prescribed 5/14/24    Current Outpatient Medications   Medication Instructions    acetaminophen (TYLENOL) 1,000 mg, Oral, EVERY 6 HOURS PRN    albuterol sulfate HFA (VENTOLIN HFA) 108 (90 Base) MCG/ACT inhaler 2 puffs, Inhalation, 4 TIMES DAILY PRN    apixaban (ELIQUIS) 5 mg, Oral, 2 TIMES DAILY    lisinopril (PRINIVIL;ZESTRIL) 30 MG tablet Take 1 tablet by mouth once daily    metFORMIN (GLUCOPHAGE) 1,000 mg, Oral, 2 TIMES DAILY WITH MEALS    Metoprolol Tartrate 50 mg, Oral, 2 TIMES DAILY    Multiple Vitamins-Minerals (THERAPEUTIC MULTIVITAMIN-MINERALS) tablet 1 tablet, Oral, DAILY    vitamin C (ASCORBIC ACID) 500 mg, 2 TIMES DAILY       Please call with questions--  Indu BearD, [facility]  Wireless: [phone]   5/16/2024 9:15 AM       No

## 2024-05-16 NOTE — H&P
foot with large ulcer. Toe deviated. Prior amputation of L 4th and 5th digit. Prior amputation of tip of 2nd toe on R foot.   Neurological:      General: No focal deficit present.      Mental Status: He is alert.       Physical exam:       Vitals:    05/15/24 2200   BP: (!) 172/96   Pulse: 98   Resp: 26   Temp:    SpO2: 99%       DATA:    Labs:  CBC:   Recent Labs     05/14/24  1210 05/15/24  1951   WBC 6.5 8.0   HGB 9.9* 11.0*   HCT 28.7* 31.6*    242       BMP:   Recent Labs     05/14/24  1210 05/15/24  1951    138   K 4.9 4.4    102   CO2 24 25   BUN 25* 25*   CREATININE 1.5* 1.6*   GLUCOSE 178* 134*     LFT's:   Recent Labs     05/14/24 1210   AST 9*   ALT 17   BILITOT 0.6   ALKPHOS 110     Troponin: No results for input(s): \"TROPONINI\" in the last 72 hours.  BNP:No results for input(s): \"BNP\" in the last 72 hours.  ABGs: No results for input(s): \"PHART\", \"BGP5AZM\", \"PO2ART\" in the last 72 hours.  INR: No results for input(s): \"INR\" in the last 72 hours.    U/A:No results for input(s): \"NITRITE\", \"COLORU\", \"PHUR\", \"LABCAST\", \"WBCUA\", \"RBCUA\", \"MUCUS\", \"TRICHOMONAS\", \"YEAST\", \"BACTERIA\", \"CLARITYU\", \"SPECGRAV\", \"LEUKOCYTESUR\", \"UROBILINOGEN\", \"BILIRUBINUR\", \"BLOODU\", \"GLUCOSEU\", \"AMORPHOUS\" in the last 72 hours.    Invalid input(s): \"KETONESU\"    XR CHEST (2 VW)   Final Result      No acute cardiopulmonary findings.      Electronically signed by Jose Schofield MD      XR FOOT RIGHT (MIN 3 VIEWS)   Final Result   Impression:    1.  Extension at the interphalangeal joint of the great toe with osseous irregularity, likely sequela of remote trauma noted on prior x-ray. Superimposed osteomyelitis is not excluded on plain film.   2.  Prior amputation of the second toe at the middle phalanx.    3.  No acute fracture or dislocation.      Electronically signed by Jose Schofield MD              ASSESSMENT AND PLAN:  Mr Fermin is a 52 yo M with a PMHx of new-onset HF, pAfib/Aflutter,  T2DM, HTN who presented for

## 2024-05-16 NOTE — CONSULTS
Department of Podiatry Consult Note  Resident       Reason for Consult: Diabetic ulcer on right big toe, plans for amputation  Requesting Physician:  Dr. Marc Kamara    CHIEF COMPLAINT: Right diabetic foot ulcer on big toe    HISTORY OF PRESENT ILLNESS:                The patient is a 53 y.o. male with significant past medical history as listed below. Podiatry was consulted for diabetic ulcer on right big toe.  Patient states that he follows with Dr. Hussein Singh DPM in the outpatient setting for right hallux wound and osteomyelitis.  Patient states that Dr. Singh recommends hallux amputation which he agrees with the treatment plan.  He states that he was supposed to start antibiotics today however has not since being admitted to the hospital.  Patient states that Dr. Lindo has removed \"crumbles of bone\" from wound.  Patient states that he actually presented to the ED per request of patient's cardiologist for further cardiac workup. Patient denies fever, chills, nausea, vomiting, shortness of breath, chest pain.  Patient has no other pedal complaints at this time.    Past Medical History:        Diagnosis Date    Allergic rhinitis     Atrial fibrillation (HCC)     Heart failure (HCC) 5/15/2024    Hyperlipidemia     Hypertension     Necrotizing fasciitis (HCC)     2018 resulted in colostomy    Obesity, Class I, BMI 30-34.9 07/24/2018    Sleep apnea 3/12/2024    Type 2 diabetes mellitus without complication (HCC)     Wears glasses        Past Surgical History:        Procedure Laterality Date    ANUS SURGERY N/A 1/3/2023    . performed by Sherman Cruz MD at [facility] OR    COLONOSCOPY  06/05/2018    COLONOSCOPY N/A 1/3/2023    EXAM UNDER ANESTHESIA; COLONOSCOPY VIA STOMA  AND VIA ANUS COLONOSCOPY VIA RECTUM; ANAL DILATION,  EXCISION OF PERIANAL SCAR AND HOUSE ADVANCEMENT FLAP ADVANCEMENT FLAP performed by Sherman Cruz MD at [facility] OR    COLOSTOMY      result of necrotizing fasciotomy    FOOT DEBRIDEMENT Right 12/30/2020

## 2024-05-16 NOTE — ED PROVIDER NOTES
ED Attending Attestation Note     Date of evaluation: 5/15/2024    This patient was seen by the advance practice provider.  I have seen and examined the patient, agree with the workup, evaluation, management and diagnosis. The care plan has been discussed.      Briefly, Franklyn Fermin is a 53 y.o. male with a PMH inclusive of DM, fot ulcer who presents for evaluation of foot infection. Plan for recent admission for surgery. Has also been seen by Cards for new heart failure. Had elevated BNP as outpatient so was sent for admission.     Notable exam findings include peripheral edema, primarily right lower extremity    Assessment/ Medical Decision Making:     Labs with LEROY, elevated BNP. Will need admission, especially with anticipated foot surgery this week.      Lupe Levine MD  05/16/24 0002

## 2024-05-16 NOTE — CARE COORDINATION
Case Management Assessment  Initial Evaluation    Date/Time of Evaluation: 5/16/2024 3:14 PM  Assessment Completed by: Oc Gonzales RN    If patient is discharged prior to next notation, then this note serves as note for discharge by case management.    Patient Name: Franklyn Fermin                   YOB: 1970  Diagnosis: Heart failure (HCC) [I50.9]  Diabetic ulcer of toe of right foot associated with type 2 diabetes mellitus, unspecified ulcer stage (HCC) [E11.621, L97.519]  Congestive heart failure, unspecified HF chronicity, unspecified heart failure type (HCC) [I50.9]                   Date / Time: 5/15/2024  7:05 PM    Patient Admission Status: Inpatient   Readmission Risk (Low < 19, Mod (19-27), High > 27): Readmission Risk Score: 11    Current PCP: Maximus Rider DO  PCP verified by CM? Yes (Maximus Rider DO)    Chart Reviewed: Yes      History Provided by: Patient  Patient Orientation: Alert and Oriented, Person, Place, Situation    Patient Cognition: Alert    Hospitalization in the last 30 days (Readmission):  No    If yes, Readmission Assessment in  Navigator will be completed.       Advance Directives:    Code Status: Full Code   [place] DNR form completed and on chart: No, Not Indicated    Patient's Primary Decision Maker is: Legal Next of Kin    Copy present: No     Discharge Planning:  Patient lives with: Spouse/Significant Other  Type of Home: House  Primary Care Giver: Self  Patient Support Systems include: Spouse/Significant Other     Current Financial resources: Medicaid    Current community resources: None    Currently ACTIVE with [place] on Aging: No  Services: Not Applicable  :  Phone:     Current services prior to admission: None            Current DME:              Type of Home Care services:  None    Home Care Information  Currently ACTIVE with Home Health Care: No  Home Care Agency: Not Applicable    ADLS  Prior functional level: Independent in

## 2024-05-17 ENCOUNTER — APPOINTMENT (OUTPATIENT)
Age: 54
DRG: 344 | End: 2024-05-17
Payer: MEDICAID

## 2024-05-17 ENCOUNTER — APPOINTMENT (OUTPATIENT)
Dept: NUCLEAR MEDICINE | Age: 54
DRG: 344 | End: 2024-05-17
Payer: MEDICAID

## 2024-05-17 PROBLEM — E11.42 DIABETIC POLYNEUROPATHY ASSOCIATED WITH TYPE 2 DIABETES MELLITUS (HCC): Status: ACTIVE | Noted: 2024-05-17

## 2024-05-17 PROBLEM — E11.621 DIABETIC ULCER OF TOE OF RIGHT FOOT ASSOCIATED WITH TYPE 2 DIABETES MELLITUS (HCC): Status: ACTIVE | Noted: 2024-05-17

## 2024-05-17 PROBLEM — M86.9 OSTEOMYELITIS OF GREAT TOE OF RIGHT FOOT (HCC): Status: ACTIVE | Noted: 2024-05-17

## 2024-05-17 PROBLEM — L97.519 DIABETIC ULCER OF TOE OF RIGHT FOOT ASSOCIATED WITH TYPE 2 DIABETES MELLITUS (HCC): Status: ACTIVE | Noted: 2024-05-17

## 2024-05-17 LAB
ANION GAP SERPL CALCULATED.3IONS-SCNC: 9 MMOL/L (ref 3–16)
APTT BLD: 50.6 SEC (ref 22.1–36.4)
APTT BLD: 59.5 SEC (ref 22.1–36.4)
APTT BLD: 68.1 SEC (ref 22.1–36.4)
BASOPHILS # BLD: 0 K/UL (ref 0–0.2)
BASOPHILS NFR BLD: 0.6 %
BILIRUB UR QL STRIP.AUTO: NEGATIVE
BUN SERPL-MCNC: 25 MG/DL (ref 7–20)
CALCIUM SERPL-MCNC: 9 MG/DL (ref 8.3–10.6)
CHLORIDE SERPL-SCNC: 105 MMOL/L (ref 99–110)
CLARITY UR: CLEAR
CO2 SERPL-SCNC: 25 MMOL/L (ref 21–32)
COLOR UR: YELLOW
CREAT SERPL-MCNC: 1.5 MG/DL (ref 0.9–1.3)
DEPRECATED RDW RBC AUTO: 13.1 % (ref 12.4–15.4)
ECHO BSA: 2.34 M2
EOSINOPHIL # BLD: 0.1 K/UL (ref 0–0.6)
EOSINOPHIL NFR BLD: 2.2 %
EPI CELLS #/AREA URNS HPF: ABNORMAL /HPF (ref 0–5)
GFR SERPLBLD CREATININE-BSD FMLA CKD-EPI: 55 ML/MIN/{1.73_M2}
GLUCOSE BLD-MCNC: 174 MG/DL (ref 70–99)
GLUCOSE BLD-MCNC: 188 MG/DL (ref 70–99)
GLUCOSE BLD-MCNC: 211 MG/DL (ref 70–99)
GLUCOSE BLD-MCNC: 215 MG/DL (ref 70–99)
GLUCOSE SERPL-MCNC: 180 MG/DL (ref 70–99)
GLUCOSE UR STRIP.AUTO-MCNC: NEGATIVE MG/DL
HCT VFR BLD AUTO: 27.6 % (ref 40.5–52.5)
HGB BLD-MCNC: 9.6 G/DL (ref 13.5–17.5)
HGB UR QL STRIP.AUTO: ABNORMAL
KETONES UR STRIP.AUTO-MCNC: NEGATIVE MG/DL
LEUKOCYTE ESTERASE UR QL STRIP.AUTO: NEGATIVE
LYMPHOCYTES # BLD: 1.9 K/UL (ref 1–5.1)
LYMPHOCYTES NFR BLD: 33.8 %
MAGNESIUM SERPL-MCNC: 1.5 MG/DL (ref 1.8–2.4)
MCH RBC QN AUTO: 28.2 PG (ref 26–34)
MCHC RBC AUTO-ENTMCNC: 34.9 G/DL (ref 31–36)
MCV RBC AUTO: 80.8 FL (ref 80–100)
MONOCYTES # BLD: 0.5 K/UL (ref 0–1.3)
MONOCYTES NFR BLD: 8.7 %
MUCOUS THREADS #/AREA URNS LPF: ABNORMAL /LPF
NEUTROPHILS # BLD: 3.1 K/UL (ref 1.7–7.7)
NEUTROPHILS NFR BLD: 54.7 %
NITRITE UR QL STRIP.AUTO: NEGATIVE
NUC STRESS EJECTION FRACTION: 48 %
NUC STRESS LV EDV: 238 ML (ref 67–155)
NUC STRESS LV ESV: 123 ML (ref 22–58)
NUC STRESS LV MASS: 226 G
NUC STRESS LV STROKE VOLUME: 115 ML
PERFORMED ON: ABNORMAL
PH UR STRIP.AUTO: 6 [PH] (ref 5–8)
PLATELET # BLD AUTO: 178 K/UL (ref 135–450)
PMV BLD AUTO: 7.8 FL (ref 5–10.5)
POC ACT LR: 213 SEC
POTASSIUM SERPL-SCNC: 4 MMOL/L (ref 3.5–5.1)
PROT UR STRIP.AUTO-MCNC: 100 MG/DL
RBC # BLD AUTO: 3.42 M/UL (ref 4.2–5.9)
RBC #/AREA URNS HPF: ABNORMAL /HPF (ref 0–4)
SODIUM SERPL-SCNC: 139 MMOL/L (ref 136–145)
SP GR UR STRIP.AUTO: 1.01 (ref 1–1.03)
STRESS BASELINE DIAS BP: 98 MMHG
STRESS BASELINE HR: 78 BPM
STRESS BASELINE SYS BP: 171 MMHG
STRESS ESTIMATED WORKLOAD: 1 METS
STRESS PEAK DIAS BP: 95 MMHG
STRESS PEAK SYS BP: 183 MMHG
STRESS PERCENT HR ACHIEVED: 64 %
STRESS POST PEAK HR: 107 BPM
STRESS RATE PRESSURE PRODUCT: ABNORMAL BPM*MMHG
STRESS TARGET HR: 168 BPM
TROPONIN, HIGH SENSITIVITY: 24 NG/L (ref 0–22)
UA DIPSTICK W REFLEX MICRO PNL UR: YES
URN SPEC COLLECT METH UR: ABNORMAL
UROBILINOGEN UR STRIP-ACNC: 0.2 E.U./DL
WBC # BLD AUTO: 5.6 K/UL (ref 4–11)
WBC #/AREA URNS HPF: ABNORMAL /HPF (ref 0–5)

## 2024-05-17 PROCEDURE — C1769 GUIDE WIRE: HCPCS | Performed by: INTERNAL MEDICINE

## 2024-05-17 PROCEDURE — 80048 BASIC METABOLIC PNL TOTAL CA: CPT

## 2024-05-17 PROCEDURE — 78452 HT MUSCLE IMAGE SPECT MULT: CPT | Performed by: INTERNAL MEDICINE

## 2024-05-17 PROCEDURE — 81001 URINALYSIS AUTO W/SCOPE: CPT

## 2024-05-17 PROCEDURE — 6360000002 HC RX W HCPCS: Performed by: INTERNAL MEDICINE

## 2024-05-17 PROCEDURE — 6360000002 HC RX W HCPCS

## 2024-05-17 PROCEDURE — 2580000003 HC RX 258: Performed by: INTERNAL MEDICINE

## 2024-05-17 PROCEDURE — 84484 ASSAY OF TROPONIN QUANT: CPT

## 2024-05-17 PROCEDURE — 83735 ASSAY OF MAGNESIUM: CPT

## 2024-05-17 PROCEDURE — 85025 COMPLETE CBC W/AUTO DIFF WBC: CPT

## 2024-05-17 PROCEDURE — 85730 THROMBOPLASTIN TIME PARTIAL: CPT

## 2024-05-17 PROCEDURE — 36415 COLL VENOUS BLD VENIPUNCTURE: CPT

## 2024-05-17 PROCEDURE — A9502 TC99M TETROFOSMIN: HCPCS | Performed by: INTERNAL MEDICINE

## 2024-05-17 PROCEDURE — 3430000000 HC RX DIAGNOSTIC RADIOPHARMACEUTICAL: Performed by: INTERNAL MEDICINE

## 2024-05-17 PROCEDURE — 6370000000 HC RX 637 (ALT 250 FOR IP): Performed by: INTERNAL MEDICINE

## 2024-05-17 PROCEDURE — B211YZZ FLUOROSCOPY OF MULTIPLE CORONARY ARTERIES USING OTHER CONTRAST: ICD-10-PCS | Performed by: INTERNAL MEDICINE

## 2024-05-17 PROCEDURE — 99233 SBSQ HOSP IP/OBS HIGH 50: CPT | Performed by: INTERNAL MEDICINE

## 2024-05-17 PROCEDURE — 78452 HT MUSCLE IMAGE SPECT MULT: CPT

## 2024-05-17 PROCEDURE — 93452 LEFT HRT CATH W/VENTRCLGRPHY: CPT | Performed by: INTERNAL MEDICINE

## 2024-05-17 PROCEDURE — 7100000011 HC PHASE II RECOVERY - ADDTL 15 MIN: Performed by: INTERNAL MEDICINE

## 2024-05-17 PROCEDURE — 93458 L HRT ARTERY/VENTRICLE ANGIO: CPT | Performed by: INTERNAL MEDICINE

## 2024-05-17 PROCEDURE — 99223 1ST HOSP IP/OBS HIGH 75: CPT | Performed by: INTERNAL MEDICINE

## 2024-05-17 PROCEDURE — 99153 MOD SED SAME PHYS/QHP EA: CPT | Performed by: INTERNAL MEDICINE

## 2024-05-17 PROCEDURE — C1887 CATHETER, GUIDING: HCPCS | Performed by: INTERNAL MEDICINE

## 2024-05-17 PROCEDURE — 93016 CV STRESS TEST SUPVJ ONLY: CPT | Performed by: INTERNAL MEDICINE

## 2024-05-17 PROCEDURE — 4A023N7 MEASUREMENT OF CARDIAC SAMPLING AND PRESSURE, LEFT HEART, PERCUTANEOUS APPROACH: ICD-10-PCS | Performed by: INTERNAL MEDICINE

## 2024-05-17 PROCEDURE — 1200000000 HC SEMI PRIVATE

## 2024-05-17 PROCEDURE — 6360000004 HC RX CONTRAST MEDICATION: Performed by: INTERNAL MEDICINE

## 2024-05-17 PROCEDURE — 99152 MOD SED SAME PHYS/QHP 5/>YRS: CPT | Performed by: INTERNAL MEDICINE

## 2024-05-17 PROCEDURE — 7100000010 HC PHASE II RECOVERY - FIRST 15 MIN: Performed by: INTERNAL MEDICINE

## 2024-05-17 PROCEDURE — 2709999900 HC NON-CHARGEABLE SUPPLY: Performed by: INTERNAL MEDICINE

## 2024-05-17 PROCEDURE — 2500000003 HC RX 250 WO HCPCS: Performed by: INTERNAL MEDICINE

## 2024-05-17 PROCEDURE — 85347 COAGULATION TIME ACTIVATED: CPT

## 2024-05-17 PROCEDURE — 93017 CV STRESS TEST TRACING ONLY: CPT

## 2024-05-17 PROCEDURE — C1894 INTRO/SHEATH, NON-LASER: HCPCS | Performed by: INTERNAL MEDICINE

## 2024-05-17 PROCEDURE — 93018 CV STRESS TEST I&R ONLY: CPT | Performed by: INTERNAL MEDICINE

## 2024-05-17 PROCEDURE — 2580000003 HC RX 258

## 2024-05-17 RX ORDER — HEPARIN SODIUM 1000 [USP'U]/ML
INJECTION, SOLUTION INTRAVENOUS; SUBCUTANEOUS PRN
Status: DISCONTINUED | OUTPATIENT
Start: 2024-05-17 | End: 2024-05-17 | Stop reason: HOSPADM

## 2024-05-17 RX ORDER — ASPIRIN 325 MG
325 TABLET ORAL ONCE
Status: COMPLETED | OUTPATIENT
Start: 2024-05-17 | End: 2024-05-17

## 2024-05-17 RX ORDER — ACETAMINOPHEN 325 MG/1
650 TABLET ORAL EVERY 4 HOURS PRN
Status: DISCONTINUED | OUTPATIENT
Start: 2024-05-17 | End: 2024-05-19 | Stop reason: HOSPADM

## 2024-05-17 RX ORDER — MAGNESIUM SULFATE IN WATER 40 MG/ML
4000 INJECTION, SOLUTION INTRAVENOUS ONCE
Status: COMPLETED | OUTPATIENT
Start: 2024-05-17 | End: 2024-05-17

## 2024-05-17 RX ORDER — LINEZOLID 2 MG/ML
600 INJECTION, SOLUTION INTRAVENOUS EVERY 12 HOURS
Status: DISCONTINUED | OUTPATIENT
Start: 2024-05-18 | End: 2024-05-19 | Stop reason: HOSPADM

## 2024-05-17 RX ORDER — SODIUM CHLORIDE 0.9 % (FLUSH) 0.9 %
5-40 SYRINGE (ML) INJECTION EVERY 12 HOURS SCHEDULED
Status: DISCONTINUED | OUTPATIENT
Start: 2024-05-17 | End: 2024-05-19 | Stop reason: HOSPADM

## 2024-05-17 RX ORDER — LIDOCAINE HYDROCHLORIDE 10 MG/ML
INJECTION, SOLUTION EPIDURAL; INFILTRATION; INTRACAUDAL; PERINEURAL PRN
Status: DISCONTINUED | OUTPATIENT
Start: 2024-05-17 | End: 2024-05-17 | Stop reason: HOSPADM

## 2024-05-17 RX ORDER — MIDAZOLAM HYDROCHLORIDE 1 MG/ML
INJECTION INTRAMUSCULAR; INTRAVENOUS PRN
Status: DISCONTINUED | OUTPATIENT
Start: 2024-05-17 | End: 2024-05-17 | Stop reason: HOSPADM

## 2024-05-17 RX ORDER — SODIUM CHLORIDE 9 MG/ML
INJECTION, SOLUTION INTRAVENOUS CONTINUOUS
Status: ACTIVE | OUTPATIENT
Start: 2024-05-17 | End: 2024-05-17

## 2024-05-17 RX ORDER — SODIUM CHLORIDE 9 MG/ML
INJECTION, SOLUTION INTRAVENOUS CONTINUOUS
Status: DISCONTINUED | OUTPATIENT
Start: 2024-05-17 | End: 2024-05-17

## 2024-05-17 RX ORDER — REGADENOSON 0.08 MG/ML
0.4 INJECTION, SOLUTION INTRAVENOUS
Status: COMPLETED | OUTPATIENT
Start: 2024-05-17 | End: 2024-05-17

## 2024-05-17 RX ORDER — SODIUM CHLORIDE 0.9 % (FLUSH) 0.9 %
5-40 SYRINGE (ML) INJECTION PRN
Status: DISCONTINUED | OUTPATIENT
Start: 2024-05-17 | End: 2024-05-19 | Stop reason: HOSPADM

## 2024-05-17 RX ADMIN — TETROFOSMIN 11.8 MILLICURIE: 1.38 INJECTION, POWDER, LYOPHILIZED, FOR SOLUTION INTRAVENOUS at 08:30

## 2024-05-17 RX ADMIN — MAGNESIUM SULFATE HEPTAHYDRATE 4000 MG: 40 INJECTION, SOLUTION INTRAVENOUS at 12:18

## 2024-05-17 RX ADMIN — TETROFOSMIN 30 MILLICURIE: 1.38 INJECTION, POWDER, LYOPHILIZED, FOR SOLUTION INTRAVENOUS at 09:35

## 2024-05-17 RX ADMIN — REGADENOSON 0.4 MG: 0.08 INJECTION, SOLUTION INTRAVENOUS at 09:35

## 2024-05-17 RX ADMIN — PIPERACILLIN AND TAZOBACTAM 3375 MG: 3; .375 INJECTION, POWDER, LYOPHILIZED, FOR SOLUTION INTRAVENOUS at 22:40

## 2024-05-17 RX ADMIN — HEPARIN SODIUM 17 UNITS/KG/HR: 10000 INJECTION, SOLUTION INTRAVENOUS at 00:06

## 2024-05-17 RX ADMIN — HEPARIN SODIUM 23 UNITS/KG/HR: 10000 INJECTION, SOLUTION INTRAVENOUS at 17:47

## 2024-05-17 RX ADMIN — SODIUM CHLORIDE: 9 INJECTION, SOLUTION INTRAVENOUS at 12:08

## 2024-05-17 RX ADMIN — HEPARIN SODIUM 17 UNITS/KG/HR: 10000 INJECTION, SOLUTION INTRAVENOUS at 04:01

## 2024-05-17 RX ADMIN — PIPERACILLIN AND TAZOBACTAM 3375 MG: 3; .375 INJECTION, POWDER, LYOPHILIZED, FOR SOLUTION INTRAVENOUS at 03:58

## 2024-05-17 RX ADMIN — HEPARIN SODIUM 4000 UNITS: 1000 INJECTION INTRAVENOUS; SUBCUTANEOUS at 00:04

## 2024-05-17 RX ADMIN — PIPERACILLIN AND TAZOBACTAM 3375 MG: 3; .375 INJECTION, POWDER, LYOPHILIZED, FOR SOLUTION INTRAVENOUS at 12:12

## 2024-05-17 RX ADMIN — HEPARIN SODIUM 2000 UNITS: 1000 INJECTION INTRAVENOUS; SUBCUTANEOUS at 07:12

## 2024-05-17 RX ADMIN — HEPARIN SODIUM 4000 UNITS: 1000 INJECTION INTRAVENOUS; SUBCUTANEOUS at 17:02

## 2024-05-17 RX ADMIN — HEPARIN SODIUM 19 UNITS/KG/HR: 10000 INJECTION, SOLUTION INTRAVENOUS at 07:13

## 2024-05-17 RX ADMIN — LINEZOLID 600 MG: 600 INJECTION, SOLUTION INTRAVENOUS at 00:04

## 2024-05-17 RX ADMIN — CARVEDILOL 6.25 MG: 6.25 TABLET, FILM COATED ORAL at 21:01

## 2024-05-17 RX ADMIN — ASPIRIN 325 MG ORAL TABLET 325 MG: 325 PILL ORAL at 13:56

## 2024-05-17 RX ADMIN — CARVEDILOL 6.25 MG: 6.25 TABLET, FILM COATED ORAL at 12:09

## 2024-05-17 ASSESSMENT — PAIN SCALES - GENERAL
PAINLEVEL_OUTOF10: 0
PAINLEVEL_OUTOF10: 0

## 2024-05-17 NOTE — PRE SEDATION
Sedation Plan  ASA: class 3 - patient with severe systemic disease     Mallampati class: III - soft palate, base of uvula visible.    Sedation plan: local anesthesia, minimal sedation and moderate (conscious sedation)    Risks, benefits, and alternatives discussed with patient.        Immediate reassessment prior to sedation:  Patient's status reviewed and vital signs assessed; acceptable to perform procedure and proceed to administer sedation as planned.      Risks, benefits and alternatives to cardiac cath and possible intervention were discussed with the patient including but not limited bleeding, heart attack, stroke, kidney failure and limb loss

## 2024-05-17 NOTE — CONSULTS
Infectious Diseases Inpatient Consult Note    Reason for Consult:   R DM foot infection  Requesting Physician:   Dr Hilliard  Primary Care Physician:  Maximus Rider DO  History Obtained From:   Pt, EPIC    Admit Date: 5/15/2024  Hospital Day: 3    CHIEF COMPLAINT:       Chief Complaint   Patient presents with    Wound Infection     Patient with chief complaint of toe infection. Patient stated he had a blister of his toe and now the bottom of his toe is to the bone.     Abnormal labs     Patient states he saw a cardiologist and MD said he had abnormal labs and needed to come to the hospital       HISTORY OF PRESENT ILLNESS:      52 yo man   PMH - DM, neuropathy, obesity (BMI 34), AF, HTN, HL, PATRICK, hx Fornier's gangrene  PSurgHx - diverting colostomy 2018, L foot 4-5th partial ray resection 9/12/22      Admit 9/12/22 - L foot infection   Nail puncture injury - + gas  OR 9/12 - 4/5th partial ray resection   Return to OR 9/15 - further resection 4/5th MT, closure    Admit 3/25/233 [place] /  in [facility]  L foot infection / abscess, BC MSSA  Discharged on Oxacillin + Metronidazole  Rx iv Oxacillin through 5/17/23    Presents L plantar DM foot ulcer, present / worsening over 1 mo  Present dorsal L hallux.    Seen nd given Ciprofloxacin.    In ED 5/15, afeb, elevated BP, WBC 8.0, Cr 1.6  Admit  Seen by Podiatry  Seen by Card - heparin infusion     Today 5/17, afeb  On Zosyn, Linezolid      Past Medical History:    Past Medical History:   Diagnosis Date    Allergic rhinitis     Atrial fibrillation (HCC)     Heart failure (HCC) 5/15/2024    Hyperlipidemia     Hypertension     Necrotizing fasciitis (HCC)     2018 resulted in colostomy    Obesity, Class I, BMI 30-34.9 07/24/2018    Sleep apnea 3/12/2024    Type 2 diabetes mellitus without complication (HCC)     Wears glasses        Past Surgical History:    Past Surgical History:   Procedure Laterality Date    ANUS SURGERY N/A 1/3/2023    . performed by Sherman Cruz MD

## 2024-05-17 NOTE — PLAN OF CARE
Pain/discomfort being managed with PRN analgesics per MD orders. Pt able to express presence and absence of pain and rate pain appropriately using numerical scale.    Pt free from falls this shift. Fall precautions in place at all times. Call light always withinreach. Pt able and agreeable to contact for safety appropriately.

## 2024-05-17 NOTE — CARE COORDINATION
CM continues to follow for DC planning and needs. Patient down for heart cath this afternoon. CM will continue to follow for any new DC needs.    Thank you,  Oc AVALOSN, RN,   [phone]

## 2024-05-17 NOTE — PLAN OF CARE
Problem: Pain  Goal: Verbalizes/displays adequate comfort level or baseline comfort level  5/17/2024 0723 by Amrit Seth RN  Outcome: Progressing  Flowsheets  Taken 5/17/2024 0723 by Amrit Seth RN  Verbalizes/displays adequate comfort level or baseline comfort level:   Encourage patient to monitor pain and request assistance   Assess pain using appropriate pain scale   Administer analgesics based on type and severity of pain and evaluate response  Taken 5/17/2024 0402 by Steph Bacon RN  Verbalizes/displays adequate comfort level or baseline comfort level: Encourage patient to monitor pain and request assistance  Note: Pain level assessed and PRN medication made available when needed for breakthrough pain. Patient stated that he is not experiencing any pain at this time. Will continue to monitor per protocol.     Problem: Skin/Tissue Integrity  Goal: Absence of new skin breakdown  Description: 1.  Monitor for areas of redness and/or skin breakdown  2.  Assess vascular access sites hourly  3.  Every 4-6 hours minimum:  Change oxygen saturation probe site  4.  Every 4-6 hours:  If on nasal continuous positive airway pressure, respiratory therapy assess nares and determine need for appliance change or resting period.  Outcome: Progressing  Note: Patient has colostomy to RUQ of abdomen that he cares for himself. Healed toe amputations to left foot. No other skin issues at this time. Will continue to monitor. Patient to turn and reposition when needed to alleviate pressure and prevent skin breakdown.

## 2024-05-18 LAB
ANION GAP SERPL CALCULATED.3IONS-SCNC: 8 MMOL/L (ref 3–16)
APTT BLD: 108.8 SEC (ref 22.1–36.4)
APTT BLD: 111.2 SEC (ref 22.1–36.4)
APTT BLD: 88.1 SEC (ref 22.1–36.4)
BASOPHILS # BLD: 0 K/UL (ref 0–0.2)
BASOPHILS NFR BLD: 0.6 %
BUN SERPL-MCNC: 18 MG/DL (ref 7–20)
CALCIUM SERPL-MCNC: 8.7 MG/DL (ref 8.3–10.6)
CHLORIDE SERPL-SCNC: 105 MMOL/L (ref 99–110)
CO2 SERPL-SCNC: 24 MMOL/L (ref 21–32)
CREAT SERPL-MCNC: 1.4 MG/DL (ref 0.9–1.3)
DEPRECATED RDW RBC AUTO: 13 % (ref 12.4–15.4)
EOSINOPHIL # BLD: 0.2 K/UL (ref 0–0.6)
EOSINOPHIL NFR BLD: 3.2 %
GFR SERPLBLD CREATININE-BSD FMLA CKD-EPI: 60 ML/MIN/{1.73_M2}
GLUCOSE BLD-MCNC: 182 MG/DL (ref 70–99)
GLUCOSE BLD-MCNC: 205 MG/DL (ref 70–99)
GLUCOSE BLD-MCNC: 228 MG/DL (ref 70–99)
GLUCOSE SERPL-MCNC: 185 MG/DL (ref 70–99)
HCT VFR BLD AUTO: 26.8 % (ref 40.5–52.5)
HGB BLD-MCNC: 9.5 G/DL (ref 13.5–17.5)
LYMPHOCYTES # BLD: 1.8 K/UL (ref 1–5.1)
LYMPHOCYTES NFR BLD: 33 %
MAGNESIUM SERPL-MCNC: 1.7 MG/DL (ref 1.8–2.4)
MCH RBC QN AUTO: 28.6 PG (ref 26–34)
MCHC RBC AUTO-ENTMCNC: 35.4 G/DL (ref 31–36)
MCV RBC AUTO: 81 FL (ref 80–100)
MONOCYTES # BLD: 0.4 K/UL (ref 0–1.3)
MONOCYTES NFR BLD: 7 %
NEUTROPHILS # BLD: 3.1 K/UL (ref 1.7–7.7)
NEUTROPHILS NFR BLD: 56.2 %
NT-PROBNP SERPL-MCNC: 1968 PG/ML (ref 0–124)
PERFORMED ON: ABNORMAL
PLATELET # BLD AUTO: 210 K/UL (ref 135–450)
PMV BLD AUTO: 7.2 FL (ref 5–10.5)
POTASSIUM SERPL-SCNC: 3.9 MMOL/L (ref 3.5–5.1)
RBC # BLD AUTO: 3.31 M/UL (ref 4.2–5.9)
SODIUM SERPL-SCNC: 137 MMOL/L (ref 136–145)
WBC # BLD AUTO: 5.6 K/UL (ref 4–11)

## 2024-05-18 PROCEDURE — 36415 COLL VENOUS BLD VENIPUNCTURE: CPT

## 2024-05-18 PROCEDURE — 2580000003 HC RX 258

## 2024-05-18 PROCEDURE — 80048 BASIC METABOLIC PNL TOTAL CA: CPT

## 2024-05-18 PROCEDURE — 6370000000 HC RX 637 (ALT 250 FOR IP)

## 2024-05-18 PROCEDURE — 6360000002 HC RX W HCPCS

## 2024-05-18 PROCEDURE — 85025 COMPLETE CBC W/AUTO DIFF WBC: CPT

## 2024-05-18 PROCEDURE — 83880 ASSAY OF NATRIURETIC PEPTIDE: CPT

## 2024-05-18 PROCEDURE — 6370000000 HC RX 637 (ALT 250 FOR IP): Performed by: INTERNAL MEDICINE

## 2024-05-18 PROCEDURE — 83735 ASSAY OF MAGNESIUM: CPT

## 2024-05-18 PROCEDURE — 85730 THROMBOPLASTIN TIME PARTIAL: CPT

## 2024-05-18 PROCEDURE — 99233 SBSQ HOSP IP/OBS HIGH 50: CPT | Performed by: INTERNAL MEDICINE

## 2024-05-18 PROCEDURE — 1200000000 HC SEMI PRIVATE

## 2024-05-18 RX ORDER — MAGNESIUM SULFATE IN WATER 40 MG/ML
2000 INJECTION, SOLUTION INTRAVENOUS ONCE
Status: COMPLETED | OUTPATIENT
Start: 2024-05-18 | End: 2024-05-18

## 2024-05-18 RX ORDER — SULFAMETHOXAZOLE AND TRIMETHOPRIM 800; 160 MG/1; MG/1
2 TABLET ORAL 2 TIMES DAILY
Qty: 56 TABLET | Refills: 0
Start: 2024-05-18 | End: 2024-06-01

## 2024-05-18 RX ORDER — ASPIRIN 81 MG/1
81 TABLET, CHEWABLE ORAL DAILY
Qty: 30 TABLET | Refills: 3
Start: 2024-05-19

## 2024-05-18 RX ORDER — CARVEDILOL 6.25 MG/1
6.25 TABLET ORAL 2 TIMES DAILY
Qty: 60 TABLET | Refills: 3 | Status: SHIPPED | OUTPATIENT
Start: 2024-05-18

## 2024-05-18 RX ORDER — SODIUM HYPOCHLORITE 1.25 MG/ML
SOLUTION TOPICAL DAILY
Status: DISCONTINUED | OUTPATIENT
Start: 2024-05-18 | End: 2024-05-19 | Stop reason: HOSPADM

## 2024-05-18 RX ORDER — ATORVASTATIN CALCIUM 80 MG/1
80 TABLET, FILM COATED ORAL NIGHTLY
Status: DISCONTINUED | OUTPATIENT
Start: 2024-05-18 | End: 2024-05-19 | Stop reason: HOSPADM

## 2024-05-18 RX ORDER — ASPIRIN 81 MG/1
81 TABLET, CHEWABLE ORAL DAILY
Status: DISCONTINUED | OUTPATIENT
Start: 2024-05-18 | End: 2024-05-19 | Stop reason: HOSPADM

## 2024-05-18 RX ORDER — ATORVASTATIN CALCIUM 80 MG/1
80 TABLET, FILM COATED ORAL NIGHTLY
Qty: 30 TABLET | Refills: 3
Start: 2024-05-19

## 2024-05-18 RX ORDER — SULFAMETHOXAZOLE AND TRIMETHOPRIM 800; 160 MG/1; MG/1
2 TABLET ORAL 2 TIMES DAILY
Status: DISCONTINUED | OUTPATIENT
Start: 2024-05-18 | End: 2024-05-19 | Stop reason: HOSPADM

## 2024-05-18 RX ADMIN — HEPARIN SODIUM 25 UNITS/KG/HR: 10000 INJECTION, SOLUTION INTRAVENOUS at 04:16

## 2024-05-18 RX ADMIN — CARVEDILOL 6.25 MG: 6.25 TABLET, FILM COATED ORAL at 20:40

## 2024-05-18 RX ADMIN — HEPARIN SODIUM 24 UNITS/KG/HR: 10000 INJECTION, SOLUTION INTRAVENOUS at 15:22

## 2024-05-18 RX ADMIN — HEPARIN SODIUM 2000 UNITS: 1000 INJECTION INTRAVENOUS; SUBCUTANEOUS at 00:09

## 2024-05-18 RX ADMIN — ATORVASTATIN CALCIUM 80 MG: 80 TABLET, FILM COATED ORAL at 13:56

## 2024-05-18 RX ADMIN — SULFAMETHOXAZOLE AND TRIMETHOPRIM 2 TABLET: 800; 160 TABLET ORAL at 16:45

## 2024-05-18 RX ADMIN — SULFAMETHOXAZOLE AND TRIMETHOPRIM 2 TABLET: 800; 160 TABLET ORAL at 20:40

## 2024-05-18 RX ADMIN — LINEZOLID 600 MG: 600 INJECTION, SOLUTION INTRAVENOUS at 03:15

## 2024-05-18 RX ADMIN — LINEZOLID 600 MG: 600 INJECTION, SOLUTION INTRAVENOUS at 13:57

## 2024-05-18 RX ADMIN — INSULIN LISPRO 2 UNITS: 100 INJECTION, SOLUTION INTRAVENOUS; SUBCUTANEOUS at 17:26

## 2024-05-18 RX ADMIN — CARVEDILOL 6.25 MG: 6.25 TABLET, FILM COATED ORAL at 09:09

## 2024-05-18 RX ADMIN — INSULIN LISPRO 2 UNITS: 100 INJECTION, SOLUTION INTRAVENOUS; SUBCUTANEOUS at 13:56

## 2024-05-18 RX ADMIN — PIPERACILLIN AND TAZOBACTAM 3375 MG: 3; .375 INJECTION, POWDER, LYOPHILIZED, FOR SOLUTION INTRAVENOUS at 16:46

## 2024-05-18 RX ADMIN — MAGNESIUM SULFATE HEPTAHYDRATE 2000 MG: 40 INJECTION, SOLUTION INTRAVENOUS at 11:18

## 2024-05-18 RX ADMIN — ASPIRIN 81 MG: 81 TABLET, CHEWABLE ORAL at 13:57

## 2024-05-18 RX ADMIN — PIPERACILLIN AND TAZOBACTAM 3375 MG: 3; .375 INJECTION, POWDER, LYOPHILIZED, FOR SOLUTION INTRAVENOUS at 06:04

## 2024-05-18 NOTE — CARE COORDINATION
This patient is being transferred to [facility] for cardiac procedure. Electronically signed by Krysten Porras RN on 5/18/2024 at 3:43 PM

## 2024-05-18 NOTE — DISCHARGE SUMMARY
should have repeat UA. His home lisinopril 30mg QD was held d/t LEROY.     Colostomy to LLQ  - present since 2018. No new changes.     T2DM  - A1c 7.3 on 5/14/2024 outpatient labs. Insulin was ordered while here. Hypoglycemia orderset placed.      Physical Exam   Constitutional:       Appearance: Normal appearance. He is not ill-appearing or diaphoretic.      Comments: Very well appearing adult male pt is sitting up in bed, eating breakfast, in NAD   HENT:      Head: Normocephalic and atraumatic.   Cardiovascular:      Rate and Rhythm: Normal rate and regular rhythm.   Pulmonary:      Effort: Pulmonary effort is normal. No respiratory distress.      Breath sounds: No rales.   Abdominal:      General: Abdomen is flat. There is no distension.      Tenderness: There is no abdominal tenderness.      Comments: Colostomy LLQ   Musculoskeletal:      Comments: He is s/p amputation of lateral 2 digits (4th and 5th digits) of left foot. RLE is bandaged to the midshin    Skin:     General: Skin is warm.      Comments: Bandage & wrap overlying the right foot    Neurological:      General: No focal deficit present.      Mental Status: He is alert and oriented to person, place, and time.   Psychiatric:         Mood and Affect: Mood normal.         Thought Content: Thought content normal.          Consults: cardiology, ID   Significant Diagnostic Studies:    XR CHEST (2 VW)   Final Result      No acute cardiopulmonary findings.      Electronically signed by Jose Schofield MD      XR FOOT RIGHT (MIN 3 VIEWS)   Final Result   Impression:    1.  Extension at the interphalangeal joint of the great toe with osseous irregularity, likely sequela of remote trauma noted on prior x-ray. Superimposed osteomyelitis is not excluded on plain film.   2.  Prior amputation of the second toe at the middle phalanx.    3.  No acute fracture or dislocation.      Electronically signed by Jose Schofield MD        Treatments: hydration, antibiotics   Disposition:

## 2024-05-18 NOTE — PLAN OF CARE
Problem: Discharge Planning  Goal: Discharge to home or other facility with appropriate resources  Outcome: Progressing     Problem: Discharge Planning  Goal: Discharge to home or other facility with appropriate resources  5/17/2024 2334 by Fani Del Angel RN  Flowsheets (Taken 5/17/2024 2334)  Discharge to home or other facility with appropriate resources:   Identify barriers to discharge with patient and caregiver   Identify discharge learning needs (meds, wound care, etc)     Problem: Pain  Goal: Verbalizes/displays adequate comfort level or baseline comfort level  5/17/2024 2334 by Fani Del Angel RN  Outcome: Progressing  Flowsheets (Taken 5/17/2024 2334)  Verbalizes/displays adequate comfort level or baseline comfort level:   Encourage patient to monitor pain and request assistance   Administer analgesics based on type and severity of pain and evaluate response     Problem: Skin/Tissue Integrity  Goal: Absence of new skin breakdown  5/17/2024 2334 by Fani Del Angel RN  Outcome: Progressing     Problem: Chronic Conditions and Co-morbidities  Goal: Patient's chronic conditions and co-morbidity symptoms are monitored and maintained or improved  5/17/2024 2334 by Fani Del Angel RN  Flowsheets (Taken 5/17/2024 2334)  Care Plan - Patient's Chronic Conditions and Co-Morbidity Symptoms are Monitored and Maintained or Improved:   Monitor and assess patient's chronic conditions and comorbid symptoms for stability, deterioration, or improvement   Collaborate with multidisciplinary team to address chronic and comorbid conditions and prevent exacerbation or deterioration     Problem: Respiratory - Adult  Goal: Achieves optimal ventilation and oxygenation  5/17/2024 2334 by Fani Del Angel RN  Flowsheets (Taken 5/17/2024 2334)  Achieves optimal ventilation and oxygenation:   Assess for changes in respiratory status   Position to facilitate oxygenation and minimize respiratory effort     Problem: Cardiovascular - Adult  Goal:

## 2024-05-19 VITALS
DIASTOLIC BLOOD PRESSURE: 90 MMHG | OXYGEN SATURATION: 97 % | WEIGHT: 242.51 LBS | HEART RATE: 84 BPM | RESPIRATION RATE: 18 BRPM | TEMPERATURE: 98.4 F | SYSTOLIC BLOOD PRESSURE: 156 MMHG | HEIGHT: 71 IN | BODY MASS INDEX: 33.95 KG/M2

## 2024-05-19 LAB
ANION GAP SERPL CALCULATED.3IONS-SCNC: 9 MMOL/L (ref 3–16)
APTT BLD: 86 SEC (ref 22.1–36.4)
APTT BLD: 96.6 SEC (ref 22.1–36.4)
BASOPHILS # BLD: 0 K/UL (ref 0–0.2)
BASOPHILS NFR BLD: 0.8 %
BUN SERPL-MCNC: 13 MG/DL (ref 7–20)
CALCIUM SERPL-MCNC: 9.1 MG/DL (ref 8.3–10.6)
CHLORIDE SERPL-SCNC: 104 MMOL/L (ref 99–110)
CO2 SERPL-SCNC: 24 MMOL/L (ref 21–32)
CREAT SERPL-MCNC: 1.5 MG/DL (ref 0.9–1.3)
DEPRECATED RDW RBC AUTO: 13.3 % (ref 12.4–15.4)
EOSINOPHIL # BLD: 0.2 K/UL (ref 0–0.6)
EOSINOPHIL NFR BLD: 2.8 %
GFR SERPLBLD CREATININE-BSD FMLA CKD-EPI: 55 ML/MIN/{1.73_M2}
GLUCOSE BLD-MCNC: 173 MG/DL (ref 70–99)
GLUCOSE BLD-MCNC: 194 MG/DL (ref 70–99)
GLUCOSE BLD-MCNC: 196 MG/DL (ref 70–99)
GLUCOSE SERPL-MCNC: 162 MG/DL (ref 70–99)
HCT VFR BLD AUTO: 27.2 % (ref 40.5–52.5)
HGB BLD-MCNC: 9.5 G/DL (ref 13.5–17.5)
LYMPHOCYTES # BLD: 1.8 K/UL (ref 1–5.1)
LYMPHOCYTES NFR BLD: 29.4 %
MAGNESIUM SERPL-MCNC: 1.7 MG/DL (ref 1.8–2.4)
MCH RBC QN AUTO: 28.3 PG (ref 26–34)
MCHC RBC AUTO-ENTMCNC: 34.9 G/DL (ref 31–36)
MCV RBC AUTO: 81.3 FL (ref 80–100)
MONOCYTES # BLD: 0.5 K/UL (ref 0–1.3)
MONOCYTES NFR BLD: 7.9 %
MRSA SPEC QL CULT: NORMAL
NEUTROPHILS # BLD: 3.5 K/UL (ref 1.7–7.7)
NEUTROPHILS NFR BLD: 59.1 %
PERFORMED ON: ABNORMAL
PLATELET # BLD AUTO: 194 K/UL (ref 135–450)
PMV BLD AUTO: 7.7 FL (ref 5–10.5)
POTASSIUM SERPL-SCNC: 4.5 MMOL/L (ref 3.5–5.1)
RBC # BLD AUTO: 3.35 M/UL (ref 4.2–5.9)
SODIUM SERPL-SCNC: 137 MMOL/L (ref 136–145)
WBC # BLD AUTO: 6 K/UL (ref 4–11)

## 2024-05-19 PROCEDURE — 85025 COMPLETE CBC W/AUTO DIFF WBC: CPT

## 2024-05-19 PROCEDURE — 36415 COLL VENOUS BLD VENIPUNCTURE: CPT

## 2024-05-19 PROCEDURE — 6360000002 HC RX W HCPCS

## 2024-05-19 PROCEDURE — 2580000003 HC RX 258

## 2024-05-19 PROCEDURE — 83735 ASSAY OF MAGNESIUM: CPT

## 2024-05-19 PROCEDURE — 6370000000 HC RX 637 (ALT 250 FOR IP)

## 2024-05-19 PROCEDURE — 6370000000 HC RX 637 (ALT 250 FOR IP): Performed by: INTERNAL MEDICINE

## 2024-05-19 PROCEDURE — 85730 THROMBOPLASTIN TIME PARTIAL: CPT

## 2024-05-19 PROCEDURE — 2580000003 HC RX 258: Performed by: INTERNAL MEDICINE

## 2024-05-19 PROCEDURE — 80048 BASIC METABOLIC PNL TOTAL CA: CPT

## 2024-05-19 RX ADMIN — ASPIRIN 81 MG: 81 TABLET, CHEWABLE ORAL at 08:16

## 2024-05-19 RX ADMIN — LINEZOLID 600 MG: 600 INJECTION, SOLUTION INTRAVENOUS at 01:59

## 2024-05-19 RX ADMIN — DAKIN'S SOLUTION 0.125% (QUARTER STRENGTH) 5 ML: 0.12 SOLUTION at 08:28

## 2024-05-19 RX ADMIN — PIPERACILLIN AND TAZOBACTAM 3375 MG: 3; .375 INJECTION, POWDER, LYOPHILIZED, FOR SOLUTION INTRAVENOUS at 10:04

## 2024-05-19 RX ADMIN — SULFAMETHOXAZOLE AND TRIMETHOPRIM 2 TABLET: 800; 160 TABLET ORAL at 08:16

## 2024-05-19 RX ADMIN — CARVEDILOL 6.25 MG: 6.25 TABLET, FILM COATED ORAL at 08:15

## 2024-05-19 RX ADMIN — SODIUM CHLORIDE, PRESERVATIVE FREE 10 ML: 5 INJECTION INTRAVENOUS at 08:16

## 2024-05-19 RX ADMIN — PIPERACILLIN AND TAZOBACTAM 3375 MG: 3; .375 INJECTION, POWDER, LYOPHILIZED, FOR SOLUTION INTRAVENOUS at 03:39

## 2024-05-19 RX ADMIN — HEPARIN SODIUM 22 UNITS/KG/HR: 10000 INJECTION, SOLUTION INTRAVENOUS at 00:30

## 2024-05-19 RX ADMIN — LINEZOLID 600 MG: 600 INJECTION, SOLUTION INTRAVENOUS at 14:27

## 2024-05-19 NOTE — PLAN OF CARE
Problem: Discharge Planning  Goal: Discharge to home or other facility with appropriate resources  5/19/2024 1155 by Ashley Marks RN  Outcome: Progressing  Flowsheets (Taken 5/19/2024 1155)  Discharge to home or other facility with appropriate resources: Identify barriers to discharge with patient and caregiver  Note: Waiting for bed assignment at [facility].     Problem: Pain  Goal: Verbalizes/displays adequate comfort level or baseline comfort level  5/19/2024 1155 by Ashley Marks RN  Outcome: Progressing  Flowsheets (Taken 5/19/2024 1155)  Verbalizes/displays adequate comfort level or baseline comfort level:   Encourage patient to monitor pain and request assistance   Assess pain using appropriate pain scale  Note: Patient has no c/o of pain.      Problem: Skin/Tissue Integrity  Goal: Absence of new skin breakdown  Description: 1.  Monitor for areas of redness and/or skin breakdown  2.  Assess vascular access sites hourly  3.  Every 4-6 hours minimum:  Change oxygen saturation probe site  4.  Every 4-6 hours:  If on nasal continuous positive airway pressure, respiratory therapy assess nares and determine need for appliance change or resting period.  5/19/2024 1155 by Ashley Marks RN  Outcome: Progressing  Note: No new skin breakdown noted.      Problem: Chronic Conditions and Co-morbidities  Goal: Patient's chronic conditions and co-morbidity symptoms are monitored and maintained or improved  5/19/2024 1155 by Ashley Marks RN  Outcome: Progressing  Flowsheets (Taken 5/19/2024 1155)  Care Plan - Patient's Chronic Conditions and Co-Morbidity Symptoms are Monitored and Maintained or Improved: Monitor and assess patient's chronic conditions and comorbid symptoms for stability, deterioration, or improvement     Problem: Respiratory - Adult  Goal: Achieves optimal ventilation and oxygenation  5/19/2024 1155 by Ashley Marks RN  Outcome: Progressing     Problem: Cardiovascular -

## 2024-05-19 NOTE — PLAN OF CARE
Problem: Discharge Planning  Goal: Discharge to home or other facility with appropriate resources  Outcome: Progressing     Problem: Pain  Goal: Verbalizes/displays adequate comfort level or baseline comfort level  Outcome: Progressing  Flowsheets (Taken 5/19/2024 0600)  Verbalizes/displays adequate comfort level or baseline comfort level:   Encourage patient to monitor pain and request assistance   Administer analgesics based on type and severity of pain and evaluate response     Problem: Skin/Tissue Integrity  Goal: Absence of new skin breakdown  Outcome: Progressing     Problem: Chronic Conditions and Co-morbidities  Goal: Patient's chronic conditions and co-morbidity symptoms are monitored and maintained or improved  Flowsheets (Taken 5/19/2024 0600)  Care Plan - Patient's Chronic Conditions and Co-Morbidity Symptoms are Monitored and Maintained or Improved:   Monitor and assess patient's chronic conditions and comorbid symptoms for stability, deterioration, or improvement   Collaborate with multidisciplinary team to address chronic and comorbid conditions and prevent exacerbation or deterioration     Problem: Respiratory - Adult  Goal: Achieves optimal ventilation and oxygenation  Outcome: Progressing  Flowsheets (Taken 5/19/2024 0600)  Achieves optimal ventilation and oxygenation:   Assess for changes in respiratory status   Position to facilitate oxygenation and minimize respiratory effort     Problem: Cardiovascular - Adult  Goal: Maintains optimal cardiac output and hemodynamic stability  Outcome: Progressing  Flowsheets (Taken 5/19/2024 0600)  Maintains optimal cardiac output and hemodynamic stability:   Monitor blood pressure and heart rate   Monitor urine output and notify Licensed Independent Practitioner for values outside of normal range   Assess for signs of decreased cardiac output     Problem: Cardiovascular - Adult  Goal: Absence of cardiac dysrhythmias or at baseline  Outcome:

## 2024-05-19 NOTE — PROGRESS NOTES
Internal Medicine Progress Note    Date: 5/16/2024   Patient: Franklyn Fermin   Hospital Day: 1    CC: Wound Infection (Patient with chief complaint of toe infection. Patient stated he had a blister of his toe and now the bottom of his toe is to the bone. ) and Abnormal labs (Patient states he saw a cardiologist and MD said he had abnormal labs and needed to come to the hospital)       Interval Hx   He feels well overall. No chest pain, SOB, abdominal pain, nausea, or other complaints. No recent changes stool output changes from his LLQ colostomy. No foot pain, redness, fevers, or chills.     HPI:   Franklyn Fermin is an 53 y.o. male w/ hx of recently diagnosed HF (EF 45%, T2DM, PATRICK, HTN who presented upon recommendation from his cardiologist d/t outpatient lab demonstrating pro-BNP >3000. He confirms he only came to the hospital upon the recommendation of his cardiologist due to elevated BNP.     While initially he had SOB a couple months ago, Franklyn confirms he has not had SOB in the past month since he (intentionally) lost approximately 25 lbs eating a healthier diet high in protein and lower in carbohydrates. No recent chest pain. He confirms he was referred to the pulmonologist and cardiologist for this SOB prompting the PATRICK and CHF diagnoses.     He also indicates he has had swelling of his right great toe. No fevers, chills, redness, or tenderness as he does not have sensation to his bilateral feet whatsoever. He is not sure if the swelling has been worsening overall recently but states the swelling worsens during the day and improves somewhat at night. He has been taking augmentin (prescribed 5/10 x7 days) and ciprofloxacin (prescribed 5/14 x7 days).     Never smoker. He drinks alcohol occasionally and only socially. No other illicit substance use.     Objective     Vital Signs:  Patient Vitals for the past 8 hrs:   BP Temp Temp src Pulse Resp SpO2   05/16/24 0723 (!) 144/85 98 °F (36.7 °C) Oral 78 18 
  Physician Progress Note      PATIENT:               KEENAN ASTORGA  CSN #:                  [id number]  :                       1970  ADMIT DATE:       5/15/2024 7:05 PM  DISCH DATE:  RESPONDING  PROVIDER #:        Gisela Hilliard MD          QUERY TEXT:    Patient admitted with diabetic right 1st toe ulcer, noted to have CKD. If   possible, please document in progress notes and discharge summary if you are   evaluating and/or treating any of the following:    The medical record reflects the following:  Risk Factors: HX- atrial fibrillation, hyperlipidemia, hypertension,   necrotizing fasciitis, type 2 diabetes  Clinical Indicators: CRT 1.5, 1.6, 1.5....GFR  55, 51, 55....Per Cardiology pn   on 24-   LEROY on CKD....Per attending pn on 24- LEROY on CKD   -   suspect may be component of prerenal (bun/creat ratio however 15) vs   cardiorenal   - baseline Cr around 1.1 - 1.4  Treatment: U/A, monitor BMP, Hold Lisinopril, Stop IV Lasix    Thank You,  Steph Fountain RN BSN CDS CRCR  [email or web address]  Options provided:  -- CKD Stage 1 GFR>90  -- CKD Stage 2 GFR 60-90  -- CKD Stage 3a GFR 45-59  -- CKD Stage 3b GFR 30-44  -- CKD Stage 4 GFR 15-29  -- CKD Stage 5 GFR<15  -- ESRD  -- Other - I will add my own diagnosis  -- Disagree - Not applicable / Not valid  -- Disagree - Clinically unable to determine / Unknown  -- Refer to Clinical Documentation Reviewer    PROVIDER RESPONSE TEXT:    This patient has CKD Stage 2.    Query created by: Steph Fountain on 2024 9:23 AM      Electronically signed by:  Gisela Hilliard MD 2024 12:14 PM          
I have seen, examined and evaluated the patient as did the resident physician, on 24. We have discussed the plan of care and decisions made during that discussion were incorporated into this note. I have reviewed the resident physician's note and agree with the assessment and plan of care as documented.    53 y.o. 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 His father had a \"mild\" MI in his 50s but survived. His mother  last week d/t \"multiple aneurysms.\"   who presented upon recommendation from his cardiologist d/t outpatient lab demonstrating pro-BNP >3000     He has been having CEBALLOS and noted dyspnea with intercourse a month back, he was referred to the pulmonologist and cardiologist for this SOB who subsequently diagnosed him with PATRICK and CHF. Upon seeing a cardiologist on 2024, the cardiologist ordered a holter monitor which demonstrated recurrent atrial flutter and infrequent AF per his cardiologist, for which he was placed on eliquis 5mg BID on 2024     He has been following Dr. Singh podiatrist, right great toe for several weeks.   Has been on Augmentin and ciprofloxacin.   Cx completed in the hospital - superficial Cx polymicrobial with staph, stenotrophomonas, Pseudomonas  Being treated for Right diabetic foot infection and distal and proximal phalanx osteomyelitis with Linezolid, piperacillin tazobactam and TMP/SMX    For his CEBALLOS, Drop in EF, elevated proBNP 2000s, Trop 24,   Underwent Stress testing which 
Nuc stress images were reviewed, suggests ischemia. Will plan for LHC later today. Please keep patient NPO x meds and will start NS   
Patient discharged via transport to [facility]. IV's in place, report given to transport. Heparin gtt sent with patient/transport. All belongings packed up and sent with patient. AVS printed and sent with transport.  
Patient is A&O x4.  RA, sat 98%.    No complaints of pain or SOB.  Vitals stable as charted.  Respirations appear to be easy and unlabored.  Lungs clear.  Respirations easy with no complaints of cough.  Cardiac monitor in place, current rhythm NSR.  Heparin gtt infusing per right FA PIV as ordered.  IVPB infusing as ordered.  Voids per urinal.  Colostomy intact.    No complaints of nausea/vomiting/diarrhea.  Up ad erik to the bathroom or chair as needed.  Tolerating regular diet.  NPO after midnight for AM stress test.    Plan of care and safety measures reviewed with the patient.  Call light in reach.  Will continue to monitor.  Electronically signed by Steph Bacon RN on 5/16/2024 at 11:49 PM    
Patient seen and examined d/w cardiology, patient will undergo cath today.  Await results, patient may have lad disease or multivessel cad and may end up needing cabg, needs cath.  Continue with monitoring sugars and creatinine, if cath is negative hydrate and plan for dc in am.  Full note to follow  
Podiatric Surgery Daily Progress Note  Franklyn Fermin      Subjective :   Patient seen and examined this am at the bedside. Patient denies any acute overnight events.  Patient to be transferred to [facility] today for further cardiac intervention.  Patient denies N/V/F/C/SOB. Patient denies calf pain, thigh pain, chest pain.     Review of Systems: A 12 point review of symptoms is unremarkable with the exception of the chief complaint. Patient specifically denies nausea, fever, vomiting, chills, shortness of breath, chest pain, abdominal pain, constipation or difficulty urinating.       Objective     BP (!) 147/73   Pulse 71   Temp 97.9 °F (36.6 °C) (Oral)   Resp 16   Ht 1.803 m (5' 11\")   Wt 109.8 kg (242 lb)   SpO2 96%   BMI 33.75 kg/m²      I/O:  Intake/Output Summary (Last 24 hours) at 5/19/2024 0728  Last data filed at 5/19/2024 0559  Gross per 24 hour   Intake 480 ml   Output 1500 ml   Net -1020 ml              Wt Readings from Last 3 Encounters:   05/15/24 109.8 kg (242 lb)   04/02/24 119.5 kg (263 lb 8 oz)   03/12/24 119.7 kg (264 lb)       LABS:    Recent Labs     05/17/24  0520 05/18/24  0634   WBC 5.6 5.6   HGB 9.6* 9.5*   HCT 27.6* 26.8*    210        Recent Labs     05/18/24  0634      K 3.9      CO2 24   BUN 18   CREATININE 1.4*        Recent Labs     05/16/24  1003 05/16/24  1433 05/18/24  1925 05/19/24  0124   INR 1.24*  --   --   --    APTT 43.1*   < > 111.2* 96.6*    < > = values in this interval not displayed.           LOWER EXTREMITY EXAMINATION    Dressing to right LE intact. No strikethrough noted to the external dressing.  Scant seropurulent drainage noted to the internal layers of the dressing.     VASCULAR: DP and PT pulses are palpable +2/4.  Upon hand-held Doppler examination, DP and PT pulses are biphasic. CFT is brisk to the digits of the foot b/l. Skin temperature is warm to cool from proximal to distal with focal calor to right hallux; improving.  Mild 
Podiatric Surgery Daily Progress Note  Franklyn Fermin      Subjective :   Patient seen and examined this am at the bedside. Patient denies any acute overnight events.  Patient underwent left heart cath procedure yesterday and was found to have several blockages and was recommended that the patient will require further surgical intervention from a cardiac standpoint.  Patient denies N/V/F/C/SOB. Patient denies calf pain, thigh pain, chest pain.     Review of Systems: A 12 point review of symptoms is unremarkable with the exception of the chief complaint. Patient specifically denies nausea, fever, vomiting, chills, shortness of breath, chest pain, abdominal pain, constipation or difficulty urinating.       Objective     BP (!) 177/94   Pulse 79   Temp 97.9 °F (36.6 °C) (Oral)   Resp 16   Ht 1.803 m (5' 11\")   Wt 109.8 kg (242 lb)   SpO2 95%   BMI 33.75 kg/m²      I/O:  Intake/Output Summary (Last 24 hours) at 5/18/2024 0942  Last data filed at 5/18/2024 0631  Gross per 24 hour   Intake 400 ml   Output 1305 ml   Net -905 ml                Wt Readings from Last 3 Encounters:   05/15/24 109.8 kg (242 lb)   04/02/24 119.5 kg (263 lb 8 oz)   03/12/24 119.7 kg (264 lb)       LABS:    Recent Labs     05/17/24  0520 05/18/24  0634   WBC 5.6 5.6   HGB 9.6* 9.5*   HCT 27.6* 26.8*    210          Recent Labs     05/18/24  0634      K 3.9      CO2 24   BUN 18   CREATININE 1.4*          Recent Labs     05/16/24  1003 05/16/24  1433 05/17/24  2248 05/18/24  0634   INR 1.24*  --   --   --    APTT 43.1*   < > 68.1* 108.8*    < > = values in this interval not displayed.             LOWER EXTREMITY EXAMINATION    Dressing to right LE intact. No strikethrough noted to the external dressing.  Scant seropurulent drainage noted to the internal layers of the dressing.     VASCULAR: DP and PT pulses are palpable +2/4.  Upon hand-held Doppler examination, DP and PT pulses are biphasic. CFT is brisk to the digits of 
Report called to Aravind. All questions answered. Patient to be transported at 1745.  
Report given to patients nurse. Pt transferred to room. Right radial site remains c/d/I. Bedside handoff given to RADHA Marcus. Heparin gtt verified Amrit GARCIA.  
[facility] Note    Patient is on Heparin low dose weight based infusion, which is being monitored & adjusted using aPTT as pt received an oral Factor-Xa inhibitor within 72 hrs of starting heparin infusion, which interacts with Anti-Xa monitoring of heparin.     It has now been 72 hours since heparin infusion was initiated, and the oral Factor-Xa inhibitor interaction with Anti-Xa levels is eliminated.  Heparin infusion will now be monitored & adjusted using Anti-Xa levels per the usual [facility] protocol.  Lab order changed, and discussed plan with nurseAshley.    Anti-Xa algorithm:    **Use original weight used to start heparin for all adjustments**    AntiXa < 0.10 Units/mL Bolus = 60 units/kg      Increase infusion by 4 units/kg/hr   (Maximum bolus = 4,000 units)    0.1-0.29  Units/mL     Bolus = 30 units/kg        Increase infusion by 2 units/kg/hr   (Maximum bolus = 2,000 units)  0.3-0.7 Units/mL        No bolus         No change in infusion                           0.71-0.80 Units/mL    No bolus        Decrease infusion by 1 unit/kg/hr  0.81-0.99 Units/mL    No bolus Decrease infusion by 2 units/kg/hr  1.0 Units/mL or greater  Hold heparin for 1 hour       Decrease infusion by 3 units/kg/hr    Hang infusion immediately after drawing initial labs.   Do NOT use ANY anti-Xa drawn prior to initiation of infusion for titration.     Anti Xa levels 6 hours after any rate change  When 2 successive Anti Xa's are at goal   monitor Anti Xa level at least daily      Please call with questions--  Thanks--  Franny Jones, PharmD, BCPS, BCGP  [id number] ([facility] [place])   5/19/2024 11:26 AM      
aPTT WNL, no change. Change aPTT to daily.  
      Labs:  CBC:   Recent Labs     05/15/24  1951 05/17/24  0520 05/18/24  0634   WBC 8.0 5.6 5.6   HGB 11.0* 9.6* 9.5*   HCT 31.6* 27.6* 26.8*    178 210       BMP:   Recent Labs     05/15/24  1951 05/17/24  0520 05/18/24  0634    139 137   K 4.4 4.0 3.9    105 105   CO2 25 25 24   BUN 25* 25* 18   CREATININE 1.6* 1.5* 1.4*   GLUCOSE 134* 180* 185*     Magnesium:   Recent Labs     05/17/24 0520 05/18/24  0634   MG 1.50* 1.70*     LFT's:   No results for input(s): \"AST\", \"ALT\", \"BILITOT\", \"ALKPHOS\" in the last 72 hours.    Invalid input(s): \"ALB\"        U/A:   Recent Labs     05/17/24  0717   COLORU Yellow   PHUR 6.0   WBCUA 0-2   RBCUA 0-2   MUCUS Rare*   CLARITYU Clear   LEUKOCYTESUR Negative   UROBILINOGEN 0.2   BILIRUBINUR Negative   BLOODU MODERATE*   GLUCOSEU Negative     Radiology:  XR CHEST (2 VW)   Final Result      No acute cardiopulmonary findings.      Electronically signed by oJse Schofield MD      XR FOOT RIGHT (MIN 3 VIEWS)   Final Result   Impression:    1.  Extension at the interphalangeal joint of the great toe with osseous irregularity, likely sequela of remote trauma noted on prior x-ray. Superimposed osteomyelitis is not excluded on plain film.   2.  Prior amputation of the second toe at the middle phalanx.    3.  No acute fracture or dislocation.      Electronically signed by Jose Schofield MD            Assessment & Plan     Franklyn Fermin is a 53 y.o. male with history of T2DM, HF who presented on 5/15 for evaluation of elevated BNP as an outpatient.     Right first toe ulcer  - hx of T2DM. XR of the right foot w/ osseous irregularlty of the IP joint of right great toe; superimposed osteomyelitis not excluded. CRP elevated to 47.1 and ESR 33. Hx of L foot digit amputations x2. No e/o hypotension, tachycardia, or significant leukocytosis.   - podiatry consulted; appreciate recommendations  - per podiatry, plan for amputation following cardiac optimization   - given hx of 
normal.       Labs:  CBC:   Recent Labs     05/17/24  0520 05/18/24  0634 05/19/24  0910   WBC 5.6 5.6 6.0   HGB 9.6* 9.5* 9.5*   HCT 27.6* 26.8* 27.2*    210 194       BMP:   Recent Labs     05/17/24  0520 05/18/24  0634 05/19/24  0910    137 137   K 4.0 3.9 4.5    105 104   CO2 25 24 24   BUN 25* 18 13   CREATININE 1.5* 1.4* 1.5*   GLUCOSE 180* 185* 162*     Magnesium:   Recent Labs     05/17/24  0520 05/18/24  0634 05/19/24  0910   MG 1.50* 1.70* 1.70*     LFT's:   No results for input(s): \"AST\", \"ALT\", \"BILITOT\", \"ALKPHOS\" in the last 72 hours.    Invalid input(s): \"ALB\"        U/A:   Recent Labs     05/17/24  0717   COLORU Yellow   PHUR 6.0   WBCUA 0-2   RBCUA 0-2   MUCUS Rare*   CLARITYU Clear   LEUKOCYTESUR Negative   UROBILINOGEN 0.2   BILIRUBINUR Negative   BLOODU MODERATE*   GLUCOSEU Negative     Radiology:  XR CHEST (2 VW)   Final Result      No acute cardiopulmonary findings.      Electronically signed by Jose Schofield MD      XR FOOT RIGHT (MIN 3 VIEWS)   Final Result   Impression:    1.  Extension at the interphalangeal joint of the great toe with osseous irregularity, likely sequela of remote trauma noted on prior x-ray. Superimposed osteomyelitis is not excluded on plain film.   2.  Prior amputation of the second toe at the middle phalanx.    3.  No acute fracture or dislocation.      Electronically signed by Jose Schofield MD            Assessment & Plan     Franklyn Fermin is a 53 y.o. male with history of T2DM, HF who presented on 5/15 for evaluation of elevated BNP as an outpatient.     Right first toe ulcer  - hx of T2DM. XR of the right foot w/ osseous irregularlty of the IP joint of right great toe; superimposed osteomyelitis not excluded. CRP elevated to 47.1 and ESR 33. Hx of L foot digit amputations x2. No e/o hypotension, tachycardia, or significant leukocytosis.   - podiatry consulted; appreciate recommendations  - per podiatry, plan for amputation following cardiac 
noted to RLE; improving 2/2 compressive dressing. No pain with calf compression b/l.     NEUROLOGIC: Gross and epicritic sensation is diminished b/l. Protective sensation is diminished at all pedal sites b/l.     DERMATOLOGIC:   Right lower extremity:  Verbal consent obtained for photos today:       Full-thickness ulceration noted to the plantar aspect of hallux.  Wound base is noted to be mainly fibrotic with surrounding granular tissue.  Wound does probe directly to bone.  Wound does not tunnel or track.  Scant purulence expressed.  No fluctuance or crepitus noted.  Mild malodor noted.  Mild erythema encompassing hallux noted.     Left lower extremity:  Healed cicatrix to the lateral aspect of foot.  Otherwise, the left lower extremity is a closed soft tissue envelope with no ulcerations or acute signs of infection     MUSCULOSKELETAL: Muscle strength is 5/5 for all pedal groups tested. No pain with palpation of the foot or ankle b/l. Ankle joint ROM is decreased in dorsiflexion with the knee extended.  Previous partial fourth and fifth ray resection noted to left lower extremity      IMAGING:  XR right foot (5/15):  IMPRESSION:  Impression:   1.  Extension at the interphalangeal joint of the great toe with osseous irregularity, likely sequela of remote trauma noted on prior x-ray. Superimposed osteomyelitis is not excluded on plain film.  2.  Prior amputation of the second toe at the middle phalanx.   3.  No acute fracture or dislocation.    ASSESSMENT/PLAN  -Full-thickness ulceration right plantar hallux (Jackson 3)  -Right diabetic foot infection  -Right distal and proximal phalanx osteomyelitis  -Diabetes mellitus type 2 with peripheral neuropathy  -Hx of partial 4th and 5th ray amputation left     -Patient examined and evaluated at the bedside   -Hypertensive otherwise VSS.  No leukocytosis noted (WBC 5.6)  -ESR 33 CRP 47.1  -A1c 7.3  -Prealb 13.6; oral nutritional wound healing supplements ordered  -Imaging 
deformity   Heart:  Regular rhythm, rate is controlled, S1, S2 normal, there is no murmur, there is no rub or gallop, cannot assess jvd, no bilateral lower extremity edema   Abdomen:   Soft, non-tender, bowel sounds active all four quadrants,  no masses, no organomegaly   Extremities: Extremities normal, atraumatic, no cyanosis.    Pulses: 2+ and symmetric   Skin: Skin color, texture, turgor normal, no rashes or lesions   Pysch: Normal mood and affect   Neurologic: Normal gross motor and sensory exam.  Cranial nerves intact       Labs:   Recent Labs     05/15/24  1950 05/17/24  0520 05/18/24  0634    139 137   K 4.4 4.0 3.9   BUN 25* 25* 18   CREATININE 1.6* 1.5* 1.4*    105 105   CO2 25 25 24   GLUCOSE 134* 180* 185*   CALCIUM 9.6 9.0 8.7   MG  --  1.50* 1.70*       Recent Labs     05/15/24  1950 05/17/24  0520 05/18/24  0634   WBC 8.0 5.6 5.6   HGB 11.0* 9.6* 9.5*   HCT 31.6* 27.6* 26.8*    178 210   MCV 82.2 80.8 81.0       No results for input(s): \"CHOLTOT\", \"TRIG\", \"HDL\", \"CHOLHDL\", \"LDL\" in the last 72 hours.    Invalid input(s): \"LIPIDCOMM\", \"VLDCHOL\"  Recent Labs     05/16/24  1003   INR 1.24*       Recent Labs     05/16/24  1433   CKTOTAL 45       No results for input(s): \"BNP\" in the last 72 hours.  No results for input(s): \"NTPROBNP\" in the last 72 hours.  No results for input(s): \"TSH\" in the last 72 hours.    Imaging:       Assessment & Plan:     1.  Right first toe diabetic ulcer, possible osteomyelitis.  2.  Chronic and compensated HFrEF.  It could be due to ischemic cardiomyopathy, less likely due to AF RVR in view of less than 1% AF burden per monitor 05/2024.  There is no evidence of acute heart failure.  3.  LEROY on CKD.  Cannot exclude due to mild hypovolemia.  4.  PAF.  Patient remains in sinus rhythm.  5.  Poorly controlled DM with neuropathy and vasculopathy.  6.  Chest pains.  Cannot exclude unstable angina.  Lexiscan nuclear stress test was suggestive of LAD ischemia.  [facility] 
is no rub or gallop, cannot assess jvd, no bilateral lower extremity edema   Abdomen:   Soft, non-tender, bowel sounds active all four quadrants,  no masses, no organomegaly   Extremities: Extremities normal, atraumatic, no cyanosis.    Pulses: 2+ and symmetric   Skin: Skin color, texture, turgor normal, no rashes or lesions   Pysch: Normal mood and affect   Neurologic: Normal gross motor and sensory exam.  Cranial nerves intact       Labs:   Recent Labs     05/15/24  1951 05/17/24  0520    139   K 4.4 4.0   BUN 25* 25*   CREATININE 1.6* 1.5*    105   CO2 25 25   GLUCOSE 134* 180*   CALCIUM 9.6 9.0   MG  --  1.50*     Recent Labs     05/15/24  1951 05/17/24  0520   WBC 8.0 5.6   HGB 11.0* 9.6*   HCT 31.6* 27.6*    178   MCV 82.2 80.8     No results for input(s): \"CHOLTOT\", \"TRIG\", \"HDL\", \"CHOLHDL\", \"LDL\" in the last 72 hours.    Invalid input(s): \"LIPIDCOMM\", \"VLDCHOL\"  Recent Labs     05/16/24  1003   INR 1.24*     Recent Labs     05/16/24  1433   CKTOTAL 45     No results for input(s): \"BNP\" in the last 72 hours.  No results for input(s): \"NTPROBNP\" in the last 72 hours.  No results for input(s): \"TSH\" in the last 72 hours.    Imaging:       Assessment & Plan:     1.  Right first toe diabetic ulcer, possible osteomyelitis.  2.  Chronic and compensated HFrEF.  It could be due to ischemic cardiomyopathy, less likely due to AF RVR in view of less than 1% AF burden per monitor 05/2024.  There is no evidence of acute heart failure.  3.  LEROY on CKD.  Cannot exclude due to mild hypovolemia.  4.  PAF.  Patient remains in sinus rhythm.  5.  Poorly controlled DM with neuropathy and vasculopathy.  6.  Chest pains.  Cannot exclude unstable angina.  Lexiscan nuclear stress test was suggestive of LAD ischemia.           - I stopped IV Lasix 40 mg twice daily (already received 2 doses) and instead  gave patient normal saline 100 mill per hour for total of 10 hours (1 L).  - Strict I's and O's every shift and 
second toe at the middle phalanx.    3.  No acute fracture or dislocation.      Electronically signed by Jose Schofield MD            Assessment & Plan     Franklyn Fermin is a 53 y.o. male with history of T2DM, HF who presented on 5/15 for evaluation of elevated BNP as an outpatient.     Right first toe ulcer  - hx of T2DM. XR of the right foot w/ osseous irregularlty of the IP joint of right great toe; superimposed osteomyelitis not excluded. CRP elevated to 47.1 and ESR 33. Hx of L foot digit amputations x2. No e/o hypotension, tachycardia, or significant leukocytosis.   - podiatry consulted; appreciate recommendations  - on linezolid and pip-tazo (5/16 - )   - per podiatry, plan for amputation following cardiac optimization   - given hx of amputations, current infx, concern for osteomyelitis, consulted ID    Newly diagnosed heart failure  - echo 3/12/2024 w/ LVEF 45%, global hypokinesis, indeterminate diastolic function, elevated RA pressure, but unable to estimate pulm art pressure d/t incomplete TR jet, and left atrium dilation. He is asymptomatic and denies SOB, CP, or pitting edema  - BNP here 3550. May be due to HF vs renal etiology (given LEROY) vs AF   - s/p furosemide 40mg on 5/15 & 5/16, then fluids for rehydration     Atrial fibrillation/atrial flutter  - Recent cardiac monitor demonstrated infrequent AF.   - told to come to ED d/t pro-BNP 3550 on outpatient labs.   - cardiology consulted; appreciate recommendations   - carvedilol 6.25 BID per cardiologist   - had stress test concerning for ischemia so undergoing LHC today   - will f/u on cardiology reccs post-LHC     LEROY on CKD  - suspect may be component of prerenal (bun/creat ratio however 15) vs cardiorenal   - baseline Cr around 1.1 - 1.4   - improved from 1.6 to 1.5 on 5/17  - UA w/ moderate blood and protein 100 mg/dl   - hold lisinopril     Hyperlipidema  - total cholesterol 240, Hdl low at 31, and LDL elevated at 182 on 5/14   - may benefit from

## 2024-05-20 LAB
BACTERIA BLD CULT ORG #2: NORMAL
BACTERIA BLD CULT: NORMAL
ECHO BSA: 2.34 M2

## 2024-05-21 LAB
BACTERIA SPEC AEROBE CULT: ABNORMAL
BACTERIA SPEC ANAEROBE CULT: ABNORMAL
GRAM STN SPEC: ABNORMAL
ORGANISM: ABNORMAL

## 2024-05-31 LAB
ALBUMIN URINE, EXTERNAL: 286.4
CREATININE, URINE, EXTERNAL: 172.3
MICROALBUMIN/CREAT UR: 1662 MG/G{CREAT}

## 2024-06-04 ENCOUNTER — TELEPHONE (OUTPATIENT)
Dept: FAMILY MEDICINE CLINIC | Age: 54
End: 2024-06-04

## 2024-06-04 NOTE — TELEPHONE ENCOUNTER
Anna with [facility] to state they received a referral for the patient. She for a verbal that Dr. Rider will sign home care orders.     [phone] - not a direct line; ask for Anna

## 2024-06-10 ENCOUNTER — TELEPHONE (OUTPATIENT)
Dept: CARDIOLOGY CLINIC | Age: 54
End: 2024-06-10

## 2024-06-10 NOTE — TELEPHONE ENCOUNTER
Pt was scheduled to see KXA as a new pt referred by [facility]. Pt missed appt because he was in [facility] having bypass surgery. Pt concerned about being marked as NO SHOW. Can that be changed. Pt has appt with [facility] on 6/21/2024. Should pt R/S with KXA and when would he be able to see him. Please advise.

## 2024-06-10 NOTE — TELEPHONE ENCOUNTER
Will route to PMMW regarding changing status of KXA appointment that was a no show.    FRONT: please reschedule with KXA next available as ordered by [facility]    Spoke with pt. He V/U

## 2024-08-28 ENCOUNTER — TELEPHONE (OUTPATIENT)
Dept: FAMILY MEDICINE CLINIC | Age: 54
End: 2024-08-28

## 2024-08-28 NOTE — TELEPHONE ENCOUNTER
Patient calling in. He has not been able to obtain ostomy supplies from [facility]. Patient states he contacted [facility] and they would fax the order. We have not received the order. I called [facility] and was on hold for over an hour. I faxed them a note asking for them to fax order form and expressed urgency. Still have not received anything from [facility]. Called and left message for patient. Does have a contact name for someone at [facility] that works on his account?

## 2024-09-10 NOTE — PROGRESS NOTES
PACU Transfer to [facility]    Vitals:    12/30/20 0900   BP: 130/79   Pulse: 79   Resp: 21   Temp: 97.5 °F (36.4 °C)   SpO2: 98%         Intake/Output Summary (Last 24 hours) at 12/30/2020 7648  Last data filed at 12/30/2020 0900  Gross per 24 hour   Intake 210 ml   Output --   Net 210 ml       Pain assessment:  present - adequately treated  Pain Level: 0    Patient transferred to care of [facility] RN.    12/30/2020 9:22 AM none

## 2024-12-16 ENCOUNTER — TELEPHONE (OUTPATIENT)
Dept: FAMILY MEDICINE CLINIC | Age: 54
End: 2024-12-16

## 2024-12-16 DIAGNOSIS — E11.628 TYPE 2 DIABETES MELLITUS WITH OTHER SKIN COMPLICATIONS (HCC): ICD-10-CM

## 2024-12-16 DIAGNOSIS — I10 ESSENTIAL HYPERTENSION: Primary | ICD-10-CM

## 2024-12-16 DIAGNOSIS — Z12.5 PROSTATE CANCER SCREENING: ICD-10-CM

## 2024-12-16 NOTE — TELEPHONE ENCOUNTER
Please verify if patient is still under our care and if so, I can do the refill but I would like for him to schedule a diabetic follow-up.  Thank you

## 2024-12-16 NOTE — TELEPHONE ENCOUNTER
Patient scld yearly physical  He asked If lab orders could be placed.    He will come to our building    Future Appointments   Date Time Provider Department Center   1/23/2025 10:00 AM Maximus Rider DO [facility] DEP

## 2024-12-23 ENCOUNTER — TELEPHONE (OUTPATIENT)
Dept: FAMILY MEDICINE CLINIC | Age: 54
End: 2024-12-23

## 2024-12-23 DIAGNOSIS — I10 ESSENTIAL HYPERTENSION: ICD-10-CM

## 2024-12-23 RX ORDER — LISINOPRIL 30 MG/1
TABLET ORAL
Qty: 90 TABLET | Refills: 0 | Status: SHIPPED | OUTPATIENT
Start: 2024-12-23

## 2024-12-23 NOTE — TELEPHONE ENCOUNTER
Patient needs a refill      lisinopril (PRINIVIL;ZESTRIL) 30 MG tablet     90 days  [facility]       Future Appointments   Date Time Provider Department Center   1/23/2025 10:00 AM Maximus Rider DO [facility] E[facility] DEP

## 2025-01-22 DIAGNOSIS — E11.628 TYPE 2 DIABETES MELLITUS WITH OTHER SKIN COMPLICATIONS (HCC): ICD-10-CM

## 2025-01-22 DIAGNOSIS — Z12.5 PROSTATE CANCER SCREENING: ICD-10-CM

## 2025-01-22 DIAGNOSIS — I10 ESSENTIAL HYPERTENSION: Primary | ICD-10-CM

## 2025-01-22 DIAGNOSIS — E11.9 TYPE 2 DIABETES MELLITUS WITHOUT COMPLICATION, WITHOUT LONG-TERM CURRENT USE OF INSULIN (HCC): ICD-10-CM

## 2025-01-22 DIAGNOSIS — I10 ESSENTIAL HYPERTENSION: ICD-10-CM

## 2025-01-23 ENCOUNTER — TELEPHONE (OUTPATIENT)
Dept: FAMILY MEDICINE CLINIC | Age: 55
End: 2025-01-23

## 2025-01-23 ENCOUNTER — OFFICE VISIT (OUTPATIENT)
Dept: FAMILY MEDICINE CLINIC | Age: 55
End: 2025-01-23
Payer: MEDICAID

## 2025-01-23 VITALS
SYSTOLIC BLOOD PRESSURE: 124 MMHG | DIASTOLIC BLOOD PRESSURE: 76 MMHG | BODY MASS INDEX: 34.02 KG/M2 | WEIGHT: 243 LBS | OXYGEN SATURATION: 98 % | HEART RATE: 85 BPM | RESPIRATION RATE: 16 BRPM | HEIGHT: 71 IN | TEMPERATURE: 97.6 F

## 2025-01-23 DIAGNOSIS — Z00.00 ANNUAL PHYSICAL EXAM: Primary | ICD-10-CM

## 2025-01-23 DIAGNOSIS — E11.621 TYPE 2 DIABETES MELLITUS WITH FOOT ULCER (CODE) (HCC): ICD-10-CM

## 2025-01-23 DIAGNOSIS — A48.0 GAS GANGRENE OF FOOT (HCC): ICD-10-CM

## 2025-01-23 DIAGNOSIS — I50.9 HEART FAILURE, UNSPECIFIED HF CHRONICITY, UNSPECIFIED HEART FAILURE TYPE (HCC): ICD-10-CM

## 2025-01-23 DIAGNOSIS — I48.0 PAROXYSMAL ATRIAL FIBRILLATION (HCC): ICD-10-CM

## 2025-01-23 LAB
ANISOCYTOSIS BLD QL SMEAR: ABNORMAL
BASOPHILS # BLD: 0.1 K/UL (ref 0–0.2)
BASOPHILS NFR BLD: 1 %
BURR CELLS BLD QL SMEAR: ABNORMAL
CHOLEST SERPL-MCNC: 242 MG/DL (ref 0–199)
DACRYOCYTES BLD QL SMEAR: ABNORMAL
DEPRECATED RDW RBC AUTO: 14 % (ref 12.4–15.4)
EOSINOPHIL # BLD: 0.5 K/UL (ref 0–0.6)
EOSINOPHIL NFR BLD: 7 %
HCT VFR BLD AUTO: 36.3 % (ref 40.5–52.5)
HDLC SERPL-MCNC: 25 MG/DL (ref 40–60)
HGB BLD-MCNC: 12.2 G/DL (ref 13.5–17.5)
LDLC SERPL CALC-MCNC: ABNORMAL MG/DL
LDLC SERPL-MCNC: 114 MG/DL
LYMPHOCYTES # BLD: 1.7 K/UL (ref 1–5.1)
LYMPHOCYTES NFR BLD: 22 %
MCH RBC QN AUTO: 28.4 PG (ref 26–34)
MCHC RBC AUTO-ENTMCNC: 33.5 G/DL (ref 31–36)
MCV RBC AUTO: 84.8 FL (ref 80–100)
MONOCYTES # BLD: 0.7 K/UL (ref 0–1.3)
MONOCYTES NFR BLD: 10 %
NEUTROPHILS # BLD: 4.4 K/UL (ref 1.7–7.7)
NEUTROPHILS NFR BLD: 52 %
NEUTS BAND NFR BLD MANUAL: 7 % (ref 0–7)
PLATELET # BLD AUTO: 226 K/UL (ref 135–450)
PLATELET BLD QL SMEAR: ADEQUATE
PMV BLD AUTO: 8.8 FL (ref 5–10.5)
POIKILOCYTOSIS BLD QL SMEAR: ABNORMAL
POLYCHROMASIA BLD QL SMEAR: ABNORMAL
PSA SERPL DL<=0.01 NG/ML-MCNC: 0.59 NG/ML (ref 0–4)
RBC # BLD AUTO: 4.28 M/UL (ref 4.2–5.9)
SLIDE REVIEW: ABNORMAL
TRIGL SERPL-MCNC: 448 MG/DL (ref 0–150)
VARIANT LYMPHS NFR BLD MANUAL: 1 % (ref 0–6)
VLDLC SERPL CALC-MCNC: ABNORMAL MG/DL
WBC # BLD AUTO: 7.4 K/UL (ref 4–11)

## 2025-01-23 PROCEDURE — 3074F SYST BP LT 130 MM HG: CPT | Performed by: FAMILY MEDICINE

## 2025-01-23 PROCEDURE — 99396 PREV VISIT EST AGE 40-64: CPT | Performed by: FAMILY MEDICINE

## 2025-01-23 PROCEDURE — 3078F DIAST BP <80 MM HG: CPT | Performed by: FAMILY MEDICINE

## 2025-01-23 SDOH — ECONOMIC STABILITY: FOOD INSECURITY: WITHIN THE PAST 12 MONTHS, THE FOOD YOU BOUGHT JUST DIDN'T LAST AND YOU DIDN'T HAVE MONEY TO GET MORE.: NEVER TRUE

## 2025-01-23 SDOH — ECONOMIC STABILITY: FOOD INSECURITY: WITHIN THE PAST 12 MONTHS, YOU WORRIED THAT YOUR FOOD WOULD RUN OUT BEFORE YOU GOT MONEY TO BUY MORE.: NEVER TRUE

## 2025-01-23 ASSESSMENT — ENCOUNTER SYMPTOMS
COLOR CHANGE: 0
SHORTNESS OF BREATH: 0
BACK PAIN: 0
ABDOMINAL PAIN: 0

## 2025-01-23 ASSESSMENT — PATIENT HEALTH QUESTIONNAIRE - PHQ9
SUM OF ALL RESPONSES TO PHQ QUESTIONS 1-9: 0
SUM OF ALL RESPONSES TO PHQ9 QUESTIONS 1 & 2: 0
1. LITTLE INTEREST OR PLEASURE IN DOING THINGS: NOT AT ALL
SUM OF ALL RESPONSES TO PHQ QUESTIONS 1-9: 0
2. FEELING DOWN, DEPRESSED OR HOPELESS: NOT AT ALL

## 2025-01-23 NOTE — PROGRESS NOTES
Franklyn Fermin  YOB: 1970    Date of Service:  1/23/2025    Chief Complaint:   Franklyn Fermin is a 54 y.o. male who presents for complete physical examination.    HPI:  HPI    Self-testicular exams: Yes  Sexual activity: Yes   Last eye exam: October 2024 - positive for DR  Exercise: Weight training  Seatbelt use: Yes  Are You a Spiritual person: No  Living will: No    10/26/24 A1C = 7.0    Wt Readings from Last 3 Encounters:   01/23/25 110.2 kg (243 lb)   05/19/24 110 kg (242 lb 8.1 oz)   04/02/24 119.5 kg (263 lb 8 oz)     BP Readings from Last 3 Encounters:   01/23/25 124/76   05/19/24 (!) 156/90   04/02/24 128/64       Patient Active Problem List   Diagnosis    Atrial fibrillation (HCC)    Essential hypertension    Type 2 diabetes mellitus without complication, without long-term current use of insulin (HCC)    History of skin graft    Wound of buttock    Obesity, Class I, BMI 30-34.9    S/P split thickness skin graft    Surgical wound, non healing    Necrotizing fasciitis    Mike's gangrene of scrotum    Colostomy in place (HCC)    Diabetic infection of left foot (HCC)    Elevated serum creatinine    Dyslipidemia    Diabetic foot infection (HCC)    Chronic osteomyelitis of left foot with draining sinus    Anal stenosis    Gas gangrene of foot (HCC)    Sleep apnea    Atrial flutter (HCC)    CEBALLOS (dyspnea on exertion)    LV dysfunction    Heart failure (HCC)    Diabetic ulcer of toe of right foot associated with type 2 diabetes mellitus (HCC)    Diabetic polyneuropathy associated with type 2 diabetes mellitus (HCC)    Osteomyelitis of great toe of right foot       Health Maintenance   Topic Date Due    HIV screen  Never done    Hepatitis C screen  Never done    Hepatitis B vaccine (1 of 3 - 19+ 3-dose series) Never done    Pneumococcal 0-64 years Vaccine (2 of 2 - PCV) 08/23/2013    DTaP/Tdap/Td vaccine (2 - Tdap) 06/15/2017    Shingles vaccine (1 of 2) Never done    Diabetic Alb to Cr ratio

## 2025-01-23 NOTE — TELEPHONE ENCOUNTER
I have a question for Dr. Chandler.  Mr. Fermin is interested in starting a GLP-1 for his diabetes but he has a history of a colectomy and wears a bag.  What does Dr. Chandler think about GLP-1's for someone with this patient's condition?  Thank you

## 2025-01-25 ENCOUNTER — TELEPHONE (OUTPATIENT)
Dept: FAMILY MEDICINE CLINIC | Age: 55
End: 2025-01-25

## 2025-01-28 ENCOUNTER — PATIENT MESSAGE (OUTPATIENT)
Dept: FAMILY MEDICINE CLINIC | Age: 55
End: 2025-01-28

## 2025-01-31 ENCOUNTER — OFFICE VISIT (OUTPATIENT)
Dept: FAMILY MEDICINE CLINIC | Age: 55
End: 2025-01-31
Payer: MEDICAID

## 2025-01-31 VITALS
BODY MASS INDEX: 34.72 KG/M2 | DIASTOLIC BLOOD PRESSURE: 82 MMHG | RESPIRATION RATE: 16 BRPM | HEART RATE: 78 BPM | WEIGHT: 248 LBS | OXYGEN SATURATION: 97 % | HEIGHT: 71 IN | TEMPERATURE: 97 F | SYSTOLIC BLOOD PRESSURE: 132 MMHG

## 2025-01-31 DIAGNOSIS — R74.01 ELEVATED ALT MEASUREMENT: ICD-10-CM

## 2025-01-31 DIAGNOSIS — R79.89 ELEVATED TSH: ICD-10-CM

## 2025-01-31 DIAGNOSIS — R79.0 LOW MAGNESIUM LEVEL: ICD-10-CM

## 2025-01-31 DIAGNOSIS — E11.621 TYPE 2 DIABETES MELLITUS WITH FOOT ULCER (CODE) (HCC): ICD-10-CM

## 2025-01-31 DIAGNOSIS — D64.9 NORMOCYTIC ANEMIA: ICD-10-CM

## 2025-01-31 DIAGNOSIS — E78.2 MIXED HYPERLIPIDEMIA: Primary | ICD-10-CM

## 2025-01-31 PROCEDURE — 3046F HEMOGLOBIN A1C LEVEL >9.0%: CPT | Performed by: FAMILY MEDICINE

## 2025-01-31 PROCEDURE — G8417 CALC BMI ABV UP PARAM F/U: HCPCS | Performed by: FAMILY MEDICINE

## 2025-01-31 PROCEDURE — 3079F DIAST BP 80-89 MM HG: CPT | Performed by: FAMILY MEDICINE

## 2025-01-31 PROCEDURE — 3017F COLORECTAL CA SCREEN DOC REV: CPT | Performed by: FAMILY MEDICINE

## 2025-01-31 PROCEDURE — 1036F TOBACCO NON-USER: CPT | Performed by: FAMILY MEDICINE

## 2025-01-31 PROCEDURE — 3075F SYST BP GE 130 - 139MM HG: CPT | Performed by: FAMILY MEDICINE

## 2025-01-31 PROCEDURE — G8427 DOCREV CUR MEDS BY ELIG CLIN: HCPCS | Performed by: FAMILY MEDICINE

## 2025-01-31 PROCEDURE — 99213 OFFICE O/P EST LOW 20 MIN: CPT | Performed by: FAMILY MEDICINE

## 2025-01-31 PROCEDURE — 2022F DILAT RTA XM EVC RTNOPTHY: CPT | Performed by: FAMILY MEDICINE

## 2025-01-31 RX ORDER — POTASSIUM CHLORIDE 1500 MG/1
TABLET, EXTENDED RELEASE ORAL
COMMUNITY
Start: 2024-12-15

## 2025-01-31 RX ORDER — EMPAGLIFLOZIN 10 MG/1
10 TABLET, FILM COATED ORAL DAILY
COMMUNITY
Start: 2024-12-15

## 2025-01-31 RX ORDER — CEFDINIR 300 MG/1
CAPSULE ORAL
COMMUNITY
Start: 2024-10-28

## 2025-01-31 RX ORDER — FENOFIBRATE 145 MG/1
145 TABLET, COATED ORAL DAILY
Qty: 30 TABLET | Refills: 3 | Status: SHIPPED | OUTPATIENT
Start: 2025-01-31

## 2025-01-31 RX ORDER — ROSUVASTATIN CALCIUM 10 MG/1
10 TABLET, COATED ORAL DAILY
Qty: 30 TABLET | Refills: 2 | Status: SHIPPED | OUTPATIENT
Start: 2025-01-31

## 2025-01-31 RX ORDER — AMIODARONE HYDROCHLORIDE 200 MG/1
TABLET ORAL DAILY
COMMUNITY
Start: 2024-09-18 | End: 2025-03-17

## 2025-01-31 RX ORDER — METOPROLOL SUCCINATE 50 MG/1
TABLET, EXTENDED RELEASE ORAL
COMMUNITY
Start: 2025-01-22

## 2025-01-31 RX ORDER — TORSEMIDE 20 MG/1
40 TABLET ORAL DAILY
COMMUNITY
Start: 2024-12-22

## 2025-01-31 RX ORDER — AMLODIPINE BESYLATE 5 MG/1
TABLET ORAL DAILY
COMMUNITY
Start: 2024-12-23

## 2025-01-31 SDOH — ECONOMIC STABILITY: FOOD INSECURITY: WITHIN THE PAST 12 MONTHS, YOU WORRIED THAT YOUR FOOD WOULD RUN OUT BEFORE YOU GOT MONEY TO BUY MORE.: NEVER TRUE

## 2025-01-31 SDOH — ECONOMIC STABILITY: FOOD INSECURITY: WITHIN THE PAST 12 MONTHS, THE FOOD YOU BOUGHT JUST DIDN'T LAST AND YOU DIDN'T HAVE MONEY TO GET MORE.: NEVER TRUE

## 2025-01-31 ASSESSMENT — PATIENT HEALTH QUESTIONNAIRE - PHQ9
SUM OF ALL RESPONSES TO PHQ QUESTIONS 1-9: 0
1. LITTLE INTEREST OR PLEASURE IN DOING THINGS: NOT AT ALL
2. FEELING DOWN, DEPRESSED OR HOPELESS: NOT AT ALL
DEPRESSION UNABLE TO ASSESS: FUNCTIONAL CAPACITY MOTIVATION LIMITS ACCURACY
SUM OF ALL RESPONSES TO PHQ QUESTIONS 1-9: 0
SUM OF ALL RESPONSES TO PHQ9 QUESTIONS 1 & 2: 0

## 2025-01-31 ASSESSMENT — ANXIETY QUESTIONNAIRES
3. WORRYING TOO MUCH ABOUT DIFFERENT THINGS: NOT AT ALL
2. NOT BEING ABLE TO STOP OR CONTROL WORRYING: NOT AT ALL
IF YOU CHECKED OFF ANY PROBLEMS ON THIS QUESTIONNAIRE, HOW DIFFICULT HAVE THESE PROBLEMS MADE IT FOR YOU TO DO YOUR WORK, TAKE CARE OF THINGS AT HOME, OR GET ALONG WITH OTHER PEOPLE: NOT DIFFICULT AT ALL
GAD7 TOTAL SCORE: 0
6. BECOMING EASILY ANNOYED OR IRRITABLE: NOT AT ALL
7. FEELING AFRAID AS IF SOMETHING AWFUL MIGHT HAPPEN: NOT AT ALL
5. BEING SO RESTLESS THAT IT IS HARD TO SIT STILL: NOT AT ALL
1. FEELING NERVOUS, ANXIOUS, OR ON EDGE: NOT AT ALL
4. TROUBLE RELAXING: NOT AT ALL

## 2025-02-03 ENCOUNTER — TELEPHONE (OUTPATIENT)
Dept: FAMILY MEDICINE CLINIC | Age: 55
End: 2025-02-03

## 2025-02-04 NOTE — TELEPHONE ENCOUNTER
Submitted PA for Ozempic (0.25 or 0.5 MG/DOSE) 2MG/3ML pen-injectors  Via [facility] [id number] STATUS: PENDING.    Follow up done daily; if no decision with in three days we will refax.  If another three days goes by with no decision will call the insurance for status.

## 2025-02-05 NOTE — TELEPHONE ENCOUNTER
The medication was DENIED; DENIAL letter is uploaded to MEDIA.    Generic Denial:  Other; please see Denial Letter.       Note :  Message from Plan  Coverage is provided when the member meets all the followin. Member has a history of at least 120 days of therapy with THREE preferred medications in this UPDL category. ONE of the 120 day trials must be with a drug in the same drug class, Byetta (5 mcg and 10 mcg), Victoza (18 MG/3 ML PEN) (Brand name is preferred by the plan) or Trulicity (0.75 mg, 1.5 mg, 3 mg, and 4.5 mg)]. Other trials may include but are not limited to: Farxiga 5 and 10 mg (Brand name is preferred by the plan), Glimepiride, Glipizide, Januvia, Jardiance 10 and 25 mg and Metformin  mg, 850 mg, 1000 mg and ER (generics of Glucophage); 2. Member has had an inadequate clinical response (the inability to reach A1C goal (less than 7%) after at least 120 days of current regimen, with use of two or more drugs concomitantly per ADA (American Diabetes Association) guidelines and; 3. Member has documented adherence and appropriate dose escalation (must achieve maximum recommended dose or document that maximum recommended dose is not tolerated or is clinically inappropriate) and; 4. Documentation includes a patient specific A1C goal if less than 7% and must include current A1C (within the last 6 months).       If you want an APPEAL; please note in this encounter what new information you would like to APPEAL with.  Once complete route back to PA POOL.    If this requires a response please respond to the pool ( P MHCX PSC MEDICATION PRE-AUTH).      Thank you please advise patient.

## 2025-02-06 NOTE — PROGRESS NOTES
[facility] - [facility]  Wound Care    Patient Name:  Daina Ngo   MRN:  [id number]  Date: 2/6/2025  Diagnosis: Wounds, multiple    History:     Past Medical History:   Diagnosis Date    PAD (peripheral artery disease)        Social History     Socioeconomic History    Marital status: Single   Tobacco Use    Smoking status: Never    Smokeless tobacco: Never   Substance and Sexual Activity    Alcohol use: Yes    Drug use: Not Currently     Social Drivers of Health     Financial Resource Strain: Low Risk  (2/6/2025)    Overall Financial Resource Strain (CARDIA)     Difficulty of Paying Living Expenses: Not very hard   Food Insecurity: No Food Insecurity (2/6/2025)    Hunger Vital Sign     Worried About Running Out of Food in the Last Year: Never true     Ran Out of Food in the Last Year: Never true   Transportation Needs: No Transportation Needs (2/6/2025)    TRANSPORTATION NEEDS     Transportation : No   Physical Activity: Patient Declined (1/29/2025)    Exercise Vital Sign     Days of Exercise per Week: Patient declined     Minutes of Exercise per Session: Patient declined   Stress: No Stress Concern Present (2/6/2025)    [employer] - Occupational Stress Questionnaire     Feeling of Stress : Not at all   Housing Stability: Low Risk  (2/6/2025)    Housing Stability Vital Sign     Unable to Pay for Housing in the Last Year: No     Homeless in the Last Year: No       Precautions:     Allergies as of 02/04/2025    (No Known Allergies)       [facility] Assessment Details/Treatment     Patient seen for inpatient wound care consult. Primary RN at bedside with patient's family and agreeable to assessment at this time. Upon assessment, bilateral groin/thigh with chronic wounds. Both wounds with scant serosanguineous drainage, moist pink/red wound base with tan slough covering wound bed. Patient states that she experiences pain with dressing change.    Sacrum noted to have chronic wound with moist tan  Pre-Operative Note  Resident Note     Patient: Luis Haro     Procedure: Incision and drainage, delayed primary closure, application of skin graft substitute; left lower extremity    Consent: In chart     Labs:        Recent Labs     09/13/22  0607 09/14/22  0730   WBC 12.0* 6.5   HGB 11.1* 12.0*   HCT 32.4* 34.9*   MCV 81.6 84.0    198        Recent Labs     09/13/22  0607 09/14/22  0730   * 140   K 4.5 5.2*    103   CO2 21 27   BUN 20 19   CREATININE 1.5* 1.3      No results for input(s): AST, ALT, ALB, BILIDIR, BILITOT, ALKPHOS in the last 72 hours. No results for input(s): LIPASE, AMYLASE in the last 72 hours. Recent Labs     09/12/22  0808   INR 1.19*      No results for input(s): CKTOTAL, CKMB, CKMBINDEX, TROPONINI in the last 72 hours. Saline lock/IV access: yes    Clearance for surgery: Yes per medicine     Diet: NPO     CXR:  mild cardiomegaly       EKG:  please see cardiology section of EMR      Echocardiogram: not done    PT/INR: 15.1/1.19 (9/12); PT/INR to be redrawn this AM    IVF: as needed    Abx: IV zosyn    Type and Screen: not indicated    Known risk factors for perioperative complications: Diabetes mellitus  Atrial fibrillation       Anesthesia to see patient    I have examined the patient and reviewed the original history and physical completed and find no relevant changes.     Lizet Henry DPM  Podiatric Resident, PGY-3  Pager #: [phone] or Perfect Serve slough and black eschar covering wound bed. Periwound tissue with non-blanchable, deep purple discoloration indicative of possible DTI. Scant serosanguineous drainage noted.     Right greater trochanter with chronic wound noted with moist pink/red wound base and tan slough covering wound bed. Scant serosanguineous drianage noted.     Left buttocks noted to have diffuse purple/maroon discoloration that is slow to gustavo. Wound has begun to slowly break open with scant serosanguineous drainage.     Left BKA site with firmly adhered eschar. No open wounds or active drainage noted.     Right BKA sit CDI.      Reviewed chart for this encounter.  See flowsheet for findings.      Recommendations: Cleanse bilateral groin/thigh, right greater trochanter, and left buttocks wounds with vashe for antimicrobial properties and pat dry. Apply aquacel AG for antimicrobial and absorptive properties. Cover with mepilex foam border dressing. Change every other day or PRN if soiled.    Cleanse sacrum with vashe for antimicrobial properties and pat dry. Apply triad BID and PRN for autolytic debridement. Cover with ABD pad and secure with medipore tape.    Paint right BKA site with betadine daily for drying and antiseptic properties and leave open to air.     Immerse surface ordered for pressure redistribution and microclimate. Nursing to maintain pressure injury prevention measures. No other issues or concerns at this time. Will follow up 2/13/2025 or sooner if needed.        Discussed POC with patient and primary nurse.  See EMR for orders and patient education.    Bedside nursing to continue care and monitoring.  Bedside to maintain pressure injury prevention interventions.        02/06/25 0845   WOCN Assessment   WOCN Total Time (mins) 45   Visit Date 02/06/25   Visit Time 0845   Consult Type New   WOCN Speciality Wound   Intervention assessed;changed;applied;chart review;coordination of care;orders   Teaching on-going        Wound  01/27/25 2133 Ulceration Right dorsal Greater trochanter   Date First Assessed/Time First Assessed: 01/27/25 2133   Present on Original Admission: Yes  Primary Wound Type: Ulceration  Side: Right  Orientation: dorsal  Location: Greater trochanter   Wound Image    Dressing Appearance Open to air;Moist drainage   Drainage Amount Scant   Drainage Characteristics/Odor Serosanguineous   Appearance Pink;Red;Tan   Tissue loss description Full thickness   Care Cleansed with:;Antimicrobial agent  (Vashe)   Dressing   (Aquacel AG, foam)   Dressing Change Due 02/08/25        Wound 01/27/25 2135 Ulceration Right upper;medial Thigh   Date First Assessed/Time First Assessed: 01/27/25 2135   Present on Original Admission: Yes  Primary Wound Type: Ulceration  Side: Right  Orientation: upper;medial  Location: Thigh   Wound Image    Dressing Appearance Moist drainage   Drainage Amount Scant   Drainage Characteristics/Odor Serosanguineous   Appearance Pink;Red;Tan;Slough   Tissue loss description Partial thickness   Care Cleansed with:;Antimicrobial agent  (Vashe)   Dressing Applied  (Aquacel AG, foam)   Dressing Change Due 02/08/25        Wound 01/27/25 2135 Ulceration Left lateral;distal Thigh   Date First Assessed/Time First Assessed: 01/27/25 2135   Present on Original Admission: Yes  Primary Wound Type: Ulceration  Side: Left  Orientation: lateral;distal  Location: (c) Thigh   Wound Image    Dressing Appearance Moist drainage   Drainage Amount Scant   Drainage Characteristics/Odor Serosanguineous   Appearance Pink;Red;Tan   Tissue loss description Partial thickness   Care Cleansed with:;Antimicrobial agent  (Vashe)   Dressing Applied  (Aquacel AG, foam)   Dressing Change Due 02/08/25        Wound 01/29/25 1702 Pressure Injury Sacral spine   Date First Assessed/Time First Assessed: 01/29/25 1702   Present on Original Admission: Yes  Primary Wound Type: Pressure Injury  Location: Sacral spine   Wound Image     Dressing Appearance  Moist drainage   Drainage Amount Scant   Drainage Characteristics/Odor Serosanguineous   Appearance Pink;Red;Maroon;Purple;Black   Tissue loss description Full thickness   Care Cleansed with:;Sterile normal saline;Applied:;Skin Barrier  (Triad)   Dressing   (ABD with medipore tape)   Dressing Change Due 02/06/25        Wound 01/29/25 1703 Contusion Left lower Buttocks   Date First Assessed/Time First Assessed: 01/29/25 1703   Present on Original Admission: Yes  Primary Wound Type: Contusion  Side: Left  Orientation: lower  Location: Buttocks   Wound Image    Dressing Appearance Open to air   Drainage Amount None   Drainage Characteristics/Odor No odor   Appearance Red;Maroon   Care Cleansed with:;Antimicrobial agent  (Vashe)   Dressing Applied;Foam   Dressing Change Due 02/07/25                 Orders placed.   Joseph Carter BSN, RN, C  02/06/2025

## 2025-02-09 DIAGNOSIS — E11.621 TYPE 2 DIABETES MELLITUS WITH FOOT ULCER (CODE) (HCC): Primary | ICD-10-CM

## 2025-04-01 DIAGNOSIS — I10 ESSENTIAL HYPERTENSION: ICD-10-CM

## 2025-04-01 RX ORDER — LISINOPRIL 30 MG/1
30 TABLET ORAL DAILY
Qty: 90 TABLET | Refills: 1 | Status: SHIPPED | OUTPATIENT
Start: 2025-04-01

## 2025-04-01 NOTE — TELEPHONE ENCOUNTER
Lov 1/31/2025    Future Appointments   Date Time Provider Department Center   4/30/2025  1:40 PM Maximus Rider DO [facility] DEP

## 2025-04-07 NOTE — TELEPHONE ENCOUNTER
Future Appointments   Date Time Provider Department Center   4/30/2025  1:40 PM Maximus Rider DO [facility] DEP     LOV 1/31/2025

## 2025-04-25 DIAGNOSIS — E78.2 MIXED HYPERLIPIDEMIA: ICD-10-CM

## 2025-04-25 RX ORDER — ROSUVASTATIN CALCIUM 10 MG/1
10 TABLET, COATED ORAL DAILY
Qty: 30 TABLET | Refills: 0 | Status: SHIPPED | OUTPATIENT
Start: 2025-04-25

## 2025-04-25 NOTE — TELEPHONE ENCOUNTER
Future Appointments   Date Time Provider Department Center   4/30/2025  1:40 PM Maximus Rider DO [facility] DEP     LOV 1/31/2025  Please address in Dr. Rider's absence.

## 2025-04-30 ENCOUNTER — OFFICE VISIT (OUTPATIENT)
Dept: FAMILY MEDICINE CLINIC | Age: 55
End: 2025-04-30
Payer: MEDICAID

## 2025-04-30 VITALS
DIASTOLIC BLOOD PRESSURE: 76 MMHG | TEMPERATURE: 97.3 F | RESPIRATION RATE: 16 BRPM | HEART RATE: 98 BPM | BODY MASS INDEX: 34.17 KG/M2 | OXYGEN SATURATION: 97 % | WEIGHT: 245 LBS | SYSTOLIC BLOOD PRESSURE: 113 MMHG

## 2025-04-30 DIAGNOSIS — E11.621 TYPE 2 DIABETES MELLITUS WITH FOOT ULCER (CODE) (HCC): Primary | ICD-10-CM

## 2025-04-30 DIAGNOSIS — I10 ESSENTIAL HYPERTENSION: ICD-10-CM

## 2025-04-30 LAB — HBA1C MFR BLD: 9.1 %

## 2025-04-30 PROCEDURE — 3017F COLORECTAL CA SCREEN DOC REV: CPT | Performed by: FAMILY MEDICINE

## 2025-04-30 PROCEDURE — 3074F SYST BP LT 130 MM HG: CPT | Performed by: FAMILY MEDICINE

## 2025-04-30 PROCEDURE — 3046F HEMOGLOBIN A1C LEVEL >9.0%: CPT | Performed by: FAMILY MEDICINE

## 2025-04-30 PROCEDURE — 2022F DILAT RTA XM EVC RTNOPTHY: CPT | Performed by: FAMILY MEDICINE

## 2025-04-30 PROCEDURE — 99214 OFFICE O/P EST MOD 30 MIN: CPT | Performed by: FAMILY MEDICINE

## 2025-04-30 PROCEDURE — G8427 DOCREV CUR MEDS BY ELIG CLIN: HCPCS | Performed by: FAMILY MEDICINE

## 2025-04-30 PROCEDURE — G8417 CALC BMI ABV UP PARAM F/U: HCPCS | Performed by: FAMILY MEDICINE

## 2025-04-30 PROCEDURE — 1036F TOBACCO NON-USER: CPT | Performed by: FAMILY MEDICINE

## 2025-04-30 PROCEDURE — 3078F DIAST BP <80 MM HG: CPT | Performed by: FAMILY MEDICINE

## 2025-04-30 PROCEDURE — 83036 HEMOGLOBIN GLYCOSYLATED A1C: CPT | Performed by: FAMILY MEDICINE

## 2025-04-30 RX ORDER — PANTOPRAZOLE SODIUM 40 MG/1
40 TABLET, DELAYED RELEASE ORAL DAILY
COMMUNITY
Start: 2025-04-29

## 2025-04-30 NOTE — PROGRESS NOTES
4/30/2025    This is a 54 y.o. male   Chief Complaint   Patient presents with    3 Month Follow-Up     Diabetes    .    HPI     Pt presents today for a diabetic follow-up. Currently taking Trulicity 0.75 mg weekly    Morning glucose readings: Doesn't check    Admits to fatigue  Denies vision changes.    Labs ordered on 01/31/25 have not been done.    Had ablation yesterday and feels great today.    Last Eye Exam: 12-18 months ago - abnormal - positive for DR (had injections and laser treatment)  Last Foot Exam: 05/15/24  Last Microalbumin: 05/31/24    Today's A1C = 9.1  05/14/24 A1C = 7.3    HTN: Taking Amlodipine 5 mg, Carvedilol 6.25 mg BID, Lisinopril 30 mg, Metoprolol  mg (two 75 mg tabs), today's /76, denies dizziness or HA's  Past Medical History:   Diagnosis Date    Allergic rhinitis     Atrial fibrillation (HCC)     Heart failure (HCC) 05/15/2024    Hyperlipidemia     Hypertension     Necrotizing fasciitis (HCC)     2018 resulted in colostomy    Neuropathy     Obesity, Class I, BMI 30-34.9 07/24/2018    Sleep apnea 03/12/2024    Type 2 diabetes mellitus without complication (HCC)     Wears glasses        Orders Only on 04/24/2025   Component Date Value Ref Range Status    Leukocytes, Bld 04/24/2025 4.60  4.00 - 12.00 10*3/uL Final    RBC 04/24/2025 4.20  4.20 - 5.80 10*6/uL Final    Hemoglobin 04/24/2025 11.4 (L)  13.2 - 17.1 g/dL Final    Hematocrit 04/24/2025 34.9 (L)  40.0 - 51.0 % Final    MCV 04/24/2025 83.1  80.0 - 100.0 fL Final    MCH 04/24/2025 27.1  27.0 - 33.0 pg Final    MCHC 04/24/2025 32.7  30.0 - 36.0 g/dL Final    RDW 04/24/2025 12.3  11.0 - 15.0 % Final    Platelets 04/24/2025 218  140 - 400 10*3/uL Final    MPV 04/24/2025 8.9 (L)  9.0 - 13.0 fL Final    aPTT 04/24/2025 32.8  23.1 - 37.6 seconds Final    Coag Tissue Factor Induced Blood T* 04/24/2025 14.0  10.5 - 14.1 seconds Final    INR 04/24/2025 1.1  0.9 - 1.1 NA Final    Comment: RECOMMENDED THERAPEUTIC RANGES USING INR :

## 2025-06-01 DIAGNOSIS — E78.2 MIXED HYPERLIPIDEMIA: ICD-10-CM

## 2025-06-02 RX ORDER — FENOFIBRATE 145 MG/1
145 TABLET, FILM COATED ORAL DAILY
Qty: 30 TABLET | Refills: 2 | Status: SHIPPED | OUTPATIENT
Start: 2025-06-02

## 2025-06-02 RX ORDER — ROSUVASTATIN CALCIUM 10 MG/1
10 TABLET, COATED ORAL DAILY
Qty: 30 TABLET | Refills: 3 | Status: SHIPPED | OUTPATIENT
Start: 2025-06-02

## 2025-06-06 DIAGNOSIS — E11.621 TYPE 2 DIABETES MELLITUS WITH FOOT ULCER (CODE) (HCC): ICD-10-CM

## 2025-06-06 RX ORDER — DULAGLUTIDE 0.75 MG/.5ML
INJECTION, SOLUTION SUBCUTANEOUS
Qty: 4 ML | Refills: 2 | Status: SHIPPED | OUTPATIENT
Start: 2025-06-06

## 2025-06-06 NOTE — TELEPHONE ENCOUNTER
Future Appointments   Date Time Provider Department Center   6/10/2025  2:40 PM Maximus Rider DO [facility] ECC DEP     LOV 4/30/2025

## 2025-06-09 DIAGNOSIS — D64.9 NORMOCYTIC ANEMIA: ICD-10-CM

## 2025-06-09 DIAGNOSIS — R79.0 LOW MAGNESIUM LEVEL: ICD-10-CM

## 2025-06-09 DIAGNOSIS — R74.01 ELEVATED ALT MEASUREMENT: ICD-10-CM

## 2025-06-09 DIAGNOSIS — I10 ESSENTIAL HYPERTENSION: ICD-10-CM

## 2025-06-09 DIAGNOSIS — Z12.5 PROSTATE CANCER SCREENING: ICD-10-CM

## 2025-06-09 DIAGNOSIS — E11.9 TYPE 2 DIABETES MELLITUS WITHOUT COMPLICATION, WITHOUT LONG-TERM CURRENT USE OF INSULIN (HCC): ICD-10-CM

## 2025-06-09 DIAGNOSIS — R79.89 ELEVATED TSH: ICD-10-CM

## 2025-06-09 DIAGNOSIS — E11.628 TYPE 2 DIABETES MELLITUS WITH OTHER SKIN COMPLICATIONS (HCC): ICD-10-CM

## 2025-06-09 DIAGNOSIS — E78.2 MIXED HYPERLIPIDEMIA: ICD-10-CM

## 2025-06-10 LAB
ALBUMIN SERPL-MCNC: 3.9 G/DL (ref 3.4–5)
ALBUMIN/GLOB SERPL: 1.3 {RATIO} (ref 1.1–2.2)
ALP SERPL-CCNC: 103 U/L (ref 40–129)
ALT SERPL-CCNC: 34 U/L (ref 10–40)
ANION GAP SERPL CALCULATED.3IONS-SCNC: 12 MMOL/L (ref 3–16)
AST SERPL-CCNC: 22 U/L (ref 15–37)
BASOPHILS # BLD: 0 K/UL (ref 0–0.2)
BASOPHILS NFR BLD: 0.8 %
BILIRUB SERPL-MCNC: 0.5 MG/DL (ref 0–1)
BUN SERPL-MCNC: 25 MG/DL (ref 7–20)
CALCIUM SERPL-MCNC: 9.4 MG/DL (ref 8.3–10.6)
CHLORIDE SERPL-SCNC: 103 MMOL/L (ref 99–110)
CHOLEST SERPL-MCNC: 236 MG/DL (ref 0–199)
CO2 SERPL-SCNC: 22 MMOL/L (ref 21–32)
CREAT SERPL-MCNC: 1.7 MG/DL (ref 0.9–1.3)
DEPRECATED RDW RBC AUTO: 14.8 % (ref 12.4–15.4)
EOSINOPHIL # BLD: 0.1 K/UL (ref 0–0.6)
EOSINOPHIL NFR BLD: 2.4 %
GFR SERPLBLD CREATININE-BSD FMLA CKD-EPI: 47 ML/MIN/{1.73_M2}
GLUCOSE SERPL-MCNC: 273 MG/DL (ref 70–99)
HCT VFR BLD AUTO: 33.5 % (ref 40.5–52.5)
HDLC SERPL-MCNC: 30 MG/DL (ref 40–60)
HGB BLD-MCNC: 11.6 G/DL (ref 13.5–17.5)
IRON SATN MFR SERPL: 13 % (ref 20–50)
IRON SERPL-MCNC: 49 UG/DL (ref 59–158)
LDLC SERPL CALC-MCNC: 159 MG/DL
LYMPHOCYTES # BLD: 1 K/UL (ref 1–5.1)
LYMPHOCYTES NFR BLD: 19.7 %
MAGNESIUM SERPL-MCNC: 1.58 MG/DL (ref 1.8–2.4)
MCH RBC QN AUTO: 28.7 PG (ref 26–34)
MCHC RBC AUTO-ENTMCNC: 34.8 G/DL (ref 31–36)
MCV RBC AUTO: 82.5 FL (ref 80–100)
MONOCYTES # BLD: 0.5 K/UL (ref 0–1.3)
MONOCYTES NFR BLD: 9.2 %
NEUTROPHILS # BLD: 3.4 K/UL (ref 1.7–7.7)
NEUTROPHILS NFR BLD: 67.9 %
PLATELET # BLD AUTO: 179 K/UL (ref 135–450)
PMV BLD AUTO: 8.6 FL (ref 5–10.5)
POTASSIUM SERPL-SCNC: 4.6 MMOL/L (ref 3.5–5.1)
PROT SERPL-MCNC: 6.8 G/DL (ref 6.4–8.2)
RBC # BLD AUTO: 4.06 M/UL (ref 4.2–5.9)
SODIUM SERPL-SCNC: 137 MMOL/L (ref 136–145)
T4 FREE SERPL-MCNC: 1.2 NG/DL (ref 0.9–1.8)
TIBC SERPL-MCNC: 367 UG/DL (ref 260–445)
TRIGL SERPL-MCNC: 234 MG/DL (ref 0–150)
TSH SERPL DL<=0.005 MIU/L-ACNC: 5.24 UIU/ML (ref 0.27–4.2)
TSH SERPL DL<=0.005 MIU/L-ACNC: 5.24 UIU/ML (ref 0.27–4.2)
VLDLC SERPL CALC-MCNC: 47 MG/DL
WBC # BLD AUTO: 5.1 K/UL (ref 4–11)

## 2025-06-13 ENCOUNTER — RESULTS FOLLOW-UP (OUTPATIENT)
Dept: FAMILY MEDICINE CLINIC | Age: 55
End: 2025-06-13

## 2025-06-16 NOTE — PROGRESS NOTES
6/17/2025    This is a 54 y.o. male   Chief Complaint   Patient presents with    Discuss Labs    Medication Check     GLP1    .    HPI     Pt presents today for the following:    T2DM: taking Trulicity 0.75 mg weekly and Metformin 1,000 mg BID    Morning BS: avg 180, has made healthy eating changes    Denies fatigue or vision disturbances    Today's A1C = 8.9  04/30/25 A1C = 9.1    Lab Results: Labs from 06/09/25 reviewed at today's visit, wnl except for decreased kidney function, low Mg, elevated total and LDL chol, low HDL, elevated TG's (improved), elevated TSH (FT4 1.2), decreased RBC/H&H, and low iron. (Hadn't taken Metformin yet)    Sees Nephrology    HTN: Taking Amlodipine 5 mg, Coreg 6.25 mg BID, Lisinopril 30 mg, Metoprolol 150 mg total daily, and Torsemide 20 mg (2 tabs daily) prn. Has Cardiology appt next month.     Has a Wound Care appt today.     Past Medical History:   Diagnosis Date    Allergic rhinitis     Atrial fibrillation (HCC)     Heart failure (HCC) 05/15/2024    Hyperlipidemia     Hypertension     Necrotizing fasciitis (HCC)     2018 resulted in colostomy    Neuropathy     Obesity, Class I, BMI 30-34.9 07/24/2018    Sleep apnea 03/12/2024    Type 2 diabetes mellitus without complication (HCC)     Wears glasses        Orders Only on 06/09/2025   Component Date Value Ref Range Status    Magnesium 06/09/2025 1.58 (L)  1.80 - 2.40 mg/dL Final    WBC 06/09/2025 5.1  4.0 - 11.0 K/uL Final    RBC 06/09/2025 4.06 (L)  4.20 - 5.90 M/uL Final    Hemoglobin 06/09/2025 11.6 (L)  13.5 - 17.5 g/dL Final    Hematocrit 06/09/2025 33.5 (L)  40.5 - 52.5 % Final    MCV 06/09/2025 82.5  80.0 - 100.0 fL Final    MCH 06/09/2025 28.7  26.0 - 34.0 pg Final    MCHC 06/09/2025 34.8  31.0 - 36.0 g/dL Final    RDW 06/09/2025 14.8  12.4 - 15.4 % Final    Platelets 06/09/2025 179  135 - 450 K/uL Final    MPV 06/09/2025 8.6  5.0 - 10.5 fL Final    Neutrophils % 06/09/2025 67.9  % Final    Lymphocytes % 06/09/2025 19.7  %

## 2025-06-17 ENCOUNTER — OFFICE VISIT (OUTPATIENT)
Dept: FAMILY MEDICINE CLINIC | Age: 55
End: 2025-06-17
Payer: MEDICAID

## 2025-06-17 VITALS
BODY MASS INDEX: 35.76 KG/M2 | DIASTOLIC BLOOD PRESSURE: 84 MMHG | HEART RATE: 78 BPM | RESPIRATION RATE: 16 BRPM | OXYGEN SATURATION: 97 % | SYSTOLIC BLOOD PRESSURE: 132 MMHG | TEMPERATURE: 97.3 F | WEIGHT: 256.4 LBS

## 2025-06-17 DIAGNOSIS — E78.2 MIXED HYPERLIPIDEMIA: ICD-10-CM

## 2025-06-17 DIAGNOSIS — D64.9 NORMOCYTIC ANEMIA: ICD-10-CM

## 2025-06-17 DIAGNOSIS — E11.621 TYPE 2 DIABETES MELLITUS WITH FOOT ULCER (CODE) (HCC): Primary | ICD-10-CM

## 2025-06-17 DIAGNOSIS — I10 ESSENTIAL HYPERTENSION: ICD-10-CM

## 2025-06-17 DIAGNOSIS — R79.89 ELEVATED TSH: ICD-10-CM

## 2025-06-17 DIAGNOSIS — D50.9 IRON DEFICIENCY ANEMIA, UNSPECIFIED IRON DEFICIENCY ANEMIA TYPE: ICD-10-CM

## 2025-06-17 LAB — HBA1C MFR BLD: 8.9 %

## 2025-06-17 PROCEDURE — 1036F TOBACCO NON-USER: CPT | Performed by: FAMILY MEDICINE

## 2025-06-17 PROCEDURE — G8417 CALC BMI ABV UP PARAM F/U: HCPCS | Performed by: FAMILY MEDICINE

## 2025-06-17 PROCEDURE — 83036 HEMOGLOBIN GLYCOSYLATED A1C: CPT | Performed by: FAMILY MEDICINE

## 2025-06-17 PROCEDURE — 3052F HG A1C>EQUAL 8.0%<EQUAL 9.0%: CPT | Performed by: FAMILY MEDICINE

## 2025-06-17 PROCEDURE — 3075F SYST BP GE 130 - 139MM HG: CPT | Performed by: FAMILY MEDICINE

## 2025-06-17 PROCEDURE — 3079F DIAST BP 80-89 MM HG: CPT | Performed by: FAMILY MEDICINE

## 2025-06-17 PROCEDURE — 3017F COLORECTAL CA SCREEN DOC REV: CPT | Performed by: FAMILY MEDICINE

## 2025-06-17 PROCEDURE — 2022F DILAT RTA XM EVC RTNOPTHY: CPT | Performed by: FAMILY MEDICINE

## 2025-06-17 PROCEDURE — 99214 OFFICE O/P EST MOD 30 MIN: CPT | Performed by: FAMILY MEDICINE

## 2025-06-17 PROCEDURE — G8427 DOCREV CUR MEDS BY ELIG CLIN: HCPCS | Performed by: FAMILY MEDICINE

## 2025-06-17 RX ORDER — FERROUS SULFATE 325(65) MG
325 TABLET ORAL EVERY OTHER DAY
Qty: 45 TABLET | Refills: 1 | Status: SHIPPED | OUTPATIENT
Start: 2025-06-17

## 2025-06-17 ASSESSMENT — PATIENT HEALTH QUESTIONNAIRE - PHQ9
SUM OF ALL RESPONSES TO PHQ QUESTIONS 1-9: 0
SUM OF ALL RESPONSES TO PHQ QUESTIONS 1-9: 0
1. LITTLE INTEREST OR PLEASURE IN DOING THINGS: NOT AT ALL
SUM OF ALL RESPONSES TO PHQ QUESTIONS 1-9: 0
2. FEELING DOWN, DEPRESSED OR HOPELESS: NOT AT ALL
SUM OF ALL RESPONSES TO PHQ QUESTIONS 1-9: 0

## 2025-06-18 ENCOUNTER — TELEPHONE (OUTPATIENT)
Dept: ADMINISTRATIVE | Age: 55
End: 2025-06-18

## 2025-06-18 NOTE — TELEPHONE ENCOUNTER
Submitted PA for Mounjaro 2.5MG/0.5ML auto-injectors  Via [facility] [id number] STATUS: PENDING.    Follow up done daily; if no decision with in three days we will refax.  If another three days goes by with no decision will call the insurance for status.

## 2025-06-19 NOTE — TELEPHONE ENCOUNTER
Do you know if you can prescribe him anything or what the next steps would be? Please see message below

## 2025-06-19 NOTE — TELEPHONE ENCOUNTER
Patient is stating that his insurance stated they are denying this because he didn't take the highest dose of the other medicines to see if those worked first. They are also stating they didn't have dates of his labs and A1c's. Please advise

## 2025-06-19 NOTE — TELEPHONE ENCOUNTER
The medication was DENIED; DENIAL letter is uploaded to MEDIA.    Generic Denial: Auto response per portal. No letter generated. please see note below      Note :  Message from Plan  Coverage is provided when the member meets the following: Documentation includes a patient specific A1C goal if less than 7% and must include current A1C (within the last 6 months).Please list with date. Other reasons for denial may include: 1. Member has a history of at least 120 days of therapy with THREE preferred medications in this UPDL category. ONE of the 120 day trials must be with a drug in the same drug class, Victoza (18 MG/3 ML PEN) (Brand name is preferred by the plan) or Trulicity (0.75 mg, 1.5 mg, 3 mg, and 4.5 mg). Other trials may include but are not limited to: Farxiga 5 and 10 mg (Brand name is preferred by the plan), Glimepiride, Glipizide, Januvia, Jardiance 10 and 25 mg and Metformin  mg, 850 mg, 1000 mg and ER (generics of Glucophage). 2. Member has had an inadequate clinical response (the inability to reach A1C goal (less than 7%) after at least 120 days of current regimen, with use of two or more drugs concomitantly per ADA (American Diabetes Association) guidelines and, 3. Member has documented adherence and appropriate dose escalation (must achieve maximum recommended dose or document that maximum recommended dose is not tolerated or is clinically inappropriate) .       If you want an APPEAL; please note in this encounter what new information you would like to APPEAL with.  Once complete route back to PA POOL.    If this requires a response please respond to the pool ( P MHCX PSC MEDICATION PRE-AUTH).      Thank you please advise patient.

## 2025-06-20 ENCOUNTER — PATIENT MESSAGE (OUTPATIENT)
Dept: FAMILY MEDICINE CLINIC | Age: 55
End: 2025-06-20

## 2025-06-20 NOTE — TELEPHONE ENCOUNTER
Please submit for for authorization patient's labs and A1c with dates.  I will send patient a MyChart increasing the Trulicity 1.5 mg weekly.  Thank you

## 2025-06-23 NOTE — TELEPHONE ENCOUNTER
As per requested. Resent to plan, made sure dates are clearly noted on PA.   Submitted PA for Mounjaro 2.5MG/0.5ML auto-injectors  Via [facility] [id number] STATUS: PENDING.    Follow up done daily; if no decision with in three days we will refax.  If another three days goes by with no decision will call the insurance for status.

## 2025-06-24 NOTE — TELEPHONE ENCOUNTER
The medication was DENIED; DENIAL letter is uploaded to MED[employer].    Generic Denial:  Other; please see Denial Letter.     Note :  Message from Plan  Coverage is provided when the member meets all the following: Member has a history of at least 120 days of therapy with THREE preferred medications in this UPDL category. ONE of the 120 day trials must be with a drug in the same drug class, Victoza (18 MG/3 ML PEN) (Brand name is preferred by the plan) or Trulicity (0.75 mg, 1.5 mg, 3 mg, and 4.5 mg). Other trials may include but are not limited to: Farxiga 5 and 10 mg (Brand name is preferred by the plan), Glimepiride, Glipizide, Januvia, Jardiance 10 and 25 mg and Metformin  mg, 850 mg, 1000 mg and ER (generics of Glucophage). Member has had an inadequate clinical response (the inability to reach A1C goal (less than 7%) after at least 120 days of current regimen, with use of two or more drugs concomitantly per ADA (American Diabetes Association) guidelines and, Member has documented adherence and appropriate dose escalation (must achieve maximum recommended dose or document that maximum recommended dose is not tolerated or is clinically inappropriate). Other reasons for denial may include; Documentation includes a patient specific A1C goal if less than 7%.      If you want an APPEAL; please note in this encounter what new information you would like to APPEAL with.  Once complete route back to PA POOL.    If this requires a response please respond to the pool ( P [facility] MEDICATION PRE-AUTH).      Thank you please advise patient.

## 2025-06-26 ENCOUNTER — PATIENT MESSAGE (OUTPATIENT)
Dept: FAMILY MEDICINE CLINIC | Age: 55
End: 2025-06-26

## 2025-07-01 DIAGNOSIS — E11.621 TYPE 2 DIABETES MELLITUS WITH FOOT ULCER (CODE) (HCC): Primary | ICD-10-CM

## 2025-07-01 RX ORDER — DULAGLUTIDE 1.5 MG/.5ML
1.5 INJECTION, SOLUTION SUBCUTANEOUS WEEKLY
Qty: 2 ADJUSTABLE DOSE PRE-FILLED PEN SYRINGE | Refills: 2 | Status: SHIPPED | OUTPATIENT
Start: 2025-07-01

## 2025-07-01 NOTE — TELEPHONE ENCOUNTER
Refill Request       Last Seen: Last Seen Department: 6/17/2025  Last Seen by PCP: 6/17/2025    Last Written: 04/07/2025      Next Appointment:   Future Appointments   Date Time Provider Department Center   9/17/2025 10:40 AM Maximus Rider DO [facility]       Requested Prescriptions     Pending Prescriptions Disp Refills    metFORMIN (GLUCOPHAGE) 1000 MG tablet [Pharmacy Med Name: metFORMIN HCl 1000 MG Oral Tablet] 180 tablet 0     Sig: TAKE 1 TABLET BY MOUTH TWICE DAILY WITH MEALS

## 2025-07-31 DIAGNOSIS — E11.621 TYPE 2 DIABETES MELLITUS WITH FOOT ULCER (CODE) (HCC): Primary | ICD-10-CM

## 2025-08-24 ENCOUNTER — APPOINTMENT (OUTPATIENT)
Dept: MRI IMAGING | Age: 55
End: 2025-08-24
Payer: MEDICAID

## 2025-08-24 ENCOUNTER — HOSPITAL ENCOUNTER (INPATIENT)
Age: 55
LOS: 11 days | Discharge: NURSING, REHAB OR LONG-TERM CARE | End: 2025-09-04
Attending: STUDENT IN AN ORGANIZED HEALTH CARE EDUCATION/TRAINING PROGRAM | Admitting: INTERNAL MEDICINE
Payer: MEDICAID

## 2025-08-24 ENCOUNTER — APPOINTMENT (OUTPATIENT)
Dept: GENERAL RADIOLOGY | Age: 55
End: 2025-08-24
Payer: MEDICAID

## 2025-08-24 DIAGNOSIS — L08.9 DIABETIC INFECTION OF LEFT FOOT (HCC): Primary | ICD-10-CM

## 2025-08-24 DIAGNOSIS — R78.81 STREPTOCOCCAL BACTEREMIA: ICD-10-CM

## 2025-08-24 DIAGNOSIS — I73.9 PAD (PERIPHERAL ARTERY DISEASE): ICD-10-CM

## 2025-08-24 DIAGNOSIS — M86.172 OTHER ACUTE OSTEOMYELITIS OF LEFT FOOT (HCC): ICD-10-CM

## 2025-08-24 DIAGNOSIS — A41.9 SEPTICEMIA (HCC): ICD-10-CM

## 2025-08-24 DIAGNOSIS — I48.91 ATRIAL FIBRILLATION, UNSPECIFIED TYPE (HCC): ICD-10-CM

## 2025-08-24 DIAGNOSIS — M14.672 CHARCOT'S JOINT OF FOOT, LEFT: ICD-10-CM

## 2025-08-24 DIAGNOSIS — B95.5 STREPTOCOCCAL BACTEREMIA: ICD-10-CM

## 2025-08-24 DIAGNOSIS — E11.628 DIABETIC INFECTION OF LEFT FOOT (HCC): Primary | ICD-10-CM

## 2025-08-24 LAB
ABO/RH: NORMAL
ALBUMIN SERPL-MCNC: 3.7 G/DL (ref 3.4–5)
ALP SERPL-CCNC: 115 U/L (ref 40–129)
ALT SERPL-CCNC: 33 U/L (ref 10–40)
AMORPH SED URNS QL MICRO: ABNORMAL /HPF
ANION GAP SERPL CALCULATED.3IONS-SCNC: 17 MMOL/L (ref 3–16)
ANTIBODY SCREEN: NORMAL
AST SERPL-CCNC: 22 U/L (ref 15–37)
BACTERIA URNS QL MICRO: ABNORMAL /HPF
BASE EXCESS BLDV CALC-SCNC: -2 MMOL/L (ref -2–3)
BASOPHILS # BLD: 0.1 K/UL (ref 0–0.2)
BASOPHILS NFR BLD: 0.4 %
BILIRUB DIRECT SERPL-MCNC: 0.5 MG/DL (ref 0–0.3)
BILIRUB INDIRECT SERPL-MCNC: 0.6 MG/DL (ref 0–1)
BILIRUB SERPL-MCNC: 1.1 MG/DL (ref 0–1)
BILIRUB UR QL STRIP.AUTO: NEGATIVE
BUN SERPL-MCNC: 26 MG/DL (ref 7–20)
CALCIUM SERPL-MCNC: 10.2 MG/DL (ref 8.3–10.6)
CHLORIDE SERPL-SCNC: 95 MMOL/L (ref 99–110)
CLARITY UR: CLEAR
CO2 BLDV-SCNC: 25 MMOL/L
CO2 SERPL-SCNC: 20 MMOL/L (ref 21–32)
COARSE GRAN CASTS #/AREA URNS LPF: ABNORMAL /LPF (ref 0–2)
COHGB MFR BLDV: 1 % (ref 0–1.5)
COLOR UR: YELLOW
CREAT SERPL-MCNC: 2.3 MG/DL (ref 0.9–1.3)
CRP SERPL-MCNC: 345 MG/L (ref 0–5.1)
DEPRECATED RDW RBC AUTO: 13.8 % (ref 12.4–15.4)
DO-HGB MFR BLDV: 88.2 %
EOSINOPHIL # BLD: 0 K/UL (ref 0–0.6)
EOSINOPHIL NFR BLD: 0 %
EPI CELLS #/AREA URNS HPF: ABNORMAL /HPF (ref 0–5)
ERYTHROCYTE [SEDIMENTATION RATE] IN BLOOD BY WESTERGREN METHOD: 76 MM/HR (ref 0–20)
GFR SERPLBLD CREATININE-BSD FMLA CKD-EPI: 33 ML/MIN/{1.73_M2}
GLUCOSE BLD-MCNC: 398 MG/DL (ref 70–99)
GLUCOSE SERPL-MCNC: 337 MG/DL (ref 70–99)
GLUCOSE UR STRIP.AUTO-MCNC: 250 MG/DL
HCO3 BLDV-SCNC: 23.5 MMOL/L (ref 24–28)
HCT VFR BLD AUTO: 32.4 % (ref 40.5–52.5)
HGB BLD-MCNC: 10.9 G/DL (ref 13.5–17.5)
HGB UR QL STRIP.AUTO: ABNORMAL
INR PPP: 1.49 (ref 0.86–1.14)
KETONES UR STRIP.AUTO-MCNC: ABNORMAL MG/DL
LACTATE BLDV-SCNC: 1.6 MMOL/L (ref 0.4–1.9)
LACTATE BLDV-SCNC: 3.8 MMOL/L (ref 0.4–1.9)
LEUKOCYTE ESTERASE UR QL STRIP.AUTO: NEGATIVE
LYMPHOCYTES # BLD: 0.4 K/UL (ref 1–5.1)
LYMPHOCYTES NFR BLD: 2.9 %
MCH RBC QN AUTO: 27.2 PG (ref 26–34)
MCHC RBC AUTO-ENTMCNC: 33.6 G/DL (ref 31–36)
MCV RBC AUTO: 80.9 FL (ref 80–100)
METHGB MFR BLDV: 0.4 % (ref 0–1.5)
MONOCYTES # BLD: 0.4 K/UL (ref 0–1.3)
MONOCYTES NFR BLD: 3.3 %
NEUTROPHILS # BLD: 12.3 K/UL (ref 1.7–7.7)
NEUTROPHILS NFR BLD: 93.4 %
NITRITE UR QL STRIP.AUTO: NEGATIVE
PCO2 BLDV: 42.1 MMHG (ref 41–51)
PERFORMED ON: ABNORMAL
PH BLDV: 7.36 [PH] (ref 7.35–7.45)
PH UR STRIP.AUTO: 6 [PH] (ref 5–8)
PLATELET # BLD AUTO: 207 K/UL (ref 135–450)
PMV BLD AUTO: 7.7 FL (ref 5–10.5)
PO2 BLDV: <30 MMHG (ref 25–40)
POTASSIUM SERPL-SCNC: 4 MMOL/L (ref 3.5–5.1)
PREALB SERPL-MCNC: 6.2 MG/DL (ref 20–40)
PROT SERPL-MCNC: 7.7 G/DL (ref 6.4–8.2)
PROT UR STRIP.AUTO-MCNC: >=300 MG/DL
PROTHROMBIN TIME: 18.1 SEC (ref 12.1–14.9)
RBC # BLD AUTO: 4 M/UL (ref 4.2–5.9)
RBC #/AREA URNS HPF: ABNORMAL /HPF (ref 0–4)
SAO2 % BLDV: 11 %
SODIUM SERPL-SCNC: 132 MMOL/L (ref 136–145)
SP GR UR STRIP.AUTO: >=1.03 (ref 1–1.03)
TROPONIN, HIGH SENSITIVITY: 33 NG/L (ref 0–22)
TROPONIN, HIGH SENSITIVITY: 38 NG/L (ref 0–22)
UA COMPLETE W REFLEX CULTURE PNL UR: ABNORMAL
UA DIPSTICK W REFLEX MICRO PNL UR: YES
URN SPEC COLLECT METH UR: ABNORMAL
UROBILINOGEN UR STRIP-ACNC: 1 E.U./DL
WBC # BLD AUTO: 13.2 K/UL (ref 4–11)
WBC #/AREA URNS HPF: ABNORMAL /HPF (ref 0–5)

## 2025-08-24 PROCEDURE — 80048 BASIC METABOLIC PNL TOTAL CA: CPT

## 2025-08-24 PROCEDURE — 86850 RBC ANTIBODY SCREEN: CPT

## 2025-08-24 PROCEDURE — 6370000000 HC RX 637 (ALT 250 FOR IP): Performed by: STUDENT IN AN ORGANIZED HEALTH CARE EDUCATION/TRAINING PROGRAM

## 2025-08-24 PROCEDURE — 87186 SC STD MICRODIL/AGAR DIL: CPT

## 2025-08-24 PROCEDURE — 99285 EMERGENCY DEPT VISIT HI MDM: CPT

## 2025-08-24 PROCEDURE — 86900 BLOOD TYPING SEROLOGIC ABO: CPT

## 2025-08-24 PROCEDURE — 93005 ELECTROCARDIOGRAM TRACING: CPT | Performed by: NURSE PRACTITIONER

## 2025-08-24 PROCEDURE — 87040 BLOOD CULTURE FOR BACTERIA: CPT

## 2025-08-24 PROCEDURE — 85652 RBC SED RATE AUTOMATED: CPT

## 2025-08-24 PROCEDURE — 86901 BLOOD TYPING SEROLOGIC RH(D): CPT

## 2025-08-24 PROCEDURE — 6360000004 HC RX CONTRAST MEDICATION

## 2025-08-24 PROCEDURE — 73720 MRI LWR EXTREMITY W/O&W/DYE: CPT

## 2025-08-24 PROCEDURE — 2500000003 HC RX 250 WO HCPCS: Performed by: NURSE PRACTITIONER

## 2025-08-24 PROCEDURE — 6370000000 HC RX 637 (ALT 250 FOR IP): Performed by: NURSE PRACTITIONER

## 2025-08-24 PROCEDURE — 1200000000 HC SEMI PRIVATE

## 2025-08-24 PROCEDURE — 6360000002 HC RX W HCPCS: Performed by: NURSE PRACTITIONER

## 2025-08-24 PROCEDURE — A9579 GAD-BASE MR CONTRAST NOS,1ML: HCPCS

## 2025-08-24 PROCEDURE — 80076 HEPATIC FUNCTION PANEL: CPT

## 2025-08-24 PROCEDURE — 87324 CLOSTRIDIUM AG IA: CPT

## 2025-08-24 PROCEDURE — 84484 ASSAY OF TROPONIN QUANT: CPT

## 2025-08-24 PROCEDURE — 73630 X-RAY EXAM OF FOOT: CPT

## 2025-08-24 PROCEDURE — 36415 COLL VENOUS BLD VENIPUNCTURE: CPT

## 2025-08-24 PROCEDURE — 96375 TX/PRO/DX INJ NEW DRUG ADDON: CPT

## 2025-08-24 PROCEDURE — 6360000002 HC RX W HCPCS: Performed by: STUDENT IN AN ORGANIZED HEALTH CARE EDUCATION/TRAINING PROGRAM

## 2025-08-24 PROCEDURE — 82803 BLOOD GASES ANY COMBINATION: CPT

## 2025-08-24 PROCEDURE — 2580000003 HC RX 258: Performed by: STUDENT IN AN ORGANIZED HEALTH CARE EDUCATION/TRAINING PROGRAM

## 2025-08-24 PROCEDURE — 2580000003 HC RX 258: Performed by: NURSE PRACTITIONER

## 2025-08-24 PROCEDURE — 96365 THER/PROPH/DIAG IV INF INIT: CPT

## 2025-08-24 PROCEDURE — 85025 COMPLETE CBC W/AUTO DIFF WBC: CPT

## 2025-08-24 PROCEDURE — 83605 ASSAY OF LACTIC ACID: CPT

## 2025-08-24 PROCEDURE — 87449 NOS EACH ORGANISM AG IA: CPT

## 2025-08-24 PROCEDURE — 87150 DNA/RNA AMPLIFIED PROBE: CPT

## 2025-08-24 PROCEDURE — 85610 PROTHROMBIN TIME: CPT

## 2025-08-24 PROCEDURE — 71046 X-RAY EXAM CHEST 2 VIEWS: CPT

## 2025-08-24 PROCEDURE — 84134 ASSAY OF PREALBUMIN: CPT

## 2025-08-24 PROCEDURE — 81001 URINALYSIS AUTO W/SCOPE: CPT

## 2025-08-24 PROCEDURE — 86140 C-REACTIVE PROTEIN: CPT

## 2025-08-24 RX ORDER — INSULIN LISPRO 100 [IU]/ML
0-8 INJECTION, SOLUTION INTRAVENOUS; SUBCUTANEOUS
Status: DISCONTINUED | OUTPATIENT
Start: 2025-08-24 | End: 2025-08-30

## 2025-08-24 RX ORDER — POLYETHYLENE GLYCOL 3350 17 G/17G
17 POWDER, FOR SOLUTION ORAL DAILY PRN
Status: DISCONTINUED | OUTPATIENT
Start: 2025-08-24 | End: 2025-09-04 | Stop reason: HOSPADM

## 2025-08-24 RX ORDER — POTASSIUM CHLORIDE 7.45 MG/ML
10 INJECTION INTRAVENOUS PRN
Status: DISCONTINUED | OUTPATIENT
Start: 2025-08-24 | End: 2025-09-04 | Stop reason: HOSPADM

## 2025-08-24 RX ORDER — SODIUM CHLORIDE 9 MG/ML
INJECTION, SOLUTION INTRAVENOUS PRN
Status: DISCONTINUED | OUTPATIENT
Start: 2025-08-24 | End: 2025-08-25

## 2025-08-24 RX ORDER — AMLODIPINE BESYLATE 5 MG/1
5 TABLET ORAL DAILY
Status: DISCONTINUED | OUTPATIENT
Start: 2025-08-25 | End: 2025-09-04 | Stop reason: HOSPADM

## 2025-08-24 RX ORDER — SODIUM CHLORIDE 0.9 % (FLUSH) 0.9 %
5-40 SYRINGE (ML) INJECTION PRN
Status: DISCONTINUED | OUTPATIENT
Start: 2025-08-24 | End: 2025-08-25

## 2025-08-24 RX ORDER — ROSUVASTATIN CALCIUM 10 MG/1
10 TABLET, COATED ORAL DAILY
Status: DISCONTINUED | OUTPATIENT
Start: 2025-08-25 | End: 2025-09-04 | Stop reason: HOSPADM

## 2025-08-24 RX ORDER — DEXTROSE MONOHYDRATE 100 MG/ML
INJECTION, SOLUTION INTRAVENOUS CONTINUOUS PRN
Status: DISCONTINUED | OUTPATIENT
Start: 2025-08-24 | End: 2025-08-26 | Stop reason: SDUPTHER

## 2025-08-24 RX ORDER — SODIUM CHLORIDE 9 MG/ML
INJECTION, SOLUTION INTRAVENOUS CONTINUOUS
Status: ACTIVE | OUTPATIENT
Start: 2025-08-24 | End: 2025-08-25

## 2025-08-24 RX ORDER — HYDROMORPHONE HYDROCHLORIDE 1 MG/ML
1 INJECTION, SOLUTION INTRAMUSCULAR; INTRAVENOUS; SUBCUTANEOUS ONCE
Refills: 0 | Status: COMPLETED | OUTPATIENT
Start: 2025-08-24 | End: 2025-08-24

## 2025-08-24 RX ORDER — ALBUTEROL SULFATE 0.83 MG/ML
2.5 SOLUTION RESPIRATORY (INHALATION) 2 TIMES DAILY PRN
Status: DISCONTINUED | OUTPATIENT
Start: 2025-08-24 | End: 2025-09-04 | Stop reason: HOSPADM

## 2025-08-24 RX ORDER — ACETAMINOPHEN 500 MG
1000 TABLET ORAL ONCE
Status: COMPLETED | OUTPATIENT
Start: 2025-08-24 | End: 2025-08-24

## 2025-08-24 RX ORDER — ACETAMINOPHEN 650 MG/1
650 SUPPOSITORY RECTAL EVERY 6 HOURS PRN
Status: DISCONTINUED | OUTPATIENT
Start: 2025-08-24 | End: 2025-09-04 | Stop reason: HOSPADM

## 2025-08-24 RX ORDER — SCOPOLAMINE 1 MG/3D
1 PATCH, EXTENDED RELEASE TRANSDERMAL
Status: DISCONTINUED | OUTPATIENT
Start: 2025-08-24 | End: 2025-09-04 | Stop reason: HOSPADM

## 2025-08-24 RX ORDER — ONDANSETRON 2 MG/ML
4 INJECTION INTRAMUSCULAR; INTRAVENOUS ONCE
Status: COMPLETED | OUTPATIENT
Start: 2025-08-24 | End: 2025-08-24

## 2025-08-24 RX ORDER — SODIUM CHLORIDE 0.9 % (FLUSH) 0.9 %
5-40 SYRINGE (ML) INJECTION EVERY 12 HOURS SCHEDULED
Status: DISCONTINUED | OUTPATIENT
Start: 2025-08-24 | End: 2025-08-25

## 2025-08-24 RX ORDER — GADOTERIDOL 279.3 MG/ML
20 INJECTION INTRAVENOUS
Status: COMPLETED | OUTPATIENT
Start: 2025-08-24 | End: 2025-08-24

## 2025-08-24 RX ORDER — PANTOPRAZOLE SODIUM 40 MG/1
40 TABLET, DELAYED RELEASE ORAL DAILY
Status: DISCONTINUED | OUTPATIENT
Start: 2025-08-25 | End: 2025-09-04 | Stop reason: HOSPADM

## 2025-08-24 RX ORDER — POTASSIUM CHLORIDE 1500 MG/1
40 TABLET, EXTENDED RELEASE ORAL PRN
Status: DISCONTINUED | OUTPATIENT
Start: 2025-08-24 | End: 2025-09-04 | Stop reason: HOSPADM

## 2025-08-24 RX ORDER — ACETAMINOPHEN 325 MG/1
650 TABLET ORAL EVERY 6 HOURS PRN
Status: DISCONTINUED | OUTPATIENT
Start: 2025-08-24 | End: 2025-09-04 | Stop reason: HOSPADM

## 2025-08-24 RX ORDER — MAGNESIUM SULFATE IN WATER 40 MG/ML
2000 INJECTION, SOLUTION INTRAVENOUS PRN
Status: DISCONTINUED | OUTPATIENT
Start: 2025-08-24 | End: 2025-09-04 | Stop reason: HOSPADM

## 2025-08-24 RX ORDER — SODIUM CHLORIDE, SODIUM LACTATE, POTASSIUM CHLORIDE, AND CALCIUM CHLORIDE .6; .31; .03; .02 G/100ML; G/100ML; G/100ML; G/100ML
2000 INJECTION, SOLUTION INTRAVENOUS ONCE
Status: COMPLETED | OUTPATIENT
Start: 2025-08-24 | End: 2025-08-24

## 2025-08-24 RX ORDER — CARVEDILOL 6.25 MG/1
6.25 TABLET ORAL 2 TIMES DAILY
Status: DISCONTINUED | OUTPATIENT
Start: 2025-08-24 | End: 2025-08-25

## 2025-08-24 RX ORDER — AMIODARONE HYDROCHLORIDE 200 MG/1
200 TABLET ORAL DAILY
Status: DISCONTINUED | OUTPATIENT
Start: 2025-08-25 | End: 2025-08-25

## 2025-08-24 RX ORDER — GLUCAGON 1 MG/ML
1 KIT INJECTION PRN
Status: DISCONTINUED | OUTPATIENT
Start: 2025-08-24 | End: 2025-09-04 | Stop reason: HOSPADM

## 2025-08-24 RX ADMIN — VANCOMYCIN HYDROCHLORIDE 2500 MG: 10 INJECTION, POWDER, LYOPHILIZED, FOR SOLUTION INTRAVENOUS at 16:41

## 2025-08-24 RX ADMIN — ONDANSETRON 4 MG: 2 INJECTION, SOLUTION INTRAMUSCULAR; INTRAVENOUS at 16:02

## 2025-08-24 RX ADMIN — PIPERACILLIN AND TAZOBACTAM 3375 MG: 3; .375 INJECTION, POWDER, LYOPHILIZED, FOR SOLUTION INTRAVENOUS at 22:21

## 2025-08-24 RX ADMIN — SODIUM CHLORIDE, PRESERVATIVE FREE 10 ML: 5 INJECTION INTRAVENOUS at 21:27

## 2025-08-24 RX ADMIN — INSULIN LISPRO 8 UNITS: 100 INJECTION, SOLUTION INTRAVENOUS; SUBCUTANEOUS at 22:32

## 2025-08-24 RX ADMIN — HYDROMORPHONE HYDROCHLORIDE 1 MG: 1 INJECTION, SOLUTION INTRAMUSCULAR; INTRAVENOUS; SUBCUTANEOUS at 16:03

## 2025-08-24 RX ADMIN — GADOTERIDOL 20 ML: 279.3 INJECTION, SOLUTION INTRAVENOUS at 18:58

## 2025-08-24 RX ADMIN — SODIUM CHLORIDE: 0.9 INJECTION, SOLUTION INTRAVENOUS at 22:22

## 2025-08-24 RX ADMIN — ACETAMINOPHEN 1000 MG: 500 TABLET ORAL at 16:05

## 2025-08-24 RX ADMIN — SODIUM CHLORIDE, SODIUM LACTATE, POTASSIUM CHLORIDE, AND CALCIUM CHLORIDE 2000 ML: .6; .31; .03; .02 INJECTION, SOLUTION INTRAVENOUS at 16:38

## 2025-08-24 RX ADMIN — PIPERACILLIN AND TAZOBACTAM 4500 MG: 4; .5 INJECTION, POWDER, LYOPHILIZED, FOR SOLUTION INTRAVENOUS at 16:03

## 2025-08-24 ASSESSMENT — PAIN SCALES - GENERAL
PAINLEVEL_OUTOF10: 7
PAINLEVEL_OUTOF10: 1
PAINLEVEL_OUTOF10: 3
PAINLEVEL_OUTOF10: 0
PAINLEVEL_OUTOF10: 0

## 2025-08-24 ASSESSMENT — PAIN DESCRIPTION - DESCRIPTORS
DESCRIPTORS: NUMBNESS;ACHING
DESCRIPTORS: OTHER (COMMENT)

## 2025-08-24 ASSESSMENT — PAIN DESCRIPTION - LOCATION
LOCATION: ANKLE
LOCATION: ANKLE
LOCATION: ANKLE;FOOT

## 2025-08-24 ASSESSMENT — LIFESTYLE VARIABLES
HOW OFTEN DO YOU HAVE A DRINK CONTAINING ALCOHOL: NEVER
HOW MANY STANDARD DRINKS CONTAINING ALCOHOL DO YOU HAVE ON A TYPICAL DAY: PATIENT DOES NOT DRINK

## 2025-08-24 ASSESSMENT — PAIN DESCRIPTION - ORIENTATION
ORIENTATION: LEFT
ORIENTATION: LEFT

## 2025-08-24 ASSESSMENT — PAIN DESCRIPTION - ONSET: ONSET: ON-GOING

## 2025-08-24 ASSESSMENT — PAIN - FUNCTIONAL ASSESSMENT
PAIN_FUNCTIONAL_ASSESSMENT: 0-10
PAIN_FUNCTIONAL_ASSESSMENT: 0-10

## 2025-08-24 ASSESSMENT — PAIN DESCRIPTION - FREQUENCY: FREQUENCY: CONTINUOUS

## 2025-08-25 ENCOUNTER — APPOINTMENT (OUTPATIENT)
Dept: VASCULAR LAB | Age: 55
End: 2025-08-25
Payer: MEDICAID

## 2025-08-25 ENCOUNTER — ANESTHESIA EVENT (OUTPATIENT)
Dept: OPERATING ROOM | Age: 55
End: 2025-08-25
Payer: MEDICAID

## 2025-08-25 ENCOUNTER — ANESTHESIA (OUTPATIENT)
Dept: OPERATING ROOM | Age: 55
End: 2025-08-25
Payer: MEDICAID

## 2025-08-25 PROBLEM — R78.81 STREPTOCOCCAL BACTEREMIA: Status: ACTIVE | Noted: 2025-08-25

## 2025-08-25 PROBLEM — S91.302A OPEN WOUND OF LEFT FOOT: Status: ACTIVE | Noted: 2025-08-25

## 2025-08-25 PROBLEM — B95.5 STREPTOCOCCAL BACTEREMIA: Status: ACTIVE | Noted: 2025-08-25

## 2025-08-25 PROBLEM — M86.072 ACUTE HEMATOGENOUS OSTEOMYELITIS OF LEFT FOOT (HCC): Status: ACTIVE | Noted: 2025-08-25

## 2025-08-25 LAB
ALBUMIN SERPL-MCNC: 2.9 G/DL (ref 3.4–5)
ANION GAP SERPL CALCULATED.3IONS-SCNC: 10 MMOL/L (ref 3–16)
BASOPHILS # BLD: 0 K/UL (ref 0–0.2)
BASOPHILS NFR BLD: 0.3 %
BUN SERPL-MCNC: 26 MG/DL (ref 7–20)
C DIFF TOX A+B STL QL IA: NORMAL
CALCIUM SERPL-MCNC: 9.5 MG/DL (ref 8.3–10.6)
CHLORIDE SERPL-SCNC: 103 MMOL/L (ref 99–110)
CO2 SERPL-SCNC: 25 MMOL/L (ref 21–32)
CREAT SERPL-MCNC: 1.9 MG/DL (ref 0.9–1.3)
DEPRECATED RDW RBC AUTO: 13.6 % (ref 12.4–15.4)
ECHO BSA: 2.31 M2
EKG ATRIAL RATE: 119 BPM
EKG DIAGNOSIS: NORMAL
EKG P AXIS: 17 DEGREES
EKG P-R INTERVAL: 124 MS
EKG Q-T INTERVAL: 368 MS
EKG QRS DURATION: 172 MS
EKG QTC CALCULATION (BAZETT): 517 MS
EKG R AXIS: -85 DEGREES
EKG T AXIS: 87 DEGREES
EKG VENTRICULAR RATE: 119 BPM
EOSINOPHIL # BLD: 0 K/UL (ref 0–0.6)
EOSINOPHIL NFR BLD: 0.3 %
GFR SERPLBLD CREATININE-BSD FMLA CKD-EPI: 41 ML/MIN/{1.73_M2}
GLUCOSE BLD-MCNC: 256 MG/DL (ref 70–99)
GLUCOSE BLD-MCNC: 282 MG/DL (ref 70–99)
GLUCOSE BLD-MCNC: 346 MG/DL (ref 70–99)
GLUCOSE BLD-MCNC: 364 MG/DL (ref 70–99)
GLUCOSE SERPL-MCNC: 302 MG/DL (ref 70–99)
HCT VFR BLD AUTO: 25.6 % (ref 40.5–52.5)
HCT VFR BLD AUTO: 26.1 % (ref 40.5–52.5)
HGB BLD-MCNC: 8.7 G/DL (ref 13.5–17.5)
HGB BLD-MCNC: 8.8 G/DL (ref 13.5–17.5)
LYMPHOCYTES # BLD: 0.8 K/UL (ref 1–5.1)
LYMPHOCYTES NFR BLD: 10.4 %
MAGNESIUM SERPL-MCNC: 1.81 MG/DL (ref 1.8–2.4)
MCH RBC QN AUTO: 27.2 PG (ref 26–34)
MCHC RBC AUTO-ENTMCNC: 33.8 G/DL (ref 31–36)
MCV RBC AUTO: 80.5 FL (ref 80–100)
MONOCYTES # BLD: 0.7 K/UL (ref 0–1.3)
MONOCYTES NFR BLD: 9.4 %
NEUTROPHILS # BLD: 6 K/UL (ref 1.7–7.7)
NEUTROPHILS NFR BLD: 79.6 %
PERFORMED ON: ABNORMAL
PHOSPHATE SERPL-MCNC: 3.5 MG/DL (ref 2.5–4.9)
PLATELET # BLD AUTO: 154 K/UL (ref 135–450)
PMV BLD AUTO: 7.8 FL (ref 5–10.5)
POTASSIUM SERPL-SCNC: 4.9 MMOL/L (ref 3.5–5.1)
RBC # BLD AUTO: 3.25 M/UL (ref 4.2–5.9)
REPORT: NORMAL
SODIUM SERPL-SCNC: 138 MMOL/L (ref 136–145)
VAS LEFT ABI: 1.15
VAS LEFT ARM BP: 126 MMHG
VAS LEFT ATA DIST PSV: 78.3 CM/S
VAS LEFT CFA DIST PSV: 56 CM/S
VAS LEFT CFA PROX PSV: 104 CM/S
VAS LEFT DORSALIS PEDIS BP: 142 MMHG
VAS LEFT PERONEAL MID PSV: 85.1 CM/S
VAS LEFT PFA PROX PSV: 79.1 CM/S
VAS LEFT POP A DIST PSV: 256 CM/S
VAS LEFT POP A PROX PSV: 156 CM/S
VAS LEFT POP A PROX VEL RATIO: 1.34
VAS LEFT PTA BP: 152 MMHG
VAS LEFT PTA DIST PSV: 110 CM/S
VAS LEFT PTA MID PSV: 124 CM/S
VAS LEFT SFA DIST PSV: 116 CM/S
VAS LEFT SFA DIST VEL RATIO: 1.28
VAS LEFT SFA MID PSV: 90.9 CM/S
VAS LEFT SFA MID VEL RATIO: 0.95
VAS LEFT SFA PROX PSV: 95.6 CM/S
VAS LEFT SFA PROX VEL RATIO: 0.92
VAS RIGHT ABI: 1.15
VAS RIGHT ARM BP: 132 MMHG
VAS RIGHT ATA DIST PSV: 11.6 CM/S
VAS RIGHT CFA DIST PSV: 73.7 CM/S
VAS RIGHT CFA PROX PSV: 114 CM/S
VAS RIGHT DORSALIS PEDIS BP: 152 MMHG
VAS RIGHT PERONEAL MID PSV: 26.7 CM/S
VAS RIGHT PFA PROX PSV: 110 CM/S
VAS RIGHT POP A DIST PSV: 42.6 CM/S
VAS RIGHT POP A PROX PSV: 44.7 CM/S
VAS RIGHT POP A PROX VEL RATIO: 0.69
VAS RIGHT PTA BP: 140 MMHG
VAS RIGHT PTA DIST PSV: 21.4 CM/S
VAS RIGHT PTA MID PSV: 29.5 CM/S
VAS RIGHT SFA DIST PSV: 64.4 CM/S
VAS RIGHT SFA DIST VEL RATIO: 1.06
VAS RIGHT SFA MID PSV: 60.9 CM/S
VAS RIGHT SFA MID VEL RATIO: 1
VAS RIGHT SFA PROX PSV: 62.9 CM/S
VAS RIGHT SFA PROX VEL RATIO: 0.6
WBC # BLD AUTO: 7.5 K/UL (ref 4–11)

## 2025-08-25 PROCEDURE — 93925 LOWER EXTREMITY STUDY: CPT | Performed by: SURGERY

## 2025-08-25 PROCEDURE — 6370000000 HC RX 637 (ALT 250 FOR IP): Performed by: INTERNAL MEDICINE

## 2025-08-25 PROCEDURE — 87205 SMEAR GRAM STAIN: CPT

## 2025-08-25 PROCEDURE — 85025 COMPLETE CBC W/AUTO DIFF WBC: CPT

## 2025-08-25 PROCEDURE — 80069 RENAL FUNCTION PANEL: CPT

## 2025-08-25 PROCEDURE — 85014 HEMATOCRIT: CPT

## 2025-08-25 PROCEDURE — 6360000002 HC RX W HCPCS

## 2025-08-25 PROCEDURE — 6370000000 HC RX 637 (ALT 250 FOR IP)

## 2025-08-25 PROCEDURE — 3600000012 HC SURGERY LEVEL 2 ADDTL 15MIN: Performed by: PODIATRIST

## 2025-08-25 PROCEDURE — 3700000001 HC ADD 15 MINUTES (ANESTHESIA): Performed by: PODIATRIST

## 2025-08-25 PROCEDURE — 87070 CULTURE OTHR SPECIMN AEROBIC: CPT

## 2025-08-25 PROCEDURE — 2580000003 HC RX 258

## 2025-08-25 PROCEDURE — 6370000000 HC RX 637 (ALT 250 FOR IP): Performed by: STUDENT IN AN ORGANIZED HEALTH CARE EDUCATION/TRAINING PROGRAM

## 2025-08-25 PROCEDURE — 36415 COLL VENOUS BLD VENIPUNCTURE: CPT

## 2025-08-25 PROCEDURE — 85018 HEMOGLOBIN: CPT

## 2025-08-25 PROCEDURE — 87186 SC STD MICRODIL/AGAR DIL: CPT

## 2025-08-25 PROCEDURE — 3700000000 HC ANESTHESIA ATTENDED CARE: Performed by: PODIATRIST

## 2025-08-25 PROCEDURE — 6360000002 HC RX W HCPCS: Performed by: PODIATRIST

## 2025-08-25 PROCEDURE — 83735 ASSAY OF MAGNESIUM: CPT

## 2025-08-25 PROCEDURE — 99223 1ST HOSP IP/OBS HIGH 75: CPT | Performed by: INTERNAL MEDICINE

## 2025-08-25 PROCEDURE — 7100000000 HC PACU RECOVERY - FIRST 15 MIN: Performed by: PODIATRIST

## 2025-08-25 PROCEDURE — 6370000000 HC RX 637 (ALT 250 FOR IP): Performed by: ANESTHESIOLOGY

## 2025-08-25 PROCEDURE — 87077 CULTURE AEROBIC IDENTIFY: CPT

## 2025-08-25 PROCEDURE — 2580000003 HC RX 258: Performed by: STUDENT IN AN ORGANIZED HEALTH CARE EDUCATION/TRAINING PROGRAM

## 2025-08-25 PROCEDURE — 87075 CULTR BACTERIA EXCEPT BLOOD: CPT

## 2025-08-25 PROCEDURE — 88311 DECALCIFY TISSUE: CPT

## 2025-08-25 PROCEDURE — 2500000003 HC RX 250 WO HCPCS: Performed by: PODIATRIST

## 2025-08-25 PROCEDURE — 1200000000 HC SEMI PRIVATE

## 2025-08-25 PROCEDURE — 88307 TISSUE EXAM BY PATHOLOGIST: CPT

## 2025-08-25 PROCEDURE — 3600000002 HC SURGERY LEVEL 2 BASE: Performed by: PODIATRIST

## 2025-08-25 PROCEDURE — 6360000002 HC RX W HCPCS: Performed by: STUDENT IN AN ORGANIZED HEALTH CARE EDUCATION/TRAINING PROGRAM

## 2025-08-25 PROCEDURE — 93925 LOWER EXTREMITY STUDY: CPT

## 2025-08-25 PROCEDURE — 2709999900 HC NON-CHARGEABLE SUPPLY: Performed by: PODIATRIST

## 2025-08-25 PROCEDURE — 7100000001 HC PACU RECOVERY - ADDTL 15 MIN: Performed by: PODIATRIST

## 2025-08-25 PROCEDURE — 87102 FUNGUS ISOLATION CULTURE: CPT

## 2025-08-25 RX ORDER — LIDOCAINE HYDROCHLORIDE 20 MG/ML
INJECTION, SOLUTION INFILTRATION; PERINEURAL PRN
Status: DISCONTINUED | OUTPATIENT
Start: 2025-08-25 | End: 2025-08-25 | Stop reason: ALTCHOICE

## 2025-08-25 RX ORDER — LABETALOL HYDROCHLORIDE 5 MG/ML
10 INJECTION, SOLUTION INTRAVENOUS
Status: DISCONTINUED | OUTPATIENT
Start: 2025-08-25 | End: 2025-08-25 | Stop reason: HOSPADM

## 2025-08-25 RX ORDER — METOPROLOL SUCCINATE 50 MG/1
150 TABLET, EXTENDED RELEASE ORAL DAILY
COMMUNITY
Start: 2025-08-01

## 2025-08-25 RX ORDER — LINEZOLID 600 MG/1
600 TABLET, FILM COATED ORAL EVERY 12 HOURS SCHEDULED
Status: DISCONTINUED | OUTPATIENT
Start: 2025-08-25 | End: 2025-08-27

## 2025-08-25 RX ORDER — SODIUM CHLORIDE, SODIUM LACTATE, POTASSIUM CHLORIDE, CALCIUM CHLORIDE 600; 310; 30; 20 MG/100ML; MG/100ML; MG/100ML; MG/100ML
INJECTION, SOLUTION INTRAVENOUS CONTINUOUS
Status: DISCONTINUED | OUTPATIENT
Start: 2025-08-25 | End: 2025-08-25 | Stop reason: HOSPADM

## 2025-08-25 RX ORDER — SODIUM CHLORIDE 0.9 % (FLUSH) 0.9 %
5-40 SYRINGE (ML) INJECTION PRN
Status: DISCONTINUED | OUTPATIENT
Start: 2025-08-25 | End: 2025-08-25 | Stop reason: HOSPADM

## 2025-08-25 RX ORDER — FENTANYL CITRATE 50 UG/ML
25 INJECTION, SOLUTION INTRAMUSCULAR; INTRAVENOUS EVERY 5 MIN PRN
Status: DISCONTINUED | OUTPATIENT
Start: 2025-08-25 | End: 2025-08-25 | Stop reason: HOSPADM

## 2025-08-25 RX ORDER — SODIUM CHLORIDE 9 MG/ML
INJECTION, SOLUTION INTRAVENOUS PRN
Status: DISCONTINUED | OUTPATIENT
Start: 2025-08-25 | End: 2025-08-25 | Stop reason: HOSPADM

## 2025-08-25 RX ORDER — HYDROMORPHONE HYDROCHLORIDE 1 MG/ML
0.5 INJECTION, SOLUTION INTRAMUSCULAR; INTRAVENOUS; SUBCUTANEOUS EVERY 5 MIN PRN
Status: DISCONTINUED | OUTPATIENT
Start: 2025-08-25 | End: 2025-08-25 | Stop reason: HOSPADM

## 2025-08-25 RX ORDER — MIDAZOLAM HYDROCHLORIDE 1 MG/ML
INJECTION, SOLUTION INTRAMUSCULAR; INTRAVENOUS
Status: DISCONTINUED | OUTPATIENT
Start: 2025-08-25 | End: 2025-08-25 | Stop reason: SDUPTHER

## 2025-08-25 RX ORDER — DIPHENHYDRAMINE HYDROCHLORIDE 50 MG/ML
12.5 INJECTION, SOLUTION INTRAMUSCULAR; INTRAVENOUS
Status: DISCONTINUED | OUTPATIENT
Start: 2025-08-25 | End: 2025-08-25 | Stop reason: HOSPADM

## 2025-08-25 RX ORDER — SODIUM CHLORIDE 0.9 % (FLUSH) 0.9 %
5-40 SYRINGE (ML) INJECTION EVERY 12 HOURS SCHEDULED
Status: DISCONTINUED | OUTPATIENT
Start: 2025-08-25 | End: 2025-08-25 | Stop reason: HOSPADM

## 2025-08-25 RX ORDER — HYDROCHLOROTHIAZIDE 12.5 MG/1
CAPSULE ORAL
Qty: 1 EACH | Refills: 0 | Status: SHIPPED | OUTPATIENT
Start: 2025-08-25

## 2025-08-25 RX ORDER — PROCHLORPERAZINE EDISYLATE 5 MG/ML
5 INJECTION INTRAMUSCULAR; INTRAVENOUS
Status: DISCONTINUED | OUTPATIENT
Start: 2025-08-25 | End: 2025-08-25 | Stop reason: HOSPADM

## 2025-08-25 RX ORDER — HALOPERIDOL 5 MG/ML
1 INJECTION INTRAMUSCULAR
Status: DISCONTINUED | OUTPATIENT
Start: 2025-08-25 | End: 2025-08-25 | Stop reason: HOSPADM

## 2025-08-25 RX ORDER — FENTANYL CITRATE 50 UG/ML
INJECTION, SOLUTION INTRAMUSCULAR; INTRAVENOUS
Status: DISCONTINUED | OUTPATIENT
Start: 2025-08-25 | End: 2025-08-25 | Stop reason: SDUPTHER

## 2025-08-25 RX ORDER — SODIUM CHLORIDE, SODIUM LACTATE, POTASSIUM CHLORIDE, CALCIUM CHLORIDE 600; 310; 30; 20 MG/100ML; MG/100ML; MG/100ML; MG/100ML
INJECTION, SOLUTION INTRAVENOUS
Status: DISCONTINUED | OUTPATIENT
Start: 2025-08-25 | End: 2025-08-25 | Stop reason: SDUPTHER

## 2025-08-25 RX ORDER — BUPIVACAINE HYDROCHLORIDE 5 MG/ML
INJECTION, SOLUTION EPIDURAL; INTRACAUDAL; PERINEURAL PRN
Status: DISCONTINUED | OUTPATIENT
Start: 2025-08-25 | End: 2025-08-25 | Stop reason: ALTCHOICE

## 2025-08-25 RX ORDER — IPRATROPIUM BROMIDE AND ALBUTEROL SULFATE 2.5; .5 MG/3ML; MG/3ML
1 SOLUTION RESPIRATORY (INHALATION)
Status: DISCONTINUED | OUTPATIENT
Start: 2025-08-25 | End: 2025-08-25 | Stop reason: HOSPADM

## 2025-08-25 RX ORDER — PROPOFOL 10 MG/ML
INJECTION, EMULSION INTRAVENOUS
Status: DISCONTINUED | OUTPATIENT
Start: 2025-08-25 | End: 2025-08-25 | Stop reason: SDUPTHER

## 2025-08-25 RX ORDER — OXYCODONE HYDROCHLORIDE 5 MG/1
5 TABLET ORAL
Status: DISCONTINUED | OUTPATIENT
Start: 2025-08-25 | End: 2025-08-25 | Stop reason: HOSPADM

## 2025-08-25 RX ORDER — ONDANSETRON 2 MG/ML
INJECTION INTRAMUSCULAR; INTRAVENOUS
Status: DISCONTINUED | OUTPATIENT
Start: 2025-08-25 | End: 2025-08-25 | Stop reason: SDUPTHER

## 2025-08-25 RX ORDER — POTASSIUM CHLORIDE 1.5 G/1.58G
20 POWDER, FOR SOLUTION ORAL DAILY
COMMUNITY

## 2025-08-25 RX ADMIN — AMIODARONE HYDROCHLORIDE 200 MG: 200 TABLET ORAL at 10:41

## 2025-08-25 RX ADMIN — FENTANYL CITRATE 25 MCG: 50 INJECTION INTRAMUSCULAR; INTRAVENOUS at 08:54

## 2025-08-25 RX ADMIN — FENTANYL CITRATE 25 MCG: 50 INJECTION INTRAMUSCULAR; INTRAVENOUS at 09:26

## 2025-08-25 RX ADMIN — FENTANYL CITRATE 25 MCG: 50 INJECTION INTRAMUSCULAR; INTRAVENOUS at 09:34

## 2025-08-25 RX ADMIN — AMLODIPINE BESYLATE 5 MG: 5 TABLET ORAL at 10:41

## 2025-08-25 RX ADMIN — SODIUM CHLORIDE, SODIUM LACTATE, POTASSIUM CHLORIDE, AND CALCIUM CHLORIDE: .6; .31; .03; .02 INJECTION, SOLUTION INTRAVENOUS at 08:49

## 2025-08-25 RX ADMIN — INSULIN HUMAN 8 UNITS: 100 INJECTION, SOLUTION PARENTERAL at 08:46

## 2025-08-25 RX ADMIN — ONDANSETRON 4 MG: 2 INJECTION, SOLUTION INTRAMUSCULAR; INTRAVENOUS at 08:51

## 2025-08-25 RX ADMIN — PIPERACILLIN AND TAZOBACTAM 3375 MG: 3; .375 INJECTION, POWDER, LYOPHILIZED, FOR SOLUTION INTRAVENOUS at 06:44

## 2025-08-25 RX ADMIN — INSULIN LISPRO 8 UNITS: 100 INJECTION, SOLUTION INTRAVENOUS; SUBCUTANEOUS at 17:52

## 2025-08-25 RX ADMIN — INSULIN LISPRO 4 UNITS: 100 INJECTION, SOLUTION INTRAVENOUS; SUBCUTANEOUS at 11:53

## 2025-08-25 RX ADMIN — INSULIN LISPRO 6 UNITS: 100 INJECTION, SOLUTION INTRAVENOUS; SUBCUTANEOUS at 20:35

## 2025-08-25 RX ADMIN — PIPERACILLIN AND TAZOBACTAM 3375 MG: 3; .375 INJECTION, POWDER, LYOPHILIZED, FOR SOLUTION INTRAVENOUS at 23:20

## 2025-08-25 RX ADMIN — ROSUVASTATIN CALCIUM 10 MG: 10 TABLET, FILM COATED ORAL at 10:41

## 2025-08-25 RX ADMIN — PANTOPRAZOLE SODIUM 40 MG: 40 TABLET, DELAYED RELEASE ORAL at 10:41

## 2025-08-25 RX ADMIN — INSULIN LISPRO 8 UNITS: 100 INJECTION, SOLUTION INTRAVENOUS; SUBCUTANEOUS at 08:44

## 2025-08-25 RX ADMIN — PIPERACILLIN AND TAZOBACTAM 3375 MG: 3; .375 INJECTION, POWDER, LYOPHILIZED, FOR SOLUTION INTRAVENOUS at 14:09

## 2025-08-25 RX ADMIN — FENTANYL CITRATE 25 MCG: 50 INJECTION INTRAMUSCULAR; INTRAVENOUS at 09:20

## 2025-08-25 RX ADMIN — LINEZOLID 600 MG: 600 TABLET, FILM COATED ORAL at 18:51

## 2025-08-25 RX ADMIN — PROPOFOL 80 MCG/KG/MIN: 10 INJECTION, EMULSION INTRAVENOUS at 08:54

## 2025-08-25 RX ADMIN — MIDAZOLAM HYDROCHLORIDE 2 MG: 1 INJECTION, SOLUTION INTRAMUSCULAR; INTRAVENOUS at 08:48

## 2025-08-25 ASSESSMENT — PAIN SCALES - GENERAL
PAINLEVEL_OUTOF10: 0

## 2025-08-25 ASSESSMENT — PAIN - FUNCTIONAL ASSESSMENT: PAIN_FUNCTIONAL_ASSESSMENT: 0-10

## 2025-08-25 ASSESSMENT — ENCOUNTER SYMPTOMS: SHORTNESS OF BREATH: 1

## 2025-08-26 ENCOUNTER — APPOINTMENT (OUTPATIENT)
Age: 55
End: 2025-08-26
Attending: INTERNAL MEDICINE
Payer: MEDICAID

## 2025-08-26 LAB
ALBUMIN SERPL-MCNC: 3 G/DL (ref 3.4–5)
ANION GAP SERPL CALCULATED.3IONS-SCNC: 10 MMOL/L (ref 3–16)
BASOPHILS # BLD: 0 K/UL (ref 0–0.2)
BASOPHILS NFR BLD: 0.5 %
BUN SERPL-MCNC: 26 MG/DL (ref 7–20)
CALCIUM SERPL-MCNC: 9 MG/DL (ref 8.3–10.6)
CHLORIDE SERPL-SCNC: 102 MMOL/L (ref 99–110)
CO2 SERPL-SCNC: 23 MMOL/L (ref 21–32)
CREAT SERPL-MCNC: 1.7 MG/DL (ref 0.9–1.3)
CRP SERPL-MCNC: 191 MG/L (ref 0–5.1)
DEPRECATED RDW RBC AUTO: 14 % (ref 12.4–15.4)
EOSINOPHIL # BLD: 0.1 K/UL (ref 0–0.6)
EOSINOPHIL NFR BLD: 2.1 %
ERYTHROCYTE [SEDIMENTATION RATE] IN BLOOD BY WESTERGREN METHOD: 38 MM/HR (ref 0–20)
GFR SERPLBLD CREATININE-BSD FMLA CKD-EPI: 47 ML/MIN/{1.73_M2}
GLUCOSE BLD-MCNC: 263 MG/DL (ref 70–99)
GLUCOSE BLD-MCNC: 298 MG/DL (ref 70–99)
GLUCOSE BLD-MCNC: 316 MG/DL (ref 70–99)
GLUCOSE BLD-MCNC: 358 MG/DL (ref 70–99)
GLUCOSE SERPL-MCNC: 319 MG/DL (ref 70–99)
HCT VFR BLD AUTO: 23.8 % (ref 40.5–52.5)
HGB BLD-MCNC: 8.1 G/DL (ref 13.5–17.5)
LYMPHOCYTES # BLD: 0.8 K/UL (ref 1–5.1)
LYMPHOCYTES NFR BLD: 16.2 %
MAGNESIUM SERPL-MCNC: 1.77 MG/DL (ref 1.8–2.4)
MCH RBC QN AUTO: 27.4 PG (ref 26–34)
MCHC RBC AUTO-ENTMCNC: 34.1 G/DL (ref 31–36)
MCV RBC AUTO: 80.2 FL (ref 80–100)
MONOCYTES # BLD: 0.4 K/UL (ref 0–1.3)
MONOCYTES NFR BLD: 8.7 %
NEUTROPHILS # BLD: 3.5 K/UL (ref 1.7–7.7)
NEUTROPHILS NFR BLD: 72.5 %
PERFORMED ON: ABNORMAL
PHOSPHATE SERPL-MCNC: 2.1 MG/DL (ref 2.5–4.9)
PLATELET # BLD AUTO: 158 K/UL (ref 135–450)
PMV BLD AUTO: 7.8 FL (ref 5–10.5)
POTASSIUM SERPL-SCNC: 4.6 MMOL/L (ref 3.5–5.1)
RBC # BLD AUTO: 2.97 M/UL (ref 4.2–5.9)
SODIUM SERPL-SCNC: 135 MMOL/L (ref 136–145)
WBC # BLD AUTO: 4.9 K/UL (ref 4–11)

## 2025-08-26 PROCEDURE — 2580000003 HC RX 258

## 2025-08-26 PROCEDURE — 97530 THERAPEUTIC ACTIVITIES: CPT

## 2025-08-26 PROCEDURE — 6360000002 HC RX W HCPCS

## 2025-08-26 PROCEDURE — 97116 GAIT TRAINING THERAPY: CPT

## 2025-08-26 PROCEDURE — 6370000000 HC RX 637 (ALT 250 FOR IP)

## 2025-08-26 PROCEDURE — 93005 ELECTROCARDIOGRAM TRACING: CPT | Performed by: NURSE PRACTITIONER

## 2025-08-26 PROCEDURE — 2500000003 HC RX 250 WO HCPCS

## 2025-08-26 PROCEDURE — 99232 SBSQ HOSP IP/OBS MODERATE 35: CPT | Performed by: INTERNAL MEDICINE

## 2025-08-26 PROCEDURE — 6370000000 HC RX 637 (ALT 250 FOR IP): Performed by: INTERNAL MEDICINE

## 2025-08-26 PROCEDURE — 97161 PT EVAL LOW COMPLEX 20 MIN: CPT

## 2025-08-26 PROCEDURE — 86140 C-REACTIVE PROTEIN: CPT

## 2025-08-26 PROCEDURE — 87040 BLOOD CULTURE FOR BACTERIA: CPT

## 2025-08-26 PROCEDURE — 1200000000 HC SEMI PRIVATE

## 2025-08-26 PROCEDURE — 83735 ASSAY OF MAGNESIUM: CPT

## 2025-08-26 PROCEDURE — 85652 RBC SED RATE AUTOMATED: CPT

## 2025-08-26 PROCEDURE — 80069 RENAL FUNCTION PANEL: CPT

## 2025-08-26 PROCEDURE — 36415 COLL VENOUS BLD VENIPUNCTURE: CPT

## 2025-08-26 PROCEDURE — 97535 SELF CARE MNGMENT TRAINING: CPT

## 2025-08-26 PROCEDURE — 97166 OT EVAL MOD COMPLEX 45 MIN: CPT

## 2025-08-26 PROCEDURE — 85025 COMPLETE CBC W/AUTO DIFF WBC: CPT

## 2025-08-26 RX ORDER — INSULIN LISPRO 100 [IU]/ML
5 INJECTION, SOLUTION INTRAVENOUS; SUBCUTANEOUS
Status: DISCONTINUED | OUTPATIENT
Start: 2025-08-26 | End: 2025-08-27

## 2025-08-26 RX ORDER — INSULIN GLARGINE 100 [IU]/ML
10 INJECTION, SOLUTION SUBCUTANEOUS 2 TIMES DAILY
Status: DISCONTINUED | OUTPATIENT
Start: 2025-08-26 | End: 2025-08-31

## 2025-08-26 RX ORDER — METOPROLOL SUCCINATE 50 MG/1
50 TABLET, EXTENDED RELEASE ORAL 3 TIMES DAILY
Status: DISCONTINUED | OUTPATIENT
Start: 2025-08-26 | End: 2025-08-26

## 2025-08-26 RX ORDER — HEPARIN SODIUM 5000 [USP'U]/ML
5000 INJECTION, SOLUTION INTRAVENOUS; SUBCUTANEOUS EVERY 8 HOURS SCHEDULED
Status: DISCONTINUED | OUTPATIENT
Start: 2025-08-26 | End: 2025-09-01

## 2025-08-26 RX ORDER — MAGNESIUM SULFATE IN WATER 40 MG/ML
2000 INJECTION, SOLUTION INTRAVENOUS ONCE
Status: DISCONTINUED | OUTPATIENT
Start: 2025-08-26 | End: 2025-08-26

## 2025-08-26 RX ORDER — LANOLIN ALCOHOL/MO/W.PET/CERES
200 CREAM (GRAM) TOPICAL ONCE
Status: COMPLETED | OUTPATIENT
Start: 2025-08-26 | End: 2025-08-26

## 2025-08-26 RX ORDER — DEXTROSE MONOHYDRATE 100 MG/ML
INJECTION, SOLUTION INTRAVENOUS CONTINUOUS PRN
Status: DISCONTINUED | OUTPATIENT
Start: 2025-08-26 | End: 2025-09-04 | Stop reason: HOSPADM

## 2025-08-26 RX ADMIN — LINEZOLID 600 MG: 600 TABLET, FILM COATED ORAL at 09:32

## 2025-08-26 RX ADMIN — PANTOPRAZOLE SODIUM 40 MG: 40 TABLET, DELAYED RELEASE ORAL at 09:32

## 2025-08-26 RX ADMIN — INSULIN LISPRO 4 UNITS: 100 INJECTION, SOLUTION INTRAVENOUS; SUBCUTANEOUS at 09:16

## 2025-08-26 RX ADMIN — PIPERACILLIN AND TAZOBACTAM 3375 MG: 3; .375 INJECTION, POWDER, LYOPHILIZED, FOR SOLUTION INTRAVENOUS at 21:47

## 2025-08-26 RX ADMIN — Medication 200 MG: at 14:17

## 2025-08-26 RX ADMIN — INSULIN LISPRO 5 UNITS: 100 INJECTION, SOLUTION INTRAVENOUS; SUBCUTANEOUS at 17:55

## 2025-08-26 RX ADMIN — INSULIN LISPRO 5 UNITS: 100 INJECTION, SOLUTION INTRAVENOUS; SUBCUTANEOUS at 13:26

## 2025-08-26 RX ADMIN — INSULIN GLARGINE 10 UNITS: 100 INJECTION, SOLUTION SUBCUTANEOUS at 21:40

## 2025-08-26 RX ADMIN — METOPROLOL SUCCINATE 50 MG: 50 TABLET, EXTENDED RELEASE ORAL at 10:54

## 2025-08-26 RX ADMIN — LINEZOLID 600 MG: 600 TABLET, FILM COATED ORAL at 21:45

## 2025-08-26 RX ADMIN — HEPARIN SODIUM 5000 UNITS: 5000 INJECTION, SOLUTION INTRAVENOUS; SUBCUTANEOUS at 15:47

## 2025-08-26 RX ADMIN — INSULIN LISPRO 4 UNITS: 100 INJECTION, SOLUTION INTRAVENOUS; SUBCUTANEOUS at 21:40

## 2025-08-26 RX ADMIN — ROSUVASTATIN CALCIUM 10 MG: 10 TABLET, FILM COATED ORAL at 09:32

## 2025-08-26 RX ADMIN — INSULIN GLARGINE 10 UNITS: 100 INJECTION, SOLUTION SUBCUTANEOUS at 14:28

## 2025-08-26 RX ADMIN — INSULIN LISPRO 8 UNITS: 100 INJECTION, SOLUTION INTRAVENOUS; SUBCUTANEOUS at 17:55

## 2025-08-26 RX ADMIN — INSULIN LISPRO 8 UNITS: 100 INJECTION, SOLUTION INTRAVENOUS; SUBCUTANEOUS at 13:26

## 2025-08-26 RX ADMIN — HEPARIN SODIUM 5000 UNITS: 5000 INJECTION, SOLUTION INTRAVENOUS; SUBCUTANEOUS at 21:41

## 2025-08-26 RX ADMIN — PIPERACILLIN AND TAZOBACTAM 3375 MG: 3; .375 INJECTION, POWDER, LYOPHILIZED, FOR SOLUTION INTRAVENOUS at 06:34

## 2025-08-26 RX ADMIN — PIPERACILLIN AND TAZOBACTAM 3375 MG: 3; .375 INJECTION, POWDER, LYOPHILIZED, FOR SOLUTION INTRAVENOUS at 15:42

## 2025-08-26 RX ADMIN — AMLODIPINE BESYLATE 5 MG: 5 TABLET ORAL at 09:32

## 2025-08-26 RX ADMIN — SODIUM PHOSPHATE, MONOBASIC, MONOHYDRATE AND SODIUM PHOSPHATE, DIBASIC, ANHYDROUS 15 MMOL: 142; 276 INJECTION, SOLUTION INTRAVENOUS at 14:21

## 2025-08-27 ENCOUNTER — ANESTHESIA EVENT (OUTPATIENT)
Dept: OPERATING ROOM | Age: 55
End: 2025-08-27
Payer: MEDICAID

## 2025-08-27 LAB
ALBUMIN SERPL-MCNC: 2.8 G/DL (ref 3.4–5)
ANION GAP SERPL CALCULATED.3IONS-SCNC: 9 MMOL/L (ref 3–16)
BACTERIA BLD CULT ORG #2: ABNORMAL
BACTERIA BLD CULT ORG #2: ABNORMAL
BACTERIA BLD CULT: ABNORMAL
BASOPHILS # BLD: 0.1 K/UL (ref 0–0.2)
BASOPHILS NFR BLD: 1 %
BUN SERPL-MCNC: 21 MG/DL (ref 7–20)
CALCIUM SERPL-MCNC: 9.3 MG/DL (ref 8.3–10.6)
CHLORIDE SERPL-SCNC: 104 MMOL/L (ref 99–110)
CO2 SERPL-SCNC: 25 MMOL/L (ref 21–32)
CREAT SERPL-MCNC: 1.7 MG/DL (ref 0.9–1.3)
DEPRECATED RDW RBC AUTO: 13.7 % (ref 12.4–15.4)
EKG ATRIAL RATE: 87 BPM
EKG DIAGNOSIS: NORMAL
EKG P AXIS: 64 DEGREES
EKG P-R INTERVAL: 168 MS
EKG Q-T INTERVAL: 448 MS
EKG QRS DURATION: 180 MS
EKG QTC CALCULATION (BAZETT): 539 MS
EKG R AXIS: -30 DEGREES
EKG T AXIS: 101 DEGREES
EKG VENTRICULAR RATE: 87 BPM
EOSINOPHIL # BLD: 0.2 K/UL (ref 0–0.6)
EOSINOPHIL NFR BLD: 3.1 %
GFR SERPLBLD CREATININE-BSD FMLA CKD-EPI: 47 ML/MIN/{1.73_M2}
GLUCOSE BLD-MCNC: 172 MG/DL (ref 70–99)
GLUCOSE BLD-MCNC: 219 MG/DL (ref 70–99)
GLUCOSE BLD-MCNC: 235 MG/DL (ref 70–99)
GLUCOSE BLD-MCNC: 280 MG/DL (ref 70–99)
GLUCOSE SERPL-MCNC: 152 MG/DL (ref 70–99)
HCT VFR BLD AUTO: 25.5 % (ref 40.5–52.5)
HGB BLD-MCNC: 8.7 G/DL (ref 13.5–17.5)
LOEFFLER MB STN SPEC: NORMAL
LYMPHOCYTES # BLD: 1.4 K/UL (ref 1–5.1)
LYMPHOCYTES NFR BLD: 24.2 %
MAGNESIUM SERPL-MCNC: 1.82 MG/DL (ref 1.8–2.4)
MCH RBC QN AUTO: 27.3 PG (ref 26–34)
MCHC RBC AUTO-ENTMCNC: 34.2 G/DL (ref 31–36)
MCV RBC AUTO: 79.8 FL (ref 80–100)
MONOCYTES # BLD: 0.5 K/UL (ref 0–1.3)
MONOCYTES NFR BLD: 8.3 %
NEUTROPHILS # BLD: 3.6 K/UL (ref 1.7–7.7)
NEUTROPHILS NFR BLD: 63.4 %
ORGANISM: ABNORMAL
PERFORMED ON: ABNORMAL
PHOSPHATE SERPL-MCNC: 2.8 MG/DL (ref 2.5–4.9)
PLATELET # BLD AUTO: 205 K/UL (ref 135–450)
PMV BLD AUTO: 7.6 FL (ref 5–10.5)
POTASSIUM SERPL-SCNC: 4.7 MMOL/L (ref 3.5–5.1)
RBC # BLD AUTO: 3.19 M/UL (ref 4.2–5.9)
SODIUM SERPL-SCNC: 138 MMOL/L (ref 136–145)
WBC # BLD AUTO: 5.6 K/UL (ref 4–11)

## 2025-08-27 PROCEDURE — 97535 SELF CARE MNGMENT TRAINING: CPT

## 2025-08-27 PROCEDURE — 85025 COMPLETE CBC W/AUTO DIFF WBC: CPT

## 2025-08-27 PROCEDURE — 97116 GAIT TRAINING THERAPY: CPT

## 2025-08-27 PROCEDURE — 6370000000 HC RX 637 (ALT 250 FOR IP): Performed by: INTERNAL MEDICINE

## 2025-08-27 PROCEDURE — 6370000000 HC RX 637 (ALT 250 FOR IP)

## 2025-08-27 PROCEDURE — 2580000003 HC RX 258

## 2025-08-27 PROCEDURE — 36415 COLL VENOUS BLD VENIPUNCTURE: CPT

## 2025-08-27 PROCEDURE — 80069 RENAL FUNCTION PANEL: CPT

## 2025-08-27 PROCEDURE — 99233 SBSQ HOSP IP/OBS HIGH 50: CPT | Performed by: INTERNAL MEDICINE

## 2025-08-27 PROCEDURE — 6360000002 HC RX W HCPCS

## 2025-08-27 PROCEDURE — 1200000000 HC SEMI PRIVATE

## 2025-08-27 PROCEDURE — 93010 ELECTROCARDIOGRAM REPORT: CPT | Performed by: INTERNAL MEDICINE

## 2025-08-27 PROCEDURE — 83735 ASSAY OF MAGNESIUM: CPT

## 2025-08-27 PROCEDURE — 97530 THERAPEUTIC ACTIVITIES: CPT

## 2025-08-27 RX ORDER — HEPARIN SODIUM 5000 [USP'U]/ML
5000 INJECTION, SOLUTION INTRAVENOUS; SUBCUTANEOUS EVERY 8 HOURS SCHEDULED
Status: CANCELLED | OUTPATIENT
Start: 2025-08-27

## 2025-08-27 RX ORDER — INSULIN LISPRO 100 [IU]/ML
8 INJECTION, SOLUTION INTRAVENOUS; SUBCUTANEOUS
Status: DISCONTINUED | OUTPATIENT
Start: 2025-08-27 | End: 2025-09-02

## 2025-08-27 RX ADMIN — INSULIN LISPRO 8 UNITS: 100 INJECTION, SOLUTION INTRAVENOUS; SUBCUTANEOUS at 13:02

## 2025-08-27 RX ADMIN — HEPARIN SODIUM 5000 UNITS: 5000 INJECTION, SOLUTION INTRAVENOUS; SUBCUTANEOUS at 22:50

## 2025-08-27 RX ADMIN — PIPERACILLIN AND TAZOBACTAM 3375 MG: 3; .375 INJECTION, POWDER, LYOPHILIZED, FOR SOLUTION INTRAVENOUS at 22:50

## 2025-08-27 RX ADMIN — INSULIN LISPRO 5 UNITS: 100 INJECTION, SOLUTION INTRAVENOUS; SUBCUTANEOUS at 08:47

## 2025-08-27 RX ADMIN — INSULIN GLARGINE 10 UNITS: 100 INJECTION, SOLUTION SUBCUTANEOUS at 08:39

## 2025-08-27 RX ADMIN — LINEZOLID 600 MG: 600 TABLET, FILM COATED ORAL at 08:39

## 2025-08-27 RX ADMIN — HEPARIN SODIUM 5000 UNITS: 5000 INJECTION, SOLUTION INTRAVENOUS; SUBCUTANEOUS at 06:12

## 2025-08-27 RX ADMIN — ROSUVASTATIN CALCIUM 10 MG: 10 TABLET, FILM COATED ORAL at 08:39

## 2025-08-27 RX ADMIN — INSULIN GLARGINE 10 UNITS: 100 INJECTION, SOLUTION SUBCUTANEOUS at 19:57

## 2025-08-27 RX ADMIN — INSULIN LISPRO 2 UNITS: 100 INJECTION, SOLUTION INTRAVENOUS; SUBCUTANEOUS at 17:41

## 2025-08-27 RX ADMIN — INSULIN LISPRO 4 UNITS: 100 INJECTION, SOLUTION INTRAVENOUS; SUBCUTANEOUS at 19:57

## 2025-08-27 RX ADMIN — INSULIN LISPRO 2 UNITS: 100 INJECTION, SOLUTION INTRAVENOUS; SUBCUTANEOUS at 13:02

## 2025-08-27 RX ADMIN — PANTOPRAZOLE SODIUM 40 MG: 40 TABLET, DELAYED RELEASE ORAL at 08:39

## 2025-08-27 RX ADMIN — PIPERACILLIN AND TAZOBACTAM 3375 MG: 3; .375 INJECTION, POWDER, LYOPHILIZED, FOR SOLUTION INTRAVENOUS at 15:19

## 2025-08-27 RX ADMIN — INSULIN LISPRO 8 UNITS: 100 INJECTION, SOLUTION INTRAVENOUS; SUBCUTANEOUS at 17:42

## 2025-08-27 RX ADMIN — HEPARIN SODIUM 5000 UNITS: 5000 INJECTION, SOLUTION INTRAVENOUS; SUBCUTANEOUS at 15:20

## 2025-08-27 RX ADMIN — PIPERACILLIN AND TAZOBACTAM 3375 MG: 3; .375 INJECTION, POWDER, LYOPHILIZED, FOR SOLUTION INTRAVENOUS at 06:11

## 2025-08-27 RX ADMIN — METOPROLOL SUCCINATE 150 MG: 100 TABLET, EXTENDED RELEASE ORAL at 08:39

## 2025-08-27 RX ADMIN — AMLODIPINE BESYLATE 5 MG: 5 TABLET ORAL at 08:39

## 2025-08-27 ASSESSMENT — ENCOUNTER SYMPTOMS: SHORTNESS OF BREATH: 1

## 2025-08-27 ASSESSMENT — PAIN SCALES - GENERAL
PAINLEVEL_OUTOF10: 0

## 2025-08-28 ENCOUNTER — APPOINTMENT (OUTPATIENT)
Dept: GENERAL RADIOLOGY | Age: 55
End: 2025-08-28
Payer: MEDICAID

## 2025-08-28 ENCOUNTER — ANESTHESIA (OUTPATIENT)
Dept: OPERATING ROOM | Age: 55
End: 2025-08-28
Payer: MEDICAID

## 2025-08-28 LAB
ALBUMIN SERPL-MCNC: 3 G/DL (ref 3.4–5)
ANION GAP SERPL CALCULATED.3IONS-SCNC: 7 MMOL/L (ref 3–16)
BASOPHILS # BLD: 0 K/UL (ref 0–0.2)
BASOPHILS NFR BLD: 0.9 %
BUN SERPL-MCNC: 18 MG/DL (ref 7–20)
CALCIUM SERPL-MCNC: 9.4 MG/DL (ref 8.3–10.6)
CHLORIDE SERPL-SCNC: 104 MMOL/L (ref 99–110)
CO2 SERPL-SCNC: 28 MMOL/L (ref 21–32)
CREAT SERPL-MCNC: 1.7 MG/DL (ref 0.9–1.3)
DEPRECATED RDW RBC AUTO: 13.7 % (ref 12.4–15.4)
EOSINOPHIL # BLD: 0.1 K/UL (ref 0–0.6)
EOSINOPHIL NFR BLD: 3.1 %
GFR SERPLBLD CREATININE-BSD FMLA CKD-EPI: 47 ML/MIN/{1.73_M2}
GLUCOSE BLD-MCNC: 120 MG/DL (ref 70–99)
GLUCOSE BLD-MCNC: 131 MG/DL (ref 70–99)
GLUCOSE BLD-MCNC: 161 MG/DL (ref 70–99)
GLUCOSE BLD-MCNC: 186 MG/DL (ref 70–99)
GLUCOSE SERPL-MCNC: 113 MG/DL (ref 70–99)
HCT VFR BLD AUTO: 26.3 % (ref 40.5–52.5)
HGB BLD-MCNC: 8.8 G/DL (ref 13.5–17.5)
INR PPP: 1.1 (ref 0.86–1.14)
LYMPHOCYTES # BLD: 1 K/UL (ref 1–5.1)
LYMPHOCYTES NFR BLD: 21.8 %
MAGNESIUM SERPL-MCNC: 1.81 MG/DL (ref 1.8–2.4)
MCH RBC QN AUTO: 26.8 PG (ref 26–34)
MCHC RBC AUTO-ENTMCNC: 33.3 G/DL (ref 31–36)
MCV RBC AUTO: 80.4 FL (ref 80–100)
MONOCYTES # BLD: 0.4 K/UL (ref 0–1.3)
MONOCYTES NFR BLD: 9.5 %
NEUTROPHILS # BLD: 2.9 K/UL (ref 1.7–7.7)
NEUTROPHILS NFR BLD: 64.7 %
PERFORMED ON: ABNORMAL
PHOSPHATE SERPL-MCNC: 3.2 MG/DL (ref 2.5–4.9)
PLATELET # BLD AUTO: 210 K/UL (ref 135–450)
PMV BLD AUTO: 7.6 FL (ref 5–10.5)
POTASSIUM SERPL-SCNC: 4.5 MMOL/L (ref 3.5–5.1)
PROTHROMBIN TIME: 14.5 SEC (ref 12.1–14.9)
RBC # BLD AUTO: 3.27 M/UL (ref 4.2–5.9)
SODIUM SERPL-SCNC: 139 MMOL/L (ref 136–145)
WBC # BLD AUTO: 4.5 K/UL (ref 4–11)

## 2025-08-28 PROCEDURE — 6360000002 HC RX W HCPCS

## 2025-08-28 PROCEDURE — 6370000000 HC RX 637 (ALT 250 FOR IP)

## 2025-08-28 PROCEDURE — 85610 PROTHROMBIN TIME: CPT

## 2025-08-28 PROCEDURE — 3600000004 HC SURGERY LEVEL 4 BASE: Performed by: PODIATRIST

## 2025-08-28 PROCEDURE — 2580000003 HC RX 258

## 2025-08-28 PROCEDURE — 80069 RENAL FUNCTION PANEL: CPT

## 2025-08-28 PROCEDURE — 3600000014 HC SURGERY LEVEL 4 ADDTL 15MIN: Performed by: PODIATRIST

## 2025-08-28 PROCEDURE — 2709999900 HC NON-CHARGEABLE SUPPLY: Performed by: PODIATRIST

## 2025-08-28 PROCEDURE — 64447 NJX AA&/STRD FEMORAL NRV IMG: CPT | Performed by: ANESTHESIOLOGY

## 2025-08-28 PROCEDURE — 6360000002 HC RX W HCPCS: Performed by: ANESTHESIOLOGY

## 2025-08-28 PROCEDURE — 2580000003 HC RX 258: Performed by: NURSE ANESTHETIST, CERTIFIED REGISTERED

## 2025-08-28 PROCEDURE — 7100000001 HC PACU RECOVERY - ADDTL 15 MIN: Performed by: PODIATRIST

## 2025-08-28 PROCEDURE — 6360000002 HC RX W HCPCS: Performed by: PODIATRIST

## 2025-08-28 PROCEDURE — 36415 COLL VENOUS BLD VENIPUNCTURE: CPT

## 2025-08-28 PROCEDURE — 2500000003 HC RX 250 WO HCPCS: Performed by: PODIATRIST

## 2025-08-28 PROCEDURE — 64445 NJX AA&/STRD SCIATIC NRV IMG: CPT | Performed by: ANESTHESIOLOGY

## 2025-08-28 PROCEDURE — 6360000002 HC RX W HCPCS: Performed by: NURSE ANESTHETIST, CERTIFIED REGISTERED

## 2025-08-28 PROCEDURE — C1713 ANCHOR/SCREW BN/BN,TIS/BN: HCPCS | Performed by: PODIATRIST

## 2025-08-28 PROCEDURE — 3700000000 HC ANESTHESIA ATTENDED CARE: Performed by: PODIATRIST

## 2025-08-28 PROCEDURE — 3700000001 HC ADD 15 MINUTES (ANESTHESIA): Performed by: PODIATRIST

## 2025-08-28 PROCEDURE — 2500000003 HC RX 250 WO HCPCS: Performed by: NURSE ANESTHETIST, CERTIFIED REGISTERED

## 2025-08-28 PROCEDURE — 85025 COMPLETE CBC W/AUTO DIFF WBC: CPT

## 2025-08-28 PROCEDURE — 2720000010 HC SURG SUPPLY STERILE: Performed by: PODIATRIST

## 2025-08-28 PROCEDURE — 99232 SBSQ HOSP IP/OBS MODERATE 35: CPT | Performed by: INTERNAL MEDICINE

## 2025-08-28 PROCEDURE — 73590 X-RAY EXAM OF LOWER LEG: CPT

## 2025-08-28 PROCEDURE — 7100000000 HC PACU RECOVERY - FIRST 15 MIN: Performed by: PODIATRIST

## 2025-08-28 PROCEDURE — 83735 ASSAY OF MAGNESIUM: CPT

## 2025-08-28 PROCEDURE — 1200000000 HC SEMI PRIVATE

## 2025-08-28 DEVICE — IMPLANTABLE DEVICE: Type: IMPLANTABLE DEVICE | Site: FOOT | Status: FUNCTIONAL

## 2025-08-28 RX ORDER — SODIUM CHLORIDE 0.9 % (FLUSH) 0.9 %
5-40 SYRINGE (ML) INJECTION PRN
Status: DISCONTINUED | OUTPATIENT
Start: 2025-08-28 | End: 2025-08-28 | Stop reason: HOSPADM

## 2025-08-28 RX ORDER — HYDROMORPHONE HYDROCHLORIDE 1 MG/ML
0.5 INJECTION, SOLUTION INTRAMUSCULAR; INTRAVENOUS; SUBCUTANEOUS EVERY 5 MIN PRN
Status: DISCONTINUED | OUTPATIENT
Start: 2025-08-28 | End: 2025-08-28 | Stop reason: HOSPADM

## 2025-08-28 RX ORDER — EPHEDRINE SULFATE 50 MG/ML
INJECTION INTRAVENOUS
Status: DISCONTINUED | OUTPATIENT
Start: 2025-08-28 | End: 2025-08-28 | Stop reason: SDUPTHER

## 2025-08-28 RX ORDER — PROPOFOL 10 MG/ML
INJECTION, EMULSION INTRAVENOUS
Status: DISCONTINUED | OUTPATIENT
Start: 2025-08-28 | End: 2025-08-28 | Stop reason: SDUPTHER

## 2025-08-28 RX ORDER — LIDOCAINE HYDROCHLORIDE 20 MG/ML
INJECTION, SOLUTION INFILTRATION; PERINEURAL PRN
Status: DISCONTINUED | OUTPATIENT
Start: 2025-08-28 | End: 2025-08-28 | Stop reason: HOSPADM

## 2025-08-28 RX ORDER — SODIUM CHLORIDE, SODIUM LACTATE, POTASSIUM CHLORIDE, CALCIUM CHLORIDE 600; 310; 30; 20 MG/100ML; MG/100ML; MG/100ML; MG/100ML
INJECTION, SOLUTION INTRAVENOUS
Status: DISCONTINUED | OUTPATIENT
Start: 2025-08-28 | End: 2025-08-28 | Stop reason: SDUPTHER

## 2025-08-28 RX ORDER — MIDAZOLAM HYDROCHLORIDE 1 MG/ML
INJECTION, SOLUTION INTRAMUSCULAR; INTRAVENOUS
Status: DISCONTINUED | OUTPATIENT
Start: 2025-08-28 | End: 2025-08-28 | Stop reason: SDUPTHER

## 2025-08-28 RX ORDER — FAMOTIDINE 10 MG/ML
INJECTION, SOLUTION INTRAVENOUS
Status: DISCONTINUED | OUTPATIENT
Start: 2025-08-28 | End: 2025-08-28 | Stop reason: SDUPTHER

## 2025-08-28 RX ORDER — BUPIVACAINE HYDROCHLORIDE 5 MG/ML
INJECTION, SOLUTION EPIDURAL; INTRACAUDAL; PERINEURAL
Status: DISCONTINUED | OUTPATIENT
Start: 2025-08-28 | End: 2025-08-28 | Stop reason: SDUPTHER

## 2025-08-28 RX ORDER — KETAMINE HCL IN NACL, ISO-OSM 20 MG/2 ML
SYRINGE (ML) INJECTION
Status: DISCONTINUED | OUTPATIENT
Start: 2025-08-28 | End: 2025-08-28 | Stop reason: SDUPTHER

## 2025-08-28 RX ORDER — SODIUM CHLORIDE 9 MG/ML
INJECTION, SOLUTION INTRAVENOUS PRN
Status: DISCONTINUED | OUTPATIENT
Start: 2025-08-28 | End: 2025-08-28 | Stop reason: HOSPADM

## 2025-08-28 RX ORDER — ONDANSETRON 2 MG/ML
INJECTION INTRAMUSCULAR; INTRAVENOUS
Status: DISCONTINUED | OUTPATIENT
Start: 2025-08-28 | End: 2025-08-28 | Stop reason: SDUPTHER

## 2025-08-28 RX ORDER — MAGNESIUM HYDROXIDE 1200 MG/15ML
LIQUID ORAL CONTINUOUS PRN
Status: DISCONTINUED | OUTPATIENT
Start: 2025-08-28 | End: 2025-08-28 | Stop reason: HOSPADM

## 2025-08-28 RX ORDER — LABETALOL HYDROCHLORIDE 5 MG/ML
10 INJECTION, SOLUTION INTRAVENOUS
Status: DISCONTINUED | OUTPATIENT
Start: 2025-08-28 | End: 2025-08-28 | Stop reason: HOSPADM

## 2025-08-28 RX ORDER — FENTANYL CITRATE 50 UG/ML
25 INJECTION, SOLUTION INTRAMUSCULAR; INTRAVENOUS EVERY 5 MIN PRN
Status: DISCONTINUED | OUTPATIENT
Start: 2025-08-28 | End: 2025-08-28 | Stop reason: HOSPADM

## 2025-08-28 RX ORDER — ROCURONIUM BROMIDE 10 MG/ML
INJECTION, SOLUTION INTRAVENOUS
Status: DISCONTINUED | OUTPATIENT
Start: 2025-08-28 | End: 2025-08-28 | Stop reason: SDUPTHER

## 2025-08-28 RX ORDER — PROCHLORPERAZINE EDISYLATE 5 MG/ML
5 INJECTION INTRAMUSCULAR; INTRAVENOUS
Status: DISCONTINUED | OUTPATIENT
Start: 2025-08-28 | End: 2025-08-28 | Stop reason: HOSPADM

## 2025-08-28 RX ORDER — OXYCODONE HYDROCHLORIDE 5 MG/1
5 TABLET ORAL PRN
Status: DISCONTINUED | OUTPATIENT
Start: 2025-08-28 | End: 2025-08-28 | Stop reason: HOSPADM

## 2025-08-28 RX ORDER — SODIUM CHLORIDE 0.9 % (FLUSH) 0.9 %
5-40 SYRINGE (ML) INJECTION EVERY 12 HOURS SCHEDULED
Status: DISCONTINUED | OUTPATIENT
Start: 2025-08-28 | End: 2025-08-28 | Stop reason: HOSPADM

## 2025-08-28 RX ORDER — OXYCODONE HYDROCHLORIDE 5 MG/1
10 TABLET ORAL PRN
Status: DISCONTINUED | OUTPATIENT
Start: 2025-08-28 | End: 2025-08-28 | Stop reason: HOSPADM

## 2025-08-28 RX ORDER — DEXAMETHASONE SODIUM PHOSPHATE 4 MG/ML
INJECTION, SOLUTION INTRA-ARTICULAR; INTRALESIONAL; INTRAMUSCULAR; INTRAVENOUS; SOFT TISSUE
Status: DISCONTINUED | OUTPATIENT
Start: 2025-08-28 | End: 2025-08-28 | Stop reason: SDUPTHER

## 2025-08-28 RX ADMIN — FAMOTIDINE 20 MG: 10 INJECTION, SOLUTION INTRAVENOUS at 15:32

## 2025-08-28 RX ADMIN — PANTOPRAZOLE SODIUM 40 MG: 40 TABLET, DELAYED RELEASE ORAL at 09:12

## 2025-08-28 RX ADMIN — PIPERACILLIN AND TAZOBACTAM 3375 MG: 3; .375 INJECTION, POWDER, LYOPHILIZED, FOR SOLUTION INTRAVENOUS at 16:08

## 2025-08-28 RX ADMIN — ROCURONIUM BROMIDE 100 MG: 10 INJECTION, SOLUTION INTRAVENOUS at 15:25

## 2025-08-28 RX ADMIN — INSULIN LISPRO 2 UNITS: 100 INJECTION, SOLUTION INTRAVENOUS; SUBCUTANEOUS at 20:30

## 2025-08-28 RX ADMIN — DEXAMETHASONE SODIUM PHOSPHATE 4 MG: 4 INJECTION INTRA-ARTICULAR; INTRALESIONAL; INTRAMUSCULAR; INTRAVENOUS; SOFT TISSUE at 15:32

## 2025-08-28 RX ADMIN — INSULIN GLARGINE 10 UNITS: 100 INJECTION, SOLUTION SUBCUTANEOUS at 20:30

## 2025-08-28 RX ADMIN — Medication 20 MG: at 15:22

## 2025-08-28 RX ADMIN — BUPIVACAINE HYDROCHLORIDE 10 ML: 5 INJECTION, SOLUTION EPIDURAL; INTRACAUDAL; PERINEURAL at 18:11

## 2025-08-28 RX ADMIN — METOPROLOL SUCCINATE 150 MG: 100 TABLET, EXTENDED RELEASE ORAL at 09:12

## 2025-08-28 RX ADMIN — PIPERACILLIN AND TAZOBACTAM 3375 MG: 3; .375 INJECTION, POWDER, LYOPHILIZED, FOR SOLUTION INTRAVENOUS at 06:28

## 2025-08-28 RX ADMIN — AMLODIPINE BESYLATE 5 MG: 5 TABLET ORAL at 09:12

## 2025-08-28 RX ADMIN — HEPARIN SODIUM 5000 UNITS: 5000 INJECTION, SOLUTION INTRAVENOUS; SUBCUTANEOUS at 22:58

## 2025-08-28 RX ADMIN — ONDANSETRON 4 MG: 2 INJECTION, SOLUTION INTRAMUSCULAR; INTRAVENOUS at 15:32

## 2025-08-28 RX ADMIN — PROPOFOL 150 MG: 10 INJECTION, EMULSION INTRAVENOUS at 15:24

## 2025-08-28 RX ADMIN — EPHEDRINE SULFATE 10 MG: 50 INJECTION INTRAVENOUS at 16:22

## 2025-08-28 RX ADMIN — SODIUM CHLORIDE, SODIUM LACTATE, POTASSIUM CHLORIDE, AND CALCIUM CHLORIDE: .6; .31; .03; .02 INJECTION, SOLUTION INTRAVENOUS at 15:19

## 2025-08-28 RX ADMIN — INSULIN GLARGINE 10 UNITS: 100 INJECTION, SOLUTION SUBCUTANEOUS at 09:12

## 2025-08-28 RX ADMIN — PIPERACILLIN AND TAZOBACTAM 3375 MG: 3; .375 INJECTION, POWDER, LYOPHILIZED, FOR SOLUTION INTRAVENOUS at 22:55

## 2025-08-28 RX ADMIN — SUGAMMADEX 200 MG: 100 INJECTION, SOLUTION INTRAVENOUS at 17:44

## 2025-08-28 RX ADMIN — MIDAZOLAM HYDROCHLORIDE 2 MG: 1 INJECTION, SOLUTION INTRAMUSCULAR; INTRAVENOUS at 15:19

## 2025-08-28 RX ADMIN — BUPIVACAINE HYDROCHLORIDE 30 ML: 5 INJECTION, SOLUTION EPIDURAL; INTRACAUDAL; PERINEURAL at 18:06

## 2025-08-28 RX ADMIN — HYDROMORPHONE HYDROCHLORIDE 1 MG: 1 INJECTION, SOLUTION INTRAMUSCULAR; INTRAVENOUS; SUBCUTANEOUS at 17:44

## 2025-08-28 RX ADMIN — EPHEDRINE SULFATE 10 MG: 50 INJECTION INTRAVENOUS at 16:51

## 2025-08-28 RX ADMIN — ROSUVASTATIN CALCIUM 10 MG: 10 TABLET, FILM COATED ORAL at 09:12

## 2025-08-28 ASSESSMENT — PAIN SCALES - GENERAL
PAINLEVEL_OUTOF10: 0

## 2025-08-28 ASSESSMENT — PAIN - FUNCTIONAL ASSESSMENT: PAIN_FUNCTIONAL_ASSESSMENT: 0-10

## 2025-08-29 ENCOUNTER — APPOINTMENT (OUTPATIENT)
Dept: GENERAL RADIOLOGY | Age: 55
End: 2025-08-29
Payer: MEDICAID

## 2025-08-29 LAB
ALBUMIN SERPL-MCNC: 3.1 G/DL (ref 3.4–5)
ANION GAP SERPL CALCULATED.3IONS-SCNC: 13 MMOL/L (ref 3–16)
BASOPHILS # BLD: 0 K/UL (ref 0–0.2)
BASOPHILS NFR BLD: 0.4 %
BUN SERPL-MCNC: 27 MG/DL (ref 7–20)
CALCIUM SERPL-MCNC: 9.3 MG/DL (ref 8.3–10.6)
CHLORIDE SERPL-SCNC: 101 MMOL/L (ref 99–110)
CO2 SERPL-SCNC: 22 MMOL/L (ref 21–32)
CREAT SERPL-MCNC: 1.7 MG/DL (ref 0.9–1.3)
DEPRECATED RDW RBC AUTO: 14.3 % (ref 12.4–15.4)
EOSINOPHIL # BLD: 0 K/UL (ref 0–0.6)
EOSINOPHIL NFR BLD: 0.1 %
GFR SERPLBLD CREATININE-BSD FMLA CKD-EPI: 47 ML/MIN/{1.73_M2}
GLUCOSE BLD-MCNC: 226 MG/DL (ref 70–99)
GLUCOSE BLD-MCNC: 232 MG/DL (ref 70–99)
GLUCOSE BLD-MCNC: 237 MG/DL (ref 70–99)
GLUCOSE BLD-MCNC: 324 MG/DL (ref 70–99)
GLUCOSE SERPL-MCNC: 341 MG/DL (ref 70–99)
HCT VFR BLD AUTO: 27.5 % (ref 40.5–52.5)
HGB BLD-MCNC: 9.1 G/DL (ref 13.5–17.5)
LYMPHOCYTES # BLD: 0.7 K/UL (ref 1–5.1)
LYMPHOCYTES NFR BLD: 10.4 %
MAGNESIUM SERPL-MCNC: 1.94 MG/DL (ref 1.8–2.4)
MCH RBC QN AUTO: 27.1 PG (ref 26–34)
MCHC RBC AUTO-ENTMCNC: 33.3 G/DL (ref 31–36)
MCV RBC AUTO: 81.3 FL (ref 80–100)
MONOCYTES # BLD: 0.4 K/UL (ref 0–1.3)
MONOCYTES NFR BLD: 6.8 %
NEUTROPHILS # BLD: 5.2 K/UL (ref 1.7–7.7)
NEUTROPHILS NFR BLD: 82.3 %
PERFORMED ON: ABNORMAL
PHOSPHATE SERPL-MCNC: 3.8 MG/DL (ref 2.5–4.9)
PLATELET # BLD AUTO: 281 K/UL (ref 135–450)
PMV BLD AUTO: 7.3 FL (ref 5–10.5)
POTASSIUM SERPL-SCNC: 5.1 MMOL/L (ref 3.5–5.1)
RBC # BLD AUTO: 3.38 M/UL (ref 4.2–5.9)
SODIUM SERPL-SCNC: 136 MMOL/L (ref 136–145)
WBC # BLD AUTO: 6.4 K/UL (ref 4–11)

## 2025-08-29 PROCEDURE — 73590 X-RAY EXAM OF LOWER LEG: CPT

## 2025-08-29 PROCEDURE — 6370000000 HC RX 637 (ALT 250 FOR IP)

## 2025-08-29 PROCEDURE — 83735 ASSAY OF MAGNESIUM: CPT

## 2025-08-29 PROCEDURE — 97166 OT EVAL MOD COMPLEX 45 MIN: CPT

## 2025-08-29 PROCEDURE — 36415 COLL VENOUS BLD VENIPUNCTURE: CPT

## 2025-08-29 PROCEDURE — 73630 X-RAY EXAM OF FOOT: CPT

## 2025-08-29 PROCEDURE — C1751 CATH, INF, PER/CENT/MIDLINE: HCPCS

## 2025-08-29 PROCEDURE — 1200000000 HC SEMI PRIVATE

## 2025-08-29 PROCEDURE — 6360000002 HC RX W HCPCS

## 2025-08-29 PROCEDURE — 6360000002 HC RX W HCPCS: Performed by: INTERNAL MEDICINE

## 2025-08-29 PROCEDURE — 99232 SBSQ HOSP IP/OBS MODERATE 35: CPT | Performed by: INTERNAL MEDICINE

## 2025-08-29 PROCEDURE — 97530 THERAPEUTIC ACTIVITIES: CPT

## 2025-08-29 PROCEDURE — 80069 RENAL FUNCTION PANEL: CPT

## 2025-08-29 PROCEDURE — 2580000003 HC RX 258

## 2025-08-29 PROCEDURE — 85025 COMPLETE CBC W/AUTO DIFF WBC: CPT

## 2025-08-29 PROCEDURE — 36569 INSJ PICC 5 YR+ W/O IMAGING: CPT

## 2025-08-29 RX ORDER — PANTOPRAZOLE SODIUM 40 MG/1
40 TABLET, DELAYED RELEASE ORAL DAILY
DISCHARGE
Start: 2025-08-29

## 2025-08-29 RX ORDER — TORSEMIDE 20 MG/1
40 TABLET ORAL DAILY
Qty: 60 TABLET | Refills: 1 | Status: SHIPPED | OUTPATIENT
Start: 2025-08-29 | End: 2025-08-29

## 2025-08-29 RX ORDER — SODIUM CHLORIDE 0.9 % (FLUSH) 0.9 %
5-40 SYRINGE (ML) INJECTION EVERY 12 HOURS SCHEDULED
Status: DISCONTINUED | OUTPATIENT
Start: 2025-08-29 | End: 2025-09-04 | Stop reason: HOSPADM

## 2025-08-29 RX ORDER — INSULIN GLARGINE 100 [IU]/ML
10 INJECTION, SOLUTION SUBCUTANEOUS 2 TIMES DAILY
DISCHARGE
Start: 2025-08-30 | End: 2025-09-03 | Stop reason: HOSPADM

## 2025-08-29 RX ORDER — INSULIN LISPRO 100 [IU]/ML
8 INJECTION, SOLUTION INTRAVENOUS; SUBCUTANEOUS
DISCHARGE
Start: 2025-08-30 | End: 2025-09-03 | Stop reason: HOSPADM

## 2025-08-29 RX ORDER — LIDOCAINE HYDROCHLORIDE 10 MG/ML
50 INJECTION, SOLUTION EPIDURAL; INFILTRATION; INTRACAUDAL; PERINEURAL ONCE
Status: COMPLETED | OUTPATIENT
Start: 2025-08-29 | End: 2025-08-29

## 2025-08-29 RX ORDER — SODIUM CHLORIDE 0.9 % (FLUSH) 0.9 %
5-40 SYRINGE (ML) INJECTION PRN
Status: DISCONTINUED | OUTPATIENT
Start: 2025-08-29 | End: 2025-09-04 | Stop reason: HOSPADM

## 2025-08-29 RX ORDER — TORSEMIDE 20 MG/1
40 TABLET ORAL DAILY
DISCHARGE
Start: 2025-08-29

## 2025-08-29 RX ORDER — INSULIN LISPRO 100 [IU]/ML
0-8 INJECTION, SOLUTION INTRAVENOUS; SUBCUTANEOUS
DISCHARGE
Start: 2025-08-30 | End: 2025-09-03 | Stop reason: HOSPADM

## 2025-08-29 RX ORDER — SODIUM CHLORIDE 9 MG/ML
INJECTION, SOLUTION INTRAVENOUS PRN
Status: DISCONTINUED | OUTPATIENT
Start: 2025-08-29 | End: 2025-09-04 | Stop reason: HOSPADM

## 2025-08-29 RX ADMIN — INSULIN LISPRO 8 UNITS: 100 INJECTION, SOLUTION INTRAVENOUS; SUBCUTANEOUS at 08:34

## 2025-08-29 RX ADMIN — INSULIN GLARGINE 10 UNITS: 100 INJECTION, SOLUTION SUBCUTANEOUS at 20:32

## 2025-08-29 RX ADMIN — PANTOPRAZOLE SODIUM 40 MG: 40 TABLET, DELAYED RELEASE ORAL at 08:33

## 2025-08-29 RX ADMIN — INSULIN LISPRO 8 UNITS: 100 INJECTION, SOLUTION INTRAVENOUS; SUBCUTANEOUS at 12:09

## 2025-08-29 RX ADMIN — INSULIN LISPRO 2 UNITS: 100 INJECTION, SOLUTION INTRAVENOUS; SUBCUTANEOUS at 17:22

## 2025-08-29 RX ADMIN — INSULIN LISPRO 8 UNITS: 100 INJECTION, SOLUTION INTRAVENOUS; SUBCUTANEOUS at 17:22

## 2025-08-29 RX ADMIN — AMLODIPINE BESYLATE 5 MG: 5 TABLET ORAL at 08:33

## 2025-08-29 RX ADMIN — PIPERACILLIN AND TAZOBACTAM 3375 MG: 3; .375 INJECTION, POWDER, LYOPHILIZED, FOR SOLUTION INTRAVENOUS at 14:19

## 2025-08-29 RX ADMIN — LIDOCAINE HYDROCHLORIDE ANHYDROUS 50 MG: 10 INJECTION, SOLUTION INFILTRATION at 12:50

## 2025-08-29 RX ADMIN — INSULIN LISPRO 2 UNITS: 100 INJECTION, SOLUTION INTRAVENOUS; SUBCUTANEOUS at 12:09

## 2025-08-29 RX ADMIN — HEPARIN SODIUM 5000 UNITS: 5000 INJECTION, SOLUTION INTRAVENOUS; SUBCUTANEOUS at 23:11

## 2025-08-29 RX ADMIN — METOPROLOL SUCCINATE 150 MG: 100 TABLET, EXTENDED RELEASE ORAL at 08:33

## 2025-08-29 RX ADMIN — PIPERACILLIN AND TAZOBACTAM 3375 MG: 3; .375 INJECTION, POWDER, LYOPHILIZED, FOR SOLUTION INTRAVENOUS at 06:09

## 2025-08-29 RX ADMIN — INSULIN LISPRO 6 UNITS: 100 INJECTION, SOLUTION INTRAVENOUS; SUBCUTANEOUS at 08:35

## 2025-08-29 RX ADMIN — INSULIN GLARGINE 10 UNITS: 100 INJECTION, SOLUTION SUBCUTANEOUS at 08:34

## 2025-08-29 RX ADMIN — HEPARIN SODIUM 5000 UNITS: 5000 INJECTION, SOLUTION INTRAVENOUS; SUBCUTANEOUS at 06:12

## 2025-08-29 RX ADMIN — INSULIN LISPRO 2 UNITS: 100 INJECTION, SOLUTION INTRAVENOUS; SUBCUTANEOUS at 20:31

## 2025-08-29 RX ADMIN — HEPARIN SODIUM 5000 UNITS: 5000 INJECTION, SOLUTION INTRAVENOUS; SUBCUTANEOUS at 14:20

## 2025-08-29 RX ADMIN — PIPERACILLIN AND TAZOBACTAM 3375 MG: 3; .375 INJECTION, POWDER, LYOPHILIZED, FOR SOLUTION INTRAVENOUS at 23:16

## 2025-08-29 RX ADMIN — ROSUVASTATIN CALCIUM 10 MG: 10 TABLET, FILM COATED ORAL at 08:34

## 2025-08-29 ASSESSMENT — PAIN SCALES - GENERAL
PAINLEVEL_OUTOF10: 0

## 2025-08-30 LAB
ALBUMIN SERPL-MCNC: 2.8 G/DL (ref 3.4–5)
ANION GAP SERPL CALCULATED.3IONS-SCNC: 7 MMOL/L (ref 3–16)
BACTERIA BLD CULT ORG #2: NORMAL
BACTERIA BLD CULT: NORMAL
BACTERIA SPEC AEROBE CULT: ABNORMAL
BACTERIA SPEC AEROBE CULT: ABNORMAL
BACTERIA SPEC ANAEROBE CULT: ABNORMAL
BASOPHILS # BLD: 0 K/UL (ref 0–0.2)
BASOPHILS NFR BLD: 0.8 %
BUN SERPL-MCNC: 26 MG/DL (ref 7–20)
CALCIUM SERPL-MCNC: 8.8 MG/DL (ref 8.3–10.6)
CHLORIDE SERPL-SCNC: 104 MMOL/L (ref 99–110)
CO2 SERPL-SCNC: 26 MMOL/L (ref 21–32)
CREAT SERPL-MCNC: 1.7 MG/DL (ref 0.9–1.3)
DEPRECATED RDW RBC AUTO: 14.3 % (ref 12.4–15.4)
EOSINOPHIL # BLD: 0.1 K/UL (ref 0–0.6)
EOSINOPHIL NFR BLD: 2.3 %
GFR SERPLBLD CREATININE-BSD FMLA CKD-EPI: 47 ML/MIN/{1.73_M2}
GLUCOSE BLD-MCNC: 228 MG/DL (ref 70–99)
GLUCOSE BLD-MCNC: 232 MG/DL (ref 70–99)
GLUCOSE BLD-MCNC: 255 MG/DL (ref 70–99)
GLUCOSE BLD-MCNC: 280 MG/DL (ref 70–99)
GLUCOSE SERPL-MCNC: 269 MG/DL (ref 70–99)
GRAM STN SPEC: ABNORMAL
HCT VFR BLD AUTO: 23.5 % (ref 40.5–52.5)
HGB BLD-MCNC: 7.8 G/DL (ref 13.5–17.5)
LYMPHOCYTES # BLD: 0.8 K/UL (ref 1–5.1)
LYMPHOCYTES NFR BLD: 17.1 %
MAGNESIUM SERPL-MCNC: 1.73 MG/DL (ref 1.8–2.4)
MCH RBC QN AUTO: 27 PG (ref 26–34)
MCHC RBC AUTO-ENTMCNC: 33.2 G/DL (ref 31–36)
MCV RBC AUTO: 81.3 FL (ref 80–100)
MONOCYTES # BLD: 0.4 K/UL (ref 0–1.3)
MONOCYTES NFR BLD: 9.8 %
NEUTROPHILS # BLD: 3.1 K/UL (ref 1.7–7.7)
NEUTROPHILS NFR BLD: 70 %
ORGANISM: ABNORMAL
ORGANISM: ABNORMAL
PERFORMED ON: ABNORMAL
PHOSPHATE SERPL-MCNC: 2.7 MG/DL (ref 2.5–4.9)
PLATELET # BLD AUTO: 212 K/UL (ref 135–450)
PMV BLD AUTO: 6.7 FL (ref 5–10.5)
POTASSIUM SERPL-SCNC: 4.8 MMOL/L (ref 3.5–5.1)
RBC # BLD AUTO: 2.9 M/UL (ref 4.2–5.9)
SODIUM SERPL-SCNC: 137 MMOL/L (ref 136–145)
WBC # BLD AUTO: 4.4 K/UL (ref 4–11)

## 2025-08-30 PROCEDURE — 6360000002 HC RX W HCPCS

## 2025-08-30 PROCEDURE — 6370000000 HC RX 637 (ALT 250 FOR IP)

## 2025-08-30 PROCEDURE — 2500000003 HC RX 250 WO HCPCS: Performed by: INTERNAL MEDICINE

## 2025-08-30 PROCEDURE — 1200000000 HC SEMI PRIVATE

## 2025-08-30 PROCEDURE — 80069 RENAL FUNCTION PANEL: CPT

## 2025-08-30 PROCEDURE — 2580000003 HC RX 258

## 2025-08-30 PROCEDURE — 85025 COMPLETE CBC W/AUTO DIFF WBC: CPT

## 2025-08-30 PROCEDURE — 83735 ASSAY OF MAGNESIUM: CPT

## 2025-08-30 RX ORDER — MAGNESIUM SULFATE IN WATER 40 MG/ML
2000 INJECTION, SOLUTION INTRAVENOUS ONCE
Status: DISCONTINUED | OUTPATIENT
Start: 2025-08-30 | End: 2025-08-30

## 2025-08-30 RX ORDER — INSULIN LISPRO 100 [IU]/ML
0-16 INJECTION, SOLUTION INTRAVENOUS; SUBCUTANEOUS
Status: DISCONTINUED | OUTPATIENT
Start: 2025-08-30 | End: 2025-09-04 | Stop reason: HOSPADM

## 2025-08-30 RX ORDER — LANOLIN ALCOHOL/MO/W.PET/CERES
400 CREAM (GRAM) TOPICAL DAILY
Status: DISCONTINUED | OUTPATIENT
Start: 2025-08-30 | End: 2025-09-01

## 2025-08-30 RX ADMIN — INSULIN LISPRO 4 UNITS: 100 INJECTION, SOLUTION INTRAVENOUS; SUBCUTANEOUS at 09:03

## 2025-08-30 RX ADMIN — HEPARIN SODIUM 5000 UNITS: 5000 INJECTION, SOLUTION INTRAVENOUS; SUBCUTANEOUS at 06:11

## 2025-08-30 RX ADMIN — INSULIN GLARGINE 10 UNITS: 100 INJECTION, SOLUTION SUBCUTANEOUS at 21:50

## 2025-08-30 RX ADMIN — PIPERACILLIN AND TAZOBACTAM 3375 MG: 3; .375 INJECTION, POWDER, LYOPHILIZED, FOR SOLUTION INTRAVENOUS at 12:43

## 2025-08-30 RX ADMIN — Medication 400 MG: at 12:49

## 2025-08-30 RX ADMIN — SODIUM CHLORIDE, PRESERVATIVE FREE 10 ML: 5 INJECTION INTRAVENOUS at 19:53

## 2025-08-30 RX ADMIN — INSULIN LISPRO 8 UNITS: 100 INJECTION, SOLUTION INTRAVENOUS; SUBCUTANEOUS at 09:04

## 2025-08-30 RX ADMIN — HEPARIN SODIUM 5000 UNITS: 5000 INJECTION, SOLUTION INTRAVENOUS; SUBCUTANEOUS at 21:51

## 2025-08-30 RX ADMIN — ROSUVASTATIN CALCIUM 10 MG: 10 TABLET, FILM COATED ORAL at 09:03

## 2025-08-30 RX ADMIN — AMLODIPINE BESYLATE 5 MG: 5 TABLET ORAL at 09:03

## 2025-08-30 RX ADMIN — SODIUM CHLORIDE, PRESERVATIVE FREE 10 ML: 5 INJECTION INTRAVENOUS at 09:03

## 2025-08-30 RX ADMIN — INSULIN LISPRO 8 UNITS: 100 INJECTION, SOLUTION INTRAVENOUS; SUBCUTANEOUS at 17:38

## 2025-08-30 RX ADMIN — INSULIN LISPRO 8 UNITS: 100 INJECTION, SOLUTION INTRAVENOUS; SUBCUTANEOUS at 12:44

## 2025-08-30 RX ADMIN — HEPARIN SODIUM 5000 UNITS: 5000 INJECTION, SOLUTION INTRAVENOUS; SUBCUTANEOUS at 12:43

## 2025-08-30 RX ADMIN — INSULIN GLARGINE 10 UNITS: 100 INJECTION, SOLUTION SUBCUTANEOUS at 09:03

## 2025-08-30 RX ADMIN — INSULIN LISPRO 4 UNITS: 100 INJECTION, SOLUTION INTRAVENOUS; SUBCUTANEOUS at 21:51

## 2025-08-30 RX ADMIN — ACETAMINOPHEN 650 MG: 325 TABLET ORAL at 22:34

## 2025-08-30 RX ADMIN — PIPERACILLIN AND TAZOBACTAM 3375 MG: 3; .375 INJECTION, POWDER, LYOPHILIZED, FOR SOLUTION INTRAVENOUS at 22:42

## 2025-08-30 RX ADMIN — PANTOPRAZOLE SODIUM 40 MG: 40 TABLET, DELAYED RELEASE ORAL at 09:03

## 2025-08-30 RX ADMIN — INSULIN LISPRO 4 UNITS: 100 INJECTION, SOLUTION INTRAVENOUS; SUBCUTANEOUS at 12:43

## 2025-08-30 RX ADMIN — PIPERACILLIN AND TAZOBACTAM 3375 MG: 3; .375 INJECTION, POWDER, LYOPHILIZED, FOR SOLUTION INTRAVENOUS at 06:12

## 2025-08-30 RX ADMIN — ACETAMINOPHEN 650 MG: 325 TABLET ORAL at 06:15

## 2025-08-30 RX ADMIN — METOPROLOL SUCCINATE 150 MG: 100 TABLET, EXTENDED RELEASE ORAL at 09:03

## 2025-08-30 ASSESSMENT — PAIN SCALES - GENERAL
PAINLEVEL_OUTOF10: 2
PAINLEVEL_OUTOF10: 7
PAINLEVEL_OUTOF10: 3
PAINLEVEL_OUTOF10: 3
PAINLEVEL_OUTOF10: 1

## 2025-08-30 ASSESSMENT — PAIN DESCRIPTION - FREQUENCY: FREQUENCY: CONTINUOUS

## 2025-08-30 ASSESSMENT — PAIN DESCRIPTION - DESCRIPTORS
DESCRIPTORS: ACHING
DESCRIPTORS: SORE;ACHING

## 2025-08-30 ASSESSMENT — PAIN DESCRIPTION - ONSET: ONSET: ON-GOING

## 2025-08-30 ASSESSMENT — PAIN - FUNCTIONAL ASSESSMENT
PAIN_FUNCTIONAL_ASSESSMENT: 0-10
PAIN_FUNCTIONAL_ASSESSMENT: 0-10
PAIN_FUNCTIONAL_ASSESSMENT: ACTIVITIES ARE NOT PREVENTED
PAIN_FUNCTIONAL_ASSESSMENT: ACTIVITIES ARE NOT PREVENTED
PAIN_FUNCTIONAL_ASSESSMENT: 0-10

## 2025-08-30 ASSESSMENT — PAIN DESCRIPTION - LOCATION
LOCATION: FOOT
LOCATION: FOOT

## 2025-08-30 ASSESSMENT — PAIN DESCRIPTION - PAIN TYPE: TYPE: SURGICAL PAIN

## 2025-08-30 ASSESSMENT — PAIN DESCRIPTION - ORIENTATION
ORIENTATION: LEFT
ORIENTATION: LEFT

## 2025-08-31 LAB
ALBUMIN SERPL-MCNC: 2.8 G/DL (ref 3.4–5)
ANION GAP SERPL CALCULATED.3IONS-SCNC: 6 MMOL/L (ref 3–16)
BASOPHILS # BLD: 0 K/UL (ref 0–0.2)
BASOPHILS NFR BLD: 0.5 %
BUN SERPL-MCNC: 21 MG/DL (ref 7–20)
CALCIUM SERPL-MCNC: 8.8 MG/DL (ref 8.3–10.6)
CHLORIDE SERPL-SCNC: 105 MMOL/L (ref 99–110)
CO2 SERPL-SCNC: 26 MMOL/L (ref 21–32)
CREAT SERPL-MCNC: 1.5 MG/DL (ref 0.9–1.3)
DEPRECATED RDW RBC AUTO: 14.2 % (ref 12.4–15.4)
EOSINOPHIL # BLD: 0.1 K/UL (ref 0–0.6)
EOSINOPHIL NFR BLD: 3.1 %
GFR SERPLBLD CREATININE-BSD FMLA CKD-EPI: 55 ML/MIN/{1.73_M2}
GLUCOSE BLD-MCNC: 205 MG/DL (ref 70–99)
GLUCOSE BLD-MCNC: 206 MG/DL (ref 70–99)
GLUCOSE BLD-MCNC: 210 MG/DL (ref 70–99)
GLUCOSE BLD-MCNC: 271 MG/DL (ref 70–99)
GLUCOSE SERPL-MCNC: 198 MG/DL (ref 70–99)
HCT VFR BLD AUTO: 23 % (ref 40.5–52.5)
HGB BLD-MCNC: 7.9 G/DL (ref 13.5–17.5)
LYMPHOCYTES # BLD: 0.8 K/UL (ref 1–5.1)
LYMPHOCYTES NFR BLD: 18.5 %
MAGNESIUM SERPL-MCNC: 1.75 MG/DL (ref 1.8–2.4)
MCH RBC QN AUTO: 27.7 PG (ref 26–34)
MCHC RBC AUTO-ENTMCNC: 34.5 G/DL (ref 31–36)
MCV RBC AUTO: 80.3 FL (ref 80–100)
MONOCYTES # BLD: 0.5 K/UL (ref 0–1.3)
MONOCYTES NFR BLD: 10.9 %
NEUTROPHILS # BLD: 2.9 K/UL (ref 1.7–7.7)
NEUTROPHILS NFR BLD: 67 %
PERFORMED ON: ABNORMAL
PHOSPHATE SERPL-MCNC: 2.7 MG/DL (ref 2.5–4.9)
PLATELET # BLD AUTO: 195 K/UL (ref 135–450)
PMV BLD AUTO: 6.8 FL (ref 5–10.5)
POTASSIUM SERPL-SCNC: 4.5 MMOL/L (ref 3.5–5.1)
RBC # BLD AUTO: 2.87 M/UL (ref 4.2–5.9)
SODIUM SERPL-SCNC: 137 MMOL/L (ref 136–145)
WBC # BLD AUTO: 4.4 K/UL (ref 4–11)

## 2025-08-31 PROCEDURE — 6360000002 HC RX W HCPCS

## 2025-08-31 PROCEDURE — 6370000000 HC RX 637 (ALT 250 FOR IP)

## 2025-08-31 PROCEDURE — 83735 ASSAY OF MAGNESIUM: CPT

## 2025-08-31 PROCEDURE — 85025 COMPLETE CBC W/AUTO DIFF WBC: CPT

## 2025-08-31 PROCEDURE — 80069 RENAL FUNCTION PANEL: CPT

## 2025-08-31 PROCEDURE — 1200000000 HC SEMI PRIVATE

## 2025-08-31 PROCEDURE — 2500000003 HC RX 250 WO HCPCS: Performed by: INTERNAL MEDICINE

## 2025-08-31 PROCEDURE — 2580000003 HC RX 258

## 2025-08-31 RX ORDER — INSULIN GLARGINE 100 [IU]/ML
15 INJECTION, SOLUTION SUBCUTANEOUS 2 TIMES DAILY
Status: DISCONTINUED | OUTPATIENT
Start: 2025-08-31 | End: 2025-09-02

## 2025-08-31 RX ORDER — LANOLIN ALCOHOL/MO/W.PET/CERES
400 CREAM (GRAM) TOPICAL DAILY
Status: DISCONTINUED | OUTPATIENT
Start: 2025-08-31 | End: 2025-08-31

## 2025-08-31 RX ADMIN — SODIUM CHLORIDE, PRESERVATIVE FREE 10 ML: 5 INJECTION INTRAVENOUS at 23:45

## 2025-08-31 RX ADMIN — INSULIN GLARGINE 15 UNITS: 100 INJECTION, SOLUTION SUBCUTANEOUS at 09:46

## 2025-08-31 RX ADMIN — APIXABAN 5 MG: 5 TABLET, FILM COATED ORAL at 20:03

## 2025-08-31 RX ADMIN — INSULIN GLARGINE 15 UNITS: 100 INJECTION, SOLUTION SUBCUTANEOUS at 20:01

## 2025-08-31 RX ADMIN — AMLODIPINE BESYLATE 5 MG: 5 TABLET ORAL at 09:46

## 2025-08-31 RX ADMIN — PIPERACILLIN AND TAZOBACTAM 3375 MG: 3; .375 INJECTION, POWDER, LYOPHILIZED, FOR SOLUTION INTRAVENOUS at 06:06

## 2025-08-31 RX ADMIN — Medication 400 MG: at 09:46

## 2025-08-31 RX ADMIN — INSULIN LISPRO 8 UNITS: 100 INJECTION, SOLUTION INTRAVENOUS; SUBCUTANEOUS at 17:26

## 2025-08-31 RX ADMIN — HEPARIN SODIUM 5000 UNITS: 5000 INJECTION, SOLUTION INTRAVENOUS; SUBCUTANEOUS at 20:02

## 2025-08-31 RX ADMIN — INSULIN LISPRO 4 UNITS: 100 INJECTION, SOLUTION INTRAVENOUS; SUBCUTANEOUS at 17:26

## 2025-08-31 RX ADMIN — INSULIN LISPRO 8 UNITS: 100 INJECTION, SOLUTION INTRAVENOUS; SUBCUTANEOUS at 12:49

## 2025-08-31 RX ADMIN — INSULIN LISPRO 4 UNITS: 100 INJECTION, SOLUTION INTRAVENOUS; SUBCUTANEOUS at 20:02

## 2025-08-31 RX ADMIN — ROSUVASTATIN CALCIUM 10 MG: 10 TABLET, FILM COATED ORAL at 09:46

## 2025-08-31 RX ADMIN — METOPROLOL SUCCINATE 150 MG: 100 TABLET, EXTENDED RELEASE ORAL at 09:45

## 2025-08-31 RX ADMIN — ACETAMINOPHEN 650 MG: 325 TABLET ORAL at 20:03

## 2025-08-31 RX ADMIN — PIPERACILLIN AND TAZOBACTAM 3375 MG: 3; .375 INJECTION, POWDER, LYOPHILIZED, FOR SOLUTION INTRAVENOUS at 23:49

## 2025-08-31 RX ADMIN — HEPARIN SODIUM 5000 UNITS: 5000 INJECTION, SOLUTION INTRAVENOUS; SUBCUTANEOUS at 12:49

## 2025-08-31 RX ADMIN — HEPARIN SODIUM 5000 UNITS: 5000 INJECTION, SOLUTION INTRAVENOUS; SUBCUTANEOUS at 06:00

## 2025-08-31 RX ADMIN — INSULIN LISPRO 8 UNITS: 100 INJECTION, SOLUTION INTRAVENOUS; SUBCUTANEOUS at 09:47

## 2025-08-31 RX ADMIN — INSULIN LISPRO 5 UNITS: 100 INJECTION, SOLUTION INTRAVENOUS; SUBCUTANEOUS at 09:46

## 2025-08-31 RX ADMIN — PIPERACILLIN AND TAZOBACTAM 3375 MG: 3; .375 INJECTION, POWDER, LYOPHILIZED, FOR SOLUTION INTRAVENOUS at 12:56

## 2025-08-31 RX ADMIN — PANTOPRAZOLE SODIUM 40 MG: 40 TABLET, DELAYED RELEASE ORAL at 09:46

## 2025-08-31 RX ADMIN — APIXABAN 5 MG: 5 TABLET, FILM COATED ORAL at 09:46

## 2025-08-31 ASSESSMENT — PAIN SCALES - GENERAL: PAINLEVEL_OUTOF10: 2

## 2025-09-01 LAB
ALBUMIN SERPL-MCNC: 2.8 G/DL (ref 3.4–5)
ANION GAP SERPL CALCULATED.3IONS-SCNC: 7 MMOL/L (ref 3–16)
BASOPHILS # BLD: 0 K/UL (ref 0–0.2)
BASOPHILS NFR BLD: 0 %
BUN SERPL-MCNC: 18 MG/DL (ref 7–20)
CALCIUM SERPL-MCNC: 8.9 MG/DL (ref 8.3–10.6)
CHLORIDE SERPL-SCNC: 105 MMOL/L (ref 99–110)
CO2 SERPL-SCNC: 25 MMOL/L (ref 21–32)
CREAT SERPL-MCNC: 1.4 MG/DL (ref 0.9–1.3)
DEPRECATED RDW RBC AUTO: 14.1 % (ref 12.4–15.4)
EOSINOPHIL # BLD: 0.2 K/UL (ref 0–0.6)
EOSINOPHIL NFR BLD: 3 %
GFR SERPLBLD CREATININE-BSD FMLA CKD-EPI: 60 ML/MIN/{1.73_M2}
GLUCOSE BLD-MCNC: 194 MG/DL (ref 70–99)
GLUCOSE BLD-MCNC: 228 MG/DL (ref 70–99)
GLUCOSE BLD-MCNC: 239 MG/DL (ref 70–99)
GLUCOSE BLD-MCNC: 281 MG/DL (ref 70–99)
GLUCOSE SERPL-MCNC: 194 MG/DL (ref 70–99)
HCT VFR BLD AUTO: 23.7 % (ref 40.5–52.5)
HGB BLD-MCNC: 8.1 G/DL (ref 13.5–17.5)
LYMPHOCYTES # BLD: 0.7 K/UL (ref 1–5.1)
LYMPHOCYTES NFR BLD: 13 %
MAGNESIUM SERPL-MCNC: 1.77 MG/DL (ref 1.8–2.4)
MCH RBC QN AUTO: 27.5 PG (ref 26–34)
MCHC RBC AUTO-ENTMCNC: 34.2 G/DL (ref 31–36)
MCV RBC AUTO: 80.4 FL (ref 80–100)
METAMYELOCYTES NFR BLD MANUAL: 1 %
MONOCYTES # BLD: 0.3 K/UL (ref 0–1.3)
MONOCYTES NFR BLD: 6 %
NEUTROPHILS # BLD: 4 K/UL (ref 1.7–7.7)
NEUTROPHILS NFR BLD: 76 %
PERFORMED ON: ABNORMAL
PHOSPHATE SERPL-MCNC: 2.8 MG/DL (ref 2.5–4.9)
PLATELET # BLD AUTO: 218 K/UL (ref 135–450)
PMV BLD AUTO: 6.5 FL (ref 5–10.5)
POTASSIUM SERPL-SCNC: 4.4 MMOL/L (ref 3.5–5.1)
RBC # BLD AUTO: 2.95 M/UL (ref 4.2–5.9)
SODIUM SERPL-SCNC: 137 MMOL/L (ref 136–145)
VARIANT LYMPHS NFR BLD MANUAL: 1 % (ref 0–6)
WBC # BLD AUTO: 5.2 K/UL (ref 4–11)

## 2025-09-01 PROCEDURE — 2580000003 HC RX 258

## 2025-09-01 PROCEDURE — 80069 RENAL FUNCTION PANEL: CPT

## 2025-09-01 PROCEDURE — 85025 COMPLETE CBC W/AUTO DIFF WBC: CPT

## 2025-09-01 PROCEDURE — 6370000000 HC RX 637 (ALT 250 FOR IP)

## 2025-09-01 PROCEDURE — 83735 ASSAY OF MAGNESIUM: CPT

## 2025-09-01 PROCEDURE — 1200000000 HC SEMI PRIVATE

## 2025-09-01 PROCEDURE — 6360000002 HC RX W HCPCS

## 2025-09-01 RX ORDER — MAGNESIUM SULFATE IN WATER 40 MG/ML
2000 INJECTION, SOLUTION INTRAVENOUS ONCE
Status: COMPLETED | OUTPATIENT
Start: 2025-09-01 | End: 2025-09-01

## 2025-09-01 RX ORDER — LANOLIN ALCOHOL/MO/W.PET/CERES
400 CREAM (GRAM) TOPICAL ONCE
Status: COMPLETED | OUTPATIENT
Start: 2025-09-01 | End: 2025-09-01

## 2025-09-01 RX ADMIN — PANTOPRAZOLE SODIUM 40 MG: 40 TABLET, DELAYED RELEASE ORAL at 08:04

## 2025-09-01 RX ADMIN — Medication 400 MG: at 08:05

## 2025-09-01 RX ADMIN — INSULIN LISPRO 8 UNITS: 100 INJECTION, SOLUTION INTRAVENOUS; SUBCUTANEOUS at 12:49

## 2025-09-01 RX ADMIN — INSULIN LISPRO 4 UNITS: 100 INJECTION, SOLUTION INTRAVENOUS; SUBCUTANEOUS at 12:50

## 2025-09-01 RX ADMIN — APIXABAN 5 MG: 5 TABLET, FILM COATED ORAL at 22:49

## 2025-09-01 RX ADMIN — MAGNESIUM SULFATE HEPTAHYDRATE 2000 MG: 40 INJECTION, SOLUTION INTRAVENOUS at 10:36

## 2025-09-01 RX ADMIN — INSULIN LISPRO 8 UNITS: 100 INJECTION, SOLUTION INTRAVENOUS; SUBCUTANEOUS at 17:30

## 2025-09-01 RX ADMIN — INSULIN LISPRO 4 UNITS: 100 INJECTION, SOLUTION INTRAVENOUS; SUBCUTANEOUS at 22:49

## 2025-09-01 RX ADMIN — METOPROLOL SUCCINATE 150 MG: 100 TABLET, EXTENDED RELEASE ORAL at 08:04

## 2025-09-01 RX ADMIN — INSULIN LISPRO 4 UNITS: 100 INJECTION, SOLUTION INTRAVENOUS; SUBCUTANEOUS at 08:05

## 2025-09-01 RX ADMIN — APIXABAN 5 MG: 5 TABLET, FILM COATED ORAL at 08:05

## 2025-09-01 RX ADMIN — INSULIN GLARGINE 15 UNITS: 100 INJECTION, SOLUTION SUBCUTANEOUS at 22:48

## 2025-09-01 RX ADMIN — PIPERACILLIN AND TAZOBACTAM 3375 MG: 3; .375 INJECTION, POWDER, LYOPHILIZED, FOR SOLUTION INTRAVENOUS at 07:09

## 2025-09-01 RX ADMIN — ROSUVASTATIN CALCIUM 10 MG: 10 TABLET, FILM COATED ORAL at 08:05

## 2025-09-01 RX ADMIN — INSULIN GLARGINE 15 UNITS: 100 INJECTION, SOLUTION SUBCUTANEOUS at 08:05

## 2025-09-01 RX ADMIN — PIPERACILLIN AND TAZOBACTAM 3375 MG: 3; .375 INJECTION, POWDER, LYOPHILIZED, FOR SOLUTION INTRAVENOUS at 14:38

## 2025-09-01 RX ADMIN — AMLODIPINE BESYLATE 5 MG: 5 TABLET ORAL at 08:04

## 2025-09-01 RX ADMIN — INSULIN LISPRO 8 UNITS: 100 INJECTION, SOLUTION INTRAVENOUS; SUBCUTANEOUS at 08:05

## 2025-09-01 RX ADMIN — INSULIN LISPRO 8 UNITS: 100 INJECTION, SOLUTION INTRAVENOUS; SUBCUTANEOUS at 17:31

## 2025-09-01 ASSESSMENT — ENCOUNTER SYMPTOMS
SHORTNESS OF BREATH: 0
ABDOMINAL PAIN: 0
ANAL BLEEDING: 0
COUGH: 0
DIARRHEA: 0
ABDOMINAL DISTENTION: 0
APNEA: 0
CONSTIPATION: 0
CHOKING: 0
BLOOD IN STOOL: 0
CHEST TIGHTNESS: 0

## 2025-09-01 ASSESSMENT — PAIN SCALES - GENERAL: PAINLEVEL_OUTOF10: 0

## 2025-09-02 LAB
ALBUMIN SERPL-MCNC: 2.9 G/DL (ref 3.4–5)
ANION GAP SERPL CALCULATED.3IONS-SCNC: 7 MMOL/L (ref 3–16)
BASOPHILS # BLD: 0 K/UL (ref 0–0.2)
BASOPHILS NFR BLD: 0 %
BUN SERPL-MCNC: 17 MG/DL (ref 7–20)
CALCIUM SERPL-MCNC: 9 MG/DL (ref 8.3–10.6)
CHLORIDE SERPL-SCNC: 107 MMOL/L (ref 99–110)
CO2 SERPL-SCNC: 24 MMOL/L (ref 21–32)
CREAT SERPL-MCNC: 1.4 MG/DL (ref 0.9–1.3)
DACRYOCYTES BLD QL SMEAR: ABNORMAL
DEPRECATED RDW RBC AUTO: 14.3 % (ref 12.4–15.4)
EOSINOPHIL # BLD: 0.2 K/UL (ref 0–0.6)
EOSINOPHIL NFR BLD: 4 %
GFR SERPLBLD CREATININE-BSD FMLA CKD-EPI: 60 ML/MIN/{1.73_M2}
GLUCOSE BLD-MCNC: 198 MG/DL (ref 70–99)
GLUCOSE BLD-MCNC: 215 MG/DL (ref 70–99)
GLUCOSE BLD-MCNC: 217 MG/DL (ref 70–99)
GLUCOSE BLD-MCNC: 255 MG/DL (ref 70–99)
GLUCOSE SERPL-MCNC: 203 MG/DL (ref 70–99)
HCT VFR BLD AUTO: 23.6 % (ref 40.5–52.5)
HGB BLD-MCNC: 8 G/DL (ref 13.5–17.5)
LYMPHOCYTES # BLD: 1.3 K/UL (ref 1–5.1)
LYMPHOCYTES NFR BLD: 25 %
MAGNESIUM SERPL-MCNC: 1.83 MG/DL (ref 1.8–2.4)
MCH RBC QN AUTO: 27.4 PG (ref 26–34)
MCHC RBC AUTO-ENTMCNC: 33.7 G/DL (ref 31–36)
MCV RBC AUTO: 81.4 FL (ref 80–100)
METAMYELOCYTES NFR BLD MANUAL: 1 %
MICROCYTES BLD QL SMEAR: ABNORMAL
MONOCYTES # BLD: 0.4 K/UL (ref 0–1.3)
MONOCYTES NFR BLD: 8 %
NEUTROPHILS # BLD: 3.1 K/UL (ref 1.7–7.7)
NEUTROPHILS NFR BLD: 61 %
NRBC BLD-RTO: 2 /100 WBC
OVALOCYTES BLD QL SMEAR: ABNORMAL
PERFORMED ON: ABNORMAL
PHOSPHATE SERPL-MCNC: 3 MG/DL (ref 2.5–4.9)
PLATELET # BLD AUTO: 197 K/UL (ref 135–450)
PMV BLD AUTO: 6.4 FL (ref 5–10.5)
POTASSIUM SERPL-SCNC: 4.5 MMOL/L (ref 3.5–5.1)
RBC # BLD AUTO: 2.9 M/UL (ref 4.2–5.9)
SODIUM SERPL-SCNC: 138 MMOL/L (ref 136–145)
VARIANT LYMPHS NFR BLD MANUAL: 1 % (ref 0–6)
WBC # BLD AUTO: 5 K/UL (ref 4–11)

## 2025-09-02 PROCEDURE — 6370000000 HC RX 637 (ALT 250 FOR IP)

## 2025-09-02 PROCEDURE — 83735 ASSAY OF MAGNESIUM: CPT

## 2025-09-02 PROCEDURE — 1200000000 HC SEMI PRIVATE

## 2025-09-02 PROCEDURE — 2500000003 HC RX 250 WO HCPCS: Performed by: INTERNAL MEDICINE

## 2025-09-02 PROCEDURE — 6360000002 HC RX W HCPCS

## 2025-09-02 PROCEDURE — 99232 SBSQ HOSP IP/OBS MODERATE 35: CPT | Performed by: INTERNAL MEDICINE

## 2025-09-02 PROCEDURE — 80069 RENAL FUNCTION PANEL: CPT

## 2025-09-02 PROCEDURE — 2580000003 HC RX 258

## 2025-09-02 PROCEDURE — 85025 COMPLETE CBC W/AUTO DIFF WBC: CPT

## 2025-09-02 RX ORDER — INSULIN LISPRO 100 [IU]/ML
10 INJECTION, SOLUTION INTRAVENOUS; SUBCUTANEOUS
Status: DISCONTINUED | OUTPATIENT
Start: 2025-09-02 | End: 2025-09-03

## 2025-09-02 RX ORDER — INSULIN GLARGINE 100 [IU]/ML
18 INJECTION, SOLUTION SUBCUTANEOUS 2 TIMES DAILY
Status: DISCONTINUED | OUTPATIENT
Start: 2025-09-02 | End: 2025-09-04 | Stop reason: HOSPADM

## 2025-09-02 RX ADMIN — AMLODIPINE BESYLATE 5 MG: 5 TABLET ORAL at 08:52

## 2025-09-02 RX ADMIN — METOPROLOL SUCCINATE 150 MG: 100 TABLET, EXTENDED RELEASE ORAL at 08:51

## 2025-09-02 RX ADMIN — INSULIN GLARGINE 18 UNITS: 100 INJECTION, SOLUTION SUBCUTANEOUS at 21:37

## 2025-09-02 RX ADMIN — INSULIN LISPRO 8 UNITS: 100 INJECTION, SOLUTION INTRAVENOUS; SUBCUTANEOUS at 08:50

## 2025-09-02 RX ADMIN — INSULIN LISPRO 8 UNITS: 100 INJECTION, SOLUTION INTRAVENOUS; SUBCUTANEOUS at 21:36

## 2025-09-02 RX ADMIN — PANTOPRAZOLE SODIUM 40 MG: 40 TABLET, DELAYED RELEASE ORAL at 08:51

## 2025-09-02 RX ADMIN — PIPERACILLIN AND TAZOBACTAM 3375 MG: 3; .375 INJECTION, POWDER, LYOPHILIZED, FOR SOLUTION INTRAVENOUS at 23:26

## 2025-09-02 RX ADMIN — PIPERACILLIN AND TAZOBACTAM 3375 MG: 3; .375 INJECTION, POWDER, LYOPHILIZED, FOR SOLUTION INTRAVENOUS at 16:27

## 2025-09-02 RX ADMIN — SODIUM CHLORIDE, PRESERVATIVE FREE 10 ML: 5 INJECTION INTRAVENOUS at 08:49

## 2025-09-02 RX ADMIN — INSULIN LISPRO 2 UNITS: 100 INJECTION, SOLUTION INTRAVENOUS; SUBCUTANEOUS at 12:00

## 2025-09-02 RX ADMIN — INSULIN GLARGINE 15 UNITS: 100 INJECTION, SOLUTION SUBCUTANEOUS at 08:50

## 2025-09-02 RX ADMIN — INSULIN LISPRO 10 UNITS: 100 INJECTION, SOLUTION INTRAVENOUS; SUBCUTANEOUS at 12:01

## 2025-09-02 RX ADMIN — SODIUM CHLORIDE, PRESERVATIVE FREE 10 ML: 5 INJECTION INTRAVENOUS at 21:36

## 2025-09-02 RX ADMIN — PIPERACILLIN AND TAZOBACTAM 3375 MG: 3; .375 INJECTION, POWDER, LYOPHILIZED, FOR SOLUTION INTRAVENOUS at 00:16

## 2025-09-02 RX ADMIN — PIPERACILLIN AND TAZOBACTAM 3375 MG: 3; .375 INJECTION, POWDER, LYOPHILIZED, FOR SOLUTION INTRAVENOUS at 07:00

## 2025-09-02 RX ADMIN — APIXABAN 5 MG: 5 TABLET, FILM COATED ORAL at 21:36

## 2025-09-02 RX ADMIN — INSULIN LISPRO 4 UNITS: 100 INJECTION, SOLUTION INTRAVENOUS; SUBCUTANEOUS at 18:53

## 2025-09-02 RX ADMIN — APIXABAN 5 MG: 5 TABLET, FILM COATED ORAL at 08:51

## 2025-09-02 RX ADMIN — INSULIN LISPRO 10 UNITS: 100 INJECTION, SOLUTION INTRAVENOUS; SUBCUTANEOUS at 18:53

## 2025-09-02 RX ADMIN — INSULIN LISPRO 4 UNITS: 100 INJECTION, SOLUTION INTRAVENOUS; SUBCUTANEOUS at 08:51

## 2025-09-02 RX ADMIN — ROSUVASTATIN CALCIUM 10 MG: 10 TABLET, FILM COATED ORAL at 08:51

## 2025-09-02 ASSESSMENT — ENCOUNTER SYMPTOMS
SHORTNESS OF BREATH: 0
ANAL BLEEDING: 0
BLOOD IN STOOL: 0
APNEA: 0
DIARRHEA: 0
ABDOMINAL DISTENTION: 0
ABDOMINAL PAIN: 0
CHOKING: 0
CONSTIPATION: 0
CHEST TIGHTNESS: 0

## 2025-09-02 ASSESSMENT — PAIN SCALES - GENERAL
PAINLEVEL_OUTOF10: 0

## 2025-09-03 LAB
ALBUMIN SERPL-MCNC: 2.6 G/DL (ref 3.4–5)
ANION GAP SERPL CALCULATED.3IONS-SCNC: 7 MMOL/L (ref 3–16)
BASOPHILS # BLD: 0 K/UL (ref 0–0.2)
BASOPHILS NFR BLD: 1.1 %
BUN SERPL-MCNC: 13 MG/DL (ref 7–20)
CALCIUM SERPL-MCNC: 6.8 MG/DL (ref 8.3–10.6)
CHLORIDE SERPL-SCNC: 113 MMOL/L (ref 99–110)
CO2 SERPL-SCNC: 19 MMOL/L (ref 21–32)
CREAT SERPL-MCNC: 1.1 MG/DL (ref 0.9–1.3)
DEPRECATED RDW RBC AUTO: 15 % (ref 12.4–15.4)
EOSINOPHIL # BLD: 0.1 K/UL (ref 0–0.6)
EOSINOPHIL NFR BLD: 2.3 %
GFR SERPLBLD CREATININE-BSD FMLA CKD-EPI: 80 ML/MIN/{1.73_M2}
GLUCOSE BLD-MCNC: 176 MG/DL (ref 70–99)
GLUCOSE BLD-MCNC: 187 MG/DL (ref 70–99)
GLUCOSE BLD-MCNC: 195 MG/DL (ref 70–99)
GLUCOSE BLD-MCNC: 197 MG/DL (ref 70–99)
GLUCOSE BLD-MCNC: 199 MG/DL (ref 70–99)
GLUCOSE SERPL-MCNC: 185 MG/DL (ref 70–99)
HCT VFR BLD AUTO: 25 % (ref 40.5–52.5)
HGB BLD-MCNC: 8.4 G/DL (ref 13.5–17.5)
LYMPHOCYTES # BLD: 0.7 K/UL (ref 1–5.1)
LYMPHOCYTES NFR BLD: 16 %
MAGNESIUM SERPL-MCNC: 1.16 MG/DL (ref 1.8–2.4)
MCH RBC QN AUTO: 27.3 PG (ref 26–34)
MCHC RBC AUTO-ENTMCNC: 33.5 G/DL (ref 31–36)
MCV RBC AUTO: 81.6 FL (ref 80–100)
MONOCYTES # BLD: 0.3 K/UL (ref 0–1.3)
MONOCYTES NFR BLD: 7.6 %
NEUTROPHILS # BLD: 3.3 K/UL (ref 1.7–7.7)
NEUTROPHILS NFR BLD: 73 %
PERFORMED ON: ABNORMAL
PHOSPHATE SERPL-MCNC: 2.1 MG/DL (ref 2.5–4.9)
PLATELET # BLD AUTO: 165 K/UL (ref 135–450)
PMV BLD AUTO: 6.6 FL (ref 5–10.5)
POTASSIUM SERPL-SCNC: 3.7 MMOL/L (ref 3.5–5.1)
RBC # BLD AUTO: 3.06 M/UL (ref 4.2–5.9)
SODIUM SERPL-SCNC: 139 MMOL/L (ref 136–145)
WBC # BLD AUTO: 4.5 K/UL (ref 4–11)

## 2025-09-03 PROCEDURE — 6360000002 HC RX W HCPCS: Performed by: STUDENT IN AN ORGANIZED HEALTH CARE EDUCATION/TRAINING PROGRAM

## 2025-09-03 PROCEDURE — 99232 SBSQ HOSP IP/OBS MODERATE 35: CPT | Performed by: INTERNAL MEDICINE

## 2025-09-03 PROCEDURE — 2580000003 HC RX 258

## 2025-09-03 PROCEDURE — 85025 COMPLETE CBC W/AUTO DIFF WBC: CPT

## 2025-09-03 PROCEDURE — 80069 RENAL FUNCTION PANEL: CPT

## 2025-09-03 PROCEDURE — 6370000000 HC RX 637 (ALT 250 FOR IP)

## 2025-09-03 PROCEDURE — 97116 GAIT TRAINING THERAPY: CPT

## 2025-09-03 PROCEDURE — 6370000000 HC RX 637 (ALT 250 FOR IP): Performed by: STUDENT IN AN ORGANIZED HEALTH CARE EDUCATION/TRAINING PROGRAM

## 2025-09-03 PROCEDURE — 6360000002 HC RX W HCPCS

## 2025-09-03 PROCEDURE — 83735 ASSAY OF MAGNESIUM: CPT

## 2025-09-03 PROCEDURE — 97530 THERAPEUTIC ACTIVITIES: CPT

## 2025-09-03 PROCEDURE — 2500000003 HC RX 250 WO HCPCS: Performed by: INTERNAL MEDICINE

## 2025-09-03 PROCEDURE — 1200000000 HC SEMI PRIVATE

## 2025-09-03 RX ORDER — INSULIN GLARGINE 100 [IU]/ML
18 INJECTION, SOLUTION SUBCUTANEOUS 2 TIMES DAILY
Qty: 10 ML | Refills: 3 | Status: SHIPPED | OUTPATIENT
Start: 2025-09-03

## 2025-09-03 RX ORDER — MAGNESIUM SULFATE IN WATER 40 MG/ML
4000 INJECTION, SOLUTION INTRAVENOUS ONCE
Status: COMPLETED | OUTPATIENT
Start: 2025-09-03 | End: 2025-09-03

## 2025-09-03 RX ORDER — INSULIN LISPRO 100 [IU]/ML
12 INJECTION, SOLUTION INTRAVENOUS; SUBCUTANEOUS
Qty: 10 EACH | Refills: 3 | Status: SHIPPED | OUTPATIENT
Start: 2025-09-03

## 2025-09-03 RX ORDER — INSULIN LISPRO 100 [IU]/ML
12 INJECTION, SOLUTION INTRAVENOUS; SUBCUTANEOUS
Status: DISCONTINUED | OUTPATIENT
Start: 2025-09-03 | End: 2025-09-04 | Stop reason: HOSPADM

## 2025-09-03 RX ADMIN — PIPERACILLIN AND TAZOBACTAM 3375 MG: 3; .375 INJECTION, POWDER, LYOPHILIZED, FOR SOLUTION INTRAVENOUS at 08:16

## 2025-09-03 RX ADMIN — ROSUVASTATIN CALCIUM 10 MG: 10 TABLET, FILM COATED ORAL at 08:09

## 2025-09-03 RX ADMIN — PANTOPRAZOLE SODIUM 40 MG: 40 TABLET, DELAYED RELEASE ORAL at 08:10

## 2025-09-03 RX ADMIN — AMLODIPINE BESYLATE 5 MG: 5 TABLET ORAL at 08:10

## 2025-09-03 RX ADMIN — SODIUM CHLORIDE, PRESERVATIVE FREE 10 ML: 5 INJECTION INTRAVENOUS at 21:05

## 2025-09-03 RX ADMIN — INSULIN LISPRO 4 UNITS: 100 INJECTION, SOLUTION INTRAVENOUS; SUBCUTANEOUS at 12:33

## 2025-09-03 RX ADMIN — Medication 2 TABLET: at 16:12

## 2025-09-03 RX ADMIN — APIXABAN 5 MG: 5 TABLET, FILM COATED ORAL at 08:10

## 2025-09-03 RX ADMIN — INSULIN LISPRO 4 UNITS: 100 INJECTION, SOLUTION INTRAVENOUS; SUBCUTANEOUS at 21:06

## 2025-09-03 RX ADMIN — INSULIN GLARGINE 18 UNITS: 100 INJECTION, SOLUTION SUBCUTANEOUS at 08:10

## 2025-09-03 RX ADMIN — APIXABAN 5 MG: 5 TABLET, FILM COATED ORAL at 21:06

## 2025-09-03 RX ADMIN — PIPERACILLIN AND TAZOBACTAM 3375 MG: 3; .375 INJECTION, POWDER, LYOPHILIZED, FOR SOLUTION INTRAVENOUS at 15:14

## 2025-09-03 RX ADMIN — INSULIN GLARGINE 18 UNITS: 100 INJECTION, SOLUTION SUBCUTANEOUS at 21:06

## 2025-09-03 RX ADMIN — INSULIN LISPRO 10 UNITS: 100 INJECTION, SOLUTION INTRAVENOUS; SUBCUTANEOUS at 08:11

## 2025-09-03 RX ADMIN — INSULIN LISPRO 4 UNITS: 100 INJECTION, SOLUTION INTRAVENOUS; SUBCUTANEOUS at 08:10

## 2025-09-03 RX ADMIN — METOPROLOL SUCCINATE 150 MG: 100 TABLET, EXTENDED RELEASE ORAL at 08:09

## 2025-09-03 RX ADMIN — SODIUM CHLORIDE, PRESERVATIVE FREE 10 ML: 5 INJECTION INTRAVENOUS at 08:10

## 2025-09-03 RX ADMIN — PIPERACILLIN AND TAZOBACTAM 3375 MG: 3; .375 INJECTION, POWDER, LYOPHILIZED, FOR SOLUTION INTRAVENOUS at 23:41

## 2025-09-03 RX ADMIN — MAGNESIUM SULFATE HEPTAHYDRATE 4000 MG: 40 INJECTION, SOLUTION INTRAVENOUS at 16:15

## 2025-09-03 RX ADMIN — INSULIN LISPRO 12 UNITS: 100 INJECTION, SOLUTION INTRAVENOUS; SUBCUTANEOUS at 18:06

## 2025-09-03 RX ADMIN — INSULIN LISPRO 12 UNITS: 100 INJECTION, SOLUTION INTRAVENOUS; SUBCUTANEOUS at 12:33

## 2025-09-03 ASSESSMENT — ENCOUNTER SYMPTOMS
ABDOMINAL DISTENTION: 0
CHEST TIGHTNESS: 0
APNEA: 0
DIARRHEA: 0
BLOOD IN STOOL: 0
SHORTNESS OF BREATH: 0
COUGH: 0
CONSTIPATION: 0
ABDOMINAL PAIN: 0
ANAL BLEEDING: 0
CHOKING: 0

## 2025-09-03 ASSESSMENT — PAIN SCALES - GENERAL
PAINLEVEL_OUTOF10: 0

## 2025-09-04 ENCOUNTER — TELEPHONE (OUTPATIENT)
Dept: INFECTIOUS DISEASES | Age: 55
End: 2025-09-04

## 2025-09-04 VITALS
HEIGHT: 71 IN | HEART RATE: 76 BPM | SYSTOLIC BLOOD PRESSURE: 148 MMHG | RESPIRATION RATE: 18 BRPM | WEIGHT: 253.09 LBS | DIASTOLIC BLOOD PRESSURE: 70 MMHG | OXYGEN SATURATION: 98 % | BODY MASS INDEX: 35.43 KG/M2 | TEMPERATURE: 98.6 F

## 2025-09-04 DIAGNOSIS — L08.9 DIABETIC INFECTION OF LEFT FOOT (HCC): Primary | ICD-10-CM

## 2025-09-04 DIAGNOSIS — E11.628 DIABETIC INFECTION OF LEFT FOOT (HCC): Primary | ICD-10-CM

## 2025-09-04 DIAGNOSIS — M86.472 CHRONIC OSTEOMYELITIS OF LEFT FOOT WITH DRAINING SINUS (HCC): ICD-10-CM

## 2025-09-04 LAB
ALBUMIN SERPL-MCNC: 3 G/DL (ref 3.4–5)
ANION GAP SERPL CALCULATED.3IONS-SCNC: 8 MMOL/L (ref 3–16)
BASOPHILS # BLD: 0 K/UL (ref 0–0.2)
BASOPHILS NFR BLD: 0 %
BUN SERPL-MCNC: 19 MG/DL (ref 7–20)
CALCIUM SERPL-MCNC: 9.1 MG/DL (ref 8.3–10.6)
CHLORIDE SERPL-SCNC: 105 MMOL/L (ref 99–110)
CO2 SERPL-SCNC: 24 MMOL/L (ref 21–32)
CREAT SERPL-MCNC: 1.5 MG/DL (ref 0.9–1.3)
DACRYOCYTES BLD QL SMEAR: ABNORMAL
DEPRECATED RDW RBC AUTO: 14.9 % (ref 12.4–15.4)
EOSINOPHIL # BLD: 0.2 K/UL (ref 0–0.6)
EOSINOPHIL NFR BLD: 3 %
GFR SERPLBLD CREATININE-BSD FMLA CKD-EPI: 55 ML/MIN/{1.73_M2}
GLUCOSE BLD-MCNC: 219 MG/DL (ref 70–99)
GLUCOSE SERPL-MCNC: 217 MG/DL (ref 70–99)
HCT VFR BLD AUTO: 24.5 % (ref 40.5–52.5)
HGB BLD-MCNC: 8.2 G/DL (ref 13.5–17.5)
LYMPHOCYTES # BLD: 1.2 K/UL (ref 1–5.1)
LYMPHOCYTES NFR BLD: 20 %
MAGNESIUM SERPL-MCNC: 2.06 MG/DL (ref 1.8–2.4)
MCH RBC QN AUTO: 27.4 PG (ref 26–34)
MCHC RBC AUTO-ENTMCNC: 33.6 G/DL (ref 31–36)
MCV RBC AUTO: 81.4 FL (ref 80–100)
MICROCYTES BLD QL SMEAR: ABNORMAL
MONOCYTES # BLD: 0.1 K/UL (ref 0–1.3)
MONOCYTES NFR BLD: 1 %
NEUTROPHILS # BLD: 4.2 K/UL (ref 1.7–7.7)
NEUTROPHILS NFR BLD: 75 %
OVALOCYTES BLD QL SMEAR: ABNORMAL
PERFORMED ON: ABNORMAL
PHOSPHATE SERPL-MCNC: 3.7 MG/DL (ref 2.5–4.9)
PLATELET # BLD AUTO: 192 K/UL (ref 135–450)
PMV BLD AUTO: 6.4 FL (ref 5–10.5)
POTASSIUM SERPL-SCNC: 4.4 MMOL/L (ref 3.5–5.1)
RBC # BLD AUTO: 3.01 M/UL (ref 4.2–5.9)
SODIUM SERPL-SCNC: 137 MMOL/L (ref 136–145)
VARIANT LYMPHS NFR BLD MANUAL: 1 % (ref 0–6)
WBC # BLD AUTO: 5.6 K/UL (ref 4–11)

## 2025-09-04 PROCEDURE — 6370000000 HC RX 637 (ALT 250 FOR IP)

## 2025-09-04 PROCEDURE — 2580000003 HC RX 258

## 2025-09-04 PROCEDURE — 6360000002 HC RX W HCPCS

## 2025-09-04 PROCEDURE — 99232 SBSQ HOSP IP/OBS MODERATE 35: CPT | Performed by: INTERNAL MEDICINE

## 2025-09-04 PROCEDURE — 85025 COMPLETE CBC W/AUTO DIFF WBC: CPT

## 2025-09-04 PROCEDURE — 80069 RENAL FUNCTION PANEL: CPT

## 2025-09-04 PROCEDURE — 83735 ASSAY OF MAGNESIUM: CPT

## 2025-09-04 PROCEDURE — 2500000003 HC RX 250 WO HCPCS: Performed by: INTERNAL MEDICINE

## 2025-09-04 RX ADMIN — AMLODIPINE BESYLATE 5 MG: 5 TABLET ORAL at 08:16

## 2025-09-04 RX ADMIN — INSULIN LISPRO 12 UNITS: 100 INJECTION, SOLUTION INTRAVENOUS; SUBCUTANEOUS at 08:18

## 2025-09-04 RX ADMIN — INSULIN GLARGINE 18 UNITS: 100 INJECTION, SOLUTION SUBCUTANEOUS at 08:17

## 2025-09-04 RX ADMIN — METOPROLOL SUCCINATE 150 MG: 100 TABLET, EXTENDED RELEASE ORAL at 08:16

## 2025-09-04 RX ADMIN — PANTOPRAZOLE SODIUM 40 MG: 40 TABLET, DELAYED RELEASE ORAL at 08:16

## 2025-09-04 RX ADMIN — ROSUVASTATIN CALCIUM 10 MG: 10 TABLET, FILM COATED ORAL at 08:17

## 2025-09-04 RX ADMIN — INSULIN LISPRO 4 UNITS: 100 INJECTION, SOLUTION INTRAVENOUS; SUBCUTANEOUS at 08:17

## 2025-09-04 RX ADMIN — APIXABAN 5 MG: 5 TABLET, FILM COATED ORAL at 08:16

## 2025-09-04 RX ADMIN — PIPERACILLIN AND TAZOBACTAM 3375 MG: 3; .375 INJECTION, POWDER, LYOPHILIZED, FOR SOLUTION INTRAVENOUS at 07:31

## 2025-09-04 RX ADMIN — SODIUM CHLORIDE, PRESERVATIVE FREE 10 ML: 5 INJECTION INTRAVENOUS at 08:17

## 2025-09-04 ASSESSMENT — PAIN SCALES - GENERAL: PAINLEVEL_OUTOF10: 0

## (undated) DEVICE — Device

## (undated) DEVICE — JEWISH HOSPITAL TURNOVER KIT: Brand: MEDLINE INDUSTRIES, INC.

## (undated) DEVICE — COVER TBL W79XL90IN HVY DUTY REINF FAN FLD DISPOSABLE

## (undated) DEVICE — SUTURE ETHILON SZ 3-0 L18IN NONABSORBABLE BLK PS-2 L19MM 3/8 1669H

## (undated) DEVICE — CATH LAB PACK: Brand: MEDLINE INDUSTRIES, INC.

## (undated) DEVICE — PREMIUM WET SKIN PREP TRAY: Brand: MEDLINE INDUSTRIES, INC.

## (undated) DEVICE — HANDPIECE SUCTION TUBING INTERPULSE 10FT

## (undated) DEVICE — 260 CM J TIP WIRE .035

## (undated) DEVICE — STRIP WND PK W0.5INXL5YD IODO GZ TIGHTLY WVN CURAD

## (undated) DEVICE — PAD, DEFIB, ADULT, RADIOTRANS, PHYSIO: Brand: MEDLINE

## (undated) DEVICE — NEEDLE HYPO 16GA L1.5IN PUR POLYPR HUB S STL REG BVL STR

## (undated) DEVICE — GLOVE SURG SZ 8 L12IN FNGR THK79MIL GRN LTX FREE

## (undated) DEVICE — PENCIL SMK EVAC BLADE COAT 70 MM BUTTON SWITCH NEPTUNE E-SEP

## (undated) DEVICE — GLOVE ORANGE PI 7 1/2   MSG9075

## (undated) DEVICE — TIP IRRIG HI FLO BTTRY PWR DISP INTERPULSE

## (undated) DEVICE — CATHETER DIAG 5FR L100CM LUMN ID0.047IN JL3.5 CRV 0 SIDE H

## (undated) DEVICE — SUTURE ETHLN SZ 3-0 L18IN NONABSORBABLE BLK PS-2 L19MM 3/8 1669H

## (undated) DEVICE — PLATE ES AD W 9FT CRD 2

## (undated) DEVICE — GLIDESHEATH SLENDER STAINLESS STEEL KIT: Brand: GLIDESHEATH SLENDER

## (undated) DEVICE — COVER LT HNDL BLU PLAS

## (undated) DEVICE — TRAP FLUID

## (undated) DEVICE — SUTURE CHROMIC GUT SZ 3-0 L27IN ABSRB BRN L26MM SH 1/2 CIR G122H

## (undated) DEVICE — TOWEL,OR,DSP,ST,BLUE,DLX,8/PK,10PK/CS: Brand: MEDLINE

## (undated) DEVICE — TOWEL SURG W17XL27IN BLU COT DLX PREWASHED DELINTED 8 PER PK

## (undated) DEVICE — SOLUTION IV 1000ML 0.9% SOD CHL

## (undated) DEVICE — CATHETER 5FR JR4 MEDTRONIC 100CM

## (undated) DEVICE — BANDAGE COBAN 4 IN COMPR W4INXL5YD FOAM COHESIVE QUIK STK SELF ADH SFT

## (undated) DEVICE — TRAY PREP DRY W/ PREM GLV 2 APPL 6 SPNG 2 UNDPD 1 OVERWRAP

## (undated) DEVICE — SOLUTION IV IRRIG WATER 1000ML POUR BRL 2F7114

## (undated) DEVICE — Z INACTIVE USE 2660664 SOLUTION IRRIG 3000ML 0.9% SOD CHL USP UROMATIC PLAS CONT

## (undated) DEVICE — GLOVE SURG SZ 9 THK91MIL LTX FREE SYN POLYISOPRENE ANTI

## (undated) DEVICE — SUTURE ETHLN SZ 2-0 L18IN NONABSORBABLE BLK L26MM FS 3/8 664G

## (undated) DEVICE — TR BAND RADIAL ARTERY COMPRESSION DEVICE: Brand: TR BAND

## (undated) DEVICE — GLOVE ORANGE PI 8   MSG9080

## (undated) DEVICE — RECTAL: Brand: MEDLINE INDUSTRIES, INC.

## (undated) DEVICE — SYRINGE MED 10ML LUERLOCK TIP W/O SFTY DISP

## (undated) DEVICE — GLOVE ORANGE PI 8 1/2   MSG9085

## (undated) DEVICE — SUTURE VICRYL + SZ 3-0 L27IN ABSRB UD L26MM SH 1/2 CIR VCP416H

## (undated) DEVICE — SKIN SCRUB TRAY PREM WET VLY

## (undated) DEVICE — BASIC SINGLE BASIN 1-LF: Brand: MEDLINE INDUSTRIES, INC.

## (undated) DEVICE — TOWEL MSG G N WVN STP FLAG

## (undated) DEVICE — UNDERGLOVE SURG SZ 8 BLU LTX FREE SYN POLYISOPRENE POLYMER

## (undated) DEVICE — PODIATRY: Brand: MEDLINE INDUSTRIES, INC.

## (undated) DEVICE — MANIFOLD SUCT 4 PRT 2 CANSTR FLTR DISP NEPTUNE 2

## (undated) DEVICE — HIGH FLOW TIP

## (undated) DEVICE — GOWN SURG XL L47IN BLU POLY REINF BRTH FLM SL HK LOOP CLSR

## (undated) DEVICE — PODIATRY PK

## (undated) DEVICE — ZIMMER® STERILE DISPOSABLE TOURNIQUET CUFF, DUAL PORT, SINGLE BLADDER, 18 IN. (46 CM)

## (undated) DEVICE — SOLUTION IRRIG 3000ML 0.9% SOD CHL USP UROMATIC PLAS CONT

## (undated) DEVICE — CURITY STRETCH BANDAGE: Brand: CURITY

## (undated) DEVICE — COAXIAL HIGH FLOW TIP WITH SOFT SHIELD

## (undated) DEVICE — GOWN,SIRUS,POLYRNF,BRTHSLV,XL,30/CS: Brand: MEDLINE

## (undated) DEVICE — CATHETER GUID 6FR L100CM COR PERIPH BLU NYL COAT PTFE LNR 67000400] CORDIS]

## (undated) DEVICE — GAUZE,PACKING STRIP,IODOFORM,1/2"X5YD,ST: Brand: CURAD

## (undated) DEVICE — PADDING,UNDERCAST,COTTON, 4"X4YD STERILE: Brand: MEDLINE

## (undated) DEVICE — SPONGE GZ W4XL4IN COT 12 PLY TYP VII WVN C FLD DSGN STERILE

## (undated) DEVICE — DRESSING PETRO W5XL9IN 3% BISMUTH TRIBROMOPHENATE XRFRM

## (undated) DEVICE — SOL IRR SOD CHL 0.9% TITAN XL CNTNR 3000ML

## (undated) DEVICE — SUTURE VCRL SZ 3-0 L27IN ABSRB UD L26MM CT-2 1/2 CIR J232H

## (undated) DEVICE — SPONGE GZ KERLIX W4.5INXL4.1YD COT 6 PLY OPN WV STRETCHABLE

## (undated) DEVICE — CANISTER NEG PRSS 500ML WND THER W/ TBNG NO PRSS RANG W/

## (undated) DEVICE — KIT DRSG SM INCLUDE TEGDERM DRP GRANUFOAM SENSATRAC PD W/

## (undated) DEVICE — DRESSING WND VAC SM GRANUFOAM SENSATRAC

## (undated) DEVICE — INTENDED FOR TISSUE SEPARATION, AND OTHER PROCEDURES THAT REQUIRE A SHARP SURGICAL BLADE TO PUNCTURE OR CUT.: Brand: BARD-PARKER ® CARBON RIB-BACK BLADES

## (undated) DEVICE — TOWEL,STOP FLAG GOLD N-W: Brand: MEDLINE

## (undated) DEVICE — PACK LAP IV REINF TBL CVR ADH UTIL UNDERBUTTOCK DRP W CUF

## (undated) DEVICE — STANDARD HYPODERMIC NEEDLE,POLYPROPYLENE HUB: Brand: MONOJECT

## (undated) DEVICE — E-Z CLEAN, NON-STICK, PTFE COATED, ELECTROSURGICAL BLADE ELECTRODE, 2.5 INCH (6.35 CM): Brand: EZ CLEAN